# Patient Record
Sex: FEMALE | Race: WHITE | NOT HISPANIC OR LATINO | Employment: OTHER | URBAN - METROPOLITAN AREA
[De-identification: names, ages, dates, MRNs, and addresses within clinical notes are randomized per-mention and may not be internally consistent; named-entity substitution may affect disease eponyms.]

---

## 2017-07-17 ENCOUNTER — APPOINTMENT (OUTPATIENT)
Dept: GENERAL RADIOLOGY | Facility: HOSPITAL | Age: 82
End: 2017-07-17

## 2017-07-17 ENCOUNTER — ANESTHESIA (OUTPATIENT)
Dept: EMERGENCY DEPT | Facility: HOSPITAL | Age: 82
End: 2017-07-17

## 2017-07-17 ENCOUNTER — HOSPITAL ENCOUNTER (INPATIENT)
Facility: HOSPITAL | Age: 82
LOS: 8 days | Discharge: NURSING, REHAB OR LONG-TERM CARE | End: 2017-07-25
Attending: EMERGENCY MEDICINE | Admitting: HOSPITALIST

## 2017-07-17 ENCOUNTER — ANESTHESIA EVENT (OUTPATIENT)
Dept: EMERGENCY DEPT | Facility: HOSPITAL | Age: 82
End: 2017-07-17
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
## 2017-07-17 DIAGNOSIS — Z74.09 IMPAIRED FUNCTIONAL MOBILITY, BALANCE, GAIT, AND ENDURANCE: ICD-10-CM

## 2017-07-17 DIAGNOSIS — M20.021 BOUTONNIERE DEFORMITY OF FINGER OF RIGHT HAND: ICD-10-CM

## 2017-07-17 DIAGNOSIS — Z74.09 IMPAIRED MOBILITY AND ADLS: ICD-10-CM

## 2017-07-17 DIAGNOSIS — D69.3 CHRONIC IDIOPATHIC THROMBOCYTOPENIA (HCC): ICD-10-CM

## 2017-07-17 DIAGNOSIS — Z78.9 IMPAIRED MOBILITY AND ADLS: ICD-10-CM

## 2017-07-17 DIAGNOSIS — M97.01XA PERIPROSTHETIC FRACTURE AROUND INTERNAL PROSTHETIC RIGHT HIP JOINT, INITIAL ENCOUNTER (HCC): ICD-10-CM

## 2017-07-17 PROBLEM — M19.90 ARTHRITIS: Status: ACTIVE | Noted: 2017-07-17

## 2017-07-17 PROBLEM — D69.6 THROMBOCYTOPENIA (HCC): Status: ACTIVE | Noted: 2017-07-17

## 2017-07-17 PROBLEM — S72.91XA CLOSED FRACTURE OF RIGHT FEMUR (HCC): Status: ACTIVE | Noted: 2017-07-17

## 2017-07-17 LAB
ABO GROUP BLD: NORMAL
ALBUMIN SERPL-MCNC: 3.2 G/DL (ref 3.2–4.8)
ALBUMIN/GLOB SERPL: 1.2 G/DL (ref 1.5–2.5)
ALP SERPL-CCNC: 89 U/L (ref 25–100)
ALT SERPL W P-5'-P-CCNC: 15 U/L (ref 7–40)
ANION GAP SERPL CALCULATED.3IONS-SCNC: 5 MMOL/L (ref 3–11)
AST SERPL-CCNC: 20 U/L (ref 0–33)
BASOPHILS # BLD AUTO: 0.01 10*3/MM3 (ref 0–0.2)
BASOPHILS NFR BLD AUTO: 0.2 % (ref 0–1)
BILIRUB SERPL-MCNC: 0.6 MG/DL (ref 0.3–1.2)
BILIRUB UR QL STRIP: NEGATIVE
BLD GP AB SCN SERPL QL: NEGATIVE
BUN BLD-MCNC: 19 MG/DL (ref 9–23)
BUN/CREAT SERPL: 38 (ref 7–25)
CALCIUM SPEC-SCNC: 9 MG/DL (ref 8.7–10.4)
CHLORIDE SERPL-SCNC: 109 MMOL/L (ref 99–109)
CLARITY UR: CLEAR
CO2 SERPL-SCNC: 28 MMOL/L (ref 20–31)
COLOR UR: YELLOW
CREAT BLD-MCNC: 0.5 MG/DL (ref 0.6–1.3)
DEPRECATED RDW RBC AUTO: 52.8 FL (ref 37–54)
EOSINOPHIL # BLD AUTO: 0.01 10*3/MM3 (ref 0–0.3)
EOSINOPHIL NFR BLD AUTO: 0.2 % (ref 0–3)
ERYTHROCYTE [DISTWIDTH] IN BLOOD BY AUTOMATED COUNT: 13.7 % (ref 11.3–14.5)
GFR SERPL CREATININE-BSD FRML MDRD: 117 ML/MIN/1.73
GLOBULIN UR ELPH-MCNC: 2.6 GM/DL
GLUCOSE BLD-MCNC: 103 MG/DL (ref 70–100)
GLUCOSE UR STRIP-MCNC: NEGATIVE MG/DL
HCT VFR BLD AUTO: 34.5 % (ref 34.5–44)
HGB BLD-MCNC: 11.6 G/DL (ref 11.5–15.5)
HGB UR QL STRIP.AUTO: NEGATIVE
IMM GRANULOCYTES # BLD: 0.02 10*3/MM3 (ref 0–0.03)
IMM GRANULOCYTES NFR BLD: 0.3 % (ref 0–0.6)
KETONES UR QL STRIP: ABNORMAL
LEUKOCYTE ESTERASE UR QL STRIP.AUTO: NEGATIVE
LYMPHOCYTES # BLD AUTO: 0.63 10*3/MM3 (ref 0.6–4.8)
LYMPHOCYTES NFR BLD AUTO: 10 % (ref 24–44)
MCH RBC QN AUTO: 35.3 PG (ref 27–31)
MCHC RBC AUTO-ENTMCNC: 33.6 G/DL (ref 32–36)
MCV RBC AUTO: 104.9 FL (ref 80–99)
MONOCYTES # BLD AUTO: 0.46 10*3/MM3 (ref 0–1)
MONOCYTES NFR BLD AUTO: 7.3 % (ref 0–12)
NEUTROPHILS # BLD AUTO: 5.2 10*3/MM3 (ref 1.5–8.3)
NEUTROPHILS NFR BLD AUTO: 82 % (ref 41–71)
NITRITE UR QL STRIP: NEGATIVE
PH UR STRIP.AUTO: 5.5 [PH] (ref 5–8)
PLATELET # BLD AUTO: 62 10*3/MM3 (ref 150–450)
PMV BLD AUTO: 10 FL (ref 6–12)
POTASSIUM BLD-SCNC: 4.5 MMOL/L (ref 3.5–5.5)
PROT SERPL-MCNC: 5.8 G/DL (ref 5.7–8.2)
PROT UR QL STRIP: NEGATIVE
RBC # BLD AUTO: 3.29 10*6/MM3 (ref 3.89–5.14)
RH BLD: NEGATIVE
SODIUM BLD-SCNC: 142 MMOL/L (ref 132–146)
SP GR UR STRIP: 1.02 (ref 1–1.03)
UROBILINOGEN UR QL STRIP: ABNORMAL
WBC NRBC COR # BLD: 6.33 10*3/MM3 (ref 3.5–10.8)

## 2017-07-17 PROCEDURE — 81003 URINALYSIS AUTO W/O SCOPE: CPT | Performed by: EMERGENCY MEDICINE

## 2017-07-17 PROCEDURE — 80053 COMPREHEN METABOLIC PANEL: CPT | Performed by: EMERGENCY MEDICINE

## 2017-07-17 PROCEDURE — 99285 EMERGENCY DEPT VISIT HI MDM: CPT

## 2017-07-17 PROCEDURE — 71010 HC CHEST PA OR AP: CPT

## 2017-07-17 PROCEDURE — 86850 RBC ANTIBODY SCREEN: CPT | Performed by: EMERGENCY MEDICINE

## 2017-07-17 PROCEDURE — 25010000002 HYDROMORPHONE PER 4 MG: Performed by: NURSE PRACTITIONER

## 2017-07-17 PROCEDURE — 25010000002 HYDROMORPHONE PER 4 MG: Performed by: EMERGENCY MEDICINE

## 2017-07-17 PROCEDURE — 73140 X-RAY EXAM OF FINGER(S): CPT

## 2017-07-17 PROCEDURE — 85025 COMPLETE CBC W/AUTO DIFF WBC: CPT | Performed by: EMERGENCY MEDICINE

## 2017-07-17 PROCEDURE — 99223 1ST HOSP IP/OBS HIGH 75: CPT | Performed by: INTERNAL MEDICINE

## 2017-07-17 PROCEDURE — 93005 ELECTROCARDIOGRAM TRACING: CPT | Performed by: EMERGENCY MEDICINE

## 2017-07-17 PROCEDURE — 25010000002 ONDANSETRON PER 1 MG: Performed by: EMERGENCY MEDICINE

## 2017-07-17 PROCEDURE — 86901 BLOOD TYPING SEROLOGIC RH(D): CPT | Performed by: EMERGENCY MEDICINE

## 2017-07-17 PROCEDURE — 73502 X-RAY EXAM HIP UNI 2-3 VIEWS: CPT

## 2017-07-17 PROCEDURE — 86900 BLOOD TYPING SEROLOGIC ABO: CPT | Performed by: EMERGENCY MEDICINE

## 2017-07-17 RX ORDER — HYDROMORPHONE HYDROCHLORIDE 1 MG/ML
0.5 INJECTION, SOLUTION INTRAMUSCULAR; INTRAVENOUS; SUBCUTANEOUS
Status: DISCONTINUED | OUTPATIENT
Start: 2017-07-17 | End: 2017-07-18

## 2017-07-17 RX ORDER — MELOXICAM 15 MG/1
15 TABLET ORAL DAILY
COMMUNITY
End: 2017-07-25 | Stop reason: HOSPADM

## 2017-07-17 RX ORDER — ONDANSETRON 2 MG/ML
4 INJECTION INTRAMUSCULAR; INTRAVENOUS EVERY 6 HOURS PRN
Status: DISCONTINUED | OUTPATIENT
Start: 2017-07-17 | End: 2017-07-25 | Stop reason: HOSPADM

## 2017-07-17 RX ORDER — GABAPENTIN 300 MG/1
300 CAPSULE ORAL 2 TIMES DAILY
COMMUNITY
End: 2017-07-25 | Stop reason: HOSPADM

## 2017-07-17 RX ORDER — ONDANSETRON 2 MG/ML
4 INJECTION INTRAMUSCULAR; INTRAVENOUS ONCE
Status: COMPLETED | OUTPATIENT
Start: 2017-07-17 | End: 2017-07-17

## 2017-07-17 RX ORDER — HYDROMORPHONE HYDROCHLORIDE 1 MG/ML
0.5 INJECTION, SOLUTION INTRAMUSCULAR; INTRAVENOUS; SUBCUTANEOUS
Status: DISPENSED | OUTPATIENT
Start: 2017-07-17 | End: 2017-07-17

## 2017-07-17 RX ORDER — SODIUM CHLORIDE 9 MG/ML
100 INJECTION, SOLUTION INTRAVENOUS CONTINUOUS
Status: DISCONTINUED | OUTPATIENT
Start: 2017-07-17 | End: 2017-07-18

## 2017-07-17 RX ORDER — MELATONIN
1000 DAILY
COMMUNITY

## 2017-07-17 RX ORDER — UBIDECARENONE 75 MG
100 CAPSULE ORAL DAILY
Status: DISCONTINUED | OUTPATIENT
Start: 2017-07-18 | End: 2017-07-20 | Stop reason: DRUGHIGH

## 2017-07-17 RX ORDER — LANOLIN ALCOHOL/MO/W.PET/CERES
50 CREAM (GRAM) TOPICAL DAILY
COMMUNITY
End: 2017-07-17 | Stop reason: DRUGHIGH

## 2017-07-17 RX ORDER — ALBUTEROL SULFATE 90 UG/1
2 AEROSOL, METERED RESPIRATORY (INHALATION) EVERY 4 HOURS PRN
COMMUNITY
End: 2019-06-20

## 2017-07-17 RX ORDER — GABAPENTIN 300 MG/1
300 CAPSULE ORAL 2 TIMES DAILY
Status: DISCONTINUED | OUTPATIENT
Start: 2017-07-17 | End: 2017-07-18

## 2017-07-17 RX ORDER — LANOLIN ALCOHOL/MO/W.PET/CERES
50 CREAM (GRAM) TOPICAL DAILY
Status: DISCONTINUED | OUTPATIENT
Start: 2017-07-18 | End: 2017-07-25 | Stop reason: HOSPADM

## 2017-07-17 RX ORDER — SODIUM CHLORIDE 9 MG/ML
125 INJECTION, SOLUTION INTRAVENOUS CONTINUOUS
Status: DISCONTINUED | OUTPATIENT
Start: 2017-07-17 | End: 2017-07-17

## 2017-07-17 RX ORDER — THIAMINE MONONITRATE (VIT B1) 100 MG
100 TABLET ORAL DAILY
Status: DISCONTINUED | OUTPATIENT
Start: 2017-07-18 | End: 2017-07-25 | Stop reason: HOSPADM

## 2017-07-17 RX ORDER — SODIUM CHLORIDE 0.9 % (FLUSH) 0.9 %
1-10 SYRINGE (ML) INJECTION AS NEEDED
Status: DISCONTINUED | OUTPATIENT
Start: 2017-07-17 | End: 2017-07-25 | Stop reason: HOSPADM

## 2017-07-17 RX ORDER — BUPIVACAINE HYDROCHLORIDE 2.5 MG/ML
INJECTION, SOLUTION EPIDURAL; INFILTRATION; INTRACAUDAL AS NEEDED
Status: SHIPPED | OUTPATIENT
Start: 2017-07-17

## 2017-07-17 RX ORDER — ROPIVACAINE HYDROCHLORIDE 2 MG/ML
10 INJECTION, SOLUTION EPIDURAL; INFILTRATION CONTINUOUS
Status: DISCONTINUED | OUTPATIENT
Start: 2017-07-17 | End: 2017-07-25 | Stop reason: HOSPADM

## 2017-07-17 RX ADMIN — SODIUM CHLORIDE 125 ML/HR: 9 INJECTION, SOLUTION INTRAVENOUS at 14:28

## 2017-07-17 RX ADMIN — HYDROMORPHONE HYDROCHLORIDE 0.5 MG: 1 INJECTION, SOLUTION INTRAMUSCULAR; INTRAVENOUS; SUBCUTANEOUS at 14:26

## 2017-07-17 RX ADMIN — SODIUM CHLORIDE 500 ML: 9 INJECTION, SOLUTION INTRAVENOUS at 14:53

## 2017-07-17 RX ADMIN — BUPIVACAINE HYDROCHLORIDE 40 ML: 2.5 INJECTION, SOLUTION EPIDURAL; INFILTRATION; INTRACAUDAL at 16:25

## 2017-07-17 RX ADMIN — HYDROMORPHONE HYDROCHLORIDE 0.5 MG: 1 INJECTION, SOLUTION INTRAMUSCULAR; INTRAVENOUS; SUBCUTANEOUS at 22:11

## 2017-07-17 RX ADMIN — SODIUM CHLORIDE 125 ML/HR: 9 INJECTION, SOLUTION INTRAVENOUS at 14:53

## 2017-07-17 RX ADMIN — ONDANSETRON 4 MG: 2 INJECTION INTRAMUSCULAR; INTRAVENOUS at 14:26

## 2017-07-17 RX ADMIN — GABAPENTIN 300 MG: 300 CAPSULE ORAL at 23:54

## 2017-07-17 RX ADMIN — SODIUM CHLORIDE 500 ML: 9 INJECTION, SOLUTION INTRAVENOUS at 14:26

## 2017-07-17 NOTE — ED PROVIDER NOTES
Subjective   HPI Comments: Elizabeth Oliveira is a 87 y.o.female who ambulates independently and is typically able to go out to the store without difficulty. She has history of a right sided hip replacement. She presents to the ED today with c/o a fall which occurred just prior to arrival. She reports she attempted sitting down on a stool but missed. This caused her to fall onto her right side. She states she tried catching herself with her right hand but was unsuccessful. She did not hit her head or experience any loss of consciousness. Since, she has developed right sided hip pain radiating into her groin and knee. She states the pain is somewhat intermittent. She also c/o mild right sided neck pain but denies headache, back pain, other extremity pain, abdominal pain, decreased appetite, weight change, or any other complaints at this time.       Patient is a 87 y.o. female presenting with fall.   History provided by:  Patient  Fall   Mechanism of injury: fall    Injury location:  Pelvis  Pelvic injury location:  R hip  Incident location: Store.  Arrived directly from scene: yes    Fall:     Fall occurred:  From a stool    Impact surface:  Hard floor    Point of impact: Right side.    Entrapped after fall: no    Protective equipment: none    Suspicion of alcohol use: no    Suspicion of drug use: no    Prior to arrival data:     Bystander interventions:  None    Patient ambulatory at scene: no      Blood loss:  None    Responsiveness at scene:  Alert    Orientation at scene:  Person, place, situation and time    Loss of consciousness: no      Amnesic to event: no    Associated symptoms: neck pain    Associated symptoms: no abdominal pain, no back pain, no chest pain, no difficulty breathing, no headaches and no loss of consciousness        Review of Systems   Constitutional: Negative for appetite change and unexpected weight change.   Respiratory: Negative for shortness of breath.    Cardiovascular: Negative for  chest pain.   Gastrointestinal: Negative for abdominal pain.   Musculoskeletal: Positive for neck pain. Negative for back pain.        Right hip pain.   Neurological: Negative for loss of consciousness, syncope, weakness and headaches.   All other systems reviewed and are negative.      Past Medical History:   Diagnosis Date   • Depression        Allergies   Allergen Reactions   • Cortisone        Past Surgical History:   Procedure Laterality Date   • HIP SURGERY     • KNEE SURGERY         History reviewed. No pertinent family history.    Social History     Social History   • Marital status: Single     Spouse name: N/A   • Number of children: N/A   • Years of education: N/A     Social History Main Topics   • Smoking status: Former Smoker   • Smokeless tobacco: None   • Alcohol use Yes      Comment: COCKTAIL EVERY NIGHT   • Drug use: No   • Sexual activity: Defer     Other Topics Concern   • None     Social History Narrative   • None         Objective   Physical Exam   Constitutional: She is oriented to person, place, and time. She appears well-developed and well-nourished. No distress.   In some pain.   HENT:   Head: Normocephalic and atraumatic.   Nose: Nose normal.   Mouth/Throat: Oropharynx is clear and moist.   Eyes: Conjunctivae and EOM are normal. Pupils are equal, round, and reactive to light.   Neck: Normal range of motion. Neck supple.   Tender in the right trapezius area.   Cardiovascular: Normal rate, regular rhythm, normal heart sounds and intact distal pulses.    No murmur heard.  Pulmonary/Chest: Effort normal and breath sounds normal. No respiratory distress.   Abdominal: Soft. Bowel sounds are normal. She exhibits no mass. There is no tenderness. There is no rebound and no guarding.   Musculoskeletal: Normal range of motion. She exhibits tenderness. She exhibits no edema.   No TLS tenderness. Pelvis is stable. Both upper extremities have severe arthritic changes in the hands. Right long finger had  boutineer deformity versus swelling of the PIP joint. NV intact. Wrist is non-tender. R hip is tender to palpation with external rotation. Right thigh and knee are non-tender. Foot and ankle non tender. Normal pulses. Sensation intact. Pain with attempt in movement of the right hip area.    Neurological: She is alert and oriented to person, place, and time.   Skin: Skin is warm and dry.   Psychiatric: She has a normal mood and affect. Her behavior is normal.   Nursing note and vitals reviewed.      Procedures         ED Course  ED Course     Recent Results (from the past 24 hour(s))   CBC (No Diff)    Collection Time: 07/18/17  7:32 AM   Result Value Ref Range    WBC 4.70 3.50 - 10.80 10*3/mm3    RBC 2.99 (L) 3.89 - 5.14 10*6/mm3    Hemoglobin 10.7 (L) 11.5 - 15.5 g/dL    Hematocrit 32.9 (L) 34.5 - 44.0 %    .0 (H) 80.0 - 99.0 fL    MCH 35.8 (H) 27.0 - 31.0 pg    MCHC 32.5 32.0 - 36.0 g/dL    RDW 14.0 11.3 - 14.5 %    RDW-SD 56.2 (H) 37.0 - 54.0 fl    MPV 9.9 6.0 - 12.0 fL    Platelets 54 (L) 150 - 450 10*3/mm3   Basic Metabolic Panel    Collection Time: 07/18/17  7:32 AM   Result Value Ref Range    Glucose 94 70 - 100 mg/dL    BUN 12 9 - 23 mg/dL    Creatinine 0.50 (L) 0.60 - 1.30 mg/dL    Sodium 136 132 - 146 mmol/L    Potassium 4.3 3.5 - 5.5 mmol/L    Chloride 106 99 - 109 mmol/L    CO2 26.0 20.0 - 31.0 mmol/L    Calcium 8.8 8.7 - 10.4 mg/dL    eGFR Non African Amer 117 >60 mL/min/1.73    BUN/Creatinine Ratio 24.0 7.0 - 25.0    Anion Gap 4.0 3.0 - 11.0 mmol/L     Note: In addition to lab results from this visit, the labs listed above may include labs taken at another facility or during a different encounter within the last 24 hours. Please correlate lab times with ED admission and discharge times for further clarification of the services performed during this visit.    XR Chest 1 View   Preliminary Result   Underlying chronic changes seen within the lung fields   bilaterally. No evidence of acute  parenchymal disease.       D:  07/17/2017   E:  07/17/2017                  XR Finger 2+ View Right   Final Result   Extensive degenerative changes identified of the proximal   interphalangeal joint with no fracture or dislocation. Gouty arthritis   or inflammatory arthritis cannot be excluded. No cortical destruction.       D:  07/17/2017   E:  07/17/2017       This report was finalized on 7/17/2017 1:44 PM by Dr. Yecenia Coronel MD.          XR Hip With or Without Pelvis 2 - 3 View Right   Final Result   Fracture seen of the proximal right femur with multiple   fracture fragments identified. There is no disruption of the hardware.       D:  07/17/2017   E:  07/17/2017       This report was finalized on 7/17/2017 1:44 PM by Dr. Yecenia Coronel MD.            Vitals:    07/18/17 1500 07/18/17 1530 07/18/17 1547 07/18/17 1845   BP:   99/70 92/55   BP Location:   Left arm Left arm   Patient Position:   Lying Lying   Pulse: 69 69 74    Resp:   16 16   Temp:   99.8 °F (37.7 °C) 98.9 °F (37.2 °C)   TempSrc:   Tympanic Oral   SpO2: 100% 100% 95%    Weight:       Height:         Medications   HYDROmorphone (DILAUDID) injection 0.5 mg (0.5 mg Intravenous Given 7/17/17 1426)   ropivacaine (NAROPIN) 0.2% peripheral nerve cath (moog) 200 mL (10 mL/hr Peripheral Nerve New Bag 7/18/17 1105)   sodium chloride 0.9 % flush 1-10 mL (not administered)   ondansetron (ZOFRAN) injection 4 mg (not administered)   vitamin B-12 (CYANOCOBALAMIN) tablet 100 mcg (100 mcg Oral Given 7/18/17 1042)   vitamin B-6 (PYRIDOXINE) tablet 50 mg (50 mg Oral Given 7/18/17 1041)   sertraline (ZOLOFT) tablet 50 mg (50 mg Oral Given 7/18/17 1042)   thiamine (VITAMIN B-1) tablet 100 mg (100 mg Oral Given 7/18/17 1042)   albuterol (PROVENTIL) nebulizer solution 0.5% 2.5 mg/0.5mL (not administered)   Pharmacy Meds to Bed Consult ( Does not apply Canceled Entry 7/18/17 1212)   oxyCODONE-acetaminophen (PERCOCET) 5-325 MG per tablet 1 tablet (not  administered)   sodium chloride 0.9 % bolus 500 mL (0 mL Intravenous Stopped 7/17/17 1452)   ondansetron (ZOFRAN) injection 4 mg (4 mg Intravenous Given 7/17/17 1426)   sodium chloride 0.9 % bolus 500 mL (500 mL Intravenous New Bag 7/17/17 1453)     ECG/EMG Results (last 24 hours)     ** No results found for the last 24 hours. 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  She has a right periprosthetic hip fracture.  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    In the ER she received IV pain medication.  Patient's request I initially spoke to Dr. Kapadia, who deferred to the on-call orthopedist which was Dr. Bailey.    I relayed the message to Dr. Bailey be his scrub nurse this he was in surgery.    The patient has chronic thrombocytopenia is slightly lower than what she described as her last count and 80,000.    She had no significant head injury with this fall today is not on any long-term anticoagulation.    I spoke with the surgery desk to let them know about the need for a hip block.  When I went back to recheck on the patient again she is already been taken up to the surgical area.    I spoke Dr. Camacho, on-call hospital medicine, will admit the patient.    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  Information recorded by the scribskip was done at my direction and has been verified and validated by me.     Cherry Carrillo  07/17/17 1416       Cherry Carrillo  07/17/17 1420       Michael Barrios MD  07/18/17 0375

## 2017-07-17 NOTE — H&P
[facility] Medicine Services  HISTORY AND PHYSICAL    Primary Care Physician: Eladio Ferrell MD (Inactive)    Subjective     Chief Complaint: Right hip pain    History of Present Illness:     Pt is an 87 y.o.female who fell today on her Right hip after she attempted to sit on a stool and missed. 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 She tried catching herself with her right hand but was unsuccessful and also bruised her Right middle finger. She did not hit her head or experience any loss of consciousness.    Pt c/o of Right sided hip pain radiating into her groin and knee. She has history of a right sided hip replacement with two subsequent Right hip dislocations. At baseline pt ambulates independently and is typically able to go out to the store without difficulty.     ED workup reveals a fracture of the proximal Right femur with multiple fracture fragments; no disruption of hardware. 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    Pt had a peripheral pain block placed on Right prior to inpt admission today    PMHx: chronic idiopathic thrombocytopenia; previous hip replacements on right; left TK replacement; arthritis    Pt does have two glasses of bourbon nightly     RIGHT HIP HISTORY per EMR     (** Progress and Procedure Report from 4/30/15 states pt has a history of adverse anesthesia reaction but pt denies any difficulty with any anaesthesia).     Initial Right total hip replacement performed in 2003 April 2015 pt with 1st dislocation. She underwent a closed reduction of Right hip in ED without issues.    March 2016 pt with 2nd dislocation - right total hip arthroplasty revision done by Dr. Harrington on 03/21/2016.    Pt was transfused with platelets prior to surgery, with her normal platelet count around 50,000. Her platelets remained stable during previous  hospital stay. She was placed on DVT prophylaxis including TEDs and SCDs as well as Lovenox, which were continued postoperatively.     Review of Systems   Constitutional: Positive for activity change. Negative for fever.   Eyes: Negative for visual disturbance.   Respiratory: Negative for cough and shortness of breath.    Cardiovascular: Negative for palpitations and leg swelling.   Gastrointestinal: Negative for constipation (last BM was this morning), rectal pain and vomiting.   Endocrine: Negative for polyuria.   Genitourinary: Negative for dysuria and frequency.   Musculoskeletal: Positive for gait problem.        Right hip, leg pain   Psychiatric/Behavioral: Negative for hallucinations. The patient is not nervous/anxious.       Otherwise complete ROS performed and negative except as mentioned in the HPI.    Past Medical History:   Diagnosis Date   • Depression        Past Surgical History:   Procedure Laterality Date   • HIP SURGERY     • KNEE SURGERY     Bilateral Hips; Left total knee    Family Hx: father COPD. Mother no hx of OA.     Social History     Social History   • Marital status: Single     Spouse name: N/A   • Number of children: N/A   • Years of education: N/A     Occupational History   • Not on file.     Social History Main Topics   • Smoking status: Former Smoker   • Smokeless tobacco: Not on file   • Alcohol use Yes      Comment: COCKTAIL EVERY NIGHT   • Drug use: No   • Sexual activity: Defer     Other Topics Concern   • Not on file     Social History Narrative   • No narrative on file       Medications:  Prescriptions Prior to Admission   Medication Sig Dispense Refill Last Dose   • albuterol (PROAIR HFA) 108 (90 BASE) MCG/ACT inhaler Inhale 2 puffs Every 4 (Four) Hours As Needed for Wheezing.   7/16/2017   • cholecalciferol (VITAMIN D3) 1000 UNITS tablet Take 1,000 Units by mouth Daily.   7/16/2017   • Cyanocobalamin 1000 MCG capsule Take 1 capsule by mouth Daily.   7/16/2017   • gabapentin  "(NEURONTIN) 300 MG capsule Take 300 mg by mouth 2 (Two) Times a Day.   7/17/2017   • meloxicam (MOBIC) 15 MG tablet Take 15 mg by mouth Daily.   7/16/2017   • Multiple Vitamins-Minerals (CENTRUM SILVER PO) Take 1 tablet by mouth Daily.   7/16/2017   • Pyridoxine HCl (VITAMIN B6) 200 MG tablet Take 1 tablet by mouth Daily.   7/16/2017   • sertraline (ZOLOFT) 50 MG tablet Take 50 mg by mouth Daily.   7/16/2017       Allergies:  Allergies   Allergen Reactions   • Cortisone          Objective     Physical Exam:  Vital Signs: /62  Pulse 74  Temp 97.8 °F (36.6 °C)  Resp 16  Ht 62\" (157.5 cm)  Wt 113 lb (51.3 kg)  SpO2 95%  BMI 20.67 kg/m2  Physical Exam   Constitutional: She is oriented to person, place, and time. She is cooperative. No distress.   HENT:   Pt does have dentures   Eyes: EOM are normal. Pupils are equal, round, and reactive to light. Right eye exhibits no discharge. Left eye exhibits no discharge. No scleral icterus.   Pinpoint pupils; symm   Neck: No tracheal deviation present.   Cardiovascular: Regular rhythm.    DHT   Pulmonary/Chest: Effort normal and breath sounds normal. No respiratory distress.   Abdominal: Soft. She exhibits no distension. There is no tenderness.   Musculoskeletal: She exhibits deformity. She exhibits no edema.   Pain Right hip   Right middle finger +bruising +deformity   Neurological: She is alert and oriented to person, place, and time.   Skin: Skin is dry. She is not diaphoretic.   Dry mucous membranes and lips   Psychiatric: She has a normal mood and affect. Her behavior is normal. Thought content normal.       Results Reviewed:    Results from last 7 days  Lab Units 07/17/17  1520   WBC 10*3/mm3 6.33   HEMOGLOBIN g/dL 11.6   PLATELETS 10*3/mm3 62*       Results from last 7 days  Lab Units 07/17/17  1520   SODIUM mmol/L 142   POTASSIUM mmol/L 4.5   CO2 mmol/L 28.0   CREATININE mg/dL 0.50*   GLUCOSE mg/dL 103*   CALCIUM mg/dL 9.0       I have personally reviewed and " "interpreted available lab data, radiology studies and ECG obtained at time of admission.     Assessment / Plan     Problem List:   Hospital Problem List     * (Principal)Closed fracture of right femur    Fall    Arthritis    Thrombocytopenia          Assessment  1. Closed fx of Right prosthetic hip  2. OA  3. Pain 2/2 #1  4. Thrombocytopenia and macrocytosis     Plan:  The patient is admitted to the hospital for further management.  Monitor blood counts   NPO after midnight - anticipating pending ortho surgery   Case mgmt - pt has rehabed at [facility] with previous hip replacements  Pain mgmt/bowel regimen -- anesthesia to be called for inadequate pain control   Incentive spirometer  Antiemetic  Monitor for ETOH withdrawal (has not been an issue on previous hospitalizations)    - has already been typed and screened -   - EKG done -     DVT prophylaxis: amilcar/scd - awaiting procedure    Code Status: FULL CODE - \"If I can be fixed then okay, if not, then so be it.\" Pt wants CPR and Intubation. She does not want a feeding tube. Pt's Power of / Health Care Surrogate is her sister, Arti Poole, who lives in [place]. [phone]    Admission Status: Patient will be admitted to (LEXOBS or LEXINPT)     CAMPBELL Ramirez 07/17/17 8:19 PM     Brief Attending Note       I have seen and examined the patient, performing an independent face-to-face diagnostic evaluation with plan of care reviewed and developed with the advanced practice clinician (APC).       Brief Summary Statement/HPI:   88 yo with h/o chronic thrombocytopenia and bilateral hip replacements presents after fall this afternoon. Ms Oliveira volunteering at [facility] today when she fell, landing on right hip. Plain films show fracture of right proximal femur. Anesthesia has place a nerve catheter for pain control, and the patient will be admitted to the Hospitalist service with Orthopedic consultation.        Attending Physical Exam:  Gen-no acute " distress, non toxic, thin female, mucous membranes slightly dry  CV-RRR, S1 S2 normal, no m/r/g  Resp-CTAB, no wheezes, rhonchi or rales  Abd-soft, Non tender, Non distended, normo active bowel sounds  Ext-No lower extremity cyanosis clubbing or edema bilaterlly  Neuro-some slightly r > left upper  weakness  Psych-overall flat affect   Skin- No rash on exposed UE or LE bilaterally        Brief Assessment/Plan:    Right hip fracture  - discussed case with Orthopedics Dr Bailey, he has kindly agreed to evaluate and make further recommendations  - continue nerve catheter for pain control.    Thrombocytopenia  - secondary to MDS? (data deficient, patient says she follows with Hematology at [facility] - will obtain records)  - Platelets 62 today -- lab review shows platelets of 51 upon similar presentation her in March of 2016    Possible COPD    EtOH use  - thiamine daily    VTE proph: mechanical    For additional information see above per APC which I have reviewed and/or edited.      Edson Camacho MD  07/17/17  10:13 PM

## 2017-07-17 NOTE — ANESTHESIA PROCEDURE NOTES
Peripheral Block    Patient location during procedure: pre-op  Start time: 7/17/2017 4:22 PM  Stop time: 7/17/2017 4:22 PM  Reason for block: procedure for pain and at surgeon's request  Performed by  Anesthesiologist: MARIA ISABEL MARIO  CRNA: NAVEEN NERI  Preanesthetic Checklist  Completed: patient identified, site marked, surgical consent, pre-op evaluation, timeout performed, IV checked, risks and benefits discussed and monitors and equipment checked  Peripheral Block Prep:  Sterile barriers:cap, gloves and mask  Prep: ChloraPrep  Patient monitoring: blood pressure monitoring, continuous pulse oximetry and EKG  Peripheral Procedure  Guidance:ultrasound guided  Images:still images obtained    Laterality:right  Block Type:fascia iliaca catheter  Injection Technique:catheter  Needle Type:echogenic  Needle Gauge:18 G  Catheter Size:20 G (20g)  Medications  Preservative Free Saline:10ml  Local Injected:ropivacaine 0.2% and bupivacaine 0.25% Local Amount Injected:40mL  Post Assessment  Injection Assessment: negative aspiration for heme, no paresthesia on injection and incremental injection  Patient Tolerance:comfortable throughout block  Complications:no  Additional Notes  Procedure:          CATHETER at skin:  15                                Analgesia was achieved with LA      Pt placed in supine position.   The insertion site was prepped in sterile fashion with Chlorapreop and clear plastic drapes.  A B-Matthew 18 g , 4 inch echogenic Touhy needle was advance In-plane under ultrasound guidance. The   Anterior superior Iliac crest was initially visualized and the probe was directed slightly medially and slightly towards the umbilicus.  The course of the needle was tracked over the sartorius muscle through the fascia Iliacus and into the anterior portion of the Iliacus muscle.  Major vessels where identified and avoided as where structures of the peritoneal cavity.  LA injection was made incrementally in 1-5ml  amounts spread was visualized superiorly below fascia iliacus.  Injection was completed with negative aspiration of blood and negative intravascular injection.  Injection pressures where normal or minimal resistance.  A 20 g B-Matthew wire styleted catheter was then advance thru the needle and very easily placed in a superior or cephalad direction.  The catheter was secured at insertion site with skin afix , mastisol, steristreps.  A CHG tegaderm dressing was placed over the insertion site and the nerve catheter labeled and capped.  Thank You.

## 2017-07-18 LAB
ANION GAP SERPL CALCULATED.3IONS-SCNC: 4 MMOL/L (ref 3–11)
BUN BLD-MCNC: 12 MG/DL (ref 9–23)
BUN/CREAT SERPL: 24 (ref 7–25)
CALCIUM SPEC-SCNC: 8.8 MG/DL (ref 8.7–10.4)
CHLORIDE SERPL-SCNC: 106 MMOL/L (ref 99–109)
CO2 SERPL-SCNC: 26 MMOL/L (ref 20–31)
CREAT BLD-MCNC: 0.5 MG/DL (ref 0.6–1.3)
DEPRECATED RDW RBC AUTO: 56.2 FL (ref 37–54)
ERYTHROCYTE [DISTWIDTH] IN BLOOD BY AUTOMATED COUNT: 14 % (ref 11.3–14.5)
GFR SERPL CREATININE-BSD FRML MDRD: 117 ML/MIN/1.73
GLUCOSE BLD-MCNC: 94 MG/DL (ref 70–100)
HCT VFR BLD AUTO: 32.9 % (ref 34.5–44)
HGB BLD-MCNC: 10.7 G/DL (ref 11.5–15.5)
MCH RBC QN AUTO: 35.8 PG (ref 27–31)
MCHC RBC AUTO-ENTMCNC: 32.5 G/DL (ref 32–36)
MCV RBC AUTO: 110 FL (ref 80–99)
PLATELET # BLD AUTO: 54 10*3/MM3 (ref 150–450)
PMV BLD AUTO: 9.9 FL (ref 6–12)
POTASSIUM BLD-SCNC: 4.3 MMOL/L (ref 3.5–5.5)
RBC # BLD AUTO: 2.99 10*6/MM3 (ref 3.89–5.14)
SODIUM BLD-SCNC: 136 MMOL/L (ref 132–146)
WBC NRBC COR # BLD: 4.7 10*3/MM3 (ref 3.5–10.8)

## 2017-07-18 PROCEDURE — 80048 BASIC METABOLIC PNL TOTAL CA: CPT | Performed by: INTERNAL MEDICINE

## 2017-07-18 PROCEDURE — 97163 PT EVAL HIGH COMPLEX 45 MIN: CPT

## 2017-07-18 PROCEDURE — 99231 SBSQ HOSP IP/OBS SF/LOW 25: CPT | Performed by: HOSPITALIST

## 2017-07-18 PROCEDURE — 25010000002 HYDROMORPHONE PER 4 MG: Performed by: NURSE PRACTITIONER

## 2017-07-18 PROCEDURE — 97110 THERAPEUTIC EXERCISES: CPT

## 2017-07-18 PROCEDURE — 25010000002 ROPIVACAINE PER 1 MG: Performed by: ANESTHESIOLOGY

## 2017-07-18 PROCEDURE — 85027 COMPLETE CBC AUTOMATED: CPT | Performed by: INTERNAL MEDICINE

## 2017-07-18 PROCEDURE — 85060 BLOOD SMEAR INTERPRETATION: CPT | Performed by: HOSPITALIST

## 2017-07-18 RX ORDER — OXYCODONE HYDROCHLORIDE AND ACETAMINOPHEN 5; 325 MG/1; MG/1
1 TABLET ORAL EVERY 6 HOURS PRN
Status: DISCONTINUED | OUTPATIENT
Start: 2017-07-18 | End: 2017-07-19

## 2017-07-18 RX ADMIN — PYRIDOXINE HCL TAB 50 MG 50 MG: 50 TAB at 10:41

## 2017-07-18 RX ADMIN — VITAM B12 100 MCG: 100 TAB at 10:42

## 2017-07-18 RX ADMIN — HYDROMORPHONE HYDROCHLORIDE 0.5 MG: 1 INJECTION, SOLUTION INTRAMUSCULAR; INTRAVENOUS; SUBCUTANEOUS at 10:45

## 2017-07-18 RX ADMIN — SERTRALINE HYDROCHLORIDE 50 MG: 50 TABLET ORAL at 10:42

## 2017-07-18 RX ADMIN — SODIUM CHLORIDE 100 ML/HR: 9 INJECTION, SOLUTION INTRAVENOUS at 06:17

## 2017-07-18 RX ADMIN — Medication 100 MG: at 10:42

## 2017-07-18 RX ADMIN — ROPIVACAINE HYDROCHLORIDE 10 ML/HR: 2 INJECTION, SOLUTION EPIDURAL; INFILTRATION at 11:05

## 2017-07-18 RX ADMIN — GABAPENTIN 300 MG: 300 CAPSULE ORAL at 10:42

## 2017-07-18 NOTE — PLAN OF CARE
Problem: Patient Care Overview (Adult)  Goal: Plan of Care Review  Outcome: Ongoing (interventions implemented as appropriate)    07/18/17 4431   Coping/Psychosocial Response Interventions   Plan Of Care Reviewed With patient   Patient Care Overview   Progress progress toward functional goals is gradual   Outcome Evaluation   Outcome Summary/Follow up Plan 88 yo female admitted 7/17/17 w/ right hip fracture s/p fall. Ortho consulted, plan for PT prior to surgical interventions. Pt up w/ therapy to chair today. Continue w/ pain control and encourage ambulation as tolerated. Case mgmt working on placement d/t limited support at home. VSS, tele NSR, tolerating regular diet w/o difficulty.

## 2017-07-18 NOTE — CONSULTS
Requesting Dr. Camacho    Reason right periprosthetic hip fracture    Mrs. Purdy is an extremely pleasant 87-year-old female. 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 The most recent was done by Dr. Rubio slightly over a year ago. She has a press-fit prosthesis and reports to me that she was doing well. She reports to me that she was not using a walker or cane.She was donating her time at [facility] when she fell off of a stool onto her right side. She had pain and difficulty bearing weight. She was brought to the emergency room.She has underlying thrombocytopenia    Past medical history, past surgical history, medications and allergies all reviewed per  chart  14 point review of systems noncontributory other than noted above    Upon physical examination she's an extremely pleasant petite  female in no acute distress. Alert and oriented ×3. Stable vital signs. No evidence of head trauma and good cervical range of motion and although limited.  She has good movement of the upper extremities without evidence of injury  Her pelvis is stable to lateral compression and her abdomen soft and nontender  She has a well-healed incision over the lateral trochanter with some tenderness to touch.  She has discomfort with a straight leg raise  Negative Homans  Distally neurovascularly intact      Plain radiographs demonstrated [place] A minimally displaced intertrochanteric hip fracture surrounding a press-fit revision hip stem. There appears to be no damage to the acetabular component. There is the question of very subtle movement of the tip of the stem within a pedestal.      Assessment;  [place] A minimally displaced periprosthetic intertrochanteric hip fracture surrounding a well-functioning press-fit stem.    Plan;  We can attempt conservative treatment of this fracture in this high risk individual with multiple previous revisions. She would need to be touchdown  weightbearing with a walker. We will mobilize her with physical therapy. She will need serial radiographs to evaluate for any displacement stem. Unfortunately, if we see any migration she would then need a large revision procedure of the femoral stem. I explained this to Elizabeth and answered her questions. She feels comfortable with this plan. We'll continue to follow her through her hospitalization and as an outpatient    Please call with any questions or concerns

## 2017-07-18 NOTE — PLAN OF CARE
Problem: Patient Care Overview (Adult)  Goal: Plan of Care Review  Outcome: Ongoing (interventions implemented as appropriate)    07/18/17 1101   Coping/Psychosocial Response Interventions   Plan Of Care Reviewed With patient   Patient Care Overview   Progress progress toward functional goals is gradual   Outcome Evaluation   Outcome Summary/Follow up Plan Patient able to take 3 steps while maintaining TDWB on R LE and then had to pivot to chair. Reported significant pain with TDWB. Will need SNF upon d/c. Recommend OT consult for ADL and adaptive equipment training. Will continue to progress mobility as able.          Problem: Inpatient Physical Therapy  Goal: Bed Mobility Goal LTG- PT  Outcome: Ongoing (interventions implemented as appropriate)    07/18/17 1101   Bed Mobility PT LTG   Bed Mobility PT LTG, Date Established 07/18/17   Bed Mobility PT LTG, Time to Achieve 5 days   Bed Mobility PT LTG, Activity Type supine to sit/sit to supine   Bed Mobility PT LTG, [place] Level contact guard assist   Bed Mobility PT LTG, Date Goal Reviewed 07/18/17   Bed Mobility PT LTG, Outcome goal ongoing       Goal: Transfer Training Goal 1 LTG- PT  Outcome: Ongoing (interventions implemented as appropriate)    07/18/17 1101   Transfer Training PT LTG   Transfer Training PT LTG, Date Established 07/18/17   Transfer Training PT LTG, Time to Achieve 5 days   Transfer Training PT LTG, Activity Type sit to stand/stand to sit   Transfer Training PT LTG, [place] Level contact guard assist;2 person assist required   Transfer Training PT LTG, Assist Device walker, rolling   Transfer Training PT LTG, Date Goal Reviewed 07/18/17   Transfer Training PT LTG, Outcome goal ongoing       Goal: Gait Training Goal LTG- PT  Outcome: Ongoing (interventions implemented as appropriate)    07/18/17 1101   Gait Training PT LTG   Gait Training Goal PT LTG, Date Established 07/18/17   Gait Training Goal PT LTG, Time to Achieve 5 days   Gait  Training Goal PT LTG, [place] Level minimum assist (75% patient effort);2 person assist required   Gait Training Goal PT LTG, Assist Device walker, rolling   Gait Training Goal PT LTG, Distance to Achieve 50 feet   Gait Training Goal PT LTG, Date Goal Reviewed 07/18/17   Gait Training Goal PT LTG, Outcome goal ongoing

## 2017-07-18 NOTE — PROGRESS NOTES
Discharge Planning Assessment  [facility]     Patient Name: Elizabeth Oliveira  MRN: [id number]  Today's Date: 7/18/2017    Admit Date: 7/17/2017          Discharge Needs Assessment       07/18/17 1614    Living Environment    Lives With alone    Living Arrangements condominium    Home Accessibility bed and bath are not on the first floor;stairs to enter home;stairs within home    Number of Stairs Within Home 14    Stair Railings at Home inside, present at both sides    Type of Financial/Environmental Concern other (see comments)   two level home,lack of accessibility    Transportation Available car;family or friend will provide    Living Environment    Provides Primary Care For no one, unable/limited ability to care for self    Primary Care Provided By other (see comments)   no caregiver, has sister& neice as next of kin    Quality Of Family Relationships unable to assess    Discharge Needs Assessment    Concerns To Be Addressed basic needs concerns;discharge planning concerns    Readmission Within The Last 30 Days no previous admission in last 30 days    Anticipated Changes Related to Illness inability to care for self    Equipment Currently Used at Home cane, straight;raised toilet;bath bench;grab bar;walker, rolling   states she has recently loaned out her equipment to a friend    Equipment Needed After Discharge none    Discharge Facility/Level Of Care Needs nursing facility, skilled    Discharge Disposition skilled nursing facility    Discharge Contact Information if Applicable Arti Poole ( sister & POA) [phone]            Discharge Plan       07/18/17 1621    Case Management/Social Work Plan    Plan to be determined    Patient/Family In Agreement With Plan yes    Additional Comments I met with Ms Oliveira to discuss d/c plan. She is uncertain of her plans. She lives alone  in a two level condo. She has a sister who lives nearby who is also her POA. She states prior to admit she was independent with ADL's,  uses a taxi service for transportation,drives occsionally. She volunteers at [employer]. Her insurance covers Rx meds. We discussed d/c options. Since she has no caregiver,she will most likely need SNF. She is agreeable to a referral to [facility]. Case mgt will follow and assist with final dispostion.        Discharge Placement     No information found        Expected Discharge Date and Time     Expected Discharge Date Expected Discharge Time    Jul 20, 2017               Demographic Summary       07/18/17 1611    Referral Information    Admission Type inpatient    Arrived From home or self-care    Referral Source physician    Reason For Consult discharge planning    Record Reviewed history and physical;medical record    Contact Information    Permission Granted to Share Information With     Primary Care Physician Information    Name Eladio Montoya            Functional Status       07/18/17 1611    Functional Status Prior    Ambulation 0-->independent    Transferring 0-->independent    Toileting 0-->independent    Bathing 0-->independent    Dressing 0-->independent    Eating 0-->independent    Communication 0-->understands/communicates without difficulty    Swallowing 0-->swallows foods/liquids without difficulty    IADL    Medications independent    Meal Preparation independent    Housekeeping independent    Laundry independent    Shopping independent    Oral Care independent    Cognitive/Perceptual/Developmental    Current Mental Status/Cognitive Functioning no deficits noted    Recent Changes in Mental Status/Cognitive Functioning unable to assess    Employment/Financial    Source Of Income social security    Employment/Finance Comments [place] Medicare replacement            Psychosocial     None            Abuse/Neglect     None            Legal     None            Substance Abuse     None            Patient Forms     None          Sonja C Kellerman, HERNÁN

## 2017-07-18 NOTE — PROGRESS NOTES
Acute pain service Inpatient Progress Note    Patient Name: Elizabeth Oliveira  :  1929  MRN:  [id number]        Acute Pain  Service Inpatient Progress Note:    Analgesia:Good  Pain Score:5/10  LOC: alert and awake  Resp Status: room air  Cardiac: VS stable  Side Effects:None  Catheter Site:clean  Cath type: peripheral nerve cath(MOOG pump)  Infusion rate: 10ml/hr  Catheter Plan:Catheter to remain Insitu and Continue catheter infusion rate unchanged  Comments: Patient pleasant but confused

## 2017-07-18 NOTE — PROGRESS NOTES
"      HOSPITALIST DAILY PROGRESS NOTE    Chief Complaint: R Hip pain    Subjective   SUBJECTIVE/OVERNIGHT EVENTS   Pain moderately controlled with peripheral nerve block. No complaints    Review of Systems:  Gen-no fevers, no chills  CV-no chest pain, no palpitations  Resp-no cough, no dyspnea  GI-no N/V/D, no abd pain    Objective   OBJECTIVE   I have reviewed the vital signs.  BP 99/70 (BP Location: Left arm, Patient Position: Lying)  Pulse 74  Temp 99.8 °F (37.7 °C) (Tympanic)   Resp 16  Ht 62\" (157.5 cm)  Wt 113 lb (51.3 kg)  SpO2 95%  BMI 20.67 kg/m2    Physical Exam:  Gen-no acute distress  CV-RRR, S1 S2 normal, no m/r/g  Resp-CTAB, no wheezes  Abd-soft, NT, ND, +BS  Ext-no edema  Neuro-A&Ox3, no focal deficits, bilateral LE sensory and motor intact  Psych-appropriate mood    Results:  I have reviewed the labs, culture data, radiology results, and diagnostic studies.      Results from last 7 days  Lab Units 07/18/17  0732 07/17/17  1520   WBC 10*3/mm3 4.70 6.33   HEMOGLOBIN g/dL 10.7* 11.6   HEMATOCRIT % 32.9* 34.5   PLATELETS 10*3/mm3 54* 62*       Results from last 7 days  Lab Units 07/18/17  0732   SODIUM mmol/L 136   POTASSIUM mmol/L 4.3   CHLORIDE mmol/L 106   CO2 mmol/L 26.0   BUN mg/dL 12   CREATININE mg/dL 0.50*   GLUCOSE mg/dL 94   CALCIUM mg/dL 8.8       I have reviewed the medications.      Assessment/Plan   ASSESSMENT/PLAN    Principal Problem:    Closed fracture of right femur  Active Problems:    Fall    Arthritis    Thrombocytopenia    # Mechanical R hip fracture  - Orthopedics following. Plan for conservative management  - Continue pain control with peripheral nerve block  - IV Dilaudid changed to PO Percocet PRN  - Holding Hip DVT ppx due to thrombocytopenia    # Chronic thrombocytopenia  # Macrocytic anemia  - Peripheral smear pending  - Continue home B6, B12 and thiamine replacements    I expect patient to be discharged in: 3-4 days    Chanelle Tolbert MD  07/18/17  5:04 PM          "

## 2017-07-18 NOTE — THERAPY EVALUATION
Acute Care - Physical Therapy Initial Evaluation  [facility]     Patient Name: Elizabeth Oliveira  : 1929  MRN: [id number]  Today's Date: 2017   Onset of Illness/Injury or Date of Surgery Date: 17  Date of Referral to PT: 17  Referring Physician: MD Lynn      Admit Date: 2017     Visit Dx:    ICD-10-CM ICD-9-CM   1. Fall, initial encounter W19.XXXA E888.9   2. 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 Impaired functional mobility, balance, gait, and endurance Z74.09 V49.89     Patient Active Problem List   Diagnosis   • Fall   • Closed fracture of right femur   • Arthritis   • Thrombocytopenia     Past Medical History:   Diagnosis Date   • Depression      Past Surgical History:   Procedure Laterality Date   • HIP SURGERY     • KNEE SURGERY            PT ASSESSMENT (last 72 hours)      PT Evaluation       17 1614 17 1101    Rehab Evaluation    Document Type  evaluation  -LR    Subjective Information  agree to therapy;complains of;pain  -LR    Patient Effort, Rehab Treatment  good  -LR    Symptoms Noted During/After Treatment  increased pain;significant change in vital signs;other (see comments)  -LR    Symptoms Noted Comment  Pulse ox indicated de-saturation after t/f to chair which persisted after t/f and with donning of supplemental O2. Patient denied SOA and did not seem visibly SOA. Pulse ox did not have good waveform. Notified RN.   -LR    General Information    Patient Profile Review  yes  -LR    Onset of Illness/Injury or Date of Surgery Date  17  -LR    Referring Physician  MD Lynn  -LR    General Observations  Patient supine in bed upon arrival. IV, R fascia iliaca nerve catheter, tele, R LE resting externally rotated.   -LR    Pertinent History Of Current  Problem  Patient presented to ED after she attempted to sit on stool while volunteering at [employer] and missed the stool and fell on her R side. C/o R hip pain and R long finger pain after fall. Patient found to have R hip fx which has been determined to be non-operative per ortho. TDWB.   -LR    Precautions/Limitations  brace on when up;fall precautions;other (see comments)   knee immob when up d/t fascia iliaca cath; TDWB R LE  -LR    Prior Level of Function  independent:;all household mobility;community mobility;gait;transfer;bed mobility;ADL's;home management;cooking;cleaning;driving;shopping;using stairs  -LR    Equipment Currently Used at Home cane, straight;raised toilet;bath bench;grab bar;walker, rolling   states she has recently loaned out her equipment to a friend  -SK cane, straight;raised toilet;bath bench;grab bar;walker, rolling   not using AD; friend currently borrowing raised toilet/RW  -LR    Plans/Goals Discussed With  patient;agreed upon  -LR    Risks Reviewed  patient:;LOB;nausea/vomiting;dizziness;increased discomfort;change in vital signs;lines disloged  -LR    Benefits Reviewed  patient:;improve function;increase independence;increase strength;increase balance;decrease pain;increase knowledge  -LR    Barriers to Rehab  previous functional deficit  -LR    Living Environment    Lives With alone  -SK alone  -LR    Living Arrangements condominium  -SK condominium  -LR    Home Accessibility bed and bath are not on the first floor;stairs to enter home;stairs within home  -SK bed and bath are not on the first floor;house is not wheelchair accessible;stairs to enter home;stairs within home;tub/shower is not walk in;grab bars present (bathtub)  -LR    Number of Stairs to Enter Home  1  -LR    Number of Stairs Within Home 14  -SK 14  -LR    Stair Railings at Home inside, present at both sides  -SK none;inside, present at both sides  -LR    Type of Financial/Environmental Concern other (see comments)    two level home,lack of accessibility  -SK none  -LR    Transportation Available car;family or friend will provide  -SK van, wheelchair accessible  -LR    Living Environment Comment  Patient reports there is no one available to stay and assist upon d/c.   -LR    Clinical Impression    Date of Referral to PT  07/18/17  -LR    PT Diagnosis  R minimally displaced per-prosthetic intertrochanteric hip fx s/p fall  -LR    Prognosis  good  -LR    Patient/Family Goals Statement  decrease pain  -LR    Criteria for Skilled Therapeutic Interventions Met  yes;treatment indicated  -LR    Rehab Potential  good, to achieve stated therapy goals  -LR    Vital Signs    Intra SpO2 (%)  85  -LR    O2 Delivery Intra Treatment  supplemental O2  -LR    Post SpO2 (%)  91  -LR    O2 Delivery Post Treatment  supplemental O2  -LR    Intra Patient Position  Sitting  -LR    Post Patient Position  Sitting  -LR    Pain Assessment    Pain Assessment  0-10  -LR    Pain Score  2  -LR    Post Pain Score  5  -LR    Pain Type  Acute pain  -LR    Pain Location  Hip  -LR    Pain Orientation  Right  -LR    Pain Intervention(s)  Repositioned;Ambulation/increased activity  -LR    Vision Assessment/Intervention    Visual Impairment  WFL  -LR    Cognitive Assessment/Intervention    Current Cognitive/Communication Assessment  functional  -LR    Orientation Status  oriented x 4;required verbal cueing (specifiy in comments)  -LR    Follows Commands/Answers Questions  100% of the time;able to follow single-step instructions;needs cueing;needs repetition  -LR    Personal Safety  WNL/WFL  -LR    ROM (Range of Motion)    General ROM  lower extremity range of motion deficits identified  -LR    General LE Assessment    ROM  LLE ROM was WFL;hip, right: LE ROM deficit  -LR    ROM Detail  R hip AROM imparied 50% d/t pain  -LR    MMT (Manual Muscle Testing)    General MMT Assessment  lower extremity strength deficits identified  -LR    Lower Extremity    Lower Ext Manual  Muscle Testing  left LE strength is WFL;right hip strength deficit  -LR    Lower Ext Manual Muscle Testing Detail  R hip functionally 2/5 d/t pain  -LR    Mobility Assessment/Training    Extremity Weight-Bearing Status  right lower extremity  -LR    Right Lower Extremity Weight-Bearing  touch down weight-bearing  -LR    Bed Mobility, Assessment/Treatment    Bed Mobility, Assistive Device  head of bed elevated;bed rails  -LR    Bed Mob, Supine to Sit, [place]  verbal cues required;minimum assist (75% patient effort);2 person assist required  -LR    Bed Mob, Sit to Supine, [place]  not tested   [facility] at end of eval  -LR    Bed Mobility, Safety Issues  decreased use of arms for pushing/pulling;decreased use of legs for bridging/pushing  -LR    Bed Mobility, Impairments  ROM decreased;strength decreased;pain  -LR    Bed Mobility, Comment  Knee immobilizer donned while supine in bed. Verbal cues to move LEs towards EOB and to push up from bed from behind to raise trunk into sitting. Min assist required to support R LE and at trunk. Denied dizziness upon sitting up. Cues to push from bed to scoot hips out to get feet on floor.   -LR    Transfer Assessment/Treatment    Transfers, Sit-Stand [place]  verbal cues required;moderate assist (50% patient effort);2 person assist required  -LR    Transfers, Stand-Sit [place]  verbal cues required;moderate assist (50% patient effort);2 person assist required  -LR    Transfers, Sit-Stand-Sit, Assist Device  rolling walker  -LR    Transfer, Maintain Weight Bearing Status  able to maintain weight bearing status;cues to maintain weight bearing status  -LR    Transfer, Safety Issues  sequencing ability decreased;step length decreased;weight-shifting ability decreased  -LR    Transfer, Impairments  ROM decreased;strength decreased;pain  -LR    Transfer, Comment  Verbal cues to push up from bed to stand and to reach back for chair to lower into sitting. Verbal cues to  step R LE out before t/f and for TDWB only on R LE.   -LR    Gait Assessment/Treatment    Gait, [place] Level  verbal cues required;moderate assist (50% patient effort);2 person assist required  -LR    Gait, Assistive Device  rolling walker  -LR    Gait, Distance (Feet)  3  -LR    Gait, Gait Deviations  right:;antalgic;forward flexed posture;step length decreased;toe-to-floor clearance decreased;weight-shifting ability decreased  -LR    Gait, Maintain Weight Bearing Status  able to maintain weight bearing status;cues to maintain weight bearing status  -LR    Gait, Safety Issues  sequencing ability decreased;step length decreased;weight-shifting ability decreased  -LR    Gait, Impairments  ROM decreased;strength decreased;pain  -LR    Gait, Comment  Patient took 3 steps maintaining TDWB towards chair and then pivoted to turn in front of chair to sit. Patient reported significant pain with TDWB. Cues for sequencing of steps and weight shifting. Cues for TDWB.   -LR    Therapy Exercises    Right Lower Extremity  AAROM:;10 reps;sitting;ankle pumps/circles;glut sets;heel slides;hip abduction/adduction;quad sets;SLR   cues for technique; mod assist, ROM limited by pain  -LR    Sensory Assessment/Intervention    Sensory Impairment  --   denies numbness/tingling; able to actively DF bilaterally  -LR    Positioning and Restraints    Pre-Treatment Position  in bed  -LR    Post Treatment Position  chair  -LR    In Chair  notified nsg;reclined;sitting;call light within reach;encouraged to call for assist;exit alarm on;on mechanical lift sling;legs elevated  -LR      07/18/17 0800 07/17/17 2391    Muscle Tone Assessment    Muscle Tone Assessment  Bilateral Upper Extremities;LLE;RLE  -AB    Bilateral Upper Extremities Muscle Tone Assessment  associated movements noted  -AB    LLE Muscle Tone Assessment associated movements noted  -JR associated movements noted  -AB    RLE Muscle Tone Assessment severely decreased tone  -JR  severely decreased tone  -AB      07/17/17 1700       General Information    Equipment Currently Used at Home none  -KL     Living Environment    Lives With alone  -KL     Living Arrangements condominium  -     Home Accessibility bed and bath are not on the first floor;bed not on first floor;grab bars present (bathtub);grab bars present (toilet);stairs (2 railings present);stairs within home;tub/shower is not walk in  -     Number of Stairs Within Home 14  -KL     Stair Railings at Home inside, present at both sides  -     Type of Financial/Environmental Concern none  -KL     Transportation Available van, wheelchair accessible  -     Living Environment Comment .  -       User Key  (r) = Recorded By, (t) = Taken By, (c) = Cosigned By    Initials Name Provider Type    LR Tianna Lizarraga, PT Physical Therapist    SK Sonja C Kellerman, RN Case Manager    AB Marlen Hu, RN Registered Nurse    RAFFI Lange RN Registered Nurse    JR Reji Berg, RN Registered Nurse          Physical Therapy Education     Title: PT OT SLP Therapies (Done)     Topic: Physical Therapy (Done)     Point: Mobility training (Done)    Learning Progress Summary    Learner Readiness Method Response Comment Documented by Status   Patient Acceptance KAYLEE BARRON,NR Educated on TDWB status and gait training while maintaining TDWB. Educated on indication for knee immobilizer.  07/18/17 1707 Done               Point: Home exercise program (Done)    Learning Progress Summary    Learner Readiness Method Response Comment Documented by Status   Patient Acceptance KAYLEE BARRON,NR Educated on TDWB status and gait training while maintaining TDWB. Educated on indication for knee immobilizer.  07/18/17 1707 Done               Point: Body mechanics (Done)    Learning Progress Summary    Learner Readiness Method Response Comment Documented by Status   Patient Acceptance KAYLEE BARRON,NR Educated on TDWB status and gait training while maintaining  TDWB. Educated on indication for knee immobilizer.  07/18/17 1707 Done               Point: Precautions (Done)    Learning Progress Summary    Learner Readiness Method Response Comment Documented by Status   Patient Acceptance E,D MARCOS,NR Educated on TDWB status and gait training while maintaining TDWB. Educated on indication for knee immobilizer.  07/18/17 1707 Done                      User Key     Initials Effective Dates Name Provider Type Discipline     06/19/15 -  Tianna Lizarraga, PT Physical Therapist PT                PT Recommendation and Plan  Anticipated Equipment Needs At Discharge: bedside commode  Anticipated Discharge Disposition: skilled nursing facility  Demonstrates Need for Referral to Another Service: occupational therapy  Planned Therapy Interventions: balance training, bed mobility training, gait training, home exercise program, patient/family education, ROM (Range of Motion), strengthening, transfer training  PT Frequency: daily  Plan of Care Review  Plan Of Care Reviewed With: patient  Progress: progress toward functional goals is gradual  Outcome Summary/Follow up Plan: Patient able to take 3 steps while maintaining TDWB on R LE and then had to pivot to chair. Reported significant pain with TDWB. Will need SNF upon d/c. Recommend OT consult for ADL and adaptive equipment training. Will continue to progress mobility as able.           IP PT Goals       07/18/17 1101          Bed Mobility PT LTG    Bed Mobility PT LTG, Date Established 07/18/17  -LR      Bed Mobility PT LTG, Time to Achieve 5 days  -LR      Bed Mobility PT LTG, Activity Type supine to sit/sit to supine  -LR      Bed Mobility PT LTG, [place] Level contact guard assist  -LR      Bed Mobility PT LTG, Date Goal Reviewed 07/18/17  -LR      Bed Mobility PT LTG, Outcome goal ongoing  -LR      Transfer Training PT LTG    Transfer Training PT LTG, Date Established 07/18/17  -LR      Transfer Training PT LTG, Time to  Achieve 5 days  -LR      Transfer Training PT LTG, Activity Type sit to stand/stand to sit  -LR      Transfer Training PT LTG, [place] Level contact guard assist;2 person assist required  -LR      Transfer Training PT LTG, Assist Device walker, rolling  -LR      Transfer Training PT  LTG, Date Goal Reviewed 07/18/17  -LR      Transfer Training PT LTG, Outcome goal ongoing  -LR      Gait Training PT LTG    Gait Training Goal PT LTG, Date Established 07/18/17  -LR      Gait Training Goal PT LTG, Time to Achieve 5 days  -LR      Gait Training Goal PT LTG, [place] Level minimum assist (75% patient effort);2 person assist required  -LR      Gait Training Goal PT LTG, Assist Device walker, rolling  -LR      Gait Training Goal PT LTG, Distance to Achieve 50 feet  -LR      Gait Training Goal PT LTG, Date Goal Reviewed 07/18/17  -LR      Gait Training Goal PT LTG, Outcome goal ongoing  -LR        User Key  (r) = Recorded By, (t) = Taken By, (c) = Cosigned By    Initials Name Provider Type    ANIA Lizarraga, PT Physical Therapist                Outcome Measures       07/18/17 1101          How much help from another person do you currently need...    Turning from your back to your side while in flat bed without using bedrails? 2  -LR      Moving from lying on back to sitting on the side of a flat bed without bedrails? 2  -LR      Moving to and from a bed to a chair (including a wheelchair)? 2  -LR      Standing up from a chair using your arms (e.g., wheelchair, bedside chair)? 2  -LR      Climbing 3-5 steps with a railing? 1  -LR      To walk in hospital room? 2  -LR      AM-PAC 6 Clicks Score 11  -LR      Functional Assessment    Outcome Measure Options AM-PAC 6 Clicks Basic Mobility (PT)  -LR        User Key  (r) = Recorded By, (t) = Taken By, (c) = Cosigned By    Initials Name Provider Type    ANIA Lizarraga PT Physical Therapist           Time Calculation:         PT Charges       07/18/17  1532          Time Calculation    Start Time 1101  -LR      PT Received On 07/18/17  -LR      PT Goal Re-Cert Due Date 07/28/17  -LR      Time Calculation- PT    Total Timed Code Minutes- PT 15 minute(s)  -LR        User Key  (r) = Recorded By, (t) = Taken By, (c) = Cosigned By    Initials Name Provider Type    LR Tianna Lizarraga, PT Physical Therapist          Therapy Charges for Today     Code Description Service Date Service Provider Modifiers Qty    15473043301 HC PT THER PROC EA 15 MIN 7/18/2017 Tianna Lizarraga, PT GP 1    96251947887 HC PT THER SUPP EA 15 MIN 7/18/2017 Tianna Lizarraga, PT GP 3    20378835659 HC PT EVAL HIGH COMPLEXITY 3 7/18/2017 Tianna Lizarraga, PT GP 1          PT G-Codes  Outcome Measure Options: AM-PAC 6 Clicks Basic Mobility (PT)      Tianna Lizarraga, PT  7/18/2017

## 2017-07-19 LAB
ALBUMIN SERPL-MCNC: 3.6 G/DL (ref 3.2–4.8)
ANION GAP SERPL CALCULATED.3IONS-SCNC: 5 MMOL/L (ref 3–11)
BACTERIA UR QL AUTO: ABNORMAL /HPF
BILIRUB UR QL STRIP: NEGATIVE
BUN BLD-MCNC: 13 MG/DL (ref 9–23)
BUN/CREAT SERPL: 21.7 (ref 7–25)
CALCIUM SPEC-SCNC: 9.3 MG/DL (ref 8.7–10.4)
CHLORIDE SERPL-SCNC: 105 MMOL/L (ref 99–109)
CLARITY UR: CLEAR
CO2 SERPL-SCNC: 30 MMOL/L (ref 20–31)
COLOR UR: YELLOW
CREAT BLD-MCNC: 0.6 MG/DL (ref 0.6–1.3)
CYTOLOGIST CVX/VAG CYTO: NORMAL
DEPRECATED RDW RBC AUTO: 54.2 FL (ref 37–54)
ERYTHROCYTE [DISTWIDTH] IN BLOOD BY AUTOMATED COUNT: 13.7 % (ref 11.3–14.5)
GFR SERPL CREATININE-BSD FRML MDRD: 95 ML/MIN/1.73
GLUCOSE BLD-MCNC: 111 MG/DL (ref 70–100)
GLUCOSE BLDC GLUCOMTR-MCNC: 103 MG/DL (ref 70–130)
GLUCOSE UR STRIP-MCNC: NEGATIVE MG/DL
HCT VFR BLD AUTO: 31.7 % (ref 34.5–44)
HGB BLD-MCNC: 10.4 G/DL (ref 11.5–15.5)
HGB UR QL STRIP.AUTO: ABNORMAL
HYALINE CASTS UR QL AUTO: ABNORMAL /LPF
KETONES UR QL STRIP: NEGATIVE
LEUKOCYTE ESTERASE UR QL STRIP.AUTO: NEGATIVE
MCH RBC QN AUTO: 35.6 PG (ref 27–31)
MCHC RBC AUTO-ENTMCNC: 32.8 G/DL (ref 32–36)
MCV RBC AUTO: 108.6 FL (ref 80–99)
NITRITE UR QL STRIP: NEGATIVE
PATH INTERP BLD-IMP: NORMAL
PH UR STRIP.AUTO: 6.5 [PH] (ref 5–8)
PHOSPHATE SERPL-MCNC: 2.4 MG/DL (ref 2.4–5.1)
PLATELET # BLD AUTO: 50 10*3/MM3 (ref 150–450)
PMV BLD AUTO: 10.4 FL (ref 6–12)
POTASSIUM BLD-SCNC: 4.7 MMOL/L (ref 3.5–5.5)
PROT UR QL STRIP: NEGATIVE
RBC # BLD AUTO: 2.92 10*6/MM3 (ref 3.89–5.14)
RBC # UR: ABNORMAL /HPF
REF LAB TEST METHOD: ABNORMAL
SODIUM BLD-SCNC: 140 MMOL/L (ref 132–146)
SP GR UR STRIP: 1.01 (ref 1–1.03)
SQUAMOUS #/AREA URNS HPF: ABNORMAL /HPF
UROBILINOGEN UR QL STRIP: ABNORMAL
WBC NRBC COR # BLD: 5.28 10*3/MM3 (ref 3.5–10.8)
WBC UR QL AUTO: ABNORMAL /HPF

## 2017-07-19 PROCEDURE — 25010000002 THIAMINE PER 100 MG: Performed by: HOSPITALIST

## 2017-07-19 PROCEDURE — 82962 GLUCOSE BLOOD TEST: CPT

## 2017-07-19 PROCEDURE — 97116 GAIT TRAINING THERAPY: CPT

## 2017-07-19 PROCEDURE — 25010000002 LORAZEPAM PER 2 MG: Performed by: HOSPITALIST

## 2017-07-19 PROCEDURE — 25010000002 FONDAPARINUX PER 0.5 MG: Performed by: HOSPITALIST

## 2017-07-19 PROCEDURE — 25010000002 ROPIVACAINE PER 1 MG: Performed by: ANESTHESIOLOGY

## 2017-07-19 PROCEDURE — 97530 THERAPEUTIC ACTIVITIES: CPT

## 2017-07-19 PROCEDURE — 85027 COMPLETE CBC AUTOMATED: CPT | Performed by: HOSPITALIST

## 2017-07-19 PROCEDURE — 97166 OT EVAL MOD COMPLEX 45 MIN: CPT

## 2017-07-19 PROCEDURE — 99233 SBSQ HOSP IP/OBS HIGH 50: CPT | Performed by: HOSPITALIST

## 2017-07-19 PROCEDURE — 81001 URINALYSIS AUTO W/SCOPE: CPT | Performed by: HOSPITALIST

## 2017-07-19 PROCEDURE — 80069 RENAL FUNCTION PANEL: CPT | Performed by: HOSPITALIST

## 2017-07-19 PROCEDURE — 25010000002 MAGNESIUM SULFATE PER 500 MG OF MAGNESIUM: Performed by: HOSPITALIST

## 2017-07-19 RX ORDER — FONDAPARINUX SODIUM 2.5 MG/.5ML
2.5 INJECTION SUBCUTANEOUS EVERY 12 HOURS
Status: DISCONTINUED | OUTPATIENT
Start: 2017-07-19 | End: 2017-07-19

## 2017-07-19 RX ORDER — LORAZEPAM 2 MG/ML
0.5 INJECTION INTRAMUSCULAR
Status: DISCONTINUED | OUTPATIENT
Start: 2017-07-19 | End: 2017-07-20

## 2017-07-19 RX ORDER — LORAZEPAM 2 MG/ML
1 INJECTION INTRAMUSCULAR
Status: DISCONTINUED | OUTPATIENT
Start: 2017-07-19 | End: 2017-07-20

## 2017-07-19 RX ORDER — LORAZEPAM 1 MG/1
1 TABLET ORAL
Status: DISCONTINUED | OUTPATIENT
Start: 2017-07-19 | End: 2017-07-20

## 2017-07-19 RX ORDER — ACETAMINOPHEN 325 MG/1
650 TABLET ORAL EVERY 6 HOURS PRN
Status: DISCONTINUED | OUTPATIENT
Start: 2017-07-19 | End: 2017-07-25 | Stop reason: HOSPADM

## 2017-07-19 RX ORDER — LORAZEPAM 0.5 MG/1
0.5 TABLET ORAL
Status: DISCONTINUED | OUTPATIENT
Start: 2017-07-19 | End: 2017-07-20

## 2017-07-19 RX ORDER — FONDAPARINUX SODIUM 2.5 MG/.5ML
2.5 INJECTION SUBCUTANEOUS
Status: DISCONTINUED | OUTPATIENT
Start: 2017-07-19 | End: 2017-07-25 | Stop reason: HOSPADM

## 2017-07-19 RX ADMIN — FONDAPARINUX SODIUM 2.5 MG: 2.5 INJECTION SUBCUTANEOUS at 20:25

## 2017-07-19 RX ADMIN — LORAZEPAM 1 MG: 2 INJECTION INTRAMUSCULAR; INTRAVENOUS at 16:58

## 2017-07-19 RX ADMIN — LORAZEPAM 1 MG: 2 INJECTION INTRAMUSCULAR; INTRAVENOUS at 17:25

## 2017-07-19 RX ADMIN — SERTRALINE HYDROCHLORIDE 50 MG: 50 TABLET ORAL at 08:58

## 2017-07-19 RX ADMIN — PYRIDOXINE HCL TAB 50 MG 50 MG: 50 TAB at 08:57

## 2017-07-19 RX ADMIN — THIAMINE HYDROCHLORIDE 100 ML/HR: 100 INJECTION, SOLUTION INTRAMUSCULAR; INTRAVENOUS at 18:01

## 2017-07-19 RX ADMIN — VITAM B12 100 MCG: 100 TAB at 08:58

## 2017-07-19 RX ADMIN — Medication 100 MG: at 08:58

## 2017-07-19 RX ADMIN — LORAZEPAM 2 MG: 2 INJECTION INTRAMUSCULAR; INTRAVENOUS at 18:01

## 2017-07-19 RX ADMIN — ROPIVACAINE HYDROCHLORIDE 10 ML/HR: 2 INJECTION, SOLUTION EPIDURAL; INFILTRATION at 05:24

## 2017-07-19 NOTE — PROGRESS NOTES
"      HOSPITALIST DAILY PROGRESS NOTE    Chief Complaint: Hip pain    Subjective   SUBJECTIVE/OVERNIGHT EVENTS   No complaints. Hip pain well-controlled. Did not require additional PO opiate. Ambulated well with PT. Pt thought she heard her sister out in the hallway when seen. Nursing reports pt being oriented but some situational confusion. +Mild dysuria    Review of Systems:  Gen-no fevers, no chills  CV-no chest pain, no palpitations  Resp-no cough, no dyspnea  GI-no N/V/D, no abd pain    Objective   OBJECTIVE   I have reviewed the vital signs.  /68 (BP Location: Left arm, Patient Position: Lying)  Pulse 77  Temp 98.1 °F (36.7 °C) (Oral)   Resp 16  Ht 62\" (157.5 cm)  Wt 113 lb (51.3 kg)  SpO2 96%  BMI 20.67 kg/m2    Physical Exam:  Gen-no acute distress  CV-RRR, S1 S2 normal, no m/r/g  Resp-CTAB, no wheezes  Abd-soft, NT, ND, +BS  Ext-no edema  Neuro-A&Ox3, no focal deficits. Bilateral LE strength 5/5, sensory and motor intact  Psych-appropriate mood    Results:  I have reviewed the labs, culture data, radiology results, and diagnostic studies.      Results from last 7 days  Lab Units 07/19/17  0655 07/18/17  0732 07/17/17  1520   WBC 10*3/mm3 5.28 4.70 6.33   HEMOGLOBIN g/dL 10.4* 10.7* 11.6   HEMATOCRIT % 31.7* 32.9* 34.5   PLATELETS 10*3/mm3 50* 54* 62*       Results from last 7 days  Lab Units 07/19/17  0655   SODIUM mmol/L 140   POTASSIUM mmol/L 4.7   CHLORIDE mmol/L 105   CO2 mmol/L 30.0   BUN mg/dL 13   CREATININE mg/dL 0.60   GLUCOSE mg/dL 111*   CALCIUM mg/dL 9.3       I have reviewed the medications.      Assessment/Plan   ASSESSMENT/PLAN      # Mechanical R hip fracture  - Orthopedics following. Plan for conservative management. FU with Dr. Bailey in 2 weeks for FU XRays. No surgical intervention for now  - Continue pain control with peripheral nerve block. Will remove catheter tomorrow morning to evaluate how pt does with nerve block and PT in anticipation of discharge  - Tylenol PRN for " pain  - Start fondaparinux 2.5mg SQ BID for Hip DVT ppx after discussion with Pharmacy     # Chronic thrombocytopenia  - Peripheral smear shows counts closer to 100,000    # Confusion  # Mild dysuria  - UA neg for infection. No respiratory symptoms to suspect respiratory infection. WBC normal with no fevers  - Medications reviewed   - ?delirium. Consider a low-dose Seroquel if agitated. Will likely improve after DC    # Macrocytic anemia  - Continue home B6, B12 and thiamine replacements    DVT ppx: Fondaparinux  I expect patient to be discharged in: 1 day    Chanelle Tolbert MD  07/19/17  3:31 PM

## 2017-07-19 NOTE — PLAN OF CARE
Problem: Patient Care Overview (Adult)  Goal: Plan of Care Review  Outcome: Ongoing (interventions implemented as appropriate)    07/18/17 1955 07/19/17 0422   Coping/Psychosocial Response Interventions   Plan Of Care Reviewed With patient --    Patient Care Overview   Progress --  improving   Outcome Evaluation   Outcome Summary/Follow up Plan --  Patient rested well this shift. No complaints of pain or discomfort this shift so far. Short periods of what may be confusion throughout the night, but quick to become oriented.

## 2017-07-19 NOTE — PLAN OF CARE
Problem: Patient Care Overview (Adult)  Goal: Plan of Care Review  Outcome: Ongoing (interventions implemented as appropriate)    07/19/17 1338   Coping/Psychosocial Response Interventions   Plan Of Care Reviewed With patient   Patient Care Overview   Progress improving   Outcome Evaluation   Outcome Summary/Follow up Plan All mobility very painful. Still able to participate with PT.         Problem: Inpatient Physical Therapy  Goal: Bed Mobility Goal LTG- PT  Outcome: Ongoing (interventions implemented as appropriate)    07/18/17 1101 07/19/17 1338   Bed Mobility PT LTG   Bed Mobility PT LTG, Date Established 07/18/17 --    Bed Mobility PT LTG, Time to Achieve 5 days --    Bed Mobility PT LTG, Activity Type supine to sit/sit to supine --    Bed Mobility PT LTG, [place] Level contact guard assist --    Bed Mobility PT LTG, Date Goal Reviewed 07/18/17 --    Bed Mobility PT LTG, Outcome --  goal ongoing       Goal: Transfer Training Goal 1 LTG- PT  Outcome: Ongoing (interventions implemented as appropriate)    07/18/17 1101 07/19/17 1338   Transfer Training PT LTG   Transfer Training PT LTG, Date Established 07/18/17 --    Transfer Training PT LTG, Time to Achieve 5 days --    Transfer Training PT LTG, Activity Type sit to stand/stand to sit --    Transfer Training PT LTG, [place] Level contact guard assist;2 person assist required --    Transfer Training PT LTG, Assist Device walker, rolling --    Transfer Training PT LTG, Date Goal Reviewed 07/18/17 --    Transfer Training PT LTG, Outcome --  goal ongoing       Goal: Gait Training Goal LTG- PT  Outcome: Ongoing (interventions implemented as appropriate)    07/18/17 1101 07/19/17 1338   Gait Training PT LTG   Gait Training Goal PT LTG, Date Established 07/18/17 --    Gait Training Goal PT LTG, Time to Achieve 5 days --    Gait Training Goal PT LTG, [place] Level minimum assist (75% patient effort);2 person assist required --    Gait Training Goal PT  LTG, Assist Device walker, rolling --    Gait Training Goal PT LTG, Distance to Achieve 50 feet --    Gait Training Goal PT LTG, Date Goal Reviewed 07/18/17 --    Gait Training Goal PT LTG, Outcome --  goal ongoing

## 2017-07-19 NOTE — THERAPY EVALUATION
Acute Care - Occupational Therapy Initial Evaluation  [facility]     Patient Name: Elizabeth Oliveira  : 1929  MRN: [id number]  Today's Date: 2017  Onset of Illness/Injury or Date of Surgery Date: 17  Date of Referral to OT: 17  Referring Physician: Chanelle Tolbert MD    Admit Date: 2017       ICD-10-CM ICD-9-CM   1. Fall, initial encounter W19.XXXA E888.9   2. 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 Impaired functional mobility, balance, gait, and endurance Z74.09 V49.89   6. Impaired mobility and ADLs Z74.09 799.89     Patient Active Problem List   Diagnosis   • Fall   • Closed fracture of right femur   • Arthritis   • Thrombocytopenia     Past Medical History:   Diagnosis Date   • Depression      Past Surgical History:   Procedure Laterality Date   • HIP SURGERY     • KNEE SURGERY            OT ASSESSMENT FLOWSHEET (last 72 hours)      OT Evaluation       17 1030 17 1027 17 0900 17 1614 17 1611    Rehab Evaluation    Document Type therapy note (daily note)  -SC evaluation  -MM       Subjective Information agree to therapy  -SC agree to therapy;complains of;pain  -MM       Patient Effort, Rehab Treatment good  -SC good  -MM       Symptoms Noted During/After Treatment increased pain  -SC increased pain  -MM       General Information    Patient Profile Review  yes  -MM       Onset of Illness/Injury or Date of Surgery Date  17  -MM       Referring Physician  Chanelle Tolbert MD  -MM       General Observations  Pt supine, R fascia iliaca n. cath intact, agreeable to tx but c/o pain. KI in room and applied to RLE prior to bed mobility.  -MM       Precautions/Limitations brace on when up;other (see comments)   TDWB R LE  -SC brace on when up;fall  precautions;other (see comments)   KI on when up 2/2 R fascia iliaca n. cath, TDWB RLE  -MM       Prior Level of Function  independent:;all household mobility;community mobility;gait;transfer;bed mobility;ADL's;home management;cooking;cleaning;driving;shopping;using stairs   very active lifestyle and [place] [facility] [place]  -MM       Equipment Currently Used at Home  cane, straight;raised toilet;bath bench;grab bar;walker, rolling  -MM  cane, straight;raised toilet;bath bench;grab bar;walker, rolling   states she has recently loaned out her equipment to a friend  -SK     Plans/Goals Discussed With  patient;agreed upon  -MM       Risks Reviewed  patient:;LOB;dizziness;increased discomfort;change in vital signs  -MM       Benefits Reviewed  patient:;improve function;increase independence;increase strength;increase balance;decrease pain;decrease risk of DVT  -MM       Barriers to Rehab  none identified  -MM       Living Environment    Lives With  alone  -MM  alone  -SK     Living Arrangements  condominium  -MM  condominium  -SK     Home Accessibility  bed and bath are not on the first floor;stairs to enter home;stairs within home  -MM  bed and bath are not on the first floor;stairs to enter home;stairs within home  -SK     Number of Stairs to Enter Home  1  -MM       Number of Stairs Within Home  14  -MM  14  -SK     Stair Railings at Home  inside, present at both sides  -MM  inside, present at both sides  -SK     Type of Financial/Environmental Concern    other (see comments)   two level home,lack of accessibility  -SK     Transportation Available    car;family or friend will provide  -SK     Clinical Impression    Date of Referral to OT  07/19/17  -MM       OT Diagnosis  Decreased independence with ADL and fnxl mobility.  -MM       Patient/Family Goals Statement  Return to PLOF, decrease pain with activity.  -MM       Criteria for Skilled Therapeutic Interventions Met  yes;treatment indicated  -MM        Rehab Potential  good, to achieve stated therapy goals  -MM       Therapy Frequency  daily   per priority policy  -MM       Anticipated Equipment Needs At Discharge  --   Issued reacher, LH sponge, sockaid, shoe horn, leg   -MM       Anticipated Discharge Disposition  skilled nursing facility   SNF for short term rehab  -MM       Functional Level Prior    Ambulation     0-->independent  -SK    Transferring     0-->independent  -SK    Toileting     0-->independent  -SK    Bathing     0-->independent  -SK    Dressing     0-->independent  -SK    Eating     0-->independent  -SK    Communication     0-->understands/communicates without difficulty  -SK    Swallowing     0-->swallows foods/liquids without difficulty  -SK    Pain Assessment    Pain Assessment Mark-Fox FACES  -SC Mark-Baker FACES  -MM       Mark-Fox FACES Pain Rating 2  -SC 2  -MM       Post Pain Score 7  -SC 7  -MM       Pain Type Acute pain  -SC Acute pain  -MM       Pain Location Hip  -SC Hip  -MM       Pain Orientation Right  -SC Right  -MM       Pain Intervention(s) Repositioned  -SC Repositioned;Ambulation/increased activity   Notified RN, MD present at end of session and aware  -MM       Response to Interventions  tolerated  -MM       Vision Assessment/Intervention    Visual Impairment  WFL  -MM       Cognitive Assessment/Intervention    Current Cognitive/Communication Assessment functional  -SC functional  -MM       Orientation Status oriented x 4;required verbal cueing (specifiy in comments)  -SC oriented x 4  -MM       Follows Commands/Answers Questions 75% of the time  -SC 75% of the time;able to follow single-step instructions;needs increased time  -MM       Personal Safety WNL/WFL  -SC WNL/WFL  -MM       ROM (Range of Motion)    General ROM  no range of motion deficits identified   BUE  -MM       MMT (Manual Muscle Testing)    General MMT Assessment  upper extremity strength deficits identified  -MM       General MMT Assessment Detail   BUE grossly 4/5 except R  4-5 2/2 swolen 3rd digit.  -MM       Muscle Tone Assessment    Muscle Tone Assessment   Right-Side Extremities  -TP      RLE Muscle Tone Assessment   unable/inappropriate to test  -TP      Bed Mobility, Assessment/Treatment    Bed Mobility, Assistive Device head of bed elevated;bed rails  -SC bed rails;head of bed elevated  -MM       Bed Mob, Supine to Sit, [place] moderate assist (50% patient effort);2 person assist required  -SC moderate assist (50% patient effort);2 person assist required;verbal cues required  -MM       Bed Mob, Sit to Supine, [place]  not tested   UIC  -MM       Bed Mobility, Safety Issues decreased use of arms for pushing/pulling  -SC decreased use of arms for pushing/pulling;decreased use of legs for bridging/pushing  -MM       Bed Mobility, Impairments pain;impaired balance;strength decreased  -SC pain;strength decreased;impaired balance  -       Bed Mobility, Comment up to edge of bed with brace on.   -SC        Transfer Assessment/Treatment    Transfers, Sit-Stand [place] moderate assist (50% patient effort);2 person assist required;verbal cues required  -SC moderate assist (50% patient effort);2 person assist required  -MM       Transfers, Stand-Sit [place] 2 person assist required;moderate assist (50% patient effort)  -SC moderate assist (50% patient effort);2 person assist required  -MM       Transfers, Sit-Stand-Sit, Assist Device rolling walker  -SC rolling walker  -MM       Transfer, Impairments pain;motor control impaired;strength decreased  -SC        Transfer, Comment cues for hand placement  -SC VC for hand placement  -MM       Functional Mobility    Functional Mobility- Ind. Level  moderate assist (50% patient effort);2 person assist required;verbal cues required  -       Functional Mobility- Device  rolling walker  -MM       Functional Mobility-Maintain WBing  able to maintain weight bearing status  -MM       Functional  Mobility- Comment  VC for weight shift between LLE and BUE on RW, sequencing walker and maintaining TDWB status; PT gave assist to pt's RLE for advancing forward.  -MM       Lower Body Bathing Assessment/Training    LB Bathing Assess/Train Assistive Device  long-handled sponge  -MM       LB Bathing Assess/Train, Position  sitting  -MM       LB Bathing Assess/Train, [place] Level  verbal cues required  -MM       LB Bathing Assess/Train, Impairments  strength decreased;impaired balance;pain  -MM       LB Bathing Assess/Train, Comment  Pt able to complete LBB simulation with vc only after demonstration was provided.  -MM       Lower Body Dressing Assessment/Training    LB Dressing Assess/Train, Clothing Type  doffing:;socks;donning:;pants  -MM       LB Dressing Assess/Train, Assist Device  reacher  -MM       LB Dressing Assess/Train, Position  sitting  -MM       LB Dressing Assess/Train, [place]  minimum assist (75% patient effort);verbal cues required  -MM       LB Dressing Assess/Train, Impairments  strength decreased;coordination impaired;pain;other (see comments)   decreased sequencing/problem solving skills  -MM       LB Dressing Assess/Train, Comment  Dependent to don socks without AE at beginning of session. Demonstration of reacher provided. Afterward, pt was able to doff L sock min A and simulate donning pants with moderate vc.  -MM       Therapy Exercises    Right Lower Extremity AAROM:;10 reps;supine;ankle pumps/circles;quad sets;heel slides  -SC        Bilateral Upper Extremity  AROM:;10 reps;elbow flexion/extension;hand pumps;shoulder extension/flexion;shoulder horizontal abd/add   Rest breaks for shldr ext/flex and shldr horizontal abd/add  -MM       Sensory Assessment/Intervention    Light Touch  LUE;RUE  -MM       LUE Light Touch  WNL  -MM       RUE Light Touch  WNL  -MM       General Therapy Interventions    Planned Therapy Interventions  adaptive equipment training;ADL retraining;balance  training;bed mobility training;energy conservation;home exercise program;strengthening;transfer training  -       Positioning and Restraints    Pre-Treatment Position in bed  -SC in bed  -       Post Treatment Position chair  -SC chair  -       In Chair sitting;notified nsg;reclined;exit alarm on  -SC notified nsg;reclined;call light within reach;encouraged to call for assist;exit alarm on;with other staff;on mechanical lift sling;R waffle boot;R knee immobilizer   MD present, R KI in place but loosened for comfort  -         07/18/17 1101 07/18/17 0800 07/17/17 2211 07/17/17 1700       Rehab Evaluation    Document Type evaluation  -LR        Subjective Information agree to therapy;complains of;pain  -LR        Patient Effort, Rehab Treatment good  -LR        Symptoms Noted During/After Treatment increased pain;significant change in vital signs;other (see comments)  -LR        Symptoms Noted Comment Pulse ox indicated de-saturation after t/f to chair which persisted after t/f and with donning of supplemental O2. Patient denied SOA and did not seem visibly SOA. Pulse ox did not have good waveform. Notified RN.   -LR        General Information    Patient Profile Review yes  -LR        Onset of Illness/Injury or Date of Surgery Date 07/17/17  -LR        Referring Physician MD Lynn  -LR        General Observations Patient supine in bed upon arrival. IV, R fascia iliaca nerve catheter, tele, R LE resting externally rotated.   -LR        Pertinent History Of Current Problem Patient presented to ED after she attempted to sit on stool while volunteering at [employer] and missed the stool and fell on her R side. C/o R hip pain and R long finger pain after fall. Patient found to have R hip fx which has been determined to be non-operative per ortho. TDWB.   -LR        Precautions/Limitations brace on when up;fall precautions;other (see comments)   knee immob when up d/t fascia iliaca cath; TDWB R LE  -LR        Prior  Level of Function independent:;all household mobility;community mobility;gait;transfer;bed mobility;ADL's;home management;cooking;cleaning;driving;shopping;using stairs  -LR        Equipment Currently Used at Home cane, straight;raised toilet;bath bench;grab bar;walker, rolling   not using AD; friend currently borrowing raised toilet/RW  -LR   none  -KL     Plans/Goals Discussed With patient;agreed upon  -LR        Risks Reviewed patient:;LOB;nausea/vomiting;dizziness;increased discomfort;change in vital signs;lines disloged  -LR        Benefits Reviewed patient:;improve function;increase independence;increase strength;increase balance;decrease pain;increase knowledge  -LR        Barriers to Rehab previous functional deficit  -LR        Living Environment    Lives With alone  -LR   alone  -KL     Living Arrangements condominium  -LR   condominium  -KL     Home Accessibility bed and bath are not on the first floor;house is not wheelchair accessible;stairs to enter home;stairs within home;tub/shower is not walk in;grab bars present (bathtub)  -LR   bed and bath are not on the first floor;bed not on first floor;grab bars present (bathtub);grab bars present (toilet);stairs (2 railings present);stairs within home;tub/shower is not walk in  -KL     Number of Stairs to Enter Home 1  -LR        Number of Stairs Within Home 14  -LR   14  -KL     Stair Railings at Home none;inside, present at both sides  -LR   inside, present at both sides  -KL     Type of Financial/Environmental Concern none  -LR   none  -KL     Transportation Available van, wheelchair accessible  -LR   van, wheelchair accessible  -KL     Living Environment Comment Patient reports there is no one available to stay and assist upon d/c.   -LR   .  -KL     Functional Level Prior    Ambulation    0-->independent  -KL     Transferring    0-->independent  -KL     Toileting    0-->independent  -KL     Bathing    0-->independent  -KL     Dressing    0-->independent   -KL     Eating    0-->independent  -KL     Communication    0-->understands/communicates without difficulty  -KL     Swallowing    0-->swallows foods/liquids without difficulty  -KL     Prior Functional Level Comment    .  -KL     Vital Signs    Intra SpO2 (%) 85  -LR        O2 Delivery Intra Treatment supplemental O2  -LR        Post SpO2 (%) 91  -LR        O2 Delivery Post Treatment supplemental O2  -LR        Intra Patient Position Sitting  -LR        Post Patient Position Sitting  -LR        Pain Assessment    Pain Assessment 0-10  -LR        Pain Score 2  -LR        Post Pain Score 5  -LR        Pain Type Acute pain  -LR        Pain Location Hip  -LR        Pain Orientation Right  -LR        Pain Intervention(s) Repositioned;Ambulation/increased activity  -LR        Vision Assessment/Intervention    Visual Impairment WFL  -LR        Cognitive Assessment/Intervention    Current Cognitive/Communication Assessment functional  -LR        Orientation Status oriented x 4;required verbal cueing (specifiy in comments)  -LR        Follows Commands/Answers Questions 100% of the time;able to follow single-step instructions;needs cueing;needs repetition  -LR        Personal Safety WNL/WFL  -LR        ROM (Range of Motion)    General ROM lower extremity range of motion deficits identified  -LR        MMT (Manual Muscle Testing)    General MMT Assessment lower extremity strength deficits identified  -LR        Muscle Tone Assessment    Muscle Tone Assessment   Bilateral Upper Extremities;LLE;RLE  -AB      Bilateral Upper Extremities Muscle Tone Assessment   associated movements noted  -AB      LLE Muscle Tone Assessment  associated movements noted  -JR associated movements noted  -AB      RLE Muscle Tone Assessment  severely decreased tone  -JR severely decreased tone  -AB      Mobility Assessment/Training    Extremity Weight-Bearing Status right lower extremity  -LR        Right Lower Extremity Weight-Bearing touch down  weight-bearing  -LR        Bed Mobility, Assessment/Treatment    Bed Mobility, Assistive Device head of bed elevated;bed rails  -LR        Bed Mob, Supine to Sit, [place] verbal cues required;minimum assist (75% patient effort);2 person assist required  -LR        Bed Mob, Sit to Supine, [place] not tested   UIC at end of eval  -LR        Bed Mobility, Safety Issues decreased use of arms for pushing/pulling;decreased use of legs for bridging/pushing  -LR        Bed Mobility, Impairments ROM decreased;strength decreased;pain  -LR        Bed Mobility, Comment Knee immobilizer donned while supine in bed. Verbal cues to move LEs towards EOB and to push up from bed from behind to raise trunk into sitting. Min assist required to support R LE and at trunk. Denied dizziness upon sitting up. Cues to push from bed to scoot hips out to get feet on floor.   -LR        Transfer Assessment/Treatment    Transfers, Sit-Stand [place] verbal cues required;moderate assist (50% patient effort);2 person assist required  -LR        Transfers, Stand-Sit [place] verbal cues required;moderate assist (50% patient effort);2 person assist required  -LR        Transfers, Sit-Stand-Sit, Assist Device rolling walker  -LR        Transfer, Maintain Weight Bearing Status able to maintain weight bearing status;cues to maintain weight bearing status  -LR        Transfer, Safety Issues sequencing ability decreased;step length decreased;weight-shifting ability decreased  -LR        Transfer, Impairments ROM decreased;strength decreased;pain  -LR        Transfer, Comment Verbal cues to push up from bed to stand and to reach back for chair to lower into sitting. Verbal cues to step R LE out before t/f and for TDWB only on R LE.   -LR        Therapy Exercises    Right Lower Extremity AAROM:;10 reps;sitting;ankle pumps/circles;glut sets;heel slides;hip abduction/adduction;quad sets;SLR   cues for technique; mod assist, ROM limited by  pain  -LR        Sensory Assessment/Intervention    Sensory Impairment --   denies numbness/tingling; able to actively DF bilaterally  -LR        Positioning and Restraints    Pre-Treatment Position in bed  -LR        Post Treatment Position chair  -LR        In Chair notified nsg;reclined;sitting;call light within reach;encouraged to call for assist;exit alarm on;on mechanical lift sling;legs elevated  -LR        General LE Assessment    ROM LLE ROM was WFL;hip, right: LE ROM deficit  -LR        ROM Detail R hip AROM imparied 50% d/t pain  -LR        Lower Extremity    Lower Ext Manual Muscle Testing left LE strength is WFL;right hip strength deficit  -LR        Lower Ext Manual Muscle Testing Detail R hip functionally 2/5 d/t pain  -LR          User Key  (r) = Recorded By, (t) = Taken By, (c) = Cosigned By    Initials Name Effective Dates    MARIA GUADALUPE Sanchez, PT 06/19/15 -     MM Tori Harrison, OT 06/22/15 -     LR Tianna Lizarraga, PT 06/19/15 -     SK Sonja C Kellerman, RN 03/14/16 -     AB Marlen Hu, RN 06/16/16 -     KL Siena Lange, HERNÁN 06/16/16 -     JR Reji Berg, RN 02/06/17 -     TP Agatha Mccullough, HERNÁN 07/10/17 -            Occupational Therapy Education     Title: PT OT SLP Therapies (Done)     Topic: Occupational Therapy (Done)     Point: ADL training (Done)    Description: Instruct learner(s) on proper safety adaptation and remediation techniques during self care or transfers.   Instruct in proper use of assistive devices.    Learning Progress Summary    Learner Readiness Method Response Comment Documented by Status   Patient Acceptance E,TB,D VU,NR Educated pt on role of OT, process of IE, benefits of activity. Guided through HEP, AE use for ADL, and educated on precautions and improved mechanics for bed mobility, ADL and tsf. MM 07/19/17 1415 Done               Point: Home exercise program (Done)    Description: Instruct learner(s) on appropriate technique for monitoring,  assisting and/or progressing therapeutic exercises/activities.    Learning Progress Summary    Learner Readiness Method Response Comment Documented by Status   Patient Acceptance E,TB,KAYLEE VU,NR Educated pt on role of OT, process of IE, benefits of activity. Guided through HEP, AE use for ADL, and educated on precautions and improved mechanics for bed mobility, ADL and tsf.  07/19/17 1415 Done               Point: Precautions (Done)    Description: Instruct learner(s) on prescribed precautions during self-care and functional transfers.    Learning Progress Summary    Learner Readiness Method Response Comment Documented by Status   Patient Acceptance E,TB,KAYLEE RODRÍGUEZ,NR Educated pt on role of OT, process of IE, benefits of activity. Guided through HEP, AE use for ADL, and educated on precautions and improved mechanics for bed mobility, ADL and tsf.  07/19/17 1415 Done               Point: Body mechanics (Done)    Description: Instruct learner(s) on proper positioning and spine alignment during self-care, functional mobility activities and/or exercises.    Learning Progress Summary    Learner Readiness Method Response Comment Documented by Status   Patient Acceptance E,TB,KAYLEE RODRÍGUEZ,NR Educated pt on role of OT, process of IE, benefits of activity. Guided through HEP, AE use for ADL, and educated on precautions and improved mechanics for bed mobility, ADL and tsf.  07/19/17 1415 Done                      User Key     Initials Effective Dates Name Provider Type Discipline     06/22/15 -  Tori Harrison OT Occupational Therapist OT                  OT Recommendation and Plan  Anticipated Equipment Needs At Discharge:  (Issued reacher, LH sponge, sockaid, shoe horn, leg )  Anticipated Discharge Disposition: skilled nursing facility (SNF for short term rehab)  Planned Therapy Interventions: adaptive equipment training, ADL retraining, balance training, bed mobility training, energy conservation, home exercise  program, strengthening, transfer training  Therapy Frequency: daily (per priority policy)  Plan of Care Review  Plan Of Care Reviewed With: patient  Progress: improving  Outcome Summary/Follow up Plan: Pt limited by pain. Mod A x 2 for supine to sit, mod A x2 for STS tsf with RW, mod A X 2 with RW for fnxl mobility. OT to address strength, balance, ADL, mobility and tsf deficits. Recommend d/c to SNF for short term rehab.          OT Goals       07/19/17 1422          Bed Mobility OT LTG    Bed Mobility OT LTG, Date Established 07/19/17  -MM      Bed Mobility OT LTG, Time to Achieve 2 wks  -MM      Bed Mobility OT LTG, Activity Type all bed mobility  -MM      Bed Mobility OT LTG, [place] Level minimum assist (75% patient effort)  -MM      Transfer Training OT LTG    Transfer Training OT LTG, Date Established 07/19/17  -MM      Transfer Training OT LTG, Time to Achieve 2 wks  -MM      Transfer Training OT LTG, Activity Type sit to stand/stand to sit;bed to chair /chair to bed;toilet  -MM      Transfer Training OT LTG, [place] Level minimum assist (75% patient effort)  -MM      Transfer Training OT LTG, Assist Device walker, rolling  -MM      Patient Education OT LTG    Patient Education OT LTG, Date Established 07/19/17  -MM      Patient Education OT LTG, Time to Achieve 2 wks  -MM      Patient Education OT LTG, Education Type HEP;precautions per surgeon;positioning;home safety;adaptive equipment mgmt  -MM      Patient Education OT LTG, Education Understanding demonstrates adequately  -MM      LB Dressing OT LTG    LB Dressing Goal OT LTG, Date Established 07/19/17  -MM      LB Dressing Goal OT LTG, Time to Achieve 2 wks  -MM      LB Dressing Goal OT LTG, [place] Level minimum assist (75% patient effort)  -MM      LB Dressing Goal OT LTG, Adaptive Equipment --   AE PRN  -MM        User Key  (r) = Recorded By, (t) = Taken By, (c) = Cosigned By    Initials Name Provider Type    LAKEISHA Harrison,  OT Occupational Therapist                Outcome Measures       07/19/17 1030 07/19/17 1027 07/18/17 1101    How much help from another person do you currently need...    Turning from your back to your side while in flat bed without using bedrails? 2  -SC  2  -LR    Moving from lying on back to sitting on the side of a flat bed without bedrails? 2  -SC  2  -LR    Moving to and from a bed to a chair (including a wheelchair)? 2  -SC  2  -LR    Standing up from a chair using your arms (e.g., wheelchair, bedside chair)? 2  -SC  2  -LR    Climbing 3-5 steps with a railing? 1  -SC  1  -LR    To walk in hospital room? 2  -SC  2  -LR    AM-PAC 6 Clicks Score 11  -SC  11  -LR    How much help from another is currently needed...    Putting on and taking off regular lower body clothing?  1  -MM     Bathing (including washing, rinsing, and drying)  2  -MM     Toileting (which includes using toilet bed pan or urinal)  2  -MM     Putting on and taking off regular upper body clothing  3  -MM     Taking care of personal grooming (such as brushing teeth)  3  -MM     Eating meals  3  -MM     Score  14  -MM     Functional Assessment    Outcome Measure Options AM-PAC 6 Clicks Basic Mobility (PT)  -SC AM-PAC 6 Clicks Daily Activity (OT)  -MM AM-PAC 6 Clicks Basic Mobility (PT)  -LR      User Key  (r) = Recorded By, (t) = Taken By, (c) = Cosigned By    Initials Name Provider Type    MARIA GUADALUPE Sanchez, PT Physical Therapist    MM Tori Harrison, OT Occupational Therapist    LR Tianna Lizarraga, PT Physical Therapist          Time Calculation:   OT Start Time: 1027    Therapy Charges for Today     Code Description Service Date Service Provider Modifiers Qty    81675524137  OT EVAL MOD COMPLEXITY 2 7/19/2017 Tori Harrison OT GO 1    [id number]  OT THERAPEUTIC ACT EA 15 MIN 7/19/2017 Tori Harrison OT GO 2               Tori Harrison OT  7/19/2017

## 2017-07-19 NOTE — PROGRESS NOTES
Ortho Consult F/U    Pt mobilized with PT yesterday.  Notes moderate pain.  No other complications noted    Exam stable    Revisited importance early mobilization  DVT prophylaxis  Appreciate medical optimization  Revisited need for close f/u XRays to ensure no displacement    TW

## 2017-07-19 NOTE — THERAPY TREATMENT NOTE
Acute Care - Physical Therapy Treatment Note  [facility]     Patient Name: Elizabeth Oliveira  : 1929  MRN: [id number]  Today's Date: 2017  Onset of Illness/Injury or Date of Surgery Date: 17  Date of Referral to PT: 17  Referring Physician: Chanelle Tolbert MD    Admit Date: 2017    Visit Dx:    ICD-10-CM ICD-9-CM   1. Fall, initial encounter W19.XXXA E888.9   2. 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 Impaired functional mobility, balance, gait, and endurance Z74.09 V49.89   6. Impaired mobility and ADLs Z74.09 799.89     Patient Active Problem List   Diagnosis   • Fall   • Closed fracture of right femur   • Arthritis   • Thrombocytopenia               Adult Rehabilitation Note       17 1030          Rehab Assessment/Intervention    Discipline physical therapist  -SC      Document Type therapy note (daily note)  -SC      Subjective Information agree to therapy  -SC      Patient Effort, Rehab Treatment good  -SC      Symptoms Noted During/After Treatment increased pain  -SC      Precautions/Limitations brace on when up;other (see comments)   TDWB R LE  -SC      Recorded by [SC] Medardo Sanchez, PT      Pain Assessment    Pain Assessment Mark-Fox FACES  -SC      Mark-Fox FACES Pain Rating 2  -SC      Post Pain Score 7  -SC      Pain Type Acute pain  -SC      Pain Location Hip  -SC      Pain Orientation Right  -SC      Pain Intervention(s) Repositioned  -SC      Recorded by [SC] Medardo Sanchez, PT      Cognitive Assessment/Intervention    Current Cognitive/Communication Assessment functional  -SC      Orientation Status oriented x 4;required verbal cueing (specifiy in comments)  -SC      Follows Commands/Answers Questions 75% of the time  -SC      Personal Safety WNL/WFL  -SC       Recorded by [SC] Medardo Sanchez, PT      Bed Mobility, Assessment/Treatment    Bed Mobility, Assistive Device head of bed elevated;bed rails  -SC      Bed Mob, Supine to Sit, [place] moderate assist (50% patient effort);2 person assist required  -SC      Bed Mobility, Safety Issues decreased use of arms for pushing/pulling  -SC      Bed Mobility, Impairments pain;impaired balance;strength decreased  -SC      Bed Mobility, Comment up to edge of bed with brace on.   -SC      Recorded by [SC] Medardo Sanchez, PT      Transfer Assessment/Treatment    Transfers, Sit-Stand [place] moderate assist (50% patient effort);2 person assist required;verbal cues required  -SC      Transfers, Stand-Sit [place] 2 person assist required;moderate assist (50% patient effort)  -SC      Transfers, Sit-Stand-Sit, Assist Device rolling walker  -SC      Transfer, Impairments pain;motor control impaired;strength decreased  -SC      Transfer, Comment cues for hand placement  -SC      Recorded by [SC] Medardo Sanchez, PT      Gait Assessment/Treatment    Gait, [place] Level moderate assist (50% patient effort)  -SC      Gait, Assistive Device rolling walker  -SC      Gait, Distance (Feet) 5  -SC      Gait, Gait Deviations right:;antalgic;weight-shifting ability decreased  -SC      Gait, Maintain Weight Bearing Status able to maintain weight bearing status  -SC      Gait, Safety Issues sequencing ability decreased  -SC      Gait, Impairments impaired balance;coordination impaired;motor control impaired;strength decreased  -SC      Gait, Comment Cues for sequencing walker  -SC      Recorded by [SC] Medardo Sanchez, PT      Therapy Exercises    Right Lower Extremity AAROM:;10 reps;supine;ankle pumps/circles;quad sets;heel slides  -SC      Recorded by [SC] Medardo Sanchez, PT      Positioning and Restraints    Pre-Treatment Position in bed  -SC      Post Treatment Position chair  -SC      In Chair sitting;notified nsg;reclined;exit  alarm on  -SC      Recorded by [SC] Medardo Sanchez, PT        User Key  (r) = Recorded By, (t) = Taken By, (c) = Cosigned By    Initials Name Effective Dates    SC Medardo Sanchez, PT 06/19/15 -                 IP PT Goals       07/19/17 1338 07/18/17 1101       Bed Mobility PT LTG    Bed Mobility PT LTG, Date Established  07/18/17  -LR     Bed Mobility PT LTG, Time to Achieve  5 days  -LR     Bed Mobility PT LTG, Activity Type  supine to sit/sit to supine  -LR     Bed Mobility PT LTG, [place] Level  contact guard assist  -LR     Bed Mobility PT LTG, Date Goal Reviewed  07/18/17  -LR     Bed Mobility PT LTG, Outcome goal ongoing  -SC goal ongoing  -LR     Transfer Training PT LTG    Transfer Training PT LTG, Date Established  07/18/17  -LR     Transfer Training PT LTG, Time to Achieve  5 days  -LR     Transfer Training PT LTG, Activity Type  sit to stand/stand to sit  -LR     Transfer Training PT LTG, [place] Level  contact guard assist;2 person assist required  -LR     Transfer Training PT LTG, Assist Device  walker, rolling  -LR     Transfer Training PT  LTG, Date Goal Reviewed  07/18/17  -LR     Transfer Training PT LTG, Outcome goal ongoing  -SC goal ongoing  -LR     Gait Training PT LTG    Gait Training Goal PT LTG, Date Established  07/18/17  -LR     Gait Training Goal PT LTG, Time to Achieve  5 days  -LR     Gait Training Goal PT LTG, [place] Level  minimum assist (75% patient effort);2 person assist required  -LR     Gait Training Goal PT LTG, Assist Device  walker, rolling  -LR     Gait Training Goal PT LTG, Distance to Achieve  50 feet  -LR     Gait Training Goal PT LTG, Date Goal Reviewed  07/18/17  -LR     Gait Training Goal PT LTG, Outcome goal ongoing  -SC goal ongoing  -LR       User Key  (r) = Recorded By, (t) = Taken By, (c) = Cosigned By    Initials Name Provider Type    SC Medardo Sanchez, PT Physical Therapist    LR Tianna Lizarraga, PT Physical Therapist          Physical  Therapy Education     Title: PT OT SLP Therapies (Done)     Topic: Physical Therapy (Done)     Point: Mobility training (Done)    Learning Progress Summary    Learner Readiness Method Response Comment Documented by Status   Patient Eager E CATRACHITO RODRÍGUEZ,AWILDA reviewed safety with mobility SC 07/19/17 1337 Done    Acceptance E VU   07/18/17 1753 Done    Acceptance E,D MARCOS,NR Educated on TDWB status and gait training while maintaining TDWB. Educated on indication for knee immobilizer. LR 07/18/17 1707 Done               Point: Home exercise program (Done)    Learning Progress Summary    Learner Readiness Method Response Comment Documented by Status   Patient Eager E CATRACHITO RODRÍGUEZ NR reviewed safety with mobility SC 07/19/17 1337 Done    Acceptance E VU   07/18/17 1753 Done    Acceptance E,D MARCOSNR Educated on TDWB status and gait training while maintaining TDWB. Educated on indication for knee immobilizer. LR 07/18/17 1707 Done               Point: Body mechanics (Done)    Learning Progress Summary    Learner Readiness Method Response Comment Documented by Status   Patient Eager E CATRACHITO RODRÍGUEZ NR reviewed safety with mobility SC 07/19/17 1337 Done    Acceptance E VU   07/18/17 1753 Done    Acceptance E,D MARCOSNR Educated on TDWB status and gait training while maintaining TDWB. Educated on indication for knee immobilizer.  07/18/17 1707 Done               Point: Precautions (Done)    Learning Progress Summary    Learner Readiness Method Response Comment Documented by Status   Patient Eager E CATRACHITO RODRÍGUEZ NR reviewed safety with mobility SC 07/19/17 1337 Done    Acceptance E VU   07/18/17 1753 Done    Acceptance E,D MARCOSNR Educated on TDWB status and gait training while maintaining TDWB. Educated on indication for knee immobilizer.  07/18/17 1707 Done                      User Key     Initials Effective Dates Name Provider Type Discipline    SC 06/19/15 -  Medardo Sanchez, PT Physical Therapist PT     06/19/15 -  Tianna Lizarraga, PT  Physical Therapist PT    JR 02/06/17 -  Reji Berg, RN Registered Nurse Nurse                    PT Recommendation and Plan  Anticipated Equipment Needs At Discharge: bedside commode  Anticipated Discharge Disposition: skilled nursing facility  Demonstrates Need for Referral to Another Service: occupational therapy  Planned Therapy Interventions: balance training, bed mobility training, gait training, home exercise program, patient/family education, ROM (Range of Motion), strengthening, transfer training  PT Frequency: daily  Plan of Care Review  Plan Of Care Reviewed With: patient  Progress: improving  Outcome Summary/Follow up Plan: All mobility very painful. Still able to participate with PT.          Outcome Measures       07/19/17 1030 07/18/17 1101       How much help from another person do you currently need...    Turning from your back to your side while in flat bed without using bedrails? 2  -SC 2  -LR     Moving from lying on back to sitting on the side of a flat bed without bedrails? 2  -SC 2  -LR     Moving to and from a bed to a chair (including a wheelchair)? 2  -SC 2  -LR     Standing up from a chair using your arms (e.g., wheelchair, bedside chair)? 2  -SC 2  -LR     Climbing 3-5 steps with a railing? 1  -SC 1  -LR     To walk in hospital room? 2  -SC 2  -LR     AM-PAC 6 Clicks Score 11  -SC 11  -LR     Functional Assessment    Outcome Measure Options AM-PAC 6 Clicks Basic Mobility (PT)  -SC AM-PAC 6 Clicks Basic Mobility (PT)  -LR       User Key  (r) = Recorded By, (t) = Taken By, (c) = Cosigned By    Initials Name Provider Type    SC Medardo Sanchez, PT Physical Therapist    LR Tianna Lizarraga, PT Physical Therapist           Time Calculation:         PT Charges       07/19/17 1344          Time Calculation    Start Time 1030  -SC      PT Received On 07/19/17  -SC      PT Goal Re-Cert Due Date 07/28/17  -SC      Time Calculation- PT    Total Timed Code Minutes- PT 15 minute(s)  -SC         User Key  (r) = Recorded By, (t) = Taken By, (c) = Cosigned By    Initials Name Provider Type    SC Medardo Sanchez, PT Physical Therapist          Therapy Charges for Today     Code Description Service Date Service Provider Modifiers Qty    [id number] HC GAIT TRAINING EA 15 MIN 7/19/2017 Medardo Sanchez, PT GP 1          PT G-Codes  Outcome Measure Options: AM-PAC 6 Clicks Basic Mobility (PT)    Medardo Sanchez PT  7/19/2017

## 2017-07-19 NOTE — PLAN OF CARE
Problem: Patient Care Overview (Adult)  Goal: Plan of Care Review  Outcome: Ongoing (interventions implemented as appropriate)    07/19/17 0611   Coping/Psychosocial Response Interventions   Plan Of Care Reviewed With patient   Patient Care Overview   Progress no change   Outcome Evaluation   Outcome Summary/Follow up Plan 88 yo female admitted for hip fracture after fall. Per H&P pt drinks two glasses of bourbon/night. Noted increased confusion today w/ hallucincations. notified KYRA GONZALEZ protocol initiated.

## 2017-07-19 NOTE — PROGRESS NOTES
Acute pain service Inpatient Progress Note    Patient Name: Elizabeth Oliveira  :  1929  MRN:  [id number]        Acute Pain  Service Inpatient Progress Note:    Analgesia:Good  Pain Score:2/10  LOC: alert and awake  Resp Status: supplemental oxygen  Cardiac: VS stable  Side Effects:None  Catheter Site:clean  Cath type: peripheral nerve cath(MOOG pump)  Infusion rate: 10ml/hr  Catheter Plan:Catheter to remain Insitu and Continue catheter infusion rate unchanged  Comments: Pt. Resting in bed, no pain unless movement, better than before block per pt and RN.  To cont. With conservative treatment, non surgical.  Thank you.

## 2017-07-19 NOTE — PROGRESS NOTES
Continued Stay Note  [facility]     Patient Name: Elizabeth Oliveira  MRN: [id number]  Today's Date: 7/19/2017    Admit Date: 7/17/2017          Discharge Plan       07/19/17 1621    Case Management/Social Work Plan    Plan SNF at [facility]    Patient/Family In Agreement With Plan yes    Additional Comments Case mgt con't to follow. [facility] has a skilled bed available and can accept Ms Oliveira PENDING insurance authorization, hopefully this will be complete by Friday 7/21,but could possibly take longer to obtain the authorization from this insurance. Case mgt will follow and assist with final arrangments.              Discharge Codes     None        Expected Discharge Date and Time     Expected Discharge Date Expected Discharge Time    Jul 21, 2017             Sonja C Kellerman, RN

## 2017-07-19 NOTE — SIGNIFICANT NOTE
Pt w/ worsening confusion and now hallucinations (auditory and visual).  Notified Dr. Tolbert.  Orders for CIWA protocol entered.  Pt has had 2 doses of ativan (total 2mg), score down from 23 to 16.  Dr. Barnes paged to make aware of possible need to transfer to higher level of care.  Awaiting call back.    Total of 4 mg Ativan given after talking w/ Dr. Barnes. Pt is resting, CIWA 14.  Will continue to monitor.

## 2017-07-19 NOTE — PLAN OF CARE
Problem: Patient Care Overview (Adult)  Goal: Plan of Care Review  Outcome: Ongoing (interventions implemented as appropriate)    07/19/17 1422   Coping/Psychosocial Response Interventions   Plan Of Care Reviewed With patient   Patient Care Overview   Progress improving   Outcome Evaluation   Outcome Summary/Follow up Plan Pt limited by pain. Mod A x 2 for supine to sit, mod A x2 for STS tsf with RW, mod A X 2 with RW for fnxl mobility. OT to address strength, balance, ADL, mobility and tsf deficits. Recommend d/c to SNF for short term rehab.         Problem: Inpatient Occupational Therapy  Goal: Bed Mobility Goal LTG- OT  Outcome: Ongoing (interventions implemented as appropriate)    07/19/17 1422   Bed Mobility OT LTG   Bed Mobility OT LTG, Date Established 07/19/17   Bed Mobility OT LTG, Time to Achieve 2 wks   Bed Mobility OT LTG, Activity Type all bed mobility   Bed Mobility OT LTG, [place] Level minimum assist (75% patient effort)       Goal: Transfer Training Goal 1 LTG- OT  Outcome: Ongoing (interventions implemented as appropriate)    07/19/17 1422   Transfer Training OT LTG   Transfer Training OT LTG, Date Established 07/19/17   Transfer Training OT LTG, Time to Achieve 2 wks   Transfer Training OT LTG, Activity Type sit to stand/stand to sit;bed to chair /chair to bed;toilet   Transfer Training OT LTG, [place] Level minimum assist (75% patient effort)   Transfer Training OT LTG, Assist Device walker, rolling       Goal: Patient Education Goal LTG- OT  Outcome: Ongoing (interventions implemented as appropriate)    07/19/17 1422   Patient Education OT LTG   Patient Education OT LTG, Date Established 07/19/17   Patient Education OT LTG, Time to Achieve 2 wks   Patient Education OT LTG, Education Type HEP;precautions per surgeon;positioning;home safety;adaptive equipment mgmt   Patient Education OT LTG, Education Understanding demonstrates adequately       Goal: LB Dressing Goal LTG- OT  Outcome:  Ongoing (interventions implemented as appropriate)    07/19/17 1422   LB Dressing OT LTG   LB Dressing Goal OT LTG, Date Established 07/19/17   LB Dressing Goal OT LTG, Time to Achieve 2 wks   LB Dressing Goal OT LTG, [place] Level minimum assist (75% patient effort)   LB Dressing Goal OT LTG, Adaptive Equipment (AE PRN)

## 2017-07-20 LAB
BASOPHILS # BLD AUTO: 0.01 10*3/MM3 (ref 0–0.2)
BASOPHILS NFR BLD AUTO: 0.2 % (ref 0–1)
DEPRECATED RDW RBC AUTO: 51.4 FL (ref 37–54)
EOSINOPHIL # BLD AUTO: 0.08 10*3/MM3 (ref 0–0.3)
EOSINOPHIL NFR BLD AUTO: 1.7 % (ref 0–3)
ERYTHROCYTE [DISTWIDTH] IN BLOOD BY AUTOMATED COUNT: 13.4 % (ref 11.3–14.5)
FOLATE SERPL-MCNC: >24 NG/ML (ref 3.2–20)
HAV IGM SERPL QL IA: NORMAL
HBV CORE IGM SERPL QL IA: NORMAL
HBV SURFACE AG SERPL QL IA: NORMAL
HCT VFR BLD AUTO: 30.8 % (ref 34.5–44)
HCV AB SER DONR QL: NORMAL
HGB BLD-MCNC: 10.5 G/DL (ref 11.5–15.5)
HIV1+2 AB SER QL: NORMAL
IMM GRANULOCYTES # BLD: 0.02 10*3/MM3 (ref 0–0.03)
IMM GRANULOCYTES NFR BLD: 0.4 % (ref 0–0.6)
IRON 24H UR-MRATE: 35 MCG/DL (ref 50–175)
IRON SATN MFR SERPL: 13 % (ref 15–50)
LYMPHOCYTES # BLD AUTO: 0.8 10*3/MM3 (ref 0.6–4.8)
LYMPHOCYTES NFR BLD AUTO: 16.9 % (ref 24–44)
MCH RBC QN AUTO: 36 PG (ref 27–31)
MCHC RBC AUTO-ENTMCNC: 34.1 G/DL (ref 32–36)
MCV RBC AUTO: 105.5 FL (ref 80–99)
MONOCYTES # BLD AUTO: 0.69 10*3/MM3 (ref 0–1)
MONOCYTES NFR BLD AUTO: 14.6 % (ref 0–12)
NEUTROPHILS # BLD AUTO: 3.13 10*3/MM3 (ref 1.5–8.3)
NEUTROPHILS NFR BLD AUTO: 66.2 % (ref 41–71)
PLATELET # BLD AUTO: 44 10*3/MM3 (ref 150–450)
PMV BLD AUTO: 9.8 FL (ref 6–12)
RBC # BLD AUTO: 2.92 10*6/MM3 (ref 3.89–5.14)
RETICS/RBC NFR AUTO: 3.11 % (ref 0.5–1.5)
TIBC SERPL-MCNC: 278 MCG/DL (ref 250–450)
TSH SERPL DL<=0.05 MIU/L-ACNC: 2 MIU/ML (ref 0.35–5.35)
VIT B12 BLD-MCNC: 1408 PG/ML (ref 211–911)
WBC NRBC COR # BLD: 4.73 10*3/MM3 (ref 3.5–10.8)

## 2017-07-20 PROCEDURE — 97110 THERAPEUTIC EXERCISES: CPT

## 2017-07-20 PROCEDURE — 25010000002 FONDAPARINUX PER 0.5 MG: Performed by: HOSPITALIST

## 2017-07-20 PROCEDURE — 99233 SBSQ HOSP IP/OBS HIGH 50: CPT | Performed by: HOSPITALIST

## 2017-07-20 PROCEDURE — 82607 VITAMIN B-12: CPT | Performed by: HOSPITALIST

## 2017-07-20 PROCEDURE — 25010000002 ROPIVACAINE PER 1 MG: Performed by: ANESTHESIOLOGY

## 2017-07-20 PROCEDURE — 85025 COMPLETE CBC W/AUTO DIFF WBC: CPT | Performed by: HOSPITALIST

## 2017-07-20 PROCEDURE — 80074 ACUTE HEPATITIS PANEL: CPT | Performed by: HOSPITALIST

## 2017-07-20 PROCEDURE — 83550 IRON BINDING TEST: CPT | Performed by: HOSPITALIST

## 2017-07-20 PROCEDURE — 82746 ASSAY OF FOLIC ACID SERUM: CPT | Performed by: HOSPITALIST

## 2017-07-20 PROCEDURE — 97116 GAIT TRAINING THERAPY: CPT

## 2017-07-20 PROCEDURE — G0432 EIA HIV-1/HIV-2 SCREEN: HCPCS | Performed by: HOSPITALIST

## 2017-07-20 PROCEDURE — 84443 ASSAY THYROID STIM HORMONE: CPT | Performed by: HOSPITALIST

## 2017-07-20 PROCEDURE — 83540 ASSAY OF IRON: CPT | Performed by: HOSPITALIST

## 2017-07-20 PROCEDURE — 85045 AUTOMATED RETICULOCYTE COUNT: CPT | Performed by: HOSPITALIST

## 2017-07-20 PROCEDURE — 25010000002 LORAZEPAM PER 2 MG: Performed by: HOSPITALIST

## 2017-07-20 RX ORDER — LORAZEPAM 1 MG/1
1 TABLET ORAL
Status: DISCONTINUED | OUTPATIENT
Start: 2017-07-20 | End: 2017-07-21

## 2017-07-20 RX ORDER — LORAZEPAM 2 MG/ML
1 INJECTION INTRAMUSCULAR
Status: DISCONTINUED | OUTPATIENT
Start: 2017-07-20 | End: 2017-07-21

## 2017-07-20 RX ORDER — TRAMADOL HYDROCHLORIDE 50 MG/1
25 TABLET ORAL EVERY 8 HOURS PRN
Status: DISCONTINUED | OUTPATIENT
Start: 2017-07-20 | End: 2017-07-25

## 2017-07-20 RX ORDER — LORAZEPAM 0.5 MG/1
0.5 TABLET ORAL
Status: DISCONTINUED | OUTPATIENT
Start: 2017-07-20 | End: 2017-07-21

## 2017-07-20 RX ORDER — LANOLIN ALCOHOL/MO/W.PET/CERES
1000 CREAM (GRAM) TOPICAL DAILY
Status: DISCONTINUED | OUTPATIENT
Start: 2017-07-20 | End: 2017-07-25 | Stop reason: HOSPADM

## 2017-07-20 RX ORDER — LORAZEPAM 2 MG/ML
0.5 INJECTION INTRAMUSCULAR
Status: DISCONTINUED | OUTPATIENT
Start: 2017-07-20 | End: 2017-07-21

## 2017-07-20 RX ADMIN — FONDAPARINUX SODIUM 2.5 MG: 2.5 INJECTION SUBCUTANEOUS at 17:11

## 2017-07-20 RX ADMIN — ROPIVACAINE HYDROCHLORIDE 10 ML/HR: 2 INJECTION, SOLUTION EPIDURAL; INFILTRATION at 00:00

## 2017-07-20 RX ADMIN — ACETAMINOPHEN 650 MG: 325 TABLET, FILM COATED ORAL at 15:08

## 2017-07-20 RX ADMIN — CYANOCOBALAMIN TAB 1000 MCG 1000 MCG: 1000 TAB at 09:45

## 2017-07-20 RX ADMIN — LORAZEPAM 0.5 MG: 2 INJECTION INTRAMUSCULAR; INTRAVENOUS at 02:52

## 2017-07-20 RX ADMIN — PYRIDOXINE HCL TAB 50 MG 50 MG: 50 TAB at 09:34

## 2017-07-20 RX ADMIN — Medication 100 MG: at 09:33

## 2017-07-20 RX ADMIN — SERTRALINE HYDROCHLORIDE 50 MG: 50 TABLET ORAL at 09:33

## 2017-07-20 NOTE — PLAN OF CARE
Problem: Patient Care Overview (Adult)  Goal: Plan of Care Review  Outcome: Ongoing (interventions implemented as appropriate)    07/20/17 1634   Coping/Psychosocial Response Interventions   Plan Of Care Reviewed With patient   Patient Care Overview   Progress progress towards functional goals is fair   Outcome Evaluation   Outcome Summary/Follow up Plan Assistance for all mobility. Nerve cath out and brace off. Able to take small steps. Limited by pain         Problem: Inpatient Physical Therapy  Goal: Bed Mobility Goal LTG- PT  Outcome: Ongoing (interventions implemented as appropriate)    07/18/17 1101 07/20/17 1634   Bed Mobility PT LTG   Bed Mobility PT LTG, Date Established 07/18/17 --    Bed Mobility PT LTG, Time to Achieve 5 days --    Bed Mobility PT LTG, Activity Type supine to sit/sit to supine --    Bed Mobility PT LTG, [place] Level contact guard assist --    Bed Mobility PT LTG, Date Goal Reviewed 07/18/17 --    Bed Mobility PT LTG, Outcome --  goal ongoing       Goal: Transfer Training Goal 1 LTG- PT  Outcome: Ongoing (interventions implemented as appropriate)    07/18/17 1101 07/20/17 1634   Transfer Training PT LTG   Transfer Training PT LTG, Date Established 07/18/17 --    Transfer Training PT LTG, Time to Achieve 5 days --    Transfer Training PT LTG, Activity Type sit to stand/stand to sit --    Transfer Training PT LTG, [place] Level contact guard assist;2 person assist required --    Transfer Training PT LTG, Assist Device walker, rolling --    Transfer Training PT LTG, Date Goal Reviewed 07/18/17 --    Transfer Training PT LTG, Outcome --  goal ongoing       Goal: Gait Training Goal LTG- PT  Outcome: Ongoing (interventions implemented as appropriate)    07/18/17 1101 07/19/17 1338   Gait Training PT LTG   Gait Training Goal PT LTG, Date Established 07/18/17 --    Gait Training Goal PT LTG, Time to Achieve 5 days --    Gait Training Goal PT LTG, [place] Level minimum assist (75%  patient effort);2 person assist required --    Gait Training Goal PT LTG, Assist Device walker, rolling --    Gait Training Goal PT LTG, Distance to Achieve 50 feet --    Gait Training Goal PT LTG, Date Goal Reviewed 07/18/17 --    Gait Training Goal PT LTG, Outcome --  goal ongoing

## 2017-07-20 NOTE — PROGRESS NOTES
"      HOSPITALIST DAILY PROGRESS NOTE    Chief Complaint: Hip pain    Subjective   SUBJECTIVE/OVERNIGHT EVENTS   Yesterday afternoon, patient became agitated and started hallucinating. Improved on CIWA protocol. Patient was agitated overnight and unintentionally pulled out her peripheral nerve block catheter this morning and also her peripheral IV.  She reports increased hip pain with transferred to on chair from bed.     Review of Systems:  Gen-no fevers, no chills  CV-no chest pain, no palpitations  Resp-no cough, no dyspnea  GI-no N/V/D, no abd pain    Objective   OBJECTIVE   I have reviewed the vital signs.  /60 (BP Location: Left leg, Patient Position: Lying)  Pulse 67  Temp 98.4 °F (36.9 °C) (Oral)   Resp 24  Ht 62\" (157.5 cm)  Wt 113 lb (51.3 kg)  SpO2 97%  BMI 20.67 kg/m2    Physical Exam:  Gen-no acute distress  CV-RRR, S1 S2 normal, no m/r/g  Resp-CTAB, no wheezes  Abd-soft, NT, ND, +BS  Ext-no edema  Neuro-A&Ox3, no focal deficits in LE   Psych-appropriate mood    Results:  I have reviewed the labs, culture data, radiology results, and diagnostic studies.      Results from last 7 days  Lab Units 07/20/17  0525 07/19/17  0655 07/18/17  0732   WBC 10*3/mm3 4.73 5.28 4.70   HEMOGLOBIN g/dL 10.5* 10.4* 10.7*   HEMATOCRIT % 30.8* 31.7* 32.9*   PLATELETS 10*3/mm3 44* 50* 54*       Results from last 7 days  Lab Units 07/19/17  0655   SODIUM mmol/L 140   POTASSIUM mmol/L 4.7   CHLORIDE mmol/L 105   CO2 mmol/L 30.0   BUN mg/dL 13   CREATININE mg/dL 0.60   GLUCOSE mg/dL 111*   CALCIUM mg/dL 9.3         I have reviewed the medications.    Current Facility-Administered Medications:   •  acetaminophen (TYLENOL) tablet 650 mg, 650 mg, Oral, Q6H PRN, Chanelle Tolbert MD  •  albuterol (PROVENTIL) nebulizer solution 0.5% 2.5 mg/0.5mL, 2.5 mg, Nebulization, Q6H PRN, Michael Harvey RPH  •  fondaparinux (ARIXTRA) injection 2.5 mg, 2.5 mg, Subcutaneous, Q24H, Chanelle Tolbert MD, 2.5 mg at 07/19/17 2025  •  " LORazepam (ATIVAN) tablet 0.5 mg, 0.5 mg, Oral, Q2H PRN **OR** LORazepam (ATIVAN) injection 0.5 mg, 0.5 mg, Intravenous, Q2H PRN, 0.5 mg at 07/20/17 0252 **OR** LORazepam (ATIVAN) tablet 1 mg, 1 mg, Oral, Q1H PRN **OR** LORazepam (ATIVAN) injection 1 mg, 1 mg, Intravenous, Q1H PRN **OR** LORazepam (ATIVAN) injection 1 mg, 1 mg, Intravenous, Q15 Min PRN, 2 mg at 07/19/17 1801 **OR** LORazepam (ATIVAN) injection 1 mg, 1 mg, Intramuscular, Q15 Min PRN, Chanelle Tolbert MD  •  multiple vitamin (M.V.I. Adult) 10 mL, thiamine (B-1) 100 mg, folic acid 1 mg, magnesium sulfate 2 g in sodium chloride 0.9 % 1,000 mL infusion, 100 mL/hr, Intravenous, Daily, Chanelle Tolbert MD, Last Rate: 100 mL/hr at 07/19/17 1801, 100 mL/hr at 07/19/17 1801  •  ondansetron (ZOFRAN) injection 4 mg, 4 mg, Intravenous, Q6H PRN, CAMPBELL Ramirez  •  Pharmacy Meds to Bed Consult, , Does not apply, Daily, Cecy Rooney, [facility]  •  ropivacaine (NAROPIN) 0.2% peripheral nerve cath (moog) 200 mL, 10 mL/hr, Peripheral Nerve, Continuous, Amelia Arora MD, Last Rate: 10 mL/hr at 07/20/17 0000, 10 mL/hr at 07/20/17 0000  •  sertraline (ZOLOFT) tablet 50 mg, 50 mg, Oral, Daily, Edson Camacho MD, 50 mg at 07/19/17 0858  •  sodium chloride 0.9 % flush 1-10 mL, 1-10 mL, Intravenous, PRN, Edson Camacho MD  •  thiamine (VITAMIN B-1) tablet 100 mg, 100 mg, Oral, Daily, Edson Camacho MD, 100 mg at 07/19/17 0858  •  vitamin B-12 (CYANOCOBALAMIN) tablet 100 mcg, 100 mcg, Oral, Daily, Edson Camacho MD, 100 mcg at 07/19/17 0858  •  vitamin B-6 (PYRIDOXINE) tablet 50 mg, 50 mg, Oral, Daily, Edson Camacho MD, 50 mg at 07/19/17 0857    Facility-Administered Medications Ordered in Other Encounters:   •  bupivacaine PF (MARCAINE) 0.25 % injection, , , PRN, Monico Herbert CRNA, 40 mL at 07/17/17 1625    Assessment/Plan   ASSESSMENT/PLAN      # Mechanical R hip fracture  - Orthopedics following. Plan for conservative management. FU with Dr. Bailey in 2 weeks  for FU XRays. No surgical intervention for now  - Peripheral nerve block unintentionally removed today  - Tylenol PRN for pain.  Avoid opiates.  Tramadol when necessary  - Start fondaparinux 2.5mg SQ BID for Hip DVT ppx after discussion with Pharmacy      # Chronic thrombocytopenia  - Peripheral smear shows counts closer to 100,000 with no schistocytes when labs showed plts count of 50,000. No clinical suspicion or lab abnormalities for MAHA   - Unlikely cirrhosis given normal INR. Will hold off on RUQ US for now  - Could be from chronic alcohol use (2 shots of bourbon for a long time)  - Will obtain HIV, acute hep panel     # Alcohol withdrawal  # Hallucinations  - UA neg for infection. No respiratory symptoms to suspect respiratory infection. WBC normal with no fevers  - Medications reviewed   - [facility] protocol in place  - Thiamine repletement     # Macrocytic anemia  - Baseling Hgb ~10  - Iron studies, B12 level, folate level, TSH  - Continue home B6, B12 and thiamine replacements    # Mood disorder  - Continue Sertraline     DVT ppx: Fondaparinux  I expect patient to be discharged in: 1-2 days    Chanelle Tolbert MD  07/20/17  7:30 AM

## 2017-07-20 NOTE — PLAN OF CARE
Problem: Patient Care Overview (Adult)  Goal: Plan of Care Review  Outcome: Ongoing (interventions implemented as appropriate)    07/19/17 2026 07/20/17 0334   Coping/Psychosocial Response Interventions   Plan Of Care Reviewed With patient --    Patient Care Overview   Progress --  no change   Outcome Evaluation   Outcome Summary/Follow up Plan --  CIWA assessments completed PRN, see flowsheets and MAR for medication administration and assessment data. Patient rested most of the night, awoke around 0240 with increased agitation and confusion.          07/19/17 2026 07/20/17 0334   Coping/Psychosocial Response Interventions   Plan Of Care Reviewed With patient --    Patient Care Overview   Progress --  no change   Outcome Evaluation   Outcome Summary/Follow up Plan --  CIWA assessments completed PRN, see flowsheets and MAR for medication administration and assessment data. Patient rested most of the night, awoke around 0240 with increased agitation and confusion.

## 2017-07-20 NOTE — THERAPY TREATMENT NOTE
Acute Care - Physical Therapy Treatment Note  [facility]     Patient Name: Elizabeth Oliveira  : 1929  MRN: [id number]  Today's Date: 2017  Onset of Illness/Injury or Date of Surgery Date: 17  Date of Referral to PT: 17  Referring Physician: Chanelle Tolbert MD    Admit Date: 2017    Visit Dx:    ICD-10-CM ICD-9-CM   1. Fall, initial encounter W19.XXXA E888.9   2. 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 Impaired functional mobility, balance, gait, and endurance Z74.09 V49.89   6. Impaired mobility and ADLs Z74.09 799.89     Patient Active Problem List   Diagnosis   • Fall   • Closed fracture of right femur   • Arthritis   • Thrombocytopenia               Adult Rehabilitation Note       17 1510 17 1030       Rehab Assessment/Intervention    Discipline physical therapist  -SC physical therapist  -SC     Document Type therapy note (daily note)  -SC therapy note (daily note)  -SC     Subjective Information agree to therapy  -SC agree to therapy  -SC     Patient Effort, Rehab Treatment good  -SC good  -SC     Symptoms Noted During/After Treatment increased pain  -SC increased pain  -SC     Precautions/Limitations fall precautions;other (see comments)   nerve cath out   -SC brace on when up;other (see comments)   TDWB R LE  -SC     Recorded by [SC] Medardo Sanchez, PT [SC] Medardo Sanchez, PT     Pain Assessment    Pain Assessment Mark-Fox FACES  -SC Mark-Baker FACES  -SC     Mark-Fox FACES Pain Rating 6  -SC 2  -SC     Post Pain Score 8  -SC 7  -SC     Pain Type Acute pain  -SC Acute pain  -SC     Pain Location Hip  -SC Hip  -SC     Pain Orientation Right  -SC Right  -SC     Pain Intervention(s) Repositioned  -SC Repositioned  -SC     Response to Interventions medicated after  treatment  -SC      Recorded by [SC] Medardo Sanchez, PT [SC] Medardo Sanchez, PT     Cognitive Assessment/Intervention    Current Cognitive/Communication Assessment functional  -SC functional  -SC     Orientation Status oriented to;place;person;required verbal cueing (specifiy in comments)  -SC oriented x 4;required verbal cueing (specifiy in comments)  -SC     Follows Commands/Answers Questions 75% of the time  -SC 75% of the time  -SC     Personal Safety WNL/WFL  -SC WNL/WFL  -SC     Personal Safety Interventions gait belt;fall prevention program maintained  -SC      Recorded by [SC] Medardo Sanchez, PT [SC] Medardo Sanchez, PT     Bed Mobility, Assessment/Treatment    Bed Mobility, Assistive Device head of bed elevated;bed rails  -SC head of bed elevated;bed rails  -SC     Bed Mob, Supine to Sit, Jones moderate assist (50% patient effort);2 person assist required  -SC moderate assist (50% patient effort);2 person assist required  -SC     Bed Mobility, Safety Issues decreased use of arms for pushing/pulling;decreased use of legs for bridging/pushing  -SC decreased use of arms for pushing/pulling  -SC     Bed Mobility, Impairments impaired balance;strength decreased;postural control impaired;pain  -SC pain;impaired balance;strength decreased  -SC     Bed Mobility, Comment vc to get to edge of bed-assist given to r leg and trunk  -SC up to edge of bed with brace on.   -SC     Recorded by [SC] Medardo Sanchez, PT [SC] Medardo Sanchez, PT     Transfer Assessment/Treatment    Transfers, Sit-Stand Jones moderate assist (50% patient effort);2 person assist required  -SC moderate assist (50% patient effort);2 person assist required;verbal cues required  -SC     Transfers, Stand-Sit Jones moderate assist (50% patient effort);2 person assist required  -SC 2 person assist required;moderate assist (50% patient effort)  -SC     Transfers, Sit-Stand-Sit, Assist Device rolling walker  -SC rolling walker  -SC      Transfer, Impairments pain;impaired balance;motor control impaired;postural control impaired  -SC pain;motor control impaired;strength decreased  -SC     Transfer, Comment cues for hand placement  -SC cues for hand placement  -SC     Recorded by [SC] Medardo Sanchez PT [SC] Medardo Sanchez PT     Gait Assessment/Treatment    Gait, [place] Level moderate assist (50% patient effort);2 person assist required  -SC moderate assist (50% patient effort)  -SC     Gait, Assistive Device rolling walker  -SC rolling walker  -SC     Gait, Distance (Feet) 3  -SC 5  -SC     Gait, Gait Deviations right:;antalgic;sarah decreased;step length decreased;weight-shifting ability decreased  -SC right:;antalgic;weight-shifting ability decreased  -SC     Gait, Maintain Weight Bearing Status able to maintain weight bearing status  -SC able to maintain weight bearing status  -SC     Gait, Safety Issues step length decreased;loses balance backward  -SC sequencing ability decreased  -SC     Gait, Impairments impaired balance;motor control impaired;pain  -SC impaired balance;coordination impaired;motor control impaired;strength decreased  -SC     Gait, Comment No brace today. Cues for sequencing and assist to advance walker  -SC Cues for sequencing walker  -SC     Recorded by [SC] Medardo Sanchez PT [SC] Medardo Sanchez PT     Therapy Exercises    Right Lower Extremity AROM:;10 reps;sitting;ankle pumps/circles;LAQ;quad sets  -SC AAROM:;10 reps;supine;ankle pumps/circles;quad sets;heel slides  -SC     Recorded by [SC] Medardo Sanchez PT [SC] Medardo Sanchez PT     Positioning and Restraints    Pre-Treatment Position in bed  -SC in bed  -SC     Post Treatment Position chair  -SC chair  -SC     In Chair sitting;reclined;notified nsg;call light within reach;encouraged to call for assist;exit alarm on  -SC sitting;notified nsg;reclined;exit alarm on  -SC     Recorded by [SC] Medardo Sanchez PT [SC] Medardo Sanchez PT       User Gruber  (r) =  Recorded By, (t) = Taken By, (c) = Cosigned By    Initials Name Effective Dates    SC Medardo Sanchez, PT 06/19/15 -                 IP PT Goals       07/20/17 1634 07/19/17 1338 07/18/17 1101    Bed Mobility PT LTG    Bed Mobility PT LTG, Date Established   07/18/17  -LR    Bed Mobility PT LTG, Time to Achieve   5 days  -LR    Bed Mobility PT LTG, Activity Type   supine to sit/sit to supine  -LR    Bed Mobility PT LTG, [place] Level   contact guard assist  -LR    Bed Mobility PT LTG, Date Goal Reviewed   07/18/17  -LR    Bed Mobility PT LTG, Outcome goal ongoing  -SC goal ongoing  -SC goal ongoing  -LR    Transfer Training PT LTG    Transfer Training PT LTG, Date Established   07/18/17  -LR    Transfer Training PT LTG, Time to Achieve   5 days  -LR    Transfer Training PT LTG, Activity Type   sit to stand/stand to sit  -LR    Transfer Training PT LTG, [place] Level   contact guard assist;2 person assist required  -LR    Transfer Training PT LTG, Assist Device   walker, rolling  -LR    Transfer Training PT  LTG, Date Goal Reviewed   07/18/17  -LR    Transfer Training PT LTG, Outcome goal ongoing  -SC goal ongoing  -SC goal ongoing  -LR    Gait Training PT LTG    Gait Training Goal PT LTG, Date Established   07/18/17  -LR    Gait Training Goal PT LTG, Time to Achieve   5 days  -LR    Gait Training Goal PT LTG, [place] Level   minimum assist (75% patient effort);2 person assist required  -LR    Gait Training Goal PT LTG, Assist Device   walker, rolling  -LR    Gait Training Goal PT LTG, Distance to Achieve   50 feet  -LR    Gait Training Goal PT LTG, Date Goal Reviewed   07/18/17  -LR    Gait Training Goal PT LTG, Outcome  goal ongoing  -SC goal ongoing  -LR      User Key  (r) = Recorded By, (t) = Taken By, (c) = Cosigned By    Initials Name Provider Type    SC Medardo Sanchez, PT Physical Therapist    LR Tianna Lizarraga, PT Physical Therapist          Physical Therapy Education     Title: PT OT SLP  Therapies (Active)     Topic: Physical Therapy (Active)     Point: Mobility training (Active)    Learning Progress Summary    Learner Readiness Method Response Comment Documented by Status   Patient Eager E NR reviewed safety with mobility SC 07/20/17 1634 Active    Nonacceptance E NR   07/19/17 1845 Active    Eager E CATRACHITO RODRÍGUEZ NR reviewed safety with mobility SC 07/19/17 1337 Done    Acceptance E VU   07/18/17 1753 Done    Acceptance E,D MARCOSNR Educated on TDWB status and gait training while maintaining TDWB. Educated on indication for knee immobilizer.  07/18/17 1707 Done               Point: Home exercise program (Active)    Learning Progress Summary    Learner Readiness Method Response Comment Documented by Status   Patient Eager E NR reviewed safety with mobility SC 07/20/17 1634 Active    Nonacceptance E NR   07/19/17 1845 Active    Eager E CATRACHITO RODRÍGUEZ NR reviewed safety with mobility SC 07/19/17 1337 Done    Acceptance E VU   07/18/17 1753 Done    Acceptance E,D AWILDA RODRÍGUEZ Educated on TDWB status and gait training while maintaining TDWB. Educated on indication for knee immobilizer.  07/18/17 1707 Done               Point: Body mechanics (Active)    Learning Progress Summary    Learner Readiness Method Response Comment Documented by Status   Patient Eager E NR reviewed safety with mobility SC 07/20/17 1634 Active    Nonacceptance E NR   07/19/17 1845 Active    Eager E CATRACHITO RODRÍGUEZ NR reviewed safety with mobility SC 07/19/17 1337 Done    Acceptance E VU   07/18/17 1753 Done    Acceptance E,D AWILDA RODRÍGUEZ Educated on TDWB status and gait training while maintaining TDWB. Educated on indication for knee immobilizer.  07/18/17 1707 Done               Point: Precautions (Active)    Learning Progress Summary    Learner Readiness Method Response Comment Documented by Status   Patient Eager E NR reviewed safety with mobility SC 07/20/17 1634 Active    Nonacceptance E NR   07/19/17 1845 Active    Eager E CATRACHITO RODRÍGUEZ NR reviewed safety  with mobility SC 07/19/17 1337 Done    Acceptance E VU  JR 07/18/17 1753 Done    Acceptance E,D VU,NR Educated on TDWB status and gait training while maintaining TDWB. Educated on indication for knee immobilizer.  07/18/17 1707 Done                      User Key     Initials Effective Dates Name Provider Type Discipline    SC 06/19/15 -  Medardo Sanchez, PT Physical Therapist PT     06/19/15 -  Tianna Lizarraga, PT Physical Therapist PT     02/06/17 -  Reji Berg, RN Registered Nurse Nurse                    PT Recommendation and Plan  Anticipated Equipment Needs At Discharge: bedside commode  Anticipated Discharge Disposition: skilled nursing facility  Demonstrates Need for Referral to Another Service: occupational therapy  Planned Therapy Interventions: balance training, bed mobility training, gait training, home exercise program, patient/family education, ROM (Range of Motion), strengthening, transfer training  PT Frequency: daily  Plan of Care Review  Plan Of Care Reviewed With: patient  Progress: progress towards functional goals is fair  Outcome Summary/Follow up Plan: Assistance for all mobility. Nerve cath out and brace off. Able to take small steps. Limited by pain          Outcome Measures       07/20/17 1510 07/19/17 1030 07/19/17 1027    How much help from another person do you currently need...    Turning from your back to your side while in flat bed without using bedrails? 2  -SC 2  -SC     Moving from lying on back to sitting on the side of a flat bed without bedrails? 2  -SC 2  -SC     Moving to and from a bed to a chair (including a wheelchair)? 2  -SC 2  -SC     Standing up from a chair using your arms (e.g., wheelchair, bedside chair)? 2  -SC 2  -SC     Climbing 3-5 steps with a railing? 1  -SC 1  -SC     To walk in hospital room? 2  -SC 2  -SC     AM-PAC 6 Clicks Score 11  -SC 11  -SC     How much help from another is currently needed...    Putting on and taking off regular lower  body clothing?   1  -MM    Bathing (including washing, rinsing, and drying)   2  -MM    Toileting (which includes using toilet bed pan or urinal)   2  -MM    Putting on and taking off regular upper body clothing   3  -MM    Taking care of personal grooming (such as brushing teeth)   3  -MM    Eating meals   3  -MM    Score   14  -MM    Functional Assessment    Outcome Measure Options AM-[facility] 6 Clicks Basic Mobility (PT)  -SC AM-PAC 6 Clicks Basic Mobility (PT)  -[facility] 6 Clicks Daily Activity (OT)  -MM      07/18/17 1101          How much help from another person do you currently need...    Turning from your back to your side while in flat bed without using bedrails? 2  -LR      Moving from lying on back to sitting on the side of a flat bed without bedrails? 2  -LR      Moving to and from a bed to a chair (including a wheelchair)? 2  -LR      Standing up from a chair using your arms (e.g., wheelchair, bedside chair)? 2  -LR      Climbing 3-5 steps with a railing? 1  -LR      To walk in hospital room? 2  -LR      AM-PAC 6 Clicks Score 11  -LR      Functional Assessment    Outcome Measure Options AM-[facility] 6 Clicks Basic Mobility (PT)  -LR        User Key  (r) = Recorded By, (t) = Taken By, (c) = Cosigned By    Initials Name Provider Type    MARIA GUADALUPE Sanchez, PT Physical Therapist    MM Tori Harrison, OT Occupational Therapist    LR Tianna Lizarraga, PT Physical Therapist           Time Calculation:         PT Charges       07/20/17 1638          Time Calculation    Start Time 1510  -SC      PT Received On 07/20/17  -SC      PT Goal Re-Cert Due Date 07/28/17  -SC      Time Calculation- PT    Total Timed Code Minutes- PT 25 minute(s)  -SC        User Key  (r) = Recorded By, (t) = Taken By, (c) = Cosigned By    Initials Name Provider Type    MARIA GUADALUPE Sanchez, PT Physical Therapist          Therapy Charges for Today     Code Description Service Date Service Provider Modifiers Qty    42430835871 HC GAIT  TRAINING EA 15 MIN 7/19/2017 Medardo Sanchez, PT GP 1    20933599522 HC GAIT TRAINING EA 15 MIN 7/20/2017 Medardo Sanchez, PT GP 1    65646488509 HC PT THER PROC EA 15 MIN 7/20/2017 Medardo Sanchez, PT GP 1    89928343301 HC PT THER SUPP EA 15 MIN 7/20/2017 Medardo Sanchez, PT GP 1          PT G-Codes  Outcome Measure Options: AM-PAC 6 Clicks Basic Mobility (PT)    Medardo Sanchez, PT  7/20/2017

## 2017-07-20 NOTE — PLAN OF CARE
Problem: Fractured Hip (Adult)  Goal: Signs and Symptoms of Listed Potential Problems Will be Absent or Manageable (Fractured Hip)  Outcome: Ongoing (interventions implemented as appropriate)

## 2017-07-21 LAB
C DIFF TOX GENS STL QL NAA+PROBE: NOT DETECTED
DEPRECATED RDW RBC AUTO: 51.6 FL (ref 37–54)
ERYTHROCYTE [DISTWIDTH] IN BLOOD BY AUTOMATED COUNT: 13.3 % (ref 11.3–14.5)
FERRITIN SERPL-MCNC: 112 NG/ML (ref 10–291)
HCT VFR BLD AUTO: 31.7 % (ref 34.5–44)
HGB BLD-MCNC: 10.7 G/DL (ref 11.5–15.5)
MCH RBC QN AUTO: 36 PG (ref 27–31)
MCHC RBC AUTO-ENTMCNC: 33.8 G/DL (ref 32–36)
MCV RBC AUTO: 106.7 FL (ref 80–99)
PLATELET # BLD AUTO: 49 10*3/MM3 (ref 150–450)
PMV BLD AUTO: 9.8 FL (ref 6–12)
RBC # BLD AUTO: 2.97 10*6/MM3 (ref 3.89–5.14)
WBC NRBC COR # BLD: 5.62 10*3/MM3 (ref 3.5–10.8)

## 2017-07-21 PROCEDURE — 87046 STOOL CULTR AEROBIC BACT EA: CPT | Performed by: HOSPITALIST

## 2017-07-21 PROCEDURE — 99232 SBSQ HOSP IP/OBS MODERATE 35: CPT | Performed by: HOSPITALIST

## 2017-07-21 PROCEDURE — 25010000002 FONDAPARINUX PER 0.5 MG: Performed by: HOSPITALIST

## 2017-07-21 PROCEDURE — 85027 COMPLETE CBC AUTOMATED: CPT | Performed by: HOSPITALIST

## 2017-07-21 PROCEDURE — 82728 ASSAY OF FERRITIN: CPT | Performed by: HOSPITALIST

## 2017-07-21 PROCEDURE — 87177 OVA AND PARASITES SMEARS: CPT | Performed by: HOSPITALIST

## 2017-07-21 PROCEDURE — 87209 SMEAR COMPLEX STAIN: CPT | Performed by: HOSPITALIST

## 2017-07-21 PROCEDURE — 87493 C DIFF AMPLIFIED PROBE: CPT | Performed by: HOSPITALIST

## 2017-07-21 PROCEDURE — 87045 FECES CULTURE AEROBIC BACT: CPT | Performed by: HOSPITALIST

## 2017-07-21 RX ORDER — FERROUS SULFATE 325(65) MG
325 TABLET ORAL
Status: DISCONTINUED | OUTPATIENT
Start: 2017-07-21 | End: 2017-07-25 | Stop reason: HOSPADM

## 2017-07-21 RX ORDER — DIPHENOXYLATE HYDROCHLORIDE AND ATROPINE SULFATE 2.5; .025 MG/1; MG/1
1 TABLET ORAL 4 TIMES DAILY PRN
Status: DISCONTINUED | OUTPATIENT
Start: 2017-07-21 | End: 2017-07-25 | Stop reason: HOSPADM

## 2017-07-21 RX ADMIN — Medication 325 MG: at 16:26

## 2017-07-21 RX ADMIN — TRAMADOL HYDROCHLORIDE 25 MG: 50 TABLET, COATED ORAL at 08:40

## 2017-07-21 RX ADMIN — FONDAPARINUX SODIUM 2.5 MG: 2.5 INJECTION SUBCUTANEOUS at 16:26

## 2017-07-21 RX ADMIN — Medication 100 MG: at 08:32

## 2017-07-21 RX ADMIN — DIPHENOXYLATE HYDROCHLORIDE AND ATROPINE SULFATE 1 TABLET: 2.5; .025 TABLET ORAL at 16:26

## 2017-07-21 RX ADMIN — SERTRALINE HYDROCHLORIDE 50 MG: 50 TABLET ORAL at 08:32

## 2017-07-21 RX ADMIN — CYANOCOBALAMIN TAB 1000 MCG 1000 MCG: 1000 TAB at 08:32

## 2017-07-21 RX ADMIN — PYRIDOXINE HCL TAB 50 MG 50 MG: 50 TAB at 08:32

## 2017-07-21 NOTE — PLAN OF CARE
Problem: Patient Care Overview (Adult)  Goal: Plan of Care Review  Outcome: Ongoing (interventions implemented as appropriate)    07/21/17 0209   Coping/Psychosocial Response Interventions   Plan Of Care Reviewed With patient   Patient Care Overview   Progress progress towards functional goals is fair         Problem: Fractured Hip (Adult)  Goal: Signs and Symptoms of Listed Potential Problems Will be Absent or Manageable (Fractured Hip)  Outcome: Ongoing (interventions implemented as appropriate)    07/21/17 0209   Fractured Hip   Problems Assessed (Fractured Hip) all   Problems Present (Fractured Hip) functional deficit/self-care deficit         Problem: Fall Risk (Adult)  Goal: Absence of Falls  Outcome: Ongoing (interventions implemented as appropriate)    07/21/17 0209   Fall Risk (Adult)   Absence of Falls making progress toward outcome

## 2017-07-21 NOTE — PROGRESS NOTES
Continued Stay Note  [facility]     Patient Name: Elizabeth Oliveira  MRN: [id number]  Today's Date: 7/21/2017    Admit Date: 7/17/2017          Discharge Plan       07/21/17 1604    Case Management/Social Work Plan    Plan SNF at [facility]    Patient/Family In Agreement With Plan yes    Additional Comments Case mgt con't to follow. Unfortunately still awaiting insurance approval, hopefully we can proceed with transfer Monday 7/24. Case mgt will f/u Monday am              Discharge Codes     None        Expected Discharge Date and Time     Expected Discharge Date Expected Discharge Time    Jul 21, 2017             Sonja C Kellerman, RN

## 2017-07-21 NOTE — PROGRESS NOTES
"      HOSPITALIST DAILY PROGRESS NOTE    Chief Complaint: Hip pain    Subjective   SUBJECTIVE/OVERNIGHT EVENTS   Having diarrhea. Per Tech, had 5-6 mucus-y liquid BMs. No abdominal pain. No blood in stool. Not scoring on CIWA protocol    Review of Systems:  Gen-no fevers, no chills  CV-no chest pain, no palpitations  Resp-no cough, no dyspnea  GI-no N/V, no abd pain, +Diarrhea    Objective   OBJECTIVE   I have reviewed the vital signs.  /62  Pulse 100  Temp 97.3 °F (36.3 °C) (Tympanic)   Resp 20  Ht 62\" (157.5 cm)  Wt 113 lb (51.3 kg)  SpO2 90%  BMI 20.67 kg/m2    Physical Exam:  Gen-no acute distress  CV-RRR, S1 S2 normal, no m/r/g  Resp-CTAB, no wheezes  Abd-soft, NT, ND, +BS  Ext-no edema  Neuro-A&Ox3, no focal deficits  Psych-appropriate mood    Results:  I have reviewed the labs, culture data, radiology results, and diagnostic studies.      Results from last 7 days  Lab Units 07/21/17  0639 07/20/17  0525 07/19/17  0655   WBC 10*3/mm3 5.62 4.73 5.28   HEMOGLOBIN g/dL 10.7* 10.5* 10.4*   HEMATOCRIT % 31.7* 30.8* 31.7*   PLATELETS 10*3/mm3 49* 44* 50*       Results from last 7 days  Lab Units 07/19/17  0655   SODIUM mmol/L 140   POTASSIUM mmol/L 4.7   CHLORIDE mmol/L 105   CO2 mmol/L 30.0   BUN mg/dL 13   CREATININE mg/dL 0.60   GLUCOSE mg/dL 111*   CALCIUM mg/dL 9.3       I have reviewed the medications.    ferrous sulfate 325 mg Oral Daily With Breakfast   fondaparinux 2.5 mg Subcutaneous Q24H   Pharmacy Meds to Bed Consult  Does not apply Daily   sertraline 50 mg Oral Daily   thiamine 100 mg Oral Daily   cyancobalamin 1,000 mcg Oral Daily   vitamin B-6 50 mg Oral Daily     Assessment/Plan   ASSESSMENT/PLAN      # Diarrhea  - C. Diff neg  - Will give Lomotil PRN for symptomatic control    # Mechanical R hip fracture  - Orthopedics following. Plan for conservative management. FU with Dr. Bailey in 2 weeks for FU XRays. No surgical intervention for now  - Peripheral nerve block unintentionally " removed today  - Tylenol PRN for pain.  Avoid opiates.  Tramadol when necessary  - Start fondaparinux 2.5mg SQ BID for Hip DVT ppx after discussion with Pharmacy      # Chronic thrombocytopenia  - Peripheral smear shows counts closer to 100,000 with no schistocytes when labs showed plts count of 50,000. No clinical suspicion or lab abnormalities for MAHA   - Unlikely cirrhosis given normal INR. Will hold off on RUQ US for now  - Could be from chronic alcohol use (reportedly 2 shots of bourbon nightly for a long time)  - HIV NR, acute hep panel neg      # Alcohol withdrawal: resolved  # Hallucinations  - UA neg for infection. No respiratory symptoms to suspect respiratory infection. WBC normal with no fevers  - Medications reviewed   - KYRA DCed  - Thiamine repletement     # Macrocytic anemia  - Baseling Hgb ~10  - Iron studies, B12 level, folate level, TSH  - Continue home B6, B12 and thiamine replacements  - Started oral iron     # Mood disorder  - Continue Sertraline      DVT ppx: Fondaparinux  I expect patient to be discharged in: 1-2 days    Chanelle Tolbert MD  07/21/17  6:48 PM

## 2017-07-21 NOTE — PLAN OF CARE
Problem: Pressure Ulcer Risk (Donal Scale) (Adult,Obstetrics,Pediatric)  Goal: Identify Related Risk Factors and Signs and Symptoms  Outcome: Ongoing (interventions implemented as appropriate)

## 2017-07-22 LAB
ALBUMIN SERPL-MCNC: 3.7 G/DL (ref 3.2–4.8)
ANION GAP SERPL CALCULATED.3IONS-SCNC: 8 MMOL/L (ref 3–11)
BASOPHILS # BLD AUTO: 0.02 10*3/MM3 (ref 0–0.2)
BASOPHILS NFR BLD AUTO: 0.3 % (ref 0–1)
BUN BLD-MCNC: 17 MG/DL (ref 9–23)
BUN/CREAT SERPL: 42.5 (ref 7–25)
CALCIUM SPEC-SCNC: 9.2 MG/DL (ref 8.7–10.4)
CHLORIDE SERPL-SCNC: 102 MMOL/L (ref 99–109)
CO2 SERPL-SCNC: 29 MMOL/L (ref 20–31)
CREAT BLD-MCNC: 0.4 MG/DL (ref 0.6–1.3)
DEPRECATED RDW RBC AUTO: 52.7 FL (ref 37–54)
EOSINOPHIL # BLD AUTO: 0.06 10*3/MM3 (ref 0–0.3)
EOSINOPHIL NFR BLD AUTO: 0.9 % (ref 0–3)
ERYTHROCYTE [DISTWIDTH] IN BLOOD BY AUTOMATED COUNT: 13.6 % (ref 11.3–14.5)
GFR SERPL CREATININE-BSD FRML MDRD: >150 ML/MIN/1.73
GLUCOSE BLD-MCNC: 106 MG/DL (ref 70–100)
HCT VFR BLD AUTO: 31.1 % (ref 34.5–44)
HGB BLD-MCNC: 10.4 G/DL (ref 11.5–15.5)
IMM GRANULOCYTES # BLD: 0.04 10*3/MM3 (ref 0–0.03)
IMM GRANULOCYTES NFR BLD: 0.6 % (ref 0–0.6)
LYMPHOCYTES # BLD AUTO: 0.74 10*3/MM3 (ref 0.6–4.8)
LYMPHOCYTES NFR BLD AUTO: 11.7 % (ref 24–44)
MAGNESIUM SERPL-MCNC: 1.8 MG/DL (ref 1.3–2.7)
MCH RBC QN AUTO: 35.9 PG (ref 27–31)
MCHC RBC AUTO-ENTMCNC: 33.4 G/DL (ref 32–36)
MCV RBC AUTO: 107.2 FL (ref 80–99)
MONOCYTES # BLD AUTO: 0.8 10*3/MM3 (ref 0–1)
MONOCYTES NFR BLD AUTO: 12.6 % (ref 0–12)
NEUTROPHILS # BLD AUTO: 4.69 10*3/MM3 (ref 1.5–8.3)
NEUTROPHILS NFR BLD AUTO: 73.9 % (ref 41–71)
PHOSPHATE SERPL-MCNC: 2.5 MG/DL (ref 2.4–5.1)
PLAT MORPH BLD: NORMAL
PLATELET # BLD AUTO: 57 10*3/MM3 (ref 150–450)
PMV BLD AUTO: 10.6 FL (ref 6–12)
POTASSIUM BLD-SCNC: 3.9 MMOL/L (ref 3.5–5.5)
RBC # BLD AUTO: 2.9 10*6/MM3 (ref 3.89–5.14)
RBC MORPH BLD: NORMAL
SODIUM BLD-SCNC: 139 MMOL/L (ref 132–146)
WBC MORPH BLD: NORMAL
WBC NRBC COR # BLD: 6.35 10*3/MM3 (ref 3.5–10.8)

## 2017-07-22 PROCEDURE — 85025 COMPLETE CBC W/AUTO DIFF WBC: CPT | Performed by: HOSPITALIST

## 2017-07-22 PROCEDURE — 99232 SBSQ HOSP IP/OBS MODERATE 35: CPT | Performed by: HOSPITALIST

## 2017-07-22 PROCEDURE — 85007 BL SMEAR W/DIFF WBC COUNT: CPT | Performed by: HOSPITALIST

## 2017-07-22 PROCEDURE — 25010000002 FONDAPARINUX PER 0.5 MG: Performed by: HOSPITALIST

## 2017-07-22 PROCEDURE — 80069 RENAL FUNCTION PANEL: CPT | Performed by: HOSPITALIST

## 2017-07-22 PROCEDURE — 83735 ASSAY OF MAGNESIUM: CPT | Performed by: HOSPITALIST

## 2017-07-22 RX ADMIN — FONDAPARINUX SODIUM 2.5 MG: 2.5 INJECTION SUBCUTANEOUS at 16:31

## 2017-07-22 RX ADMIN — Medication 325 MG: at 09:18

## 2017-07-22 RX ADMIN — TRAMADOL HYDROCHLORIDE 25 MG: 50 TABLET, COATED ORAL at 22:06

## 2017-07-22 RX ADMIN — SERTRALINE HYDROCHLORIDE 50 MG: 50 TABLET ORAL at 09:18

## 2017-07-22 RX ADMIN — TRAMADOL HYDROCHLORIDE 25 MG: 50 TABLET, COATED ORAL at 09:18

## 2017-07-22 RX ADMIN — PYRIDOXINE HCL TAB 50 MG 50 MG: 50 TAB at 09:20

## 2017-07-22 RX ADMIN — CYANOCOBALAMIN TAB 1000 MCG 1000 MCG: 1000 TAB at 09:19

## 2017-07-22 RX ADMIN — ACETAMINOPHEN 650 MG: 325 TABLET, FILM COATED ORAL at 16:31

## 2017-07-22 RX ADMIN — Medication 100 MG: at 09:18

## 2017-07-22 RX ADMIN — TRAMADOL HYDROCHLORIDE 25 MG: 50 TABLET, COATED ORAL at 17:54

## 2017-07-22 NOTE — PROGRESS NOTES
Orthopedic follow-up    Elizabeth continues to have some difficulty with pain and mobilization in therapy. No complications however    Vital signs reviewed  Skin over the hip remains to be in good condition.  Review of surgical incision shows no complications.  Compartments are soft. Negative Homans    Plan continues to be a discharge with safe mobilization  Fall prevention and DVT prophylaxis  She'll need follow-up in 2 weeks with x-rays in the office to evaluate for any evidence of fracture displacement or subsidence of the femoral component.    Please call with any questions or concerns

## 2017-07-22 NOTE — PLAN OF CARE
Problem: Fall Risk (Adult)  Intervention: Reduce Risk/Promote Restraint Free Environment    07/22/17 0116   Restraint Interventions   Safety Promotion/Fall Prevention safety round/check completed;fall prevention program maintained;nonskid shoes/slippers when out of bed;supervised activity;toileting scheduled   Safety Interventions   Safety/Security Measures bed alarm set   Environmental Safety Modification assistive device/personal items within reach;clutter free environment maintained;lighting adjusted;mobility aid in reach;room near unit station;room organization consistent

## 2017-07-22 NOTE — PROGRESS NOTES
"      HOSPITALIST DAILY PROGRESS NOTE    Chief Complaint: Hip pain    Subjective   SUBJECTIVE/OVERNIGHT EVENTS   Having severe hip pain with movement.  Diarrhea improved    Review of Systems:  Gen-no fevers, no chills  CV-no chest pain, no palpitations  Resp-no cough, no dyspnea  GI-no N/V, no abd pain, +Diarrhea    Objective   OBJECTIVE   I have reviewed the vital signs.  /67 (BP Location: Left arm, Patient Position: Lying)  Pulse 87  Temp 98 °F (36.7 °C) (Oral)   Resp 18  Ht 62\" (157.5 cm)  Wt 113 lb (51.3 kg)  SpO2 97%  BMI 20.67 kg/m2    Physical Exam:  Gen-no acute distress  CV-RRR, S1 S2 normal, no m/r/g  Resp-CTAB, no wheezes  Abd-soft, NT, ND, +BS  Ext-no edema  Neuro-A&Ox3, no focal deficits, sensory and motor intact on right lower extremity  Psych-appropriate mood    Results:  I have reviewed the labs, culture data, radiology results, and diagnostic studies.      Results from last 7 days  Lab Units 07/22/17  0436 07/21/17  0639 07/20/17  0525   WBC 10*3/mm3 6.35 5.62 4.73   HEMOGLOBIN g/dL 10.4* 10.7* 10.5*   HEMATOCRIT % 31.1* 31.7* 30.8*   PLATELETS 10*3/mm3 57* 49* 44*       Results from last 7 days  Lab Units 07/22/17  0436   SODIUM mmol/L 139   POTASSIUM mmol/L 3.9   CHLORIDE mmol/L 102   CO2 mmol/L 29.0   BUN mg/dL 17   CREATININE mg/dL 0.40*   GLUCOSE mg/dL 106*   CALCIUM mg/dL 9.2       I have reviewed the medications.    ferrous sulfate 325 mg Oral Daily With Breakfast   fondaparinux 2.5 mg Subcutaneous Q24H   Pharmacy Meds to Bed Consult  Does not apply Daily   sertraline 50 mg Oral Daily   thiamine 100 mg Oral Daily   cyancobalamin 1,000 mcg Oral Daily   vitamin B-6 50 mg Oral Daily     Assessment/Plan   ASSESSMENT/PLAN      # Diarrhea  - C. Diff neg  - Will give Lomotil PRN for symptomatic control    # Mechanical R hip fracture  - Orthopedics following. Plan for conservative management. FU with Dr. Bailey in 2 weeks for FU XRays. No surgical intervention for now  - Peripheral nerve " block unintentionally removed today  - Tylenol PRN for pain.  Avoid opiates.  Tramadol when necessary  - Start fondaparinux 2.5mg SQ BID for Hip DVT ppx after discussion with Pharmacy      # Chronic thrombocytopenia  - Peripheral smear shows counts closer to 100,000 with no schistocytes when labs showed plts count of 50,000. No clinical suspicion or lab abnormalities for MAHA   - Unlikely cirrhosis given normal INR. Will hold off on RUQ US for now  - Could be from chronic alcohol use (reportedly 2 shots of bourbon nightly for a long time)  - HIV NR, acute hep panel neg      # Alcohol withdrawal: resolved  # Hallucinations  - UA neg for infection. No respiratory symptoms to suspect respiratory infection. WBC normal with no fevers  - Medications reviewed   - KYRA DCed  - Thiamine repletement     # Macrocytic anemia  # Anemia of chronic disease  - Baseling Hgb ~10  - B12 level, folate level, TSH all normal  - Continue home B6, B12 and thiamine replacements  - Started oral iron     # Mood disorder  - Continue Sertraline      DVT ppx: Fondaparinux  I expect patient to be discharged in: 1-2 days pending insurance approval    Chanelle Tolbert MD  07/22/17  7:06 AM

## 2017-07-22 NOTE — PLAN OF CARE
Problem: Pressure Ulcer Risk (Donal Scale) (Adult,Obstetrics,Pediatric)  Goal: Skin Integrity  Outcome: Ongoing (interventions implemented as appropriate)

## 2017-07-23 LAB — BACTERIA SPEC AEROBE CULT: NORMAL

## 2017-07-23 PROCEDURE — 97110 THERAPEUTIC EXERCISES: CPT

## 2017-07-23 PROCEDURE — 25010000002 FONDAPARINUX PER 0.5 MG: Performed by: HOSPITALIST

## 2017-07-23 PROCEDURE — 97530 THERAPEUTIC ACTIVITIES: CPT

## 2017-07-23 PROCEDURE — 99232 SBSQ HOSP IP/OBS MODERATE 35: CPT | Performed by: HOSPITALIST

## 2017-07-23 RX ADMIN — PYRIDOXINE HCL TAB 50 MG 50 MG: 50 TAB at 08:51

## 2017-07-23 RX ADMIN — TRAMADOL HYDROCHLORIDE 25 MG: 50 TABLET, COATED ORAL at 04:49

## 2017-07-23 RX ADMIN — Medication 100 MG: at 08:51

## 2017-07-23 RX ADMIN — SERTRALINE HYDROCHLORIDE 50 MG: 50 TABLET ORAL at 08:51

## 2017-07-23 RX ADMIN — TRAMADOL HYDROCHLORIDE 25 MG: 50 TABLET, COATED ORAL at 22:53

## 2017-07-23 RX ADMIN — FONDAPARINUX SODIUM 2.5 MG: 2.5 INJECTION SUBCUTANEOUS at 17:30

## 2017-07-23 RX ADMIN — CYANOCOBALAMIN TAB 1000 MCG 1000 MCG: 1000 TAB at 08:51

## 2017-07-23 RX ADMIN — TRAMADOL HYDROCHLORIDE 25 MG: 50 TABLET, COATED ORAL at 12:35

## 2017-07-23 RX ADMIN — Medication 325 MG: at 08:51

## 2017-07-23 NOTE — THERAPY TREATMENT NOTE
Acute Care - Physical Therapy Treatment Note  [facility]     Patient Name: Elizabeth Oliveira  : 1929  MRN: [id number]  Today's Date: 2017  Onset of Illness/Injury or Date of Surgery Date: 17  Date of Referral to PT: 17  Referring Physician: Chanelle Tolbert MD    Admit Date: 2017    Visit Dx:    ICD-10-CM ICD-9-CM   1. Fall, initial encounter W19.XXXA E888.9   2. 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 Impaired functional mobility, balance, gait, and endurance Z74.09 V49.89   6. Impaired mobility and ADLs Z74.09 799.89     Patient Active Problem List   Diagnosis   • Fall   • Closed fracture of right femur   • Arthritis   • Thrombocytopenia               Adult Rehabilitation Note       17 0955 17 1510       Rehab Assessment/Intervention    Discipline physical therapy assistant  -UD physical therapist  -SC     Document Type therapy note (daily note)  -UD therapy note (daily note)  -SC     Subjective Information agree to therapy;complains of;weakness;pain  -UD agree to therapy  -SC     Patient Effort, Rehab Treatment adequate  -UD good  -SC     Symptoms Noted During/After Treatment increased pain  -UD increased pain  -SC     Precautions/Limitations  fall precautions;other (see comments)   nerve cath out   -SC     Specific Treatment Considerations knee immobilizer rt leg  -UD      Recorded by [UD] Latia Conklin PTA [SC] Medardo Osoriop, PT     Vital Signs    O2 Delivery Post Treatment supplemental O2  -UD      Pre Patient Position Supine  -UD      Intra Patient Position Standing  -UD      Post Patient Position Sitting  -UD      Recorded by [UD] Latia Conklin PTA      Pain Assessment    Pain Assessment Mark-Fox FACES  -UD Mark-Baker FACES  -SC     Mark-Fox FACES Pain Rating 4  -UD  6  -SC     Pain Score 4  -UD      Post Pain Score 4  -UD 8  -SC     Pain Type Acute pain  -UD Acute pain  -SC     Pain Location Leg  -UD Hip  -SC     Pain Orientation Right  -UD Right  -SC     Pain Intervention(s) Repositioned  -UD Repositioned  -SC     Response to Interventions  medicated after treatment  -SC     Recorded by [UD] Latia Conklin PTA [SC] Medardo Sanchez, PT     Cognitive Assessment/Intervention    Current Cognitive/Communication Assessment  functional  -SC     Orientation Status oriented x 4  -UD oriented to;place;person;required verbal cueing (specifiy in comments)  -SC     Follows Commands/Answers Questions 75% of the time  -UD 75% of the time  -SC     Personal Safety  WNL/WFL  -SC     Personal Safety Interventions  gait belt;fall prevention program maintained  -SC     Recorded by [UD] Latia Conklin PTA [SC] Medardo Sanchez, PT     Mobility Assessment/Training    Right Lower Extremity Weight-Bearing touch down weight-bearing  -UD      Recorded by [UD] Latia Conklin PTA      Bed Mobility, Assessment/Treatment    Bed Mobility, Assistive Device  head of bed elevated;bed rails  -SC     Bed Mob, Supine to Sit, [place] moderate assist (50% patient effort);2 person assist required  - moderate assist (50% patient effort);2 person assist required  -SC     Bed Mobility, Safety Issues  decreased use of arms for pushing/pulling;decreased use of legs for bridging/pushing  -SC     Bed Mobility, Impairments  impaired balance;strength decreased;postural control impaired;pain  -SC     Bed Mobility, Comment  vc to get to edge of bed-assist given to r leg and trunk  -SC     Recorded by [UD] Latia Conklin PTA [SC] Medardo Sanchez, PT     Transfer Assessment/Treatment    Transfers, Sit-Stand [place] moderate assist (50% patient effort);2 person assist required  - moderate assist (50% patient effort);2 person assist required  -SC     Transfers, Stand-Sit [place] moderate assist (50% patient effort);2  person assist required  -UD moderate assist (50% patient effort);2 person assist required  -SC     Transfers, Sit-Stand-Sit, Assist Device  rolling walker  -SC     Transfer, Impairments  pain;impaired balance;motor control impaired;postural control impaired  -SC     Transfer, Comment  cues for hand placement  -SC     Recorded by [UD] Latia Conklin PTA [SC] Medardo Sanchez, PT     Gait Assessment/Treatment    Gait, [place] Level moderate assist (50% patient effort);2 person assist required  -UD moderate assist (50% patient effort);2 person assist required  -SC     Gait, Assistive Device  rolling walker  -SC     Gait, Distance (Feet) 5  -UD 3  -SC     Gait, Gait Deviations  right:;antalgic;sarah decreased;step length decreased;weight-shifting ability decreased  -SC     Gait, Maintain Weight Bearing Status  able to maintain weight bearing status  -SC     Gait, Safety Issues step length decreased  - step length decreased;loses balance backward  -SC     Gait, Impairments strength decreased  - impaired balance;motor control impaired;pain  -SC     Gait, Comment --   knee immobilzer rt leg  - No brace today. Cues for sequencing and assist to advance walker  -SC     Recorded by [UD] Latia Conklin PTA [SC] Medardo Sanchez, PT     Therapy Exercises    Right Lower Extremity  AROM:;10 reps;sitting;ankle pumps/circles;LAQ;quad sets  -SC     Bilateral Lower Extremities AROM:;10 reps;sitting  -      Recorded by [UD] Latia Conklin PTA [SC] Medardo Sanchez, PT     Positioning and Restraints    Pre-Treatment Position in bed  -UD in bed  -SC     Post Treatment Position chair  - chair  -SC     In Chair notified nsg;reclined;sitting;call light within reach;exit alarm on;legs elevated  - sitting;reclined;notified nsg;call light within reach;encouraged to call for assist;exit alarm on  -SC     Recorded by [UD] Latia Conklin PTA [SC] Medardo Sanchez, PT       User Key  (r) = Recorded By, (t) = Taken By, (c) = Cosigned By     Initials Name Effective Dates    SC Medardo Osoriop, PT 06/19/15 -     UD Latia Conklin, PTA 06/22/15 -                 IP PT Goals       07/23/17 1113 07/20/17 1634 07/19/17 1338    Bed Mobility PT LTG    Bed Mobility PT LTG, Outcome goal ongoing  -UD goal ongoing  -SC goal ongoing  -SC    Transfer Training PT LTG    Transfer Training PT LTG, Outcome goal ongoing  -UD goal ongoing  -SC goal ongoing  -SC    Gait Training PT LTG    Gait Training Goal PT LTG, Outcome goal ongoing  -UD  goal ongoing  -SC      07/18/17 1101          Bed Mobility PT LTG    Bed Mobility PT LTG, Date Established 07/18/17  -LR      Bed Mobility PT LTG, Time to Achieve 5 days  -LR      Bed Mobility PT LTG, Activity Type supine to sit/sit to supine  -LR      Bed Mobility PT LTG, [place] Level contact guard assist  -LR      Bed Mobility PT LTG, Date Goal Reviewed 07/18/17  -LR      Bed Mobility PT LTG, Outcome goal ongoing  -LR      Transfer Training PT LTG    Transfer Training PT LTG, Date Established 07/18/17  -LR      Transfer Training PT LTG, Time to Achieve 5 days  -LR      Transfer Training PT LTG, Activity Type sit to stand/stand to sit  -LR      Transfer Training PT LTG, [place] Level contact guard assist;2 person assist required  -LR      Transfer Training PT LTG, Assist Device walker, rolling  -LR      Transfer Training PT  LTG, Date Goal Reviewed 07/18/17  -LR      Transfer Training PT LTG, Outcome goal ongoing  -LR      Gait Training PT LTG    Gait Training Goal PT LTG, Date Established 07/18/17  -LR      Gait Training Goal PT LTG, Time to Achieve 5 days  -LR      Gait Training Goal PT LTG, [place] Level minimum assist (75% patient effort);2 person assist required  -LR      Gait Training Goal PT LTG, Assist Device walker, rolling  -LR      Gait Training Goal PT LTG, Distance to Achieve 50 feet  -LR      Gait Training Goal PT LTG, Date Goal Reviewed 07/18/17  -LR      Gait Training Goal PT LTG, Outcome goal ongoing  -LR         User Key  (r) = Recorded By, (t) = Taken By, (c) = Cosigned By    Initials Name Provider Type    MARIA GUADALUPE Sanchez, PT Physical Therapist    CHRISTOFER Conklin, PTA Physical Therapy Assistant    LR Tianna Lizarraga, PT Physical Therapist          Physical Therapy Education     Title: PT OT SLP Therapies (Active)     Topic: Physical Therapy (Done)     Point: Mobility training (Done)    Learning Progress Summary    Learner Readiness Method Response Comment Documented by Status   Patient Acceptance E,D NR,CATRACHITO   07/23/17 1113 Done    Eager E NR reviewed safety with mobility SC 07/20/17 1634 Active    Nonacceptance E NR   07/19/17 1845 Active    Eager E CATRACHITO RODRÍGUEZNR reviewed safety with mobility SC 07/19/17 1337 Done    Acceptance E VU   07/18/17 1753 Done    Acceptance E,D VUNR Educated on TDWB status and gait training while maintaining TDWB. Educated on indication for knee immobilizer.  07/18/17 1707 Done               Point: Home exercise program (Done)    Learning Progress Summary    Learner Readiness Method Response Comment Documented by Status   Patient Acceptance E,D NR,CATRACHITO   07/23/17 1113 Done    Eager E NR reviewed safety with mobility SC 07/20/17 1634 Active    Nonacceptance E NR   07/19/17 1845 Active    Eager E CATRACHITO RODRÍGUEZNR reviewed safety with mobility SC 07/19/17 1337 Done    Acceptance E VU   07/18/17 1753 Done    Acceptance E,D VUNR Educated on TDWB status and gait training while maintaining TDWB. Educated on indication for knee immobilizer.  07/18/17 1707 Done               Point: Body mechanics (Done)    Learning Progress Summary    Learner Readiness Method Response Comment Documented by Status   Patient Acceptance E,D NR,DU   07/23/17 1113 Done    Eager E NR reviewed safety with mobility SC 07/20/17 1634 Active    Nonacceptance E NR   07/19/17 1845 Active    Eager E VUCATRACHITONR reviewed safety with mobility SC 07/19/17 1337 Done    Acceptance E VU   07/18/17 1753 Done    Acceptance  KAYLEE BARRON NR Educated on TDWB status and gait training while maintaining TDWB. Educated on indication for knee immobilizer.  07/18/17 1707 Done               Point: Precautions (Done)    Learning Progress Summary    Learner Readiness Method Response Comment Documented by Status   Patient Acceptance E,D CATRACHITO [facility]   07/23/17 1113 Done    Eager BEATRICE [facility] reviewed safety with mobility SC 07/20/17 1634 Active    Nonacceptance E NR   07/19/17 1845 Active    Eager E CATRACHITO RODRÍGUEZ NR reviewed safety with mobility SC 07/19/17 1337 Done    Acceptance E VU   07/18/17 1753 Done    Acceptance KAYLEE BARRON NR Educated on TDWB status and gait training while maintaining TDWB. Educated on indication for knee immobilizer.  07/18/17 1707 Done                      User Key     Initials Effective Dates Name Provider Type Discipline    SC 06/19/15 -  Medardo Sanchez, PT Physical Therapist PT     06/22/15 -  Latia Conklin, PTA Physical Therapy Assistant PT     06/19/15 -  Tianna Lizarraga, PT Physical Therapist PT     02/06/17 -  Reji Berg RN Registered Nurse Nurse                    PT Recommendation and Plan  Anticipated Equipment Needs At Discharge: bedside commode  Anticipated Discharge Disposition: skilled nursing facility  Demonstrates Need for Referral to Another Service: occupational therapy  Planned Therapy Interventions: balance training, bed mobility training, gait training, home exercise program, patient/family education, ROM (Range of Motion), strengthening, transfer training  PT Frequency: daily  Plan of Care Review  Plan Of Care Reviewed With: patient  Progress: no change  Outcome Summary/Follow up Plan: pt has to have knee immobilzer on per Rn.still needs mod. assist for transfers.limited by pain for gait          Outcome Measures       07/23/17 0955 07/20/17 1510       How much help from another person do you currently need...    Turning from your back to your side while in flat bed without using bedrails? 2  -UD 2  -SC      Moving from lying on back to sitting on the side of a flat bed without bedrails? 2  -UD 2  -SC     Moving to and from a bed to a chair (including a wheelchair)? 2  -UD 2  -SC     Standing up from a chair using your arms (e.g., wheelchair, bedside chair)? 2  -UD 2  -SC     Climbing 3-5 steps with a railing? 1  -UD 1  -SC     To walk in hospital room? 2  -UD 2  -SC     AM-PAC 6 Clicks Score 11  -UD 11  -SC     Functional Assessment    Outcome Measure Options  AM-PAC 6 Clicks Basic Mobility (PT)  -SC       User Key  (r) = Recorded By, (t) = Taken By, (c) = Cosigned By    Initials Name Provider Type    SC Medardo Sanchez, PT Physical Therapist    CHRISTOFER Conklin PTA Physical Therapy Assistant           Time Calculation:         PT Charges       07/23/17 1115          Time Calculation    PT Received On 07/23/17  -      PT Goal Re-Cert Due Date 07/28/17  -      Time Calculation- PT    Total Timed Code Minutes- PT 20 minute(s)  -UD        User Key  (r) = Recorded By, (t) = Taken By, (c) = Cosigned By    Initials Name Provider Type    CHRISTOFER Conklin PTA Physical Therapy Assistant          Therapy Charges for Today     Code Description Service Date Service Provider Modifiers Qty    13440338195 HC PT THER PROC EA 15 MIN 7/23/2017 Latia Conklin PTA GP 1    63463282992 HC PT THER SUPP EA 15 MIN 7/23/2017 Latia Conklin PTA GP 1          PT G-Codes  Outcome Measure Options: AM-PAC 6 Clicks Basic Mobility (PT)    Latia Conklin PTA  7/23/2017

## 2017-07-23 NOTE — PLAN OF CARE
Problem: Patient Care Overview (Adult)  Goal: Plan of Care Review  Outcome: Ongoing (interventions implemented as appropriate)    07/23/17 0602   Coping/Psychosocial Response Interventions   Plan Of Care Reviewed With patient   Patient Care Overview   Progress improving         Problem: Fractured Hip (Adult)  Goal: Signs and Symptoms of Listed Potential Problems Will be Absent or Manageable (Fractured Hip)  Outcome: Ongoing (interventions implemented as appropriate)    07/23/17 0602   Fractured Hip   Problems Assessed (Fractured Hip) all;constipation   Problems Present (Fractured Hip) pain         07/23/17 0602   Fractured Hip   Problems Assessed (Fractured Hip) all;constipation   Problems Present (Fractured Hip) pain         Problem: Fall Risk (Adult)  Goal: Identify Related Risk Factors and Signs and Symptoms  Outcome: Ongoing (interventions implemented as appropriate)    07/23/17 0602   Fall Risk   Fall Risk: Related Risk Factors age-related changes;confusion/agitation;history of falls;gait/mobility problems;fatigue/slow reaction;impaired vision;inadequate lighting   Fall Risk: Signs and Symptoms presence of risk factors         Problem: Pressure Ulcer Risk (Donal Scale) (Adult,Obstetrics,Pediatric)  Goal: Identify Related Risk Factors and Signs and Symptoms    07/23/17 0602   Pressure Ulcer Risk (Donal Scale)   Related Risk Factors (Pressure Ulcer Risk (Donal Scale)) age extremes;cognitive impairment;hospitalization prolonged;medication;mobility impaired

## 2017-07-23 NOTE — THERAPY TREATMENT NOTE
Acute Care - Occupational Therapy Treatment Note  [facility]     Patient Name: Elizabeth Oliveira  : 1929  MRN: [id number]  Today's Date: 2017  Onset of Illness/Injury or Date of Surgery Date: 17  Date of Referral to OT: 17  Referring Physician: Chanelle Tolbert MD      Admit Date: 2017    Visit Dx:     ICD-10-CM ICD-9-CM   1. Fall, initial encounter W19.XXXA E888.9   2. 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 Impaired functional mobility, balance, gait, and endurance Z74.09 V49.89   6. Impaired mobility and ADLs Z74.09 799.89     Patient Active Problem List   Diagnosis   • Fall   • Closed fracture of right femur   • Arthritis   • Thrombocytopenia             Adult Rehabilitation Note       17 1050 17 0955 17 1510    Rehab Assessment/Intervention    Discipline occupational therapist  -TA physical therapy assistant  -UD physical therapist  -SC    Document Type therapy note (daily note)  -TA therapy note (daily note)  -UD therapy note (daily note)  -SC    Subjective Information agree to therapy;complains of;pain  -TA agree to therapy;complains of;weakness;pain  -UD agree to therapy  -SC    Patient Effort, Rehab Treatment adequate  -TA adequate  -UD good  -SC    Symptoms Noted During/After Treatment increased pain  -TA increased pain  -UD increased pain  -SC    Precautions/Limitations fall precautions;brace on when up;other (see comments)   KI when up; TDWB RLE  -TA  fall precautions;other (see comments)   nerve cath out   -SC    Specific Treatment Considerations  knee immobilizer rt leg  -UD     Recorded by [TA] Onofre Suárez OT [UD] Latia Conklin PTA [SC] Medardo Sanchez, PT    Vital Signs    Pre Systolic BP Rehab --   Nurse cleared pt for tx, VSS  -TA      O2 Delivery  Post Treatment  supplemental O2  -UD     Pre Patient Position Sitting  -TA Supine  -UD     Intra Patient Position Side Lying  -TA Standing  -UD     Post Patient Position Sitting  -TA Sitting  -UD     Recorded by [TA] Onofre Suárez, OT [UD] Latia Conklin PTA     Pain Assessment    Pain Assessment Mark-Fox FACES  -TA Mark-Fox FACES  -UD Mark-Baker FACES  -SC    Mark-Fox FACES Pain Rating 4  -TA 4  -UD 6  -SC    Pain Score 8  -TA 4  -UD     Post Pain Score 8  -TA 4  -UD 8  -SC    Pain Type Acute pain  -TA Acute pain  -UD Acute pain  -SC    Pain Location Leg  -TA Leg  -UD Hip  -SC    Pain Orientation Right  -TA Right  -UD Right  -SC    Pain Intervention(s) Repositioned;Ambulation/increased activity  -TA Repositioned  -UD Repositioned  -SC    Response to Interventions Pt premedicated; did not tolerate  -TA  medicated after treatment  -SC    Recorded by [TA] Onofre Suárez, OT [UD] Latia Conklin, PTA [SC] Medardo Sanchez, PT    Cognitive Assessment/Intervention    Current Cognitive/Communication Assessment functional  -TA  functional  -SC    Orientation Status oriented x 4  -TA oriented x 4  -UD oriented to;place;person;required verbal cueing (specifiy in comments)  -SC    Follows Commands/Answers Questions 100% of the time;able to follow single-step instructions;needs cueing  -TA 75% of the time  -UD 75% of the time  -SC    Personal Safety WNL/WFL  -TA  WNL/WFL  -SC    Personal Safety Interventions fall prevention program maintained;gait belt;nonskid shoes/slippers when out of bed;muscle strengthening facilitated;supervised activity  -TA  gait belt;fall prevention program maintained  -SC    Recorded by [TA] Onofre Suárez, OT [UD] Latia Conklin, PTA [SC] Medardo Sanchez, PT    Mobility Assessment/Training    Extremity Weight-Bearing Status right lower extremity  -TA      Right Lower Extremity Weight-Bearing touch down weight-bearing  -TA touch down weight-bearing  -UD     Recorded by [TA] Onofre LUNA  NANCY Suárze [UD] Latia Conklin PTA     Bed Mobility, Assessment/Treatment    Bed Mobility, Assistive Device   head of bed elevated;bed rails  -SC    Bed Mob, Supine to Sit, [place]  moderate assist (50% patient effort);2 person assist required  -UD moderate assist (50% patient effort);2 person assist required  -SC    Bed Mobility, Safety Issues   decreased use of arms for pushing/pulling;decreased use of legs for bridging/pushing  -SC    Bed Mobility, Impairments strength decreased  -TA  impaired balance;strength decreased;postural control impaired;pain  -SC    Bed Mobility, Comment Pt UIC; pt needed to void bowel; unable to tolerate BSC; chair relined to supine position and pt rolled to L side with Max A for placement of bedpan, post toilet hygiene  -TA  vc to get to edge of bed-assist given to r leg and trunk  -SC    Recorded by [TA] Onofre Suárez OT [UD] Latia Conklin PTA [SC] Medardo LI Daniel, PT    Transfer Assessment/Treatment    Transfers, Sit-Stand [place]  moderate assist (50% patient effort);2 person assist required  -UD moderate assist (50% patient effort);2 person assist required  -SC    Transfers, Stand-Sit [place]  moderate assist (50% patient effort);2 person assist required  -UD moderate assist (50% patient effort);2 person assist required  -SC    Transfers, Sit-Stand-Sit, Assist Device   rolling walker  -SC    Transfer, Impairments   pain;impaired balance;motor control impaired;postural control impaired  -SC    Transfer, Comment   cues for hand placement  -SC    Recorded by  [UD] Latia Conklin PTA [SC] Medardo A Daniel, PT    Gait Assessment/Treatment    Gait, [place] Level  moderate assist (50% patient effort);2 person assist required  -UD moderate assist (50% patient effort);2 person assist required  -SC    Gait, Assistive Device   rolling walker  -SC    Gait, Distance (Feet)  5  -UD 3  -SC    Gait, Gait Deviations   right:;antalgic;sarah decreased;step length  decreased;weight-shifting ability decreased  -SC    Gait, Maintain Weight Bearing Status   able to maintain weight bearing status  -SC    Gait, Safety Issues  step length decreased  - step length decreased;loses balance backward  -SC    Gait, Impairments  strength decreased  -UD impaired balance;motor control impaired;pain  -SC    Gait, Comment  --   knee immobilzer rt leg  -UD No brace today. Cues for sequencing and assist to advance walker  -SC    Recorded by  [UD] Latia Conklin PTA [SC] Medardo Osoriop, PT    Toileting Assessment/Training    Toileting Assess/Train, Assistive Device other (see comments)   bedpan  -TA      Toileting Assess/Train, Position supine  -TA      Toileting Assess/Train, Indepen Level dependent (less than 25% patient effort)  -TA      Toileting Assess/Train, Impairments strength decreased;pain  -TA      Toileting Assess/Train, Comment Increased pain with L rolling; pt dependent for post toilet hygiene  -TA      Recorded by [TA] Onofre Suárez OT      Therapy Exercises    Right Lower Extremity   AROM:;10 reps;sitting;ankle pumps/circles;LAQ;quad sets  -SC    Bilateral Lower Extremities  AROM:;10 reps;sitting  -UD     Bilateral Upper Extremity AROM:;10 reps;sitting;elbow flexion/extension;hand pumps;shoulder circles;shoulder extension/flexion;shoulder rolls/shrugs  -TA      Recorded by [TA] Onofre Suárez OT [UD] Latia Conklin PTA [SC] Medardo LI Daniel, PT    Positioning and Restraints    Pre-Treatment Position sitting in chair/recliner  -TA in bed  -UD in bed  -SC    Post Treatment Position chair  -TA chair  -UD chair  -SC    In Chair notified nsg;reclined;call light within reach;encouraged to call for assist;exit alarm on;with nsg;legs elevated;RLE elevated;R heel elevated  -TA notified nsg;reclined;sitting;call light within reach;exit alarm on;legs elevated  -UD sitting;reclined;notified nsg;call light within reach;encouraged to call for assist;exit alarm on  -SC    Recorded by  [TA] Onofre Suárez, OT [UD] Latia Conklin, PTA [SC] Medardo LI Daniel, PT      User Key  (r) = Recorded By, (t) = Taken By, (c) = Cosigned By    Initials Name Effective Dates    SC Medardo A Daniel, PT 06/19/15 -     UD Latia Conklin, PTA 06/22/15 -     TA Onofre S Kamini, OT 03/14/16 -                 OT Goals       07/23/17 1130 07/19/17 1422       Bed Mobility OT LTG    Bed Mobility OT LTG, Date Established  07/19/17  -MM     Bed Mobility OT LTG, Time to Achieve  2 wks  -MM     Bed Mobility OT LTG, Activity Type  all bed mobility  -MM     Bed Mobility OT LTG, [place] Level  minimum assist (75% patient effort)  -MM     Bed Mobility OT LTG, Outcome goal ongoing   Max A to roll L with recliner in supine position  -TA      Transfer Training OT LTG    Transfer Training OT LTG, Date Established  07/19/17  -MM     Transfer Training OT LTG, Time to Achieve  2 wks  -MM     Transfer Training OT LTG, Activity Type  sit to stand/stand to sit;bed to chair /chair to bed;toilet  -MM     Transfer Training OT LTG, [place] Level  minimum assist (75% patient effort)  -MM     Transfer Training OT LTG, Assist Device  walker, rolling  -MM     Transfer Training OT LTG, Outcome goal ongoing  -TA      Patient Education OT LTG    Patient Education OT LTG, Date Established  07/19/17  -MM     Patient Education OT LTG, Time to Achieve  2 wks  -MM     Patient Education OT LTG, Education Type  HEP;precautions per surgeon;positioning;home safety;adaptive equipment mgmt  -MM     Patient Education OT LTG, Education Understanding  demonstrates adequately  -MM     Patient Education OT LTG Outcome goal ongoing   Progressing with HEP, surgical precautions/TDWB.  -TA      LB Dressing OT LTG    LB Dressing Goal OT LTG, Date Established  07/19/17  -MM     LB Dressing Goal OT LTG, Time to Achieve  2 wks  -MM     LB Dressing Goal OT LTG, [place] Level  minimum assist (75% patient effort)  -MM     LB Dressing Goal OT LTG, Adaptive Equipment   --   AE PRN  -MM     LB Dressing Goal OT LTG, Outcome goal ongoing  -TA        User Key  (r) = Recorded By, (t) = Taken By, (c) = Cosigned By    Initials Name Provider Type    LAKEISHA Harrison, OT Occupational Therapist    DEEP Suárez, OT Occupational Therapist          Occupational Therapy Education     Title: PT OT SLP Therapies (Active)     Topic: Occupational Therapy (Active)     Point: ADL training (Active)    Description: Instruct learner(s) on proper safety adaptation and remediation techniques during self care or transfers.   Instruct in proper use of assistive devices.    Learning Progress Summary    Learner Readiness Method Response Comment Documented by Status   Patient Nonacceptance E NR   07/19/17 1845 Active    Acceptance E,TB,D VU,NR Educated pt on role of OT, process of IE, benefits of activity. Guided through HEP, AE use for ADL, and educated on precautions and improved mechanics for bed mobility, ADL and tsf.  07/19/17 1415 Done               Point: Home exercise program (Done)    Description: Instruct learner(s) on appropriate technique for monitoring, assisting and/or progressing therapeutic exercises/activities.    Learning Progress Summary    Learner Readiness Method Response Comment Documented by Status   Patient Acceptance E VU,NR Body mechanics with rolling; BUE HEP TA 07/23/17 1129 Done    Nonacceptance E NR   07/19/17 1845 Active    Acceptance E,TB,D VU,NR Educated pt on role of OT, process of IE, benefits of activity. Guided through HEP, AE use for ADL, and educated on precautions and improved mechanics for bed mobility, ADL and tsf.  07/19/17 1415 Done               Point: Precautions (Active)    Description: Instruct learner(s) on prescribed precautions during self-care and functional transfers.    Learning Progress Summary    Learner Readiness Method Response Comment Documented by Status   Patient Nonacceptance E NR   07/19/17 1845 Active    Acceptance  E,MOHAN,KAYLEE RODRÍGUEZ,NR Educated pt on role of OT, process of IE, benefits of activity. Guided through HEP, AE use for ADL, and educated on precautions and improved mechanics for bed mobility, ADL and tsf.  07/19/17 1415 Done               Point: Body mechanics (Done)    Description: Instruct learner(s) on proper positioning and spine alignment during self-care, functional mobility activities and/or exercises.    Learning Progress Summary    Learner Readiness Method Response Comment Documented by Status   Patient Acceptance E MARCOS,AWILDA Body mechanics with rolling; BUE HEP TA 07/23/17 1129 Done    Nonacceptance E NR  JR 07/19/17 1845 Active    Acceptance EMOHAN,KAYLEE RODRÍGUEZ,NR Educated pt on role of OT, process of IE, benefits of activity. Guided through HEP, AE use for ADL, and educated on precautions and improved mechanics for bed mobility, ADL and tsf.  07/19/17 1415 Done                      User Key     Initials Effective Dates Name Provider Type Discipline     06/22/15 -  Tori Harrison, OT Occupational Therapist OT    TA 03/14/16 -  Onofre Suárez, OT Occupational Therapist OT     02/06/17 -  Reji Berg RN Registered Nurse Nurse                  OT Recommendation and Plan  Anticipated Equipment Needs At Discharge:  (Issued reacher, LH sponge, sockaid, shoe horn, leg )  Anticipated Discharge Disposition: skilled nursing facility (SNF for short term rehab)  Planned Therapy Interventions: adaptive equipment training, ADL retraining, balance training, bed mobility training, energy conservation, home exercise program, strengthening, transfer training  Therapy Frequency: daily (per priority policy)  Plan of Care Review  Plan Of Care Reviewed With: patient  Progress: progress toward functional goals is gradual  Outcome Summary/Follow up Plan: Pt limited by pain with movement of LB this date; dependent for post toilet hygiene; progressing with BUE HEP, surgical precautions.        Outcome Measures        07/23/17 1050 07/23/17 0955 07/20/17 1510    How much help from another person do you currently need...    Turning from your back to your side while in flat bed without using bedrails?  2  -UD 2  -SC    Moving from lying on back to sitting on the side of a flat bed without bedrails?  2  -UD 2  -SC    Moving to and from a bed to a chair (including a wheelchair)?  2  -UD 2  -SC    Standing up from a chair using your arms (e.g., wheelchair, bedside chair)?  2  -UD 2  -SC    Climbing 3-5 steps with a railing?  1  -UD 1  -SC    To walk in hospital room?  2  -UD 2  -SC    AM-PAC 6 Clicks Score  11  -UD 11  -SC    How much help from another is currently needed...    Putting on and taking off regular lower body clothing? 1  -TA      Bathing (including washing, rinsing, and drying) 2  -TA      Toileting (which includes using toilet bed pan or urinal) 2  -TA      Putting on and taking off regular upper body clothing 3  -TA      Taking care of personal grooming (such as brushing teeth) 3  -TA      Eating meals 3  -TA      Score 14  -TA      Functional Assessment    Outcome Measure Options AM-PAC 6 Clicks Daily Activity (OT)  -TA  AM-PAC 6 Clicks Basic Mobility (PT)  -SC      User Key  (r) = Recorded By, (t) = Taken By, (c) = Cosigned By    Initials Name Provider Type    SC Medardo Sanchez, PT Physical Therapist    UD Latia Conklin, PTA Physical Therapy Assistant    DEEP Suárez OT Occupational Therapist           Time Calculation:         Time Calculation- OT       07/23/17 1133          Time Calculation- OT    OT Start Time 1050   ttc 24 minutes  -TA      Total Timed Code Minutes- OT 24 minute(s)  -TA      OT Received On 07/23/17  -TA      OT Goal Re-Cert Due Date 07/29/17  -TA        User Key  (r) = Recorded By, (t) = Taken By, (c) = Cosigned By    Initials Name Provider Type    DEEP Suárez OT Occupational Therapist           Therapy Charges for Today     Code Description Service Date Service Provider  Modifiers Qty    [id number]  OT THERAPEUTIC ACT EA 15 MIN 7/23/2017 Onofre Suárez OT GO 2               Onofre Suárez OT  7/23/2017

## 2017-07-23 NOTE — PLAN OF CARE
Problem: Patient Care Overview (Adult)  Goal: Plan of Care Review  Outcome: Ongoing (interventions implemented as appropriate)    07/23/17 1130   Coping/Psychosocial Response Interventions   Plan Of Care Reviewed With patient   Patient Care Overview   Progress progress toward functional goals is gradual   Outcome Evaluation   Outcome Summary/Follow up Plan Pt limited by pain with movement of LB this date; dependent for post toilet hygiene; progressing with BUE HEP, surgical precautions.         Problem: Inpatient Occupational Therapy  Goal: Bed Mobility Goal LTG- OT  Outcome: Ongoing (interventions implemented as appropriate)    07/19/17 1422 07/23/17 1130   Bed Mobility OT LTG   Bed Mobility OT LTG, Date Established 07/19/17 --    Bed Mobility OT LTG, Time to Achieve 2 wks --    Bed Mobility OT LTG, Activity Type all bed mobility --    Bed Mobility OT LTG, [place] Level minimum assist (75% patient effort) --    Bed Mobility OT LTG, Outcome --  goal ongoing  (Max A to roll L with recliner in supine position)       Goal: Transfer Training Goal 1 LTG- OT  Outcome: Ongoing (interventions implemented as appropriate)    07/19/17 1422 07/23/17 1130   Transfer Training OT LTG   Transfer Training OT LTG, Date Established 07/19/17 --    Transfer Training OT LTG, Time to Achieve 2 wks --    Transfer Training OT LTG, Activity Type sit to stand/stand to sit;bed to chair /chair to bed;toilet --    Transfer Training OT LTG, [place] Level minimum assist (75% patient effort) --    Transfer Training OT LTG, Assist Device walker, rolling --    Transfer Training OT LTG, Outcome --  goal ongoing       Goal: Patient Education Goal LTG- OT  Outcome: Ongoing (interventions implemented as appropriate)    07/19/17 1422 07/23/17 1130   Patient Education OT LTG   Patient Education OT LTG, Date Established 07/19/17 --    Patient Education OT LTG, Time to Achieve 2 wks --    Patient Education OT LTG, Education Type HEP;precautions per  surgeon;positioning;home safety;adaptive equipment mgmt --    Patient Education OT LTG, Education Understanding demonstrates adequately --    Patient Education OT LTG Outcome --  goal ongoing  (Progressing with HEP, surgical precautions/TDWB.)       Goal: LB Dressing Goal LTG- OT  Outcome: Ongoing (interventions implemented as appropriate)    07/19/17 1422 07/23/17 1130   LB Dressing OT LTG   LB Dressing Goal OT LTG, Date Established 07/19/17 --    LB Dressing Goal OT LTG, Time to Achieve 2 wks --    LB Dressing Goal OT LTG, [place] Level minimum assist (75% patient effort) --    LB Dressing Goal OT LTG, Adaptive Equipment (AE PRN) --    LB Dressing Goal OT LTG, Outcome --  goal ongoing

## 2017-07-23 NOTE — PROGRESS NOTES
"      HOSPITALIST DAILY PROGRESS NOTE    Chief Complaint: Hip pain    Subjective   SUBJECTIVE/OVERNIGHT EVENTS   Hip pain is improved. Diarrhea improved    Review of Systems:  Gen-no fevers, no chills  CV-no chest pain, no palpitations  Resp-no cough, no dyspnea  GI-no N/V, no abd pain, +Diarrhea    Objective   OBJECTIVE   I have reviewed the vital signs.  /68 (BP Location: Left arm, Patient Position: Lying)  Pulse 80  Temp 98.2 °F (36.8 °C) (Temporal Artery )   Resp 16  Ht 62\" (157.5 cm)  Wt 113 lb (51.3 kg)  SpO2 95%  BMI 20.67 kg/m2    Physical Exam:  Gen-no acute distress  CV-RRR, S1 S2 normal, no m/r/g  Resp-CTAB, no wheezes  Abd-soft, NT, ND, +BS  Ext-no edema  Neuro-A&Ox3, sensory and motor intact on right lower extremity  Psych-appropriate mood    Results:  I have reviewed the labs, culture data, radiology results, and diagnostic studies.      Results from last 7 days  Lab Units 07/22/17  0436 07/21/17  0639 07/20/17  0525   WBC 10*3/mm3 6.35 5.62 4.73   HEMOGLOBIN g/dL 10.4* 10.7* 10.5*   HEMATOCRIT % 31.1* 31.7* 30.8*   PLATELETS 10*3/mm3 57* 49* 44*       Results from last 7 days  Lab Units 07/22/17  0436   SODIUM mmol/L 139   POTASSIUM mmol/L 3.9   CHLORIDE mmol/L 102   CO2 mmol/L 29.0   BUN mg/dL 17   CREATININE mg/dL 0.40*   GLUCOSE mg/dL 106*   CALCIUM mg/dL 9.2       I have reviewed the medications.    ferrous sulfate 325 mg Oral Daily With Breakfast   fondaparinux 2.5 mg Subcutaneous Q24H   Pharmacy Meds to Bed Consult  Does not apply Daily   sertraline 50 mg Oral Daily   thiamine 100 mg Oral Daily   cyancobalamin 1,000 mcg Oral Daily   vitamin B-6 50 mg Oral Daily     Assessment/Plan   ASSESSMENT/PLAN      # Diarrhea  - C. Diff neg  - Will give Lomotil PRN for symptomatic control    # Mechanical R hip fracture  - Orthopedics following. Plan for conservative management. FU with Dr. Bailey in 2 weeks for FU XRays. No surgical intervention for now  - Tylenol PRN for pain.  Avoid opiates.  " Tramadol when necessary  - Start fondaparinux 2.5mg SQ BID for Hip DVT ppx after discussion with Pharmacy      # Chronic thrombocytopenia  - Peripheral smear shows counts closer to 100,000 with no schistocytes when labs showed plts count of 50,000. No clinical suspicion or lab abnormalities for MAHA   - Unlikely cirrhosis given normal INR. Will hold off on RUQ US for now  - Could be from chronic alcohol use (reportedly 2 shots of bourbon nightly for a long time)  - HIV NR, acute hep panel neg      # Alcohol withdrawal: resolved  # Hallucinations  - UA neg for infection. No respiratory symptoms to suspect respiratory infection. WBC normal with no fevers  - Medications reviewed   - KYRA DCed  - Thiamine repletement     # Macrocytic anemia  # Anemia of chronic disease  - Baseling Hgb ~10  - B12 level, folate level, TSH all normal  - Continue home B6, B12 and thiamine replacements  - Started oral iron     # Mood disorder  - Continue Sertraline      DVT ppx: Fondaparinux  I expect patient to be discharged in: 1-2 days pending insurance approval    Chanelle Tolbert MD  07/23/17  7:40 AM

## 2017-07-23 NOTE — PLAN OF CARE
Problem: Patient Care Overview (Adult)  Goal: Plan of Care Review  Outcome: Ongoing (interventions implemented as appropriate)    07/23/17 1113   Coping/Psychosocial Response Interventions   Plan Of Care Reviewed With patient   Patient Care Overview   Progress no change   Outcome Evaluation   Outcome Summary/Follow up Plan pt has to have knee immobilzer on per Rn.still needs mod. assist for transfers.limited by pain for gait         Problem: Inpatient Physical Therapy  Goal: Bed Mobility Goal LTG- PT  Outcome: Ongoing (interventions implemented as appropriate)    07/18/17 1101 07/23/17 1113   Bed Mobility PT LTG   Bed Mobility PT LTG, Date Established 07/18/17 --    Bed Mobility PT LTG, Time to Achieve 5 days --    Bed Mobility PT LTG, Activity Type supine to sit/sit to supine --    Bed Mobility PT LTG, [place] Level contact guard assist --    Bed Mobility PT LTG, Date Goal Reviewed 07/18/17 --    Bed Mobility PT LTG, Outcome --  goal ongoing       Goal: Transfer Training Goal 1 LTG- PT  Outcome: Ongoing (interventions implemented as appropriate)    07/18/17 1101 07/23/17 1113   Transfer Training PT LTG   Transfer Training PT LTG, Date Established 07/18/17 --    Transfer Training PT LTG, Time to Achieve 5 days --    Transfer Training PT LTG, Activity Type sit to stand/stand to sit --    Transfer Training PT LTG, [place] Level contact guard assist;2 person assist required --    Transfer Training PT LTG, Assist Device walker, rolling --    Transfer Training PT LTG, Date Goal Reviewed 07/18/17 --    Transfer Training PT LTG, Outcome --  goal ongoing       Goal: Gait Training Goal LTG- PT  Outcome: Ongoing (interventions implemented as appropriate)    07/18/17 1101 07/23/17 1113   Gait Training PT LTG   Gait Training Goal PT LTG, Date Established 07/18/17 --    Gait Training Goal PT LTG, Time to Achieve 5 days --    Gait Training Goal PT LTG, [place] Level minimum assist (75% patient effort);2 person  assist required --    Gait Training Goal PT LTG, Assist Device walker, rolling --    Gait Training Goal PT LTG, Distance to Achieve 50 feet --    Gait Training Goal PT LTG, Date Goal Reviewed 07/18/17 --    Gait Training Goal PT LTG, Outcome --  goal ongoing

## 2017-07-24 LAB
O+P SPEC MICRO: NORMAL
O+P STL TRI STN: NORMAL

## 2017-07-24 PROCEDURE — 97110 THERAPEUTIC EXERCISES: CPT

## 2017-07-24 PROCEDURE — 25010000002 FONDAPARINUX PER 0.5 MG: Performed by: HOSPITALIST

## 2017-07-24 PROCEDURE — 94799 UNLISTED PULMONARY SVC/PX: CPT

## 2017-07-24 PROCEDURE — 99232 SBSQ HOSP IP/OBS MODERATE 35: CPT | Performed by: NURSE PRACTITIONER

## 2017-07-24 PROCEDURE — 97530 THERAPEUTIC ACTIVITIES: CPT | Performed by: OCCUPATIONAL THERAPIST

## 2017-07-24 RX ORDER — ASCORBIC ACID 500 MG
500 TABLET ORAL DAILY
Status: DISCONTINUED | OUTPATIENT
Start: 2017-07-24 | End: 2017-07-25 | Stop reason: HOSPADM

## 2017-07-24 RX ADMIN — SERTRALINE HYDROCHLORIDE 50 MG: 50 TABLET ORAL at 08:38

## 2017-07-24 RX ADMIN — ACETAMINOPHEN 650 MG: 325 TABLET, FILM COATED ORAL at 21:13

## 2017-07-24 RX ADMIN — PYRIDOXINE HCL TAB 50 MG 50 MG: 50 TAB at 08:38

## 2017-07-24 RX ADMIN — ACETAMINOPHEN 650 MG: 325 TABLET, FILM COATED ORAL at 14:33

## 2017-07-24 RX ADMIN — FONDAPARINUX SODIUM 2.5 MG: 2.5 INJECTION SUBCUTANEOUS at 17:19

## 2017-07-24 RX ADMIN — Medication 100 MG: at 08:38

## 2017-07-24 RX ADMIN — CYANOCOBALAMIN TAB 1000 MCG 1000 MCG: 1000 TAB at 08:38

## 2017-07-24 RX ADMIN — Medication 325 MG: at 08:38

## 2017-07-24 RX ADMIN — TRAMADOL HYDROCHLORIDE 25 MG: 50 TABLET, COATED ORAL at 08:38

## 2017-07-24 RX ADMIN — OXYCODONE HYDROCHLORIDE AND ACETAMINOPHEN 500 MG: 500 TABLET ORAL at 14:34

## 2017-07-24 RX ADMIN — TRAMADOL HYDROCHLORIDE 25 MG: 50 TABLET, COATED ORAL at 17:19

## 2017-07-24 NOTE — PROGRESS NOTES
Continued Stay Note  [facility]     Patient Name: Elizabeth Oliveira  MRN: [id number]  Today's Date: 7/24/2017    Admit Date: 7/17/2017          Discharge Plan       07/24/17 1701    Case Management/Social Work Plan    Plan SNF at [facility]    Patient/Family In Agreement With Plan yes    Additional Comments Unfortuntaely, still waiting on insurance authorization for skilled care at [facility] , this is a particulary difficult payor to obtain authorization from. [facility] still has a skilled bed for her.Discussed with Ms Oliveira and sister  (Arti Poole  [phone])              Discharge Codes     None        Expected Discharge Date and Time     Expected Discharge Date Expected Discharge Time    Jul 21, 2017             Sonja C Kellerman, RN

## 2017-07-24 NOTE — PLAN OF CARE
Problem: Patient Care Overview (Adult)  Goal: Plan of Care Review  Outcome: Ongoing (interventions implemented as appropriate)    07/24/17 1636   Coping/Psychosocial Response Interventions   Plan Of Care Reviewed With patient   Patient Care Overview   Progress progress toward functional goals as expected   Outcome Evaluation   Outcome Summary/Follow up Plan Pt performed stand-pivot transfer to chair, limited by c/o R and L hip pain. Able to maintain WB status on RLE with repetitive cues. Will progress with mobility as able.          Problem: Inpatient Physical Therapy  Goal: Bed Mobility Goal LTG- PT  Outcome: Ongoing (interventions implemented as appropriate)    07/18/17 1101 07/24/17 1636   Bed Mobility PT LTG   Bed Mobility PT LTG, Date Established 07/18/17 --    Bed Mobility PT LTG, Time to Achieve 5 days --    Bed Mobility PT LTG, Activity Type supine to sit/sit to supine --    Bed Mobility PT LTG, [place] Level contact guard assist --    Bed Mobility PT LTG, Date Goal Reviewed --  07/24/17   Bed Mobility PT LTG, Outcome --  goal ongoing       Goal: Transfer Training Goal 1 LTG- PT  Outcome: Ongoing (interventions implemented as appropriate)    07/18/17 1101 07/24/17 1636   Transfer Training PT LTG   Transfer Training PT LTG, Date Established 07/18/17 --    Transfer Training PT LTG, Time to Achieve 5 days --    Transfer Training PT LTG, Activity Type sit to stand/stand to sit --    Transfer Training PT LTG, [place] Level contact guard assist;2 person assist required --    Transfer Training PT LTG, Assist Device walker, rolling --    Transfer Training PT LTG, Date Goal Reviewed --  07/24/17   Transfer Training PT LTG, Outcome --  goal ongoing       Goal: Gait Training Goal LTG- PT  Outcome: Ongoing (interventions implemented as appropriate)    07/18/17 1101 07/24/17 1636   Gait Training PT LTG   Gait Training Goal PT LTG, Date Established 07/18/17 --    Gait Training Goal PT LTG, Time to Achieve 5 days  --    Gait Training Goal PT LTG, [place] Level minimum assist (75% patient effort);2 person assist required --    Gait Training Goal PT LTG, Assist Device walker, rolling --    Gait Training Goal PT LTG, Distance to Achieve 50 feet --    Gait Training Goal PT LTG, Date Goal Reviewed --  07/24/17   Gait Training Goal PT LTG, Outcome --  goal ongoing

## 2017-07-24 NOTE — THERAPY TREATMENT NOTE
Acute Care - Physical Therapy Treatment Note  [facility]     Patient Name: Elizabeth Oliveira  : 1929  MRN: [id number]  Today's Date: 2017  Onset of Illness/Injury or Date of Surgery Date: 17  Date of Referral to PT: 17  Referring Physician: Chanelle Tolbert MD    Admit Date: 2017    Visit Dx:    ICD-10-CM ICD-9-CM   1. Fall, initial encounter W19.XXXA E888.9   2. 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 Impaired functional mobility, balance, gait, and endurance Z74.09 V49.89   6. Impaired mobility and ADLs Z74.09 799.89     Patient Active Problem List   Diagnosis   • Fall   • Closed fracture of right femur   • Arthritis   • Thrombocytopenia               Adult Rehabilitation Note       17 1530 17 1515 17 1050    Rehab Assessment/Intervention    Discipline occupational therapist  -AR physical therapist   co-tx with OT Simultaneous filing. User may be unaware of other data.  -LM occupational therapist  -TA    Document Type  therapy note (daily note)  -LM therapy note (daily note)  -TA    Subjective Information agree to therapy;complains of;pain  -AR agree to therapy;complains of;pain  -LM agree to therapy;complains of;pain  -TA    Patient Effort, Rehab Treatment good  -AR good  -LM adequate  -TA    Symptoms Noted During/After Treatment increased pain;fatigue  -AR increased pain;fatigue  -LM increased pain  -TA    Precautions/Limitations fall precautions;brace on when up;non-weight bearing status   KI on when up, TDWD RLE  -AR fall precautions;brace on when up;other (see comments)   RLE KI, TDWB RLE  -LM fall precautions;brace on when up;other (see comments)   KI when up; TDWB RLE  -TA    Recorded by [AR] Nicole Estrada, OT [LM] Rima Mckeon PT [TA] Onofre Suárez,  OT    Vital Signs    Pre Systolic BP Rehab   --   Nurse cleared pt for tx, VSS  -TA    Pre SpO2 (%) 90  -AR 90  -LM     O2 Delivery Pre Treatment supplemental O2   2.5 L NC  -AR supplemental O2   2.5 L NC  -LM     Intra SpO2 (%) 84  -AR 84  -LM     O2 Delivery Intra Treatment supplemental O2  -AR supplemental O2  -LM     Post SpO2 (%) 91  -AR 91  -LM     O2 Delivery Post Treatment supplemental O2  -AR supplemental O2  -LM     Pre Patient Position Supine  -AR Supine  -LM Sitting  -TA    Intra Patient Position   Side Lying  -TA    Post Patient Position Sitting  -AR Sitting  -LM Sitting  -TA    Recorded by [AR] Nicole Estrada, OT [LM] Riam Mckeon, PT [TA] Onofre Suárez OT    Pain Assessment    Pain Assessment 0-10  -AR 0-10  -LM Mark-Fox FACES  -TA    Mark-Fox FACES Pain Rating 4  -AR 4  -LM 4  -TA    Pain Score 3  -AR 3   pain in R hip only  -LM 8  -TA    Post Pain Score  --   did not give numerical value; pain in both L&R hip  -LM 8  -TA    Pain Type Acute pain  -AR Acute pain  -LM Acute pain  -TA    Pain Location Leg  -AR Hip  -LM Leg  -TA    Pain Orientation Right  -AR Right  -LM Right  -TA    Pain Intervention(s) Repositioned;Ambulation/increased activity  -AR Repositioned;Ambulation/increased activity  -LM Repositioned;Ambulation/increased activity  -TA    Response to Interventions tolerated  -AR  Pt premedicated; did not tolerate  -TA    Multiple Pain Sites Yes  -AR Yes  -LM     Recorded by [AR] Nicole Estrada, OT [LM] Rima Mckeon, PT [TA] Onofre Suárez OT    Pain 2    Pre Tx Pain Score 2 4  -AR 4  -LM     Post Tx Pain Score 2 3  -AR 3  -LM     Pain Type 2 Acute pain  -AR Acute pain  -LM     Pain Location 2 Hip  -AR Hip  -LM     Pain Orientation 2 Right  -AR Right  -LM     Pain Intervention(s) 2 Repositioned;Ambulation/increased activity  -AR Repositioned;Ambulation/increased activity  -LM     Response to Interventions 2 tolerated  -AR      Recorded by [AR] Nicole Estrada, OT  [LM] Rima Mckeon, PT     Cognitive Assessment/Intervention    Current Cognitive/Communication Assessment functional  -AR functional  -LM functional  -TA    Orientation Status oriented x 4  -AR oriented x 4  -LM oriented x 4  -TA    Follows Commands/Answers Questions 75% of the time;able to follow single-step instructions  -AR 75% of the time;able to follow single-step instructions;needs cueing;needs increased time;needs repetition  -% of the time;able to follow single-step instructions;needs cueing  -TA    Personal Safety WNL/WFL  -AR WNL/WFL  -LM WNL/WFL  -TA    Personal Safety Interventions fall prevention program maintained  -AR  fall prevention program maintained;gait belt;nonskid shoes/slippers when out of bed;muscle strengthening facilitated;supervised activity  -TA    Recorded by [AR] Nicole Estrada, OT [LM] Rima Mckeon, PT [TA] Onofre Suárez, NANCY    Mobility Assessment/Training    Extremity Weight-Bearing Status right lower extremity  -AR right lower extremity  -LM right lower extremity  -TA    Right Lower Extremity Weight-Bearing touch down weight-bearing  -AR touch down weight-bearing  -LM touch down weight-bearing  -TA    Recorded by [AR] Nicole Estrada, OT [LM] Rima Mckeon, PT [TA] Onofre Suárez, OT    Bed Mobility, Assessment/Treatment    Bed Mobility, Assistive Device bed rails;head of bed elevated;draw sheet  -AR bed rails;head of bed elevated  -LM     Bed Mobility, Scoot/Bridge, [place] maximum assist (25% patient effort);2 person assist required;verbal cues required  -AR maximum assist (25% patient effort);2 person assist required;verbal cues required  -LM     Bed Mob, Supine to Sit, [place] maximum assist (25% patient effort);2 person assist required;verbal cues required  -AR maximum assist (25% patient effort);2 person assist required;verbal cues required  -LM     Bed Mob, Sit to Supine, [place]  not tested   Pt left UIC  -LM     Bed Mobility,  Impairments strength decreased;impaired balance  -AR strength decreased;pain  -LM strength decreased  -TA    Bed Mobility, Comment cues to sequence and advance BLE, cues maintain WB status  -AR Donned and adjusted RLE KI prior to bed mobility. Pt able to scoot BLEs close to EOB. Verbal cues for hand placement and proper technique for transfer; assist required at trunk to move into upright position and at BLEs to move off bed.   -LM Pt UIC; pt needed to void bowel; unable to tolerate BSC; chair relined to supine position and pt rolled to L side with Max A for placement of bedpan, post toilet hygiene  -TA    Recorded by [AR] Nicole Estrada, OT [LM] Rima Mckeon, PT [TA] Onofre Suárez OT    Transfer Assessment/Treatment    Transfers, Bed-Chair [place]  maximum assist (25% patient effort);2 person assist required;verbal cues required  -LM     Transfers, Chair-Bed [place]  not tested   Pt left UIC  -LM     Transfers, Bed-Chair-Bed, Assist Device  other (see comments)   pt holding onto therapist arm; stand-pivot transfer  -LM     Transfers, Sit-Stand [place] maximum assist (25% patient effort);2 person assist required;verbal cues required  -AR maximum assist (25% patient effort);2 person assist required;verbal cues required  -LM     Transfers, Stand-Sit [place] maximum assist (25% patient effort);2 person assist required;verbal cues required  -AR moderate assist (50% patient effort);2 person assist required;verbal cues required  -LM     Transfers, Sit-Stand-Sit, Assist Device rolling walker  -AR      Transfer, Maintain Weight Bearing Status cues to maintain weight bearing status  -AR      Transfer, Safety Issues balance decreased during turns;sequencing ability decreased  -AR sequencing ability decreased;step length decreased;weight-shifting ability decreased  -LM     Transfer, Impairments pain;strength decreased;impaired balance  -AR strength decreased;pain  -LM     Transfer, Comment cues  for hand placement, sequencing and assist to advance RLE durings tand-to-sit transition   -AR Verbal cues to push from bed when standing, WB status, reach for chair armrests when sitting, step out RLE prior to transfer.   -LM     Recorded by [AR] Nicole Estrada OT [LM] Rima Mckeon, PT     Gait Assessment/Treatment    Gait, [place] Level  unable to perform  -LM     Recorded by  [LM] Rima Mckeon, PT     Lower Body Dressing Assessment/Training    LB Dressing Assess/Train, Comment deferred AE training as pt on  phone with rehab facility  -AR      Recorded by [AR] Nicole Estrada OT      Toileting Assessment/Training    Toileting Assess/Train, Assistive Device   other (see comments)   bedpan  -TA    Toileting Assess/Train, Position   supine  -TA    Toileting Assess/Train, Indepen Level   dependent (less than 25% patient effort)  -TA    Toileting Assess/Train, Impairments   strength decreased;pain  -TA    Toileting Assess/Train, Comment   Increased pain with L rolling; pt dependent for post toilet hygiene  -TA    Recorded by   [TA] Onofre Suárez OT    Therapy Exercises    Right Lower Extremity  AROM:;10 reps;supine;ankle pumps/circles;glut sets;quad sets;AAROM:;SLR;hip abduction/adduction  -LM     Bilateral Upper Extremity   AROM:;10 reps;sitting;elbow flexion/extension;hand pumps;shoulder circles;shoulder extension/flexion;shoulder rolls/shrugs  -TA    Recorded by  [LM] Rima Mckeon, PT [TA] Onofre Suárez OT    Positioning and Restraints    Pre-Treatment Position in bed  -AR in bed  -LM sitting in chair/recliner  -TA    Post Treatment Position chair  -AR chair  -LM chair  -TA    In Chair notified nsg;reclined;call light within reach;encouraged to call for assist;exit alarm on;compression device  -AR notified nsg;reclined;sitting;call light within reach;encouraged to call for assist;exit alarm on;with OT   removed KI  -LM notified nsg;reclined;call light within reach;encouraged to call  for assist;exit alarm on;with nsg;legs elevated;RLE elevated;R heel elevated  -TA    Recorded by [AR] Nicole Estrada, OT [LM] Rima Mckeon, PT [TA] Onofre Suárez, OT      07/23/17 0955          Rehab Assessment/Intervention    Discipline physical therapy assistant  -UD      Document Type therapy note (daily note)  -UD      Subjective Information agree to therapy;complains of;weakness;pain  -UD      Patient Effort, Rehab Treatment adequate  -UD      Symptoms Noted During/After Treatment increased pain  -UD      Specific Treatment Considerations knee immobilizer rt leg  -UD      Recorded by [UD] Latia Conklin PTA      Vital Signs    O2 Delivery Post Treatment supplemental O2  -UD      Pre Patient Position Supine  -UD      Intra Patient Position Standing  -UD      Post Patient Position Sitting  -UD      Recorded by [UD] Latia Conklin PTA      Pain Assessment    Pain Assessment Mark-Fox FACES  -UD      Mark-Fox FACES Pain Rating 4  -UD      Pain Score 4  -UD      Post Pain Score 4  -UD      Pain Type Acute pain  -UD      Pain Location Leg  -UD      Pain Orientation Right  -UD      Pain Intervention(s) Repositioned  -UD      Recorded by [UD] Latia Conklin PTA      Cognitive Assessment/Intervention    Orientation Status oriented x 4  -UD      Follows Commands/Answers Questions 75% of the time  -UD      Recorded by [UD] Latia Conklin PTA      Mobility Assessment/Training    Right Lower Extremity Weight-Bearing touch down weight-bearing  -UD      Recorded by [UD] Latia Conklin PTA      Bed Mobility, Assessment/Treatment    Bed Mob, Supine to Sit, [place]gie moderate assist (50% patient effort);2 person assist required  -UD      Recorded by [UD] Latia Conklin PTA      Transfer Assessment/Treatment    Transfers, Sit-Stand [place]gie moderate assist (50% patient effort);2 person assist required  -UD      Transfers, Stand-Sit [place]gie moderate assist (50% patient effort);2 person assist required  -UD       Recorded by [UD] Latia Conklin PTA      Gait Assessment/Treatment    Gait, [place] Level moderate assist (50% patient effort);2 person assist required  -UD      Gait, Distance (Feet) 5  -UD      Gait, Safety Issues step length decreased  -UD      Gait, Impairments strength decreased  -UD      Gait, Comment --   knee immobilzer rt leg  -UD      Recorded by [UD] Latia Conklin PTA      Therapy Exercises    Bilateral Lower Extremities AROM:;10 reps;sitting  -UD      Recorded by [UD] Latia Conklin PTA      Positioning and Restraints    Pre-Treatment Position in bed  -UD      Post Treatment Position chair  -UD      In Chair notified nsg;reclined;sitting;call light within reach;exit alarm on;legs elevated  -UD      Recorded by [UD] Latia Conklin PTA        User Key  (r) = Recorded By, (t) = Taken By, (c) = Cosigned By    Initials Name Effective Dates    UD Latia Conklin, PRIYA 06/22/15 -     AR Nicole Estrada, OT 06/22/15 -     TA Onofre Suárez, OT 03/14/16 -     LM Rima Mckeon, PT 06/09/17 -                 IP PT Goals       07/24/17 1636 07/23/17 1113 07/20/17 1634    Bed Mobility PT LTG    Bed Mobility PT LTG, Date Goal Reviewed 07/24/17  -LM      Bed Mobility PT LTG, Outcome goal ongoing  -LM goal ongoing  -UD goal ongoing  -SC    Transfer Training PT LTG    Transfer Training PT  LTG, Date Goal Reviewed 07/24/17  -LM      Transfer Training PT LTG, Outcome goal ongoing  -LM goal ongoing  -UD goal ongoing  -SC    Gait Training PT LTG    Gait Training Goal PT LTG, Date Goal Reviewed 07/24/17  -LM      Gait Training Goal PT LTG, Outcome goal ongoing  -LM goal ongoing  -UD       07/19/17 1338 07/18/17 1101       Bed Mobility PT LTG    Bed Mobility PT LTG, Date Established  07/18/17  -LR     Bed Mobility PT LTG, Time to Achieve  5 days  -LR     Bed Mobility PT LTG, Activity Type  supine to sit/sit to supine  -LR     Bed Mobility PT LTG, [place] Level  contact guard assist  -LR     Bed Mobility PT LTG,  Date Goal Reviewed  07/18/17  -LR     Bed Mobility PT LTG, Outcome goal ongoing  -SC goal ongoing  -LR     Transfer Training PT LTG    Transfer Training PT LTG, Date Established  07/18/17  -LR     Transfer Training PT LTG, Time to Achieve  5 days  -LR     Transfer Training PT LTG, Activity Type  sit to stand/stand to sit  -LR     Transfer Training PT LTG, [place] Level  contact guard assist;2 person assist required  -LR     Transfer Training PT LTG, Assist Device  walker, rolling  -LR     Transfer Training PT  LTG, Date Goal Reviewed  07/18/17  -LR     Transfer Training PT LTG, Outcome goal ongoing  -SC goal ongoing  -LR     Gait Training PT LTG    Gait Training Goal PT LTG, Date Established  07/18/17  -LR     Gait Training Goal PT LTG, Time to Achieve  5 days  -LR     Gait Training Goal PT LTG, [place] Level  minimum assist (75% patient effort);2 person assist required  -LR     Gait Training Goal PT LTG, Assist Device  walker, rolling  -LR     Gait Training Goal PT LTG, Distance to Achieve  50 feet  -LR     Gait Training Goal PT LTG, Date Goal Reviewed  07/18/17  -LR     Gait Training Goal PT LTG, Outcome goal ongoing  -SC goal ongoing  -LR       User Key  (r) = Recorded By, (t) = Taken By, (c) = Cosigned By    Initials Name Provider Type    SC Medardo Sanchez, PT Physical Therapist    UD Latia Conklin, PTA Physical Therapy Assistant    LR Tianna Lizarraga, PT Physical Therapist    LM Rima Mckeon, PT Physical Therapist          Physical Therapy Education     Title: PT OT SLP Therapies (Done)     Topic: Physical Therapy (Done)     Point: Mobility training (Done)    Learning Progress Summary    Learner Readiness Method Response Comment Documented by Status   Patient Acceptance E,D VU,NR Reviewed benefits of activity, WB status, transfer technique, HEP. LM 07/24/17 1635 Done    Acceptance E,D NR,CATRACHITO BEAULIEU 07/23/17 1113 Done    Eager E NR reviewed safety with mobility SC 07/20/17 1634 Active     Nonacceptance E NR   07/19/17 1845 Active    Eager E CATRACHITO RODRÍGUEZNR reviewed safety with mobility SC 07/19/17 1337 Done    Acceptance E VU   07/18/17 1753 Done    Acceptance E,D MARCOS,NR Educated on TDWB status and gait training while maintaining TDWB. Educated on indication for knee immobilizer.  07/18/17 1707 Done               Point: Home exercise program (Done)    Learning Progress Summary    Learner Readiness Method Response Comment Documented by Status   Patient Acceptance E,D VU,NR Reviewed benefits of activity, WB status, transfer technique, HEP.  07/24/17 1635 Done    Acceptance E,D NRCATRACHITO   07/23/17 1113 Done    Eager E NR reviewed safety with mobility SC 07/20/17 1634 Active    Nonacceptance E NR   07/19/17 1845 Active    Eager E CATRACHITO RODRÍGUEZ,NR reviewed safety with mobility SC 07/19/17 1337 Done    Acceptance E VU   07/18/17 1753 Done    Acceptance E,D MARCOSNR Educated on TDWB status and gait training while maintaining TDWB. Educated on indication for knee immobilizer.  07/18/17 1707 Done               Point: Body mechanics (Done)    Learning Progress Summary    Learner Readiness Method Response Comment Documented by Status   Patient Acceptance E,D VU,NR Reviewed benefits of activity, WB status, transfer technique, HEP.  07/24/17 1635 Done    Acceptance E,D NRCATRACHITO   07/23/17 1113 Done    Eager E NR reviewed safety with mobility SC 07/20/17 1634 Active    Nonacceptance E NR   07/19/17 1845 Active    Eager E CATRACHITO RODRÍGUEZNR reviewed safety with mobility SC 07/19/17 1337 Done    Acceptance E VU   07/18/17 1753 Done    Acceptance E,D MARCOS,NR Educated on TDWB status and gait training while maintaining TDWB. Educated on indication for knee immobilizer.  07/18/17 1707 Done               Point: Precautions (Done)    Learning Progress Summary    Learner Readiness Method Response Comment Documented by Status   Patient Acceptance E,D VU,NR Reviewed benefits of activity, WB status, transfer technique, HEP.  07/24/17  1635 Done    Acceptance E,D NR,CATRACHITO   07/23/17 1113 Done    Eager E NR reviewed safety with mobility SC 07/20/17 1634 Active    Nonacceptance E NR   07/19/17 1845 Active    Eager E CATRACHITO RODRÍGUEZ,NR reviewed safety with mobility SC 07/19/17 1337 Done    Acceptance E VU   07/18/17 1753 Done    Acceptance E,D MARCOS,NR Educated on TDWB status and gait training while maintaining TDWB. Educated on indication for knee immobilizer.  07/18/17 1707 Done                      User Key     Initials Effective Dates Name Provider Type Discipline    SC 06/19/15 -  Medardo Sanchez, PT Physical Therapist PT     06/22/15 -  Latia Conklin, PRIYA Physical Therapy Assistant PT     06/19/15 -  Tianna Lizarraga, PT Physical Therapist PT     02/06/17 -  Reji Berg, RN Registered Nurse Nurse     06/09/17 -  Rima Mckeon, PT Physical Therapist PT                    PT Recommendation and Plan  Anticipated Equipment Needs At Discharge: bedside commode  Anticipated Discharge Disposition: skilled nursing facility  Demonstrates Need for Referral to Another Service: occupational therapy  Planned Therapy Interventions: balance training, bed mobility training, gait training, home exercise program, patient/family education, ROM (Range of Motion), strengthening, transfer training  PT Frequency: daily  Plan of Care Review  Plan Of Care Reviewed With: patient  Progress: progress toward functional goals as expected  Outcome Summary/Follow up Plan: Pt performed stand-pivot transfer to chair, limited by c/o R and L hip pain. Able to maintain WB status on RLE with repetitive cues. Will progress with mobility as able.           Outcome Measures       07/24/17 1530 07/24/17 1515 07/23/17 1050    How much help from another person do you currently need...    Turning from your back to your side while in flat bed without using bedrails?  2  -LM     Moving from lying on back to sitting on the side of a flat bed without bedrails?  2  -LM     Moving to  and from a bed to a chair (including a wheelchair)?  2  -LM     Standing up from a chair using your arms (e.g., wheelchair, bedside chair)?  2  -LM     Climbing 3-5 steps with a railing?  1  -LM     To walk in hospital room?  2  -LM     AM-PAC 6 Clicks Score  11  -LM     How much help from another is currently needed...    Putting on and taking off regular lower body clothing? 1  -AR  1  -TA    Bathing (including washing, rinsing, and drying) 2  -AR  2  -TA    Toileting (which includes using toilet bed pan or urinal) 2  -AR  2  -TA    Putting on and taking off regular upper body clothing 3  -AR  3  -TA    Taking care of personal grooming (such as brushing teeth) 3  -AR  3  -TA    Eating meals 3  -AR  3  -TA    Score 14  -AR  14  -TA    Functional Assessment    Outcome Measure Options AM-PAC 6 Clicks Daily Activity (OT)  -AR AM-PAC 6 Clicks Basic Mobility (PT)  -LM AM-PAC 6 Clicks Daily Activity (OT)  -TA      07/23/17 0955          How much help from another person do you currently need...    Turning from your back to your side while in flat bed without using bedrails? 2  -UD      Moving from lying on back to sitting on the side of a flat bed without bedrails? 2  -UD      Moving to and from a bed to a chair (including a wheelchair)? 2  -UD      Standing up from a chair using your arms (e.g., wheelchair, bedside chair)? 2  -UD      Climbing 3-5 steps with a railing? 1  -UD      To walk in hospital room? 2  -UD      AM-PAC 6 Clicks Score 11  -UD        User Key  (r) = Recorded By, (t) = Taken By, (c) = Cosigned By    Initials Name Provider Type    UD Latia Conklin, PTA Physical Therapy Assistant    TOSIN Estrada, OT Occupational Therapist    DEEP Suárez, OT Occupational Therapist    LISA Mckeon, PT Physical Therapist           Time Calculation:         PT Charges       07/24/17 1638          Time Calculation    Start Time 1515  -LM      PT Received On 07/24/17  -LM      PT Goal Re-Cert Due Date  07/28/17  -LM      Time Calculation- PT    Total Timed Code Minutes- PT 25 minute(s)  -LM        User Key  (r) = Recorded By, (t) = Taken By, (c) = Cosigned By    Initials Name Provider Type    LM Rima Mckeno PT Physical Therapist          Therapy Charges for Today     Code Description Service Date Service Provider Modifiers Qty    [id number] HC PT THER PROC EA 15 MIN 7/24/2017 Rima Mckeon, PT GP 2          PT G-Codes  Outcome Measure Options: AM-PAC 6 Clicks Daily Activity (OT)    Rima Mckeon PT  7/24/2017

## 2017-07-24 NOTE — PROGRESS NOTES
"Adult Nutrition  Assessment/PES    Patient Name:  Elizabeth Oliveira  YOB: 1929  MRN: [id number]  Admit Date:  7/17/2017    Assessment Date:  7/24/2017        Reason for Assessment       07/24/17 1149    Reason for Assessment    Reason For Assessment/Visit length of stay    Time Spent (min) 20    Diagnosis Diagnosis    Ortho Fracture   Closed fx of right femur; Fall; Arthritis    Other diagnosis Thrombocytopenia              Nutrition/Diet History       07/24/17 1150    Nutrition/Diet History    Reported/Observed By Patient    Other Pt reported appetite to be fair, willing to try supplements, reported the  has been taking excellent care of her, and would like to have gatorade sent on every tray, pt reported it has helped her with cramps.            Anthropometrics       07/24/17 1151    Anthropometrics    Height 157.5 cm (62\")    Weight 51.3 kg (113 lb)    Ideal Body Weight (IBW)    Ideal Body Weight (IBW), Female 50.83    % Ideal Body Weight 101.05    Body Mass Index (BMI)    BMI (kg/m2) 20.71            Labs/Tests/Procedures/Meds       07/24/17 1152    Labs/Tests/Procedures/Meds    Labs/Tests Review Reviewed                Nutrition Prescription Ordered       07/24/17 1152    Nutrition Prescription PO    Current PO Diet Regular            Evaluation of Received Nutrient/Fluid Intake       07/24/17 1152    PO Evaluation    Number of Meals 4    % PO Intake 56        Problem/Interventions:        Problem 1       07/24/17 1152    Nutrition Diagnoses Problem 1    Problem 1 Inadequate Intake/Infusion    Etiology (related to) Medical Diagnosis   Clinical condition    Signs/Symptoms (evidenced by) PO Intake    Percent (%) intake recorded 56 %    Over number of meals 4              Intervention Goal       07/24/17 1153    Intervention Goal    General Nutrition support treatment    PO Increase intake            Nutrition Intervention       07/24/17 1153    Nutrition Intervention    RD/Tech " Action Advise available snack;Menu provided;Encourage intake;Recommend/ordered;Follow Tx progress;Care plan reviewd    Recommended/Ordered Supplement            Nutrition Prescription       07/24/17 1153    Nutrition Prescription PO    PO Prescription Begin/change supplement    Fluid Consistency Thin    Supplement Boost Plus   Chocolate    Supplement Frequency 3 times a day    New PO Prescription Ordered? Yes            Education/Evaluation       07/24/17 1154    Monitor/Evaluation    Monitor Per protocol;PO intake;Supplement intake        Electronically signed by:  Carol Stone  07/24/17 11:54 AM

## 2017-07-24 NOTE — THERAPY TREATMENT NOTE
Acute Care - Occupational Therapy Treatment Note  [facility]     Patient Name: Elizabeth Oliveira  : 1929  MRN: [id number]  Today's Date: 2017  Onset of Illness/Injury or Date of Surgery Date: 17  Date of Referral to OT: 17  Referring Physician: Chanelle Tolbert MD      Admit Date: 2017    Visit Dx:     ICD-10-CM ICD-9-CM   1. Fall, initial encounter W19.XXXA E888.9   2. 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 Impaired functional mobility, balance, gait, and endurance Z74.09 V49.89   6. Impaired mobility and ADLs Z74.09 799.89     Patient Active Problem List   Diagnosis   • Fall   • Closed fracture of right femur   • Arthritis   • Thrombocytopenia             Adult Rehabilitation Note       17 1530 17 1515 17 1050    Rehab Assessment/Intervention    Discipline occupational therapist  -AR physical therapist   co-tx with OT Simultaneous filing. User may be unaware of other data.  -LM occupational therapist  -TA    Document Type  therapy note (daily note)  -LM therapy note (daily note)  -TA    Subjective Information agree to therapy;complains of;pain  -AR agree to therapy;complains of;pain  -LM agree to therapy;complains of;pain  -TA    Patient Effort, Rehab Treatment good  -AR good  -LM adequate  -TA    Symptoms Noted During/After Treatment increased pain;fatigue  -AR increased pain;fatigue  -LM increased pain  -TA    Precautions/Limitations fall precautions;brace on when up;non-weight bearing status   KI on when up, TDWD RLE  -AR fall precautions;brace on when up;other (see comments)   RLE KI, TDWB RLE  -LM fall precautions;brace on when up;other (see comments)   KI when up; TDWB RLE  -TA    Recorded by [AR] Nicole Estrada OT [LM] Rima Mckeon PT [TA] Onofre LUNA  NANCY Suárez    Vital Signs    Pre Systolic BP Rehab   --   Nurse cleared pt for tx, VSS  -TA    Pre SpO2 (%) 90  -AR 95  -LM     O2 Delivery Pre Treatment supplemental O2   2.5 L NC  -AR      Intra SpO2 (%) 84  -AR      O2 Delivery Intra Treatment supplemental O2  -AR      Post SpO2 (%) 91  -AR 90  -LM     O2 Delivery Post Treatment supplemental O2  -AR      Pre Patient Position Supine  -AR Supine  -LM Sitting  -TA    Intra Patient Position   Side Lying  -TA    Post Patient Position Sitting  -AR Sitting  -LM Sitting  -TA    Recorded by [AR] Nicole Estrada OT [LM] Rima Mckeon, PT [TA] Onofre Suárez OT    Pain Assessment    Pain Assessment 0-10  -AR 0-10  -LM Mark-Fox FACES  -TA    Mark-Fox FACES Pain Rating 4  -AR  4  -TA    Pain Score 3  -AR 5   pain in R hip only  -LM 8  -TA    Post Pain Score  --   did not give numerical value; pain in both L&R hip  -LM 8  -TA    Pain Type Acute pain  -AR Acute pain  -LM Acute pain  -TA    Pain Location Leg  -AR Hip  -LM Leg  -TA    Pain Orientation Right  -AR Right;Left  -LM Right  -TA    Pain Intervention(s) Repositioned;Ambulation/increased activity  -AR Repositioned  -LM Repositioned;Ambulation/increased activity  -TA    Response to Interventions tolerated  -AR  Pt premedicated; did not tolerate  -TA    Multiple Pain Sites Yes  -AR      Recorded by [AR] Nicole Estrada OT [LM] Rima Mckeon, PT [TA] Onofre Suárez OT    Pain 2    Pre Tx Pain Score 2 4  -AR      Post Tx Pain Score 2 3  -AR      Pain Type 2 Acute pain  -AR      Pain Location 2 Hip  -AR      Pain Orientation 2 Right  -AR      Pain Intervention(s) 2 Repositioned;Ambulation/increased activity  -AR      Response to Interventions 2 tolerated  -AR      Recorded by [AR] Nicole Estrada OT      Cognitive Assessment/Intervention    Current Cognitive/Communication Assessment functional  -AR functional  -LM functional  -TA    Orientation Status oriented x 4  -AR oriented x 4  -LM  oriented x 4  -TA    Follows Commands/Answers Questions 75% of the time;able to follow single-step instructions  -% of the time;able to follow single-step instructions;needs cueing;needs increased time;needs repetition  -% of the time;able to follow single-step instructions;needs cueing  -TA    Personal Safety WNL/WFL  -AR WNL/WFL  -LM WNL/WFL  -TA    Personal Safety Interventions fall prevention program maintained  -AR  fall prevention program maintained;gait belt;nonskid shoes/slippers when out of bed;muscle strengthening facilitated;supervised activity  -TA    Recorded by [AR] Nicole Estrada, OT [LM] Rima Mckeon, PT [TA] Onofre Suárez OT    Mobility Assessment/Training    Extremity Weight-Bearing Status right lower extremity  -AR right lower extremity  -LM right lower extremity  -TA    Right Lower Extremity Weight-Bearing touch down weight-bearing  -AR touch down weight-bearing  -LM touch down weight-bearing  -TA    Recorded by [AR] Nicole Estrada OT [LM] Rima Mckeon, PT [TA] Onofre Suárez OT    Bed Mobility, Assessment/Treatment    Bed Mobility, Assistive Device bed rails;head of bed elevated;draw sheet  -AR bed rails;head of bed elevated  -LM     Bed Mobility, Scoot/Bridge, [place] maximum assist (25% patient effort);2 person assist required;verbal cues required  -AR      Bed Mob, Supine to Sit, [place] maximum assist (25% patient effort);2 person assist required;verbal cues required  -AR maximum assist (25% patient effort);2 person assist required;verbal cues required  -LM     Bed Mob, Sit to Supine, [place]  not tested   Pt left UIC  -LM     Bed Mobility, Impairments strength decreased;impaired balance  -AR strength decreased;pain  -LM strength decreased  -TA    Bed Mobility, Comment cues to sequence and advance BLE, cues maintain WB status  -AR Donned and adjusted RLE KI prior to bed mobility. Pt able to scoot BLEs close to EOB. Verbal cues for hand  placement and proper technique for transfer; assist required at trunk to move into upright position and at BLEs to move off bed.   -LM Pt UIC; pt needed to void bowel; unable to tolerate BSC; chair relined to supine position and pt rolled to L side with Max A for placement of bedpan, post toilet hygiene  -TA    Recorded by [AR] Nicole Estrada, OT [LM] Rima Mckeon, PT [TA] Onofre Suárez OT    Transfer Assessment/Treatment    Transfers, Bed-Chair [place]  maximum assist (25% patient effort);2 person assist required;verbal cues required  -LM     Transfers, Chair-Bed [place]  not tested   Pt left UIC  -LM     Transfers, Bed-Chair-Bed, Assist Device  other (see comments)   pt holding onto therapist arm; stand-pivot transfer  -LM     Transfers, Sit-Stand [place] maximum assist (25% patient effort);2 person assist required;verbal cues required  -AR maximum assist (25% patient effort);2 person assist required;verbal cues required  -LM     Transfers, Stand-Sit [place] maximum assist (25% patient effort);2 person assist required;verbal cues required  -AR moderate assist (50% patient effort);2 person assist required;verbal cues required  -LM     Transfers, Sit-Stand-Sit, Assist Device rolling walker  -AR      Transfer, Maintain Weight Bearing Status cues to maintain weight bearing status  -AR      Transfer, Safety Issues balance decreased during turns;sequencing ability decreased  -AR sequencing ability decreased;step length decreased;weight-shifting ability decreased  -LM     Transfer, Impairments pain;strength decreased;impaired balance  -AR strength decreased;pain  -LM     Transfer, Comment cues for hand placement, sequencing and assist to advance RLE durings tand-to-sit transition   -AR Verbal cues to push from bed when standing, WB status, reach for chair armrests when sitting, step out RLE prior to transfer.   -LM     Recorded by [AR] Nicole Estrada, OT [LM] Rima Mckeon, PT      Gait Assessment/Treatment    Gait, [place] Level  unable to perform  -LM     Recorded by  [LM] Rima Mckeon, PT     Lower Body Dressing Assessment/Training    LB Dressing Assess/Train, Comment deferred AE training as pt on  phone with rehab facility  -AR      Recorded by [AR] Nicole Estrada OT      Toileting Assessment/Training    Toileting Assess/Train, Assistive Device   other (see comments)   bedpan  -TA    Toileting Assess/Train, Position   supine  -TA    Toileting Assess/Train, Indepen Level   dependent (less than 25% patient effort)  -TA    Toileting Assess/Train, Impairments   strength decreased;pain  -TA    Toileting Assess/Train, Comment   Increased pain with L rolling; pt dependent for post toilet hygiene  -TA    Recorded by   [TA] Onofre Suárez OT    Therapy Exercises    Right Lower Extremity  AROM:;10 reps;supine;ankle pumps/circles;glut sets;quad sets;AAROM:;SLR;hip abduction/adduction  -LM     Bilateral Upper Extremity   AROM:;10 reps;sitting;elbow flexion/extension;hand pumps;shoulder circles;shoulder extension/flexion;shoulder rolls/shrugs  -TA    Recorded by  [LM] Rima Mckeon, PT [TA] Onofre Suárez OT    Positioning and Restraints    Pre-Treatment Position in bed  -AR in bed  -LM sitting in chair/recliner  -TA    Post Treatment Position chair  -AR chair  -LM chair  -TA    In Chair notified nsg;reclined;call light within reach;encouraged to call for assist;exit alarm on;compression device  -AR notified nsg;reclined;sitting;call light within reach;encouraged to call for assist;exit alarm on;with OT   removed KI  -LM notified nsg;reclined;call light within reach;encouraged to call for assist;exit alarm on;with nsg;legs elevated;RLE elevated;R heel elevated  -TA    Recorded by [AR] Nicole Estrada OT [LM] Rima Mckeon, PT [TA] Onofre Suárez OT      07/23/17 0955          Rehab Assessment/Intervention    Discipline physical therapy assistant  -UD      Document  Type therapy note (daily note)  -UD      Subjective Information agree to therapy;complains of;weakness;pain  -UD      Patient Effort, Rehab Treatment adequate  -UD      Symptoms Noted During/After Treatment increased pain  -UD      Specific Treatment Considerations knee immobilizer rt leg  -UD      Recorded by [UD] Latia Conklin PTA      Vital Signs    O2 Delivery Post Treatment supplemental O2  -UD      Pre Patient Position Supine  -UD      Intra Patient Position Standing  -UD      Post Patient Position Sitting  -UD      Recorded by [UD] Latia Conklin PTA      Pain Assessment    Pain Assessment Mark-Fox FACES  -UD      Mark-Fox FACES Pain Rating 4  -UD      Pain Score 4  -UD      Post Pain Score 4  -UD      Pain Type Acute pain  -UD      Pain Location Leg  -UD      Pain Orientation Right  -UD      Pain Intervention(s) Repositioned  -UD      Recorded by [UD] Latia Conklin PTA      Cognitive Assessment/Intervention    Orientation Status oriented x 4  -UD      Follows Commands/Answers Questions 75% of the time  -UD      Recorded by [UD] Latia Conklin PTA      Mobility Assessment/Training    Right Lower Extremity Weight-Bearing touch down weight-bearing  -UD      Recorded by [UD] Latia Conklin PTA      Bed Mobility, Assessment/Treatment    Bed Mob, Supine to Sit, [place] moderate assist (50% patient effort);2 person assist required  -UD      Recorded by [UD] Latia Conklin PTA      Transfer Assessment/Treatment    Transfers, Sit-Stand [place] moderate assist (50% patient effort);2 person assist required  -UD      Transfers, Stand-Sit [place] moderate assist (50% patient effort);2 person assist required  -UD      Recorded by [UD] Latia Conklin PTA      Gait Assessment/Treatment    Gait, [place] Level moderate assist (50% patient effort);2 person assist required  -UD      Gait, Distance (Feet) 5  -UD      Gait, Safety Issues step length decreased  -UD      Gait, Impairments strength decreased  -UD       Gait, Comment --   knee immobilzer rt leg  -UD      Recorded by [UD] Latia Conklin PTA      Therapy Exercises    Bilateral Lower Extremities AROM:;10 reps;sitting  -UD      Recorded by [UD] Latia Conklin PTA      Positioning and Restraints    Pre-Treatment Position in bed  -UD      Post Treatment Position chair  -UD      In Chair notified nsg;reclined;sitting;call light within reach;exit alarm on;legs elevated  -UD      Recorded by [UD] Latia Conklin PTA        User Key  (r) = Recorded By, (t) = Taken By, (c) = Cosigned By    Initials Name Effective Dates    UD Latia Conklin, PTA 06/22/15 -     AR Nicole Estrada, OT 06/22/15 -     TA Onofre Suárez, OT 03/14/16 -     LM Rima HERNAN Mckeon, PT 06/09/17 -                 OT Goals       07/24/17 1624 07/23/17 1130 07/19/17 1422    Bed Mobility OT LTG    Bed Mobility OT LTG, Date Established   07/19/17  -MM    Bed Mobility OT LTG, Time to Achieve   2 wks  -MM    Bed Mobility OT LTG, Activity Type   all bed mobility  -MM    Bed Mobility OT LTG, [place] Level   minimum assist (75% patient effort)  -MM    Bed Mobility OT LTG, Outcome goal ongoing  -AR goal ongoing   Max A to roll L with recliner in supine position  -TA     Transfer Training OT LTG    Transfer Training OT LTG, Date Established   07/19/17  -MM    Transfer Training OT LTG, Time to Achieve   2 wks  -MM    Transfer Training OT LTG, Activity Type   sit to stand/stand to sit;bed to chair /chair to bed;toilet  -MM    Transfer Training OT LTG, [place] Level   minimum assist (75% patient effort)  -MM    Transfer Training OT LTG, Assist Device   walker, rolling  -MM    Transfer Training OT LTG, Outcome goal ongoing  -AR goal ongoing  -TA     Patient Education OT LTG    Patient Education OT LTG, Date Established   07/19/17  -MM    Patient Education OT LTG, Time to Achieve   2 wks  -MM    Patient Education OT LTG, Education Type   HEP;precautions per surgeon;positioning;home safety;adaptive equipment  mgmt  -MM    Patient Education OT LTG, Education Understanding   demonstrates adequately  -MM    Patient Education OT LTG Outcome goal ongoing  -AR goal ongoing   Progressing with HEP, surgical precautions/TDWB.  -TA     LB Dressing OT LTG    LB Dressing Goal OT LTG, Date Established   07/19/17  -MM    LB Dressing Goal OT LTG, Time to Achieve   2 wks  -MM    LB Dressing Goal OT LTG, [place] Level   minimum assist (75% patient effort)  -MM    LB Dressing Goal OT LTG, Adaptive Equipment   --   AE PRN  -MM    LB Dressing Goal OT LTG, Outcome goal ongoing  -AR goal ongoing  -TA       User Key  (r) = Recorded By, (t) = Taken By, (c) = Cosigned By    Initials Name Provider Type    MM Tori Harrison, OT Occupational Therapist    TOSIN Estrada, OT Occupational Therapist    DEEP Suárez, OT Occupational Therapist          Occupational Therapy Education     Title: PT OT SLP Therapies (Done)     Topic: Occupational Therapy (Done)     Point: ADL training (Done)    Description: Instruct learner(s) on proper safety adaptation and remediation techniques during self care or transfers.   Instruct in proper use of assistive devices.    Learning Progress Summary    Learner Readiness Method Response Comment Documented by Status   Patient Eager MOHAN BARRON D VU,NR Reviewed WB precautions, bed mobility, transfr training AR 07/24/17 1621 Done    Nonacceptance E AWILDA THOMASON 07/19/17 1845 Active    Acceptance MOHAN BARRON D VU,NR Educated pt on role of OT, process of IE, benefits of activity. Guided through HEP, AE use for ADL, and educated on precautions and improved mechanics for bed mobility, ADL and tsf.  07/19/17 1415 Done               Point: Home exercise program (Done)    Description: Instruct learner(s) on appropriate technique for monitoring, assisting and/or progressing therapeutic exercises/activities.    Learning Progress Summary    Learner Readiness Method Response Comment Documented by Status   Patient Eager  MOHAN BARRON D VU,AWILDA Reviewed WB precautions, bed mobility, transfr training AR 07/24/17 1621 Done    Acceptance E MARCOS,AWILDA Body mechanics with rolling; BUE HEP  07/23/17 1129 Done    Nonacceptance E NR   07/19/17 1845 Active    Acceptance MOHAN BARRON D VU,AWILDA Educated pt on role of OT, process of IE, benefits of activity. Guided through HEP, AE use for ADL, and educated on precautions and improved mechanics for bed mobility, ADL and tsf.  07/19/17 1415 Done               Point: Precautions (Done)    Description: Instruct learner(s) on prescribed precautions during self-care and functional transfers.    Learning Progress Summary    Learner Readiness Method Response Comment Documented by Status   Patient Eager MOHAN BARRON D VU, NR Reviewed WB precautions, bed mobility, transfr training AR 07/24/17 1621 Done    Nonacceptance E NR   07/19/17 1845 Active    Acceptance MOHAN BARRON D VU, NR Educated pt on role of OT, process of IE, benefits of activity. Guided through HEP, AE use for ADL, and educated on precautions and improved mechanics for bed mobility, ADL and tsf.  07/19/17 1415 Done               Point: Body mechanics (Done)    Description: Instruct learner(s) on proper positioning and spine alignment during self-care, functional mobility activities and/or exercises.    Learning Progress Summary    Learner Readiness Method Response Comment Documented by Status   Patient Eager MOHAN BARRON D VU, NR Reviewed WB precautions, bed mobility, transfr training AR 07/24/17 1621 Done    Acceptance E AWILDA RODRÍGUEZ Body mechanics with rolling; BUE HEP  07/23/17 1129 Done    Nonacceptance E NR   07/19/17 1845 Active    Acceptance MOHAN BARRON D VU, NR Educated pt on role of OT, process of IE, benefits of activity. Guided through HEP, AE use for ADL, and educated on precautions and improved mechanics for bed mobility, ADL and tsf.  07/19/17 1415 Done                      User Key     Initials Effective Dates Name Provider Type Discipline     06/22/15 -  Tori Bullard  Christy, OT Occupational Therapist OT    AR 06/22/15 -  Nicole Estrada, OT Occupational Therapist OT    TA 03/14/16 -  Onofre Suárez, OT Occupational Therapist OT    JR 02/06/17 -  Reji Berg RN Registered Nurse Nurse                  OT Recommendation and Plan  Anticipated Equipment Needs At Discharge:  (Issued reacher, LH sponge, sockaid, shoe horn, leg )  Anticipated Discharge Disposition: skilled nursing facility (SNF for short term rehab)  Planned Therapy Interventions: adaptive equipment training, ADL retraining, balance training, bed mobility training, energy conservation, home exercise program, strengthening, transfer training  Therapy Frequency: daily (per priority policy)  Plan of Care Review  Plan Of Care Reviewed With: patient  Progress: progress toward functional goals is gradual  Outcome Summary/Follow up Plan: Pt limited with c/o L hip pain as well as R hip pain. Able to maintain WB status RLE wih cues. Recommend SNF-level rehab.         Outcome Measures       07/24/17 1530 07/23/17 1050 07/23/17 0955    How much help from another person do you currently need...    Turning from your back to your side while in flat bed without using bedrails?   2  -UD    Moving from lying on back to sitting on the side of a flat bed without bedrails?   2  -UD    Moving to and from a bed to a chair (including a wheelchair)?   2  -UD    Standing up from a chair using your arms (e.g., wheelchair, bedside chair)?   2  -UD    Climbing 3-5 steps with a railing?   1  -UD    To walk in hospital room?   2  -UD    AM-PAC 6 Clicks Score   11  -UD    How much help from another is currently needed...    Putting on and taking off regular lower body clothing? 1  -AR 1  -TA     Bathing (including washing, rinsing, and drying) 2  -AR 2  -TA     Toileting (which includes using toilet bed pan or urinal) 2  -AR 2  -TA     Putting on and taking off regular upper body clothing 3  -AR 3  -TA     Taking care of personal  grooming (such as brushing teeth) 3  -AR 3  -TA     Eating meals 3  -AR 3  -TA     Score 14  -AR 14  -TA     Functional Assessment    Outcome Measure Options AM-PAC 6 Clicks Daily Activity (OT)  -AR AM-PAC 6 Clicks Daily Activity (OT)  -TA       User Key  (r) = Recorded By, (t) = Taken By, (c) = Cosigned By    Initials Name Provider Type    CHRISTOFER Conklin, PRIYA Physical Therapy Assistant    AR Nicole Estrada, OT Occupational Therapist    DEEP Suárez OT Occupational Therapist           Time Calculation:         Time Calculation- OT       07/24/17 1622          Time Calculation- OT    OT Start Time 1530  -AR      Total Timed Code Minutes- OT 15 minute(s)  -AR      OT Received On 07/24/17  -AR      OT Goal Re-Cert Due Date 07/29/17  -AR        User Key  (r) = Recorded By, (t) = Taken By, (c) = Cosigned By    Initials Name Provider Type    TOSIN Estrada OT Occupational Therapist           Therapy Charges for Today     Code Description Service Date Service Provider Modifiers Qty    [id number]  OT THERAPEUTIC ACT EA 15 MIN 7/24/2017 Nicole Estrada OT GO 1               Nicole Estrada OT  7/24/2017

## 2017-07-24 NOTE — PLAN OF CARE
Problem: Fall Risk (Adult)  Goal: Absence of Falls  Outcome: Ongoing (interventions implemented as appropriate)    07/24/17 0126   Fall Risk (Adult)   Absence of Falls making progress toward outcome

## 2017-07-24 NOTE — PLAN OF CARE
Problem: Patient Care Overview (Adult)  Goal: Plan of Care Review  Outcome: Ongoing (interventions implemented as appropriate)    07/24/17 1624   Coping/Psychosocial Response Interventions   Plan Of Care Reviewed With patient   Patient Care Overview   Progress progress toward functional goals is gradual   Outcome Evaluation   Outcome Summary/Follow up Plan Pt limited with c/o L hip pain as well as R hip pain. Able to maintain WB status RLE wih cues. Recommend SNF-level rehab.          Problem: Fractured Hip (Adult)  Goal: Signs and Symptoms of Listed Potential Problems Will be Absent or Manageable (Fractured Hip)  Outcome: Ongoing (interventions implemented as appropriate)    07/24/17 1624   Fractured Hip   Problems Assessed (Fractured Hip) functional deficit/self-care deficit   Problems Present (Fractured Hip) functional deficit/self-care deficit         Problem: Inpatient Occupational Therapy  Goal: Bed Mobility Goal LTG- OT  Outcome: Ongoing (interventions implemented as appropriate)    07/19/17 1422 07/24/17 1624   Bed Mobility OT LTG   Bed Mobility OT LTG, Date Established 07/19/17 --    Bed Mobility OT LTG, Time to Achieve 2 wks --    Bed Mobility OT LTG, Activity Type all bed mobility --    Bed Mobility OT LTG, [place] Level minimum assist (75% patient effort) --    Bed Mobility OT LTG, Outcome --  goal ongoing       Goal: Transfer Training Goal 1 LTG- OT  Outcome: Ongoing (interventions implemented as appropriate)    07/19/17 1422 07/24/17 1624   Transfer Training OT LTG   Transfer Training OT LTG, Date Established 07/19/17 --    Transfer Training OT LTG, Time to Achieve 2 wks --    Transfer Training OT LTG, Activity Type sit to stand/stand to sit;bed to chair /chair to bed;toilet --    Transfer Training OT LTG, [place] Level minimum assist (75% patient effort) --    Transfer Training OT LTG, Assist Device walker, rolling --    Transfer Training OT LTG, Outcome --  goal ongoing       Goal: Patient  Education Goal LTG- OT  Outcome: Ongoing (interventions implemented as appropriate)    07/19/17 1422 07/24/17 1624   Patient Education OT LTG   Patient Education OT LTG, Date Established 07/19/17 --    Patient Education OT LTG, Time to Achieve 2 wks --    Patient Education OT LTG, Education Type HEP;precautions per surgeon;positioning;home safety;adaptive equipment mgmt --    Patient Education OT LTG, Education Understanding demonstrates adequately --    Patient Education OT LTG Outcome --  goal ongoing       Goal: LB Dressing Goal LTG- OT  Outcome: Ongoing (interventions implemented as appropriate)    07/19/17 1422 07/24/17 1624   LB Dressing OT LTG   LB Dressing Goal OT LTG, Date Established 07/19/17 --    LB Dressing Goal OT LTG, Time to Achieve 2 wks --    LB Dressing Goal OT LTG, [place] Level minimum assist (75% patient effort) --    LB Dressing Goal OT LTG, Adaptive Equipment (AE PRN) --    LB Dressing Goal OT LTG, Outcome --  goal ongoing

## 2017-07-24 NOTE — PROGRESS NOTES
"Hospitalist  NOTE     Date of Admission: 7/17/2017   LOS: 7 days   PCP: Eladio Ferrell MD (Inactive)  Ms. Elizabeth Oliveira, 87 y.o. female is followed for:  Chief Complaint: hip pain            SUBJECTIVE   HPI:late note entry saw patient at 1400. Patient was resting in bed in NAD. She states she has not had much of an appetite. She denies any soa, cp, fever, chills or n/v/d.    Subjective:  Symptoms:  No shortness of breath, chest pain or diarrhea.    Diet:  No nausea or vomiting.        The patient's relevant past medical, surgical and social history were reviewed and updated in Epic as appropriate.    Review of Systems:  Review of Systems   Constitutional: Positive for activity change and appetite change. Negative for chills and fever.   Respiratory: Negative for shortness of breath and wheezing.    Cardiovascular: Negative for chest pain and palpitations.   Gastrointestinal: Negative for abdominal distention, abdominal pain, diarrhea, nausea and vomiting.   Genitourinary: Negative for dysuria.   Musculoskeletal: Positive for gait problem.     Otherwise complete ROS negative except as mentioned in the HPI        OBJECTIVE     Vital Sign Min/Max for last 24 hours  Temp  Min: 97.8 °F (36.6 °C)  Max: 98.4 °F (36.9 °C)   BP  Min: 107/63  Max: 110/67   Pulse  Min: 78  Max: 83   Resp  Min: 16  Max: 16   SpO2  Min: 91 %  Max: 97 %   Flow (L/min)  Min: 2  Max: 2   Weight  Min: 113 lb (51.3 kg)  Max: 113 lb (51.3 kg)      Intake/Output Summary (Last 24 hours) at 07/24/17 1519  Last data filed at 07/23/17 1740   Gross per 24 hour   Intake                0 ml   Output             1000 ml   Net            -1000 ml      Telemetry sr Last 3 weights    07/17/17  1117 07/24/17  1151   Weight: 113 lb (51.3 kg) 113 lb (51.3 kg)          Objective:  Vital signs: (most recent): Blood pressure 107/63, pulse 78, temperature 98.3 °F (36.8 °C), resp. rate 16, height 62\" (157.5 cm), weight 113 lb (51.3 kg), SpO2 91 %.  No " fever.              Physical Exam:   Constitutional: Oriented to person, place, and time. Well-developed and well-nourished. No distress.   Head: Normocephalic and atraumatic.   Mouth/Throat: No oropharyngeal exudate.   Eyes: Conjunctivae and EOM are normal. No scleral icterus.   Neck: Normal range of motion. No mass or lymphadenopathy  Cardiovascular: Normal rate, regular rhythm, normal heart sounds and intact distal pulses.  Exam reveals no gallop and no friction rub.  No murmur appreciated.  Pulmonary/Chest: Effort normal and breath sounds normal. No respiratory distress.  No rales rhonchi or wheezing   Abdominal: Soft.  Normal bowel sounds, no distention, no mass, no tenderness  Musculoskeletal: Normal range of motion. He exhibits no edema.   Neurological: No focal deficits   Skin: Skin is warm and dry. No rash noted. Nails show no clubbing.   Mood: Appropriate and pleasant  Results:    Results from last 7 days  Lab Units 07/22/17  0436 07/21/17  0639 07/20/17  0525   WBC 10*3/mm3 6.35 5.62 4.73   HEMOGLOBIN g/dL 10.4* 10.7* 10.5*   HEMATOCRIT % 31.1* 31.7* 30.8*   PLATELETS 10*3/mm3 57* 49* 44*       Results from last 7 days  Lab Units 07/22/17  0436   SODIUM mmol/L 139   POTASSIUM mmol/L 3.9   CHLORIDE mmol/L 102   CO2 mmol/L 29.0   BUN mg/dL 17   CREATININE mg/dL 0.40*   GLUCOSE mg/dL 106*   CALCIUM mg/dL 9.2       Results from last 7 days  Lab Units 07/17/17  1520   ALK PHOS U/L 89   BILIRUBIN mg/dL 0.6   ALT (SGPT) U/L 15   AST (SGOT) U/L 20         Imaging Results (last 24 hours)     ** No results found for the last 24 hours. **        Lab Results   Component Value Date    GLUCOSE 106 (H) 07/22/2017    GLUCOSE 111 (H) 07/19/2017    GLUCOSE 94 07/18/2017    GLUCOSE 103 (H) 07/17/2017    GLUCOSE 99 03/22/2016     Current Medications:  ferrous sulfate 325 mg Oral Daily With Breakfast   fondaparinux 2.5 mg Subcutaneous Q24H   Pharmacy Meds to Bed Consult  Does not apply Daily   sertraline 50 mg Oral Daily    thiamine 100 mg Oral Daily   cyancobalamin 1,000 mcg Oral Daily   vitamin B-6 50 mg Oral Daily   vitamin C 500 mg Oral Daily       ropivacaine (NAROPIN) 0.2% peripheral nerve cath (moog) 200 mL 10 mL/hr Last Rate: 10 mL/hr (07/20/17 0000)       I reviewed the patient's results and images. Images:    Medications reviewed.      Assessment/Plan   ASSESSMENT / PLAN    Principal Problem:    Closed fracture of right femur  Active Problems:    Fall    Arthritis    Thrombocytopenia    # Diarrhea  - C. Diff neg  - Will give Lomotil PRN for symptomatic control     # Mechanical R hip fracture  - Orthopedics following. Plan for conservative management. FU with Dr. Bailey in 2 weeks for FU XRays. No surgical intervention for now  - Tylenol PRN for pain.  Avoid opiates.  Tramadol when necessary  - Start fondaparinux 2.5mg SQ BID for Hip DVT ppx after discussion with Pharmacy      # Chronic thrombocytopenia  - Peripheral smear shows counts closer to 100,000 with no schistocytes when labs showed plts count of 50,000. No clinical suspicion or lab abnormalities for MAHA   - Unlikely cirrhosis given normal INR. Will hold off on RUQ US for now  - Could be from chronic alcohol use (reportedly 2 shots of bourbon nightly for a long time)  - HIV NR, acute hep panel neg      # Alcohol withdrawal: resolved  # Hallucinations  - UA neg for infection. No respiratory symptoms to suspect respiratory infection. WBC normal with no fevers  - Medications reviewed   - CIWA DCed  - Thiamine repletement      # Macrocytic anemia  # Anemia of chronic disease  - Baseling Hgb ~10  - B12 level, folate level, TSH all normal  - Continue home B6, B12 and thiamine replacements  - Started oral iron, added vitamin C to help absorption       # Mood disorder  - Continue Sertraline    Patient is on 3 L of oxygen per nursing tried to wean and patient sat went to 88%. Encouraged used of IS and CDB. Talked with nursing about weaning as tolerated.       DVT ppx:  Fondaparinux  I expect patient to be discharged in: 1-2 days pending insurance approval             Assessment & Plan      Plan of care and goals reviewed with patient and staff.      I discussed the patient's findings and my recommendations with patient.     Lynnette Harvey, CAMPBELL 07/24/17 3:19 PM

## 2017-07-25 VITALS
TEMPERATURE: 97.2 F | HEART RATE: 74 BPM | DIASTOLIC BLOOD PRESSURE: 52 MMHG | SYSTOLIC BLOOD PRESSURE: 100 MMHG | RESPIRATION RATE: 16 BRPM | HEIGHT: 62 IN | OXYGEN SATURATION: 96 % | BODY MASS INDEX: 20.8 KG/M2 | WEIGHT: 113 LBS

## 2017-07-25 PROCEDURE — 94799 UNLISTED PULMONARY SVC/PX: CPT

## 2017-07-25 PROCEDURE — 99239 HOSP IP/OBS DSCHRG MGMT >30: CPT | Performed by: NURSE PRACTITIONER

## 2017-07-25 RX ORDER — TRAMADOL HYDROCHLORIDE 50 MG/1
50 TABLET ORAL EVERY 8 HOURS PRN
Status: DISCONTINUED | OUTPATIENT
Start: 2017-07-25 | End: 2017-07-25 | Stop reason: HOSPADM

## 2017-07-25 RX ORDER — TRAMADOL HYDROCHLORIDE 50 MG/1
50 TABLET ORAL EVERY 4 HOURS PRN
Refills: 0
Start: 2017-07-25 | End: 2018-04-23

## 2017-07-25 RX ORDER — ACETAMINOPHEN 325 MG/1
650 TABLET ORAL EVERY 6 HOURS PRN
Refills: 0
Start: 2017-07-25 | End: 2019-06-20

## 2017-07-25 RX ORDER — LANOLIN ALCOHOL/MO/W.PET/CERES
100 CREAM (GRAM) TOPICAL DAILY
Start: 2017-07-25 | End: 2019-06-20

## 2017-07-25 RX ORDER — FONDAPARINUX SODIUM 2.5 MG/.5ML
2.5 INJECTION SUBCUTANEOUS
Qty: 3.5 ML
Start: 2017-07-25 | End: 2017-08-08

## 2017-07-25 RX ORDER — ASCORBIC ACID 500 MG
500 TABLET ORAL DAILY
Start: 2017-07-25 | End: 2019-06-20

## 2017-07-25 RX ORDER — FERROUS SULFATE 325(65) MG
325 TABLET ORAL
Start: 2017-07-25 | End: 2019-06-20

## 2017-07-25 RX ADMIN — OXYCODONE HYDROCHLORIDE AND ACETAMINOPHEN 500 MG: 500 TABLET ORAL at 08:24

## 2017-07-25 RX ADMIN — Medication: at 10:43

## 2017-07-25 RX ADMIN — TRAMADOL HYDROCHLORIDE 25 MG: 50 TABLET, COATED ORAL at 08:25

## 2017-07-25 RX ADMIN — PYRIDOXINE HCL TAB 50 MG 50 MG: 50 TAB at 08:24

## 2017-07-25 RX ADMIN — TRAMADOL HYDROCHLORIDE 25 MG: 50 TABLET, COATED ORAL at 01:07

## 2017-07-25 RX ADMIN — Medication 100 MG: at 08:24

## 2017-07-25 RX ADMIN — CYANOCOBALAMIN TAB 1000 MCG 1000 MCG: 1000 TAB at 08:24

## 2017-07-25 RX ADMIN — SERTRALINE HYDROCHLORIDE 50 MG: 50 TABLET ORAL at 08:24

## 2017-07-25 RX ADMIN — ACETAMINOPHEN 650 MG: 325 TABLET, FILM COATED ORAL at 10:25

## 2017-07-25 RX ADMIN — Medication 325 MG: at 08:24

## 2017-07-25 RX ADMIN — TRAMADOL HYDROCHLORIDE 50 MG: 50 TABLET, COATED ORAL at 11:05

## 2017-07-25 NOTE — PROGRESS NOTES
"Hospitalist  NOTE     Date of Admission: 7/17/2017   LOS: 8 days   PCP: Eladio Ferrell MD (Inactive)  Ms. Elizabeth Oliveira, 87 y.o. female is followed for:  Chief Complaint: hip pain            SUBJECTIVE   HPI:late note entry saw patient at 1230. Patient was resting in bed in NAD. She states she has not had much of an appetite. She denies any soa, cp, fever, chills or n/v/d. She states her pain has improved from this morning.     Subjective:  Symptoms:  No shortness of breath, chest pain or diarrhea.    Diet:  No nausea or vomiting.        The patient's relevant past medical, surgical and social history were reviewed and updated in Epic as appropriate.    Review of Systems:  Review of Systems   Constitutional: Positive for activity change and appetite change. Negative for chills and fever.   Respiratory: Negative for shortness of breath and wheezing.    Cardiovascular: Negative for chest pain and palpitations.   Gastrointestinal: Negative for abdominal distention, abdominal pain, diarrhea, nausea and vomiting.   Genitourinary: Negative for dysuria.   Musculoskeletal: Positive for gait problem.     Otherwise complete ROS negative except as mentioned in the HPI        OBJECTIVE     Vital Sign Min/Max for last 24 hours  Temp  Min: 96.4 °F (35.8 °C)  Max: 97.2 °F (36.2 °C)   BP  Min: 100/52  Max: 122/64   Pulse  Min: 70  Max: 77   Resp  Min: 16  Max: 16   SpO2  Min: 92 %  Max: 96 %   Flow (L/min)  Min: 1  Max: 2   No Data Recorded      Intake/Output Summary (Last 24 hours) at 07/25/17 1314  Last data filed at 07/24/17 1949   Gross per 24 hour   Intake                0 ml   Output              350 ml   Net             -350 ml      Telemetry sr Last 3 weights    07/17/17  1117 07/24/17  1151   Weight: 113 lb (51.3 kg) 113 lb (51.3 kg)          Objective:  Vital signs: (most recent): Blood pressure 100/52, pulse 74, temperature 97.2 °F (36.2 °C), resp. rate 16, height 62\" (157.5 cm), weight 113 lb (51.3 kg), " SpO2 96 %.  No fever.              Physical Exam:   Constitutional: Oriented to person, place, and time. Well-developed and well-nourished. No distress.   Head: Normocephalic and atraumatic.    Eyes: Conjunctivae and EOM are normal. No scleral icterus.   Neck: Normal range of motion. No mass or lymphadenopathy  Cardiovascular: Normal rate, regular rhythm, normal heart sounds and intact distal pulses.  Exam reveals no gallop and no friction rub.  No murmur appreciated.  Pulmonary/Chest: Effort normal and breath sounds normal. No respiratory distress.  No rales rhonchi or wheezing   Abdominal: Soft.  Normal bowel sounds, no distention, no mass, no tenderness  Musculoskeletal: Normal range of motion. He exhibits no edema.   Neurological: No focal deficits   Skin: Skin is warm and dry. No rash noted. Nails show no clubbing. bruising noted on right inside thigh. Bruising on miguel arms and right hand.   Mood: Appropriate and pleasant  Results:    Results from last 7 days  Lab Units 07/22/17  0436 07/21/17  0639 07/20/17  0525   WBC 10*3/mm3 6.35 5.62 4.73   HEMOGLOBIN g/dL 10.4* 10.7* 10.5*   HEMATOCRIT % 31.1* 31.7* 30.8*   PLATELETS 10*3/mm3 57* 49* 44*       Results from last 7 days  Lab Units 07/22/17  0436   SODIUM mmol/L 139   POTASSIUM mmol/L 3.9   CHLORIDE mmol/L 102   CO2 mmol/L 29.0   BUN mg/dL 17   CREATININE mg/dL 0.40*   GLUCOSE mg/dL 106*   CALCIUM mg/dL 9.2             Imaging Results (last 24 hours)     ** No results found for the last 24 hours. **        Lab Results   Component Value Date    GLUCOSE 106 (H) 07/22/2017    GLUCOSE 111 (H) 07/19/2017    GLUCOSE 94 07/18/2017    GLUCOSE 103 (H) 07/17/2017    GLUCOSE 99 03/22/2016     Current Medications:    ferrous sulfate 325 mg Oral Daily With Breakfast   fondaparinux 2.5 mg Subcutaneous Q24H   Pharmacy Meds to Bed Consult  Does not apply Daily   sertraline 50 mg Oral Daily   thiamine 100 mg Oral Daily   cyancobalamin 1,000 mcg Oral Daily   vitamin B-6 50 mg  Oral Daily   vitamin C 500 mg Oral Daily       ropivacaine (NAROPIN) 0.2% peripheral nerve cath (moog) 200 mL 10 mL/hr Last Rate: 10 mL/hr (07/20/17 0000)       I reviewed the patient's results and images. Images:    Medications reviewed.      Assessment/Plan   ASSESSMENT / PLAN    Principal Problem:    Closed fracture of right femur  Active Problems:    Fall    Arthritis    Thrombocytopenia    # Diarrhea  - C. Diff neg  - Will give Lomotil PRN for symptomatic control     # Mechanical R hip fracture  - Orthopedics following. Plan for conservative management. FU with Dr. Bailey in 2 weeks for FU XRays. No surgical intervention for now  - Tylenol PRN for pain.  Avoid opiates.  Tramadol dose increased due to uncontrolled pain this am  - Start fondaparinux 2.5mg SQ BID for Hip DVT ppx after discussion with Pharmacy      # Chronic thrombocytopenia  - Peripheral smear shows counts closer to 100,000 with no schistocytes when labs showed plts count of 50,000. No clinical suspicion or lab abnormalities for MAHA   - Unlikely cirrhosis given normal INR. Will hold off on RUQ US for now  - Could be from chronic alcohol use (reportedly 2 shots of bourbon nightly for a long time)  - HIV NR, acute hep panel neg      # Alcohol withdrawal: resolved  # Hallucinations  - UA neg for infection. No respiratory symptoms to suspect respiratory infection. WBC normal with no fevers  - Medications reviewed   - CIWA DCed  - Thiamine repletement      # Macrocytic anemia  # Anemia of chronic disease  - Baseling Hgb ~10  - B12 level, folate level, TSH all normal  - Continue home B6, B12 and thiamine replacements  - Started oral iron, added vitamin C to help absorption       # Mood disorder  - Continue Sertraline    Patient appetite is poor will add boost supplements tid    DVT ppx: Fondaparinux  I expect patient to be discharged in: 1 days pending insurance approval             Assessment & Plan      Plan of care and goals reviewed with patient  and staff.      I discussed the patient's findings and my recommendations with patient.     Lynnette Harvey, APRN 07/25/17 1:14 PM

## 2017-07-25 NOTE — PROGRESS NOTES
Continued Stay Note  [facility]     Patient Name: Elizabeth Oliveira  MRN: [id number]  Today's Date: 7/25/2017    Admit Date: 7/17/2017          Discharge Plan       07/25/17 4471    Case Management/Social Work Plan    Plan [facility]    Patient/Family In Agreement With Plan yes    Additional Comments Per Marine at [facility], they have received insurance approval and are able to accept today if medically ready. EMS transport scheduled for 1500. Nurse to call report to [phone], dc summary to be faxed to [phone]. APRN and patient notified. CM will follow.               Discharge Codes     None        Expected Discharge Date and Time     Expected Discharge Date Expected Discharge Time    Jul 28, 2017             Margie Calderon RN

## 2017-07-25 NOTE — DISCHARGE SUMMARY
[facility] Medicine Services  DISCHARGE SUMMARY       Date of Admission: 7/17/2017  Date of Discharge:  7/25/2017  Primary Care Physician: Eladio Ferrell MD (Inactive)  Consulting Physician(s)     Provider Relationship    Jamaal Bailey MD Consulting Physician          Discharge Diagnoses:  Active Hospital Problems (** Indicates Principal Problem)    Diagnosis Date Noted   • **Closed fracture of right femur [S72.91XA] 07/17/2017   • Fall [W19.XXXA] 07/17/2017   • Arthritis [M19.90] 07/17/2017   • Thrombocytopenia [D69.6] 07/17/2017      Resolved Hospital Problems    Diagnosis Date Noted Date Resolved   No resolved problems to display.       Presenting Problem/History of Present Illness  Fall, initial encounter [W19.XXXA]     History of Present Illness on Day of Discharge: Patient is resting in bed in NAD. She states she is feeling better with the increase in pain medication. She is tolerating diet without any n/v/d.  She is ready for rehab today.     Hospital Course  Patient is a 87 y.o. female with a PMH of depression , hip surgery and knee surgery. Patient presented to [facility] ED with complaints of right hip pain after she attempted to sit on a stool and missed. She tried catching herself with her right hand but was unsuccessful and also bruised her Right middle finger. She did not hit her head or experience any loss of consciousness.    In ED xray reveals a fracture of the proximal Right femur with multiple fracture fragments; no disruption of hardware. 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     Patient was admitted to hospital medicine for further care and orthopedics were consulted. Patient had a peripheral pain blocked placed for pain and it has been removed and patient is tolerating po pain medication.    Orthopedics did not feel that surgery was recommended at this time. They will use conservative treatment. They  "felt it was a Aguirre A minimally displaced periprosthetic intertrochanteric hip fracture surrounding a well-functioning press-fit stem. They recommended  Mobilization  with physical therapy.    Patient had some anemia and was placed on oral iron and vitamin C. Hemoglobin has remained stable with no overt signs of bleeding. Platelets were low on admission and have increased and remained stable while in hospital.     Patient will be discharged to rehab today. She will need to see a facility provider within 1-2 days. She will need to follow up with PCP within one week of d/c from rehab. She will follow up with Orthopedics in 2 weeks with xrays. She will need a CBC in two days to monitor platelet count. We will continue DVT prophylaxis until follow up with orthopedics and will defer to them if patient needs to continue medication. Will defer to PCP about continuation of gabapentin medication has been stopped while in hospital.     Procedures Performed         Consults:   Consults     Date and Time Order Name Status Description    7/17/2017 2210 Inpatient Consult to Orthopedic Surgery Completed           Pertinent Test Results:  Lab Results (last 24 hours)     Procedure Component Value Units Date/Time    Ova & Parasite Examination [[id number]] Collected:  07/21/17 1235    Specimen:  Stool from Per Rectum Updated:  07/24/17 1418     Ova + Parasite Exam No ova or parasites seen on concentrated smear     Trichrome Stain No ova or parasites seen on trichrome stain        Imaging Results (last 24 hours)     ** No results found for the last 24 hours. **          Condition on Discharge:  Stable     Physical Exam on Discharge:/52  Pulse 74  Temp 97.2 °F (36.2 °C)  Resp 16  Ht 62\" (157.5 cm)  Wt 113 lb (51.3 kg)  SpO2 96%  BMI 20.67 kg/m2  Physical Exam   Constitutional: She is oriented to person, place, and time. She appears well-developed and well-nourished. No distress.   HENT:   Head: Normocephalic.   Eyes: " Pupils are equal, round, and reactive to light.   Neck: Normal range of motion.   Cardiovascular: Normal rate, regular rhythm, normal heart sounds and intact distal pulses.  Exam reveals no friction rub.    No murmur heard.  Pulmonary/Chest: Effort normal and breath sounds normal. No stridor. No respiratory distress. She has no wheezes. She has no rales.   Abdominal: Soft. Bowel sounds are normal. She exhibits no distension. There is no tenderness.   Musculoskeletal: Normal range of motion. She exhibits no edema.   Neurological: She is alert and oriented to person, place, and time.   Skin: Skin is warm and dry. She is not diaphoretic. There is pallor.   Psychiatric: She has a normal mood and affect. Her behavior is normal.   Vitals reviewed.        Discharge Disposition  Rehab Facility or Unit (DC - External)    Discharge Medications   Elizabeth Oliveira   Home Medication Instructions LAURENCE:[id number]    Printed on:07/25/17 1410   Medication Information                      acetaminophen (TYLENOL) 325 MG tablet  Take 2 tablets by mouth Every 6 (Six) Hours As Needed for Mild Pain (1-3).             albuterol (PROAIR HFA) 108 (90 BASE) MCG/ACT inhaler  Inhale 2 puffs Every 4 (Four) Hours As Needed for Wheezing.             cholecalciferol (VITAMIN D3) 1000 UNITS tablet  Take 1,000 Units by mouth Daily.             Cyanocobalamin 1000 MCG capsule  Take 1 capsule by mouth Daily.             ferrous sulfate 325 (65 FE) MG tablet  Take 1 tablet by mouth Daily With Breakfast.             fondaparinux (ARIXTRA) 2.5 MG/0.5ML solution injection  Inject 0.5 mL under the skin Daily for 14 days.             Multiple Vitamins-Minerals (CENTRUM SILVER PO)  Take 1 tablet by mouth Daily.             Pyridoxine HCl (VITAMIN B6) 200 MG tablet  Take 1 tablet by mouth Daily.             sertraline (ZOLOFT) 50 MG tablet  Take 50 mg by mouth Daily.             thiamine (VITAMIN B1) 100 MG tablet  Take 1 tablet by mouth Daily.              traMADol (ULTRAM) 50 MG tablet  Take 1 tablet by mouth Every 4 (Four) Hours As Needed for Severe Pain  (7-10).             vitamin C (VITAMIN C) 500 MG tablet  Take 1 tablet by mouth Daily.                 Discharge Diet:   Diet Instructions     Diet: Regular; Thin Liquids, No Restrictions       Discharge Diet:  Regular   Fluid Consistency:  Thin Liquids, No Restrictions   Boost supplements tid                 Discharge Care Plan / Instructions:    Activity at Discharge:   Activity Instructions     Activity as Tolerated                     Follow-up Appointments  No future appointments.  Additional Instructions for the Follow-ups that You Need to Schedule     Discharge Follow-Up With Specified Provider    As directed    To:  Dr. Bailey orthopedics   Follow Up:  2 Weeks       Discharge Follow-up with PCP    As directed    Follow Up Details:  PCP within one week of d/c from rehab                 Test Results Pending at Discharge       CAMPBELL Macdonald 07/25/17 2:10 PM    Time: Discharge 45 min    Please note that portions of this note may have been completed with a voice recognition program. Efforts were made to edit the dictations, but occasionally words are mistranscribed.

## 2017-07-25 NOTE — PLAN OF CARE
Problem: Patient Care Overview (Adult)  Goal: Plan of Care Review  Outcome: Ongoing (interventions implemented as appropriate)    07/25/17 0358   Coping/Psychosocial Response Interventions   Plan Of Care Reviewed With patient   Patient Care Overview   Progress progress toward functional goals as expected   Outcome Evaluation   Outcome Summary/Follow up Plan Pt c/o R leg/hip pain during the 7p shift - medicated as ordered/appropriate. Does well with using bedpain. Pt calm, cooperative. VSS         Problem: Fractured Hip (Adult)  Goal: Signs and Symptoms of Listed Potential Problems Will be Absent or Manageable (Fractured Hip)  Outcome: Ongoing (interventions implemented as appropriate)    Problem: Fall Risk (Adult)  Goal: Identify Related Risk Factors and Signs and Symptoms  Outcome: Ongoing (interventions implemented as appropriate)  Goal: Absence of Falls  Outcome: Ongoing (interventions implemented as appropriate)    Problem: Pressure Ulcer Risk (Donal Scale) (Adult,Obstetrics,Pediatric)  Goal: Identify Related Risk Factors and Signs and Symptoms  Outcome: Ongoing (interventions implemented as appropriate)  Goal: Skin Integrity  Outcome: Ongoing (interventions implemented as appropriate)

## 2017-07-26 NOTE — THERAPY DISCHARGE NOTE
Acute Care - Occupational Therapy Discharge Summary  [facility]     Patient Name: Elizabeth Oliveira  : 1929  MRN: [id number]    Today's Date: 2017  Onset of Illness/Injury or Date of Surgery Date: 17    Date of Referral to OT: 17  Referring Physician: Chanelle Tolbert MD      Admit Date: 2017        OT Recommendation and Plan    Visit Dx:    ICD-10-CM ICD-9-CM   1. Fall, initial encounter W19.XXXA E888.9   2. 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 Impaired functional mobility, balance, gait, and endurance Z74.09 V49.89   6. Impaired mobility and ADLs Z74.09 799.89                     OT Goals       17 1624 17 1130 17 1422    Bed Mobility OT LTG    Bed Mobility OT LTG, Date Established   17  -MM    Bed Mobility OT LTG, Time to Achieve   2 wks  -MM    Bed Mobility OT LTG, Activity Type   all bed mobility  -MM    Bed Mobility OT LTG, [place] Level   minimum assist (75% patient effort)  -MM    Bed Mobility OT LTG, Outcome goal ongoing  -AR goal ongoing   Max A to roll L with recliner in supine position  -TA     Transfer Training OT LTG    Transfer Training OT LTG, Date Established   17  -MM    Transfer Training OT LTG, Time to Achieve   2 wks  -MM    Transfer Training OT LTG, Activity Type   sit to stand/stand to sit;bed to chair /chair to bed;toilet  -MM    Transfer Training OT LTG, [place] Level   minimum assist (75% patient effort)  -MM    Transfer Training OT LTG, Assist Device   walker, rolling  -MM    Transfer Training OT LTG, Outcome goal ongoing  -AR goal ongoing  -TA     Patient Education OT LTG    Patient Education OT LTG, Date Established   17  -MM    Patient Education OT LTG, Time to Achieve   2 wks  -MM    Patient Education OT LTG,  Education Type   HEP;precautions per surgeon;positioning;home safety;adaptive equipment mgmt  -MM    Patient Education OT LTG, Education Understanding   demonstrates adequately  -MM    Patient Education OT LTG Outcome goal ongoing  -AR goal ongoing   Progressing with HEP, surgical precautions/TDWB.  -TA     LB Dressing OT LTG    LB Dressing Goal OT LTG, Date Established   07/19/17  -MM    LB Dressing Goal OT LTG, Time to Achieve   2 wks  -MM    LB Dressing Goal OT LTG, Ideal Level   minimum assist (75% patient effort)  -MM    LB Dressing Goal OT LTG, Adaptive Equipment   --   AE PRN  -MM    LB Dressing Goal OT LTG, Outcome goal ongoing  -AR goal ongoing  -TA       User Key  (r) = Recorded By, (t) = Taken By, (c) = Cosigned By    Initials Name Provider Type    MM Tori Harrison, OT Occupational Therapist    TOSIN Estrada, OT Occupational Therapist    DEEP Suárez, OT Occupational Therapist                Outcome Measures       07/24/17 1530 07/24/17 1515       How much help from another person do you currently need...    Turning from your back to your side while in flat bed without using bedrails?  2  -LM     Moving from lying on back to sitting on the side of a flat bed without bedrails?  2  -LM     Moving to and from a bed to a chair (including a wheelchair)?  2  -LM     Standing up from a chair using your arms (e.g., wheelchair, bedside chair)?  2  -LM     Climbing 3-5 steps with a railing?  1  -LM     To walk in hospital room?  2  -LM     AM-PAC 6 Clicks Score  11  -LM     How much help from another is currently needed...    Putting on and taking off regular lower body clothing? 1  -AR      Bathing (including washing, rinsing, and drying) 2  -AR      Toileting (which includes using toilet bed pan or urinal) 2  -AR      Putting on and taking off regular upper body clothing 3  -AR      Taking care of personal grooming (such as brushing teeth) 3  -AR      Eating meals 3  -AR       Score 14  -AR      Functional Assessment    Outcome Measure Options AM-PAC 6 Clicks Daily Activity (OT)  -AR AM-PAC 6 Clicks Basic Mobility (PT)  -LM       User Key  (r) = Recorded By, (t) = Taken By, (c) = Cosigned By    Initials Name Provider Type    TOSIN Estrada, OT Occupational Therapist    LISA Mckeon, PT Physical Therapist              OT Discharge Summary  Reason for Discharge: Discharge from facility  Outcomes Achieved: Refer to plan of care for updates on goals achieved  Discharge Destination: Inpatient rehabilitation facility      Katie Griggs OT  7/26/2017

## 2017-07-26 NOTE — PROGRESS NOTES
Continued Stay Note  [facility]     Patient Name: Elizabeth Oliveira  MRN: [id number]  Today's Date: 7/26/2017    Admit Date: 7/17/2017          Discharge Plan     Consent obtained for the participation in the [facility] Transitions Program. Love Solares RN                Discharge Codes     None        Expected Discharge Date and Time     Expected Discharge Date Expected Discharge Time    Jul 25, 2017             Love Solares RN

## 2017-10-17 ENCOUNTER — LAB REQUISITION (OUTPATIENT)
Dept: LAB | Facility: HOSPITAL | Age: 82
End: 2017-10-17

## 2017-10-17 DIAGNOSIS — Z00.00 ROUTINE GENERAL MEDICAL EXAMINATION AT A HEALTH CARE FACILITY: ICD-10-CM

## 2017-10-17 LAB
ANION GAP SERPL CALCULATED.3IONS-SCNC: 6 MMOL/L (ref 3–11)
BASOPHILS # BLD AUTO: 0.01 10*3/MM3 (ref 0–0.2)
BASOPHILS NFR BLD AUTO: 0.1 % (ref 0–1)
BUN BLD-MCNC: 18 MG/DL (ref 9–23)
BUN/CREAT SERPL: 30 (ref 7–25)
CALCIUM SPEC-SCNC: 8.6 MG/DL (ref 8.7–10.4)
CHLORIDE SERPL-SCNC: 95 MMOL/L (ref 99–109)
CO2 SERPL-SCNC: 34 MMOL/L (ref 20–31)
CREAT BLD-MCNC: 0.6 MG/DL (ref 0.6–1.3)
DEPRECATED RDW RBC AUTO: 58.6 FL (ref 37–54)
EOSINOPHIL # BLD AUTO: 0 10*3/MM3 (ref 0–0.3)
EOSINOPHIL NFR BLD AUTO: 0 % (ref 0–3)
ERYTHROCYTE [DISTWIDTH] IN BLOOD BY AUTOMATED COUNT: 16 % (ref 11.3–14.5)
GFR SERPL CREATININE-BSD FRML MDRD: 95 ML/MIN/1.73
GLUCOSE BLD-MCNC: 174 MG/DL (ref 70–100)
HCT VFR BLD AUTO: 33.7 % (ref 34.5–44)
HGB BLD-MCNC: 10.5 G/DL (ref 11.5–15.5)
IMM GRANULOCYTES # BLD: 0.02 10*3/MM3 (ref 0–0.03)
IMM GRANULOCYTES NFR BLD: 0.2 % (ref 0–0.6)
LYMPHOCYTES # BLD AUTO: 0.77 10*3/MM3 (ref 0.6–4.8)
LYMPHOCYTES NFR BLD AUTO: 9 % (ref 24–44)
MCH RBC QN AUTO: 31.3 PG (ref 27–31)
MCHC RBC AUTO-ENTMCNC: 31.2 G/DL (ref 32–36)
MCV RBC AUTO: 100.3 FL (ref 80–99)
MONOCYTES # BLD AUTO: 0.86 10*3/MM3 (ref 0–1)
MONOCYTES NFR BLD AUTO: 10.1 % (ref 0–12)
NEUTROPHILS # BLD AUTO: 6.87 10*3/MM3 (ref 1.5–8.3)
NEUTROPHILS NFR BLD AUTO: 80.6 % (ref 41–71)
PLATELET # BLD AUTO: 86 10*3/MM3 (ref 150–450)
PMV BLD AUTO: 10.9 FL (ref 6–12)
POTASSIUM BLD-SCNC: 4.4 MMOL/L (ref 3.5–5.5)
RBC # BLD AUTO: 3.36 10*6/MM3 (ref 3.89–5.14)
SODIUM BLD-SCNC: 135 MMOL/L (ref 132–146)
WBC NRBC COR # BLD: 8.53 10*3/MM3 (ref 3.5–10.8)

## 2017-10-17 PROCEDURE — 80048 BASIC METABOLIC PNL TOTAL CA: CPT

## 2017-10-17 PROCEDURE — 85025 COMPLETE CBC W/AUTO DIFF WBC: CPT

## 2017-11-28 ENCOUNTER — TRANSCRIBE ORDERS (OUTPATIENT)
Dept: NUTRITION | Facility: HOSPITAL | Age: 82
End: 2017-11-28

## 2017-11-28 DIAGNOSIS — E63.9 POOR NUTRITION: Primary | ICD-10-CM

## 2017-12-05 ENCOUNTER — TRANSCRIBE ORDERS (OUTPATIENT)
Dept: PHYSICAL THERAPY | Facility: CLINIC | Age: 82
End: 2017-12-05

## 2017-12-05 DIAGNOSIS — R29.898 MUSCULAR DECONDITIONING: Primary | ICD-10-CM

## 2017-12-12 ENCOUNTER — TRANSCRIBE ORDERS (OUTPATIENT)
Dept: PHYSICAL THERAPY | Facility: HOSPITAL | Age: 82
End: 2017-12-12

## 2017-12-12 ENCOUNTER — HOSPITAL ENCOUNTER (OUTPATIENT)
Dept: PHYSICAL THERAPY | Facility: HOSPITAL | Age: 82
Setting detail: THERAPIES SERIES
Discharge: HOME | End: 2017-12-12
Attending: FAMILY MEDICINE

## 2017-12-12 DIAGNOSIS — R29.898 MUSCULAR DECONDITIONING: ICD-10-CM

## 2017-12-12 DIAGNOSIS — Z74.09 IMPAIRED FUNCTIONAL MOBILITY, BALANCE, GAIT, AND ENDURANCE: Primary | ICD-10-CM

## 2017-12-12 PROCEDURE — 97161 PT EVAL LOW COMPLEX 20 MIN: CPT

## 2017-12-12 PROCEDURE — G8978 MOBILITY CURRENT STATUS: HCPCS

## 2017-12-12 PROCEDURE — G8979 MOBILITY GOAL STATUS: HCPCS

## 2017-12-12 NOTE — THERAPY EVALUATION
Outpatient Physical Therapy Ortho Initial Evaluation  [facility]     Patient Name: Elizabeth Oliveira  : 1929  MRN: [id number]  Today's Date: 2017      Visit Date: 2017    Patient Active Problem List   Diagnosis   • Fall   • Closed fracture of right femur   • Arthritis   • Thrombocytopenia        Past Medical History:   Diagnosis Date   • Depression         Past Surgical History:   Procedure Laterality Date   • HIP SURGERY     • KNEE SURGERY         Visit Dx:     ICD-10-CM ICD-9-CM   1. Muscular deconditioning R29.898 781.99             Patient History       17 1300          History    Chief Complaint Difficulty Walking;Difficulty with daily activities;Falls/history of falls;Muscle weakness  -      Date Current Problem(s) Began 17  -      Brief Description of Current Complaint Ms. Oliveira presents s/p JESSIKA revision .  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  She landed on her right hip.  Found to have right periprosthetic proximal femur fracture ().  Intially treated conservatively, but then required surgery.  She was discharged to subacute rehab following surgery (d/c'd ), and then had 3 weeks of home health P.T. . Some of history obtained from chart review and documentation patient brought with her today.  She has h/o previous revisions due to dislocation.     Lives alone in a condo, with 1 step in.  Had lift chair installed.  (7 steps down to to kitchen and living area, and 7 up to bedrooms and full bath).   Can walk in kitchen without A.D.  Lacks confidence to fully go without A.D.  Was not using A.D. prior to injury.  She was playing golf.  Has a lift for right shoe.    -      Previous treatment for THIS PROBLEM Rehabilitation   [place] [facility] [place] home with H.H. for 3 weeks  -      Occupation/sports/leisure activities retired ; has volunteered for [employer]  for 20 years; plays golf  -LF      Pain     Pain Location Hip  -LF      Pain at Present 0  -LF      Pain at Best 0  -LF      Pain at Worst 0  -LF      Pain Comments says she occasionally has R medial knee pain  -LF      Difficulties with ADL's? has someone coming 3 days a week to help with laundry, general housecleaning, cooking, has not returned to driving  -LF      Fall Risk Assessment    Any falls in the past year: Yes  -LF      Number of falls reported in the last 12 months 1  -LF      Factors that contributed to the fall: Lost balance  -LF      Daily Activities    Primary Language English  -LF      Are you able to read Yes  -LF      Are you able to write Yes  -LF      How does patient learn best? Listening  -LF      Barriers to learning --   wears glasses  -LF      Pt Participated in POC and Goals Yes  -LF        User Key  (r) = Recorded By, (t) = Taken By, (c) = Cosigned By    Initials Name Provider Type    LF Lauren Gastelum, PT Physical Therapist                PT Ortho       12/12/17 1300    Precautions and Contraindications    Precautions/Limitations hip precautions- right  -LF    Posture/Observations    Posture/Observations Comments ambulates with RWx, mild forward flexed posture  -LF    MMT (Manual Muscle Testing)    General MMT Assessment lower extremity strength deficits identified  -LF    Left Hip    Hip Flexion Gross Movement (4+/5) good plus  -LF    Hip ABduction Gross Movement (4+/5) good plus  -LF    Hip ADduction Gross Movement (4+/5) good plus  -LF    Right Hip    Hip Flexion Gross Movement (4-/5) good minus  -LF    Hip ABduction Gross Movement (4-/5) good minus  -LF    Hip ADduction Gross Movement (5/5) normal  -LF    Lower Extremity    Lower Ext Manual Muscle Testing left hip strength deficit;right hip strength deficit;left knee strength deficit;right knee strength deficit;left ankle strength deficit;right ankle strength deficit  -LF    Left Knee    Knee Extension Gross Movement (5/5) normal   -LF    Knee Flexion Gross Movement (4+/5) good plus  -LF    Right Knee    Knee Extension Gross Movement (4+/5) good plus  -LF    Knee Flexion Gross Movement (4+/5) good plus  -LF    Left Ankle/Foot    Ankle PF Gross Movement (5/5) normal  -LF    Ankle Dorsiflexion Gross Movement (4+/5) good plus  -LF    Right Ankle/Foot    Ankle PF Gross Movement (5/5) normal  -LF    Ankle Dorsiflexion Gross Movement (4+/5) good plus  -LF      User Key  (r) = Recorded By, (t) = Taken By, (c) = Cosigned By    Initials Name Provider Type    LF Lauren Gastelum, PT Physical Therapist        Therapy Education  Education Details: Patient does not want to return for follow-up until after new Year.  Instructed to resume HEP provided by home health as she says she had not been consistent with this recently           PT OP Goals       12/12/17 1300       PT Short Term Goals    STG Date to Achieve 01/09/18  -LF     STG 1 MÉNDEZ score improved by 10 points  -LF     STG 2 Patient independent and complaint with HEP for improved strength and mobility  -LF     STG 3 Patient able to complete 10 reps sit<>stand 30 seconds test to demonstrate improved strength.  -LF     Long Term Goals    LTG Date to Achieve 02/06/18  -LF     LTG 1 MÉNDEZ improved >/= 45 for improved functional mobility and decreased fall risk  -LF     LTG 2 TUG score improved to </= 10 seconds without A.D. for improved functional mobility and decreased fall risk  -LF     LTG 3 LEFS score improved by 10 points  -LF     Time Calculation    PT Goal Re-Cert Due Date 03/12/18  -LF       User Key  (r) = Recorded By, (t) = Taken By, (c) = Cosigned By    Initials Name Provider Type    LF Lauren Gastelum, PT Physical Therapist                PT Assessment/Plan       12/12/17 1300       PT Assessment    Functional Limitations Impaired gait;Limitation in home management;Limitations in community activities;Performance in leisure activities;Performance in self-care ADL;Performance in sport  activities  -LF     Impairments Balance;Endurance;Gait;Impaired flexibility;Pain;Muscle strength  -LF     Assessment Comments Ms. Oliveira presents s/p JESSIKA revision with decrease strength, balance, gait, activity tolerance.  She required several rests during exam today.  She will benefit from continued treatment to address deficits for improved functional mobility and decreased fall risk  -     Please refer to paper survey for additional self-reported information Yes  -LF     Rehab Potential Good  -LF     Patient/caregiver participated in establishment of treatment plan and goals Yes  -LF     Patient would benefit from skilled therapy intervention Yes  -LF     PT Plan    PT Frequency 2x/week  -LF     Predicted Duration of Therapy Intervention (days/wks) 8 weeks  -LF     Planned CPT's? PT EVAL LOW COMPLEXITY: 18000;PT THER PROC EA 15 MIN: 03131;PT GAIT TRAINING EA 15 MIN: 04812;PT NEUROMUSC RE-EDUCATION EA 15 MIN: 51723;PT HOT OR COLD PACK TREAT MCARE  -LF     PT Plan Comments will cont. 2x/week with focus on ther ex to improve LE strength, balance, and gait.  She does not want to start until after the holidays and is scheduled for follow-up 1st week of January  -       User Key  (r) = Recorded By, (t) = Taken By, (c) = Cosigned By    Initials Name Provider Type     Lauren Gastelum, PT Physical Therapist               Outcome Measure Options: Lower Extremity Functional Scale (LEFS), Timed Up and Go (TUG), 2 Minute Walk Test, 30 Second Chair Stand Test, Williamson Balance  30 Second Chair Stand Test  30 Second Chair Stand Test: 8 (using UE's)  Williamson Balance Scale  Sitting to Standing: able to stand independently using hands  Standing Unsupported: able to stand safely for 2 minutes  Sitting with Back Unsupported but Feet Supported on Floor or on Stool: able to sit safely and securely for 2 minutes  Standing to Sitting: controls descent by using hands  Transfers: able to transfer safely definite need of hands  Standing  Unsupported with Eyes Closed: able to stand 10 seconds with supervision  Standing Unsupported with Feet Together: able to place feet together independently and stand 1 minute safely  Reaching Forward with Outstretched Arm While Standing: can reach forward 5 cm (2 inches)   Object From the Floor From a Standing Position: unable to try/needs assist to keep from losing balance or falling  Turning to Look Behind Over Left and Right Shoulders While Standing: looks behind one side only other side shows less weight shift  Turn 360 Degrees: needs close supervision or verbal cuing  Place Alternate Foot on Step or Stool While Standing Unsupported: able to complete > 2 steps needs minimal assist  Standing Unsupported with One Foot in Front: loses balance while stepping or standing  Standing on One Leg: tries to lift leg unable to hold 3 seconds but remains standing independently  Williamson Total Score: 32  Lower Extremity Functional Index  Any of your usual work, housework or school activities: Moderate difficulty  Your usual hobbies, recreational or sporting activities: Extreme difficulty or unable to perform activity  Getting into or out of the bath: Moderate difficulty  Walking between rooms: A little bit of difficulty  Putting on your shoes or socks: Moderate difficulty  Squatting: Extreme difficulty or unable to perform activity  Lifting an object, like a bag of groceries from the floor: Moderate difficulty  Performing light activities around your home: Moderate difficulty  Performing heavy activities around your home: Quite a bit of difficulty  Getting into or out of a car: Moderate difficulty  Walking 2 blocks: Extreme difficulty or unable to perform activity  Walking a mile: Extreme difficulty or unable to perform activity  Going up or down 10 stairs (about 1 flight of stairs): Extreme difficulty or unable to perform activity  Standing for 1 hour: Extreme difficulty or unable to perform activity  Sitting for 1 hour:  No difficulty  Running on even ground: Extreme difficulty or unable to perform activity  Running on uneven ground: Extreme difficulty or unable to perform activity  Making sharp turns while running fast: Extreme difficulty or unable to perform activity  Hopping: Extreme difficulty or unable to perform activity  Rolling over in bed: Moderate difficulty  Total: 22  Timed Up and Go (TUG)  TUG Test 1: 15 seconds (using RWx)  TUG Test 2: 13 seconds (w/o A.D., w/ CGA of P.T.)      Time Calculation:   Start Time: 1250     Therapy Charges for Today     Code Description Service Date Service Provider Modifiers Qty    23362315215 HC PT MOBILITY CURRENT 12/12/2017 Lauren Gastelum, PT GP, CK 1    93334992107 HC PT MOBILITY PROJECTED 12/12/2017 Lauren Gastelum, PT GP, CJ 1    43793301323 HC PT EVAL LOW COMPLEXITY 4 12/12/2017 Lauren Gastelum, PT GP 1          PT G-Rosemary  PT Professional Judgement Used?: Yes  Outcome Measure Options: Lower Extremity Functional Scale (LEFS), Timed Up and Go (TUG), 2 Minute Walk Test, 30 Second Chair Stand Test, Williamson Balance  Functional Limitation: Mobility: Walking and moving around  Mobility: Walking and Moving Around Current Status (): At least 40 percent but less than 60 percent impaired, limited or restricted  Mobility: Walking and Moving Around Goal Status (): At least 20 percent but less than 40 percent impaired, limited or restricted         Lauren Gastelum, PT  12/12/2017

## 2018-01-02 ENCOUNTER — DOCUMENTATION (OUTPATIENT)
Dept: PHYSICAL THERAPY | Facility: HOSPITAL | Age: 83
End: 2018-01-02

## 2018-01-02 DIAGNOSIS — R29.898 MUSCULAR DECONDITIONING: Primary | ICD-10-CM

## 2018-01-02 DIAGNOSIS — Z74.09 IMPAIRED FUNCTIONAL MOBILITY, BALANCE, GAIT, AND ENDURANCE: ICD-10-CM

## 2018-01-02 NOTE — THERAPY DISCHARGE NOTE
Outpatient Physical Therapy Discharge Summary         Patient Name: Elizabeth Oliveira  : 1929  MRN: [id number]    Today's Date: 2018    Visit Dx:    ICD-10-CM ICD-9-CM   1. Muscular deconditioning R29.898 781.99   2. Impaired functional mobility, balance, gait, and endurance Z74.09 V49.89             PT OP Goals       18 0800       PT Short Term Goals    STG Date to Achieve 18  -MM     STG 1 MÉNDEZ score improved by 10 points  -MM     STG 1 Progress Not Met  -MM     STG 2 Patient independent and complaint with HEP for improved strength and mobility  -MM     STG 2 Progress Not Met  -MM     STG 3 Patient able to complete 10 reps sit<>stand 30 seconds test to demonstrate improved strength.  -MM     STG 3 Progress Not Met  -MM     Long Term Goals    LTG Date to Achieve 18  -MM     LTG 1 MÉNDEZ improved >/= 45 for improved functional mobility and decreased fall risk  -MM     LTG 1 Progress Not Met  -MM     LTG 2 TUG score improved to </= 10 seconds without A.D. for improved functional mobility and decreased fall risk  -MM     LTG 2 Progress Not Met  -MM     LTG 3 LEFS score improved by 10 points  -MM     LTG 3 Progress Not Met  -MM       User Key  (r) = Recorded By, (t) = Taken By, (c) = Cosigned By    Initials Name Provider Type    MM Ronaldo Tapia, PT Physical Therapist          OP PT Discharge Summary  Date of Discharge: 18  Reason for Discharge: other (comment) (client called to cancel appointments because she has started going to see another PT. )  Outcomes Achieved: Refer to plan of care for updates on goals achieved  Discharge Destination: Home without follow-up              Ronaldo Tapia, PT  2018

## 2018-03-07 ENCOUNTER — HOSPITAL ENCOUNTER (OUTPATIENT)
Dept: GENERAL RADIOLOGY | Facility: HOSPITAL | Age: 83
Discharge: HOME | End: 2018-03-07
Attending: FAMILY MEDICINE | Admitting: FAMILY MEDICINE

## 2018-03-07 ENCOUNTER — TRANSCRIBE ORDERS (OUTPATIENT)
Dept: ADMINISTRATIVE | Facility: HOSPITAL | Age: 83
End: 2018-03-07

## 2018-03-07 DIAGNOSIS — J44.9 CHRONIC OBSTRUCTIVE PULMONARY DISEASE, UNSPECIFIED COPD TYPE (HCC): Primary | ICD-10-CM

## 2018-03-07 PROCEDURE — 71046 X-RAY EXAM CHEST 2 VIEWS: CPT

## 2018-03-09 ENCOUNTER — TRANSCRIBE ORDERS (OUTPATIENT)
Dept: ADMINISTRATIVE | Facility: HOSPITAL | Age: 83
End: 2018-03-09

## 2018-03-09 ENCOUNTER — HOSPITAL ENCOUNTER (OUTPATIENT)
Dept: GENERAL RADIOLOGY | Facility: HOSPITAL | Age: 83
Discharge: HOME | End: 2018-03-09
Attending: FAMILY MEDICINE | Admitting: FAMILY MEDICINE

## 2018-03-09 DIAGNOSIS — R93.89 ABNORMAL CHEST X-RAY: Primary | ICD-10-CM

## 2018-03-09 DIAGNOSIS — R93.89 ABNORMAL CHEST X-RAY: ICD-10-CM

## 2018-03-09 PROCEDURE — 71046 X-RAY EXAM CHEST 2 VIEWS: CPT

## 2018-04-23 ENCOUNTER — OFFICE VISIT (OUTPATIENT)
Dept: PULMONOLOGY | Facility: CLINIC | Age: 83
End: 2018-04-23

## 2018-04-23 VITALS
SYSTOLIC BLOOD PRESSURE: 120 MMHG | TEMPERATURE: 98.4 F | WEIGHT: 112 LBS | HEIGHT: 61 IN | OXYGEN SATURATION: 96 % | BODY MASS INDEX: 21.14 KG/M2 | DIASTOLIC BLOOD PRESSURE: 72 MMHG | HEART RATE: 72 BPM

## 2018-04-23 DIAGNOSIS — R06.02 SOB (SHORTNESS OF BREATH): Primary | ICD-10-CM

## 2018-04-23 DIAGNOSIS — R09.02 OXYGEN DESATURATION: ICD-10-CM

## 2018-04-23 DIAGNOSIS — J43.2 CENTRILOBULAR EMPHYSEMA (HCC): ICD-10-CM

## 2018-04-23 DIAGNOSIS — R06.09 DOE (DYSPNEA ON EXERTION): ICD-10-CM

## 2018-04-23 PROCEDURE — 94375 RESPIRATORY FLOW VOLUME LOOP: CPT | Performed by: INTERNAL MEDICINE

## 2018-04-23 PROCEDURE — 94618 PULMONARY STRESS TESTING: CPT | Performed by: INTERNAL MEDICINE

## 2018-04-23 PROCEDURE — 99204 OFFICE O/P NEW MOD 45 MIN: CPT | Performed by: INTERNAL MEDICINE

## 2018-04-23 PROCEDURE — 94726 PLETHYSMOGRAPHY LUNG VOLUMES: CPT | Performed by: INTERNAL MEDICINE

## 2018-04-23 PROCEDURE — 94729 DIFFUSING CAPACITY: CPT | Performed by: INTERNAL MEDICINE

## 2018-04-23 RX ORDER — MELOXICAM 15 MG/1
15 TABLET ORAL DAILY
COMMUNITY
Start: 2018-02-20

## 2018-04-23 RX ORDER — GABAPENTIN 300 MG/1
300 CAPSULE ORAL 2 TIMES DAILY
COMMUNITY
Start: 2018-02-20

## 2018-04-23 NOTE — PROGRESS NOTES
Elizabeth Oliveira is a 88 y.o. female here for evaluation of   Chief Complaint   Patient presents with   • COPD       Problem list:  1. Moderate to severe emphysema  2. Exercise-induced desaturation  3. Dyspnea on exertion  4. Myelodysplastic syndrome, thrombocytopenia, chronic anemia  5. Lumbosacral spondylosis  6. Dyslipidemia  7. Irritable bowel syndrome  8. Osteoarthritis  9. Macular degeneration  10. Depression  11. Total hip replacement, right 2003, left 2005  12. Right femur fracture with a reconstruction July 17, 2017  13. Left total knee replacement 2006, calcium removal 1 month later  14. Right hip dislocation April 2015  15. Bilateral cataract surgery  16. Former tobacco use, quit 2010; [id number]    History of Present Illness    88-year-old woman who fell last summer, dislocating and fracturing her right femur. She had a previous right hip replacement. She required a reconstructive surgery and was in rehabilitation at [facility] for almost 4 months. It left her with her right leg shorter than her left requiring an elevated she will. She was started on supplemental oxygen during this rehabilitation.. Currently she is wearing it at night. She is able to work out with her rehabilitation twice a week and denies wearing oxygen. She does feel short of air on exertion. She quit smoking cigarettes approximately 10 years ago after smoking for 60 years. She uses an inhaler on an as-needed basis and really seldom uses it. She denies a chronic cough or wheezing she just feels short of air and tired when she walks. She denies sharp chest pain with inspiration or hemoptysis. She has never had a blood clot. She is still driving. She uses an electric cart in the grocery store. She has a cleaning lady. She states that she can bathe and dress herself without dyspnea. She would very much like to come off of the supplemental oxygen at night. Her home medical equipment company is [employer]. He has not  been hospitalized for breathing difficulty. She has no history of coronary artery disease. She does have a chronically low platelets and a chronic anemia. Her last hemoglobin was 10.5. Her TSH is okay.    Review of Systems   Constitutional: Positive for activity change. Negative for appetite change and fever.   HENT: Positive for postnasal drip. Negative for congestion, sore throat and trouble swallowing.    Eyes: Positive for visual disturbance.   Respiratory: Positive for shortness of breath. Negative for cough and wheezing.    Cardiovascular: Negative for chest pain, palpitations and leg swelling.   Gastrointestinal: Negative for constipation and diarrhea.   Endocrine: Negative.    Genitourinary: Negative for dysuria.   Musculoskeletal: Positive for arthralgias and back pain.   Skin: Negative.    Allergic/Immunologic: Negative for environmental allergies.   Neurological: Negative for dizziness, tremors, weakness and numbness.        Poor balance   Hematological: Bruises/bleeds easily.   Psychiatric/Behavioral: Negative.          Current Outpatient Prescriptions:   •  acetaminophen (TYLENOL) 325 MG tablet, Take 2 tablets by mouth Every 6 (Six) Hours As Needed for Mild Pain (1-3)., Disp: , Rfl: 0  •  albuterol (PROAIR HFA) 108 (90 BASE) MCG/ACT inhaler, Inhale 2 puffs Every 4 (Four) Hours As Needed for Wheezing., Disp: , Rfl:   •  cholecalciferol (VITAMIN D3) 1000 UNITS tablet, Take 1,000 Units by mouth Daily., Disp: , Rfl:   •  Cyanocobalamin 1000 MCG capsule, Take 1 capsule by mouth Daily., Disp: , Rfl:   •  ferrous sulfate 325 (65 FE) MG tablet, Take 1 tablet by mouth Daily With Breakfast., Disp: , Rfl:   •  gabapentin (NEURONTIN) 300 MG capsule, Take 300 mg by mouth 2 (Two) Times a Day., Disp: , Rfl:   •  meloxicam (MOBIC) 15 MG tablet, , Disp: , Rfl:   •  Multiple Vitamins-Minerals (CENTRUM SILVER PO), Take 1 tablet by mouth Daily., Disp: , Rfl:   •  Pyridoxine HCl (VITAMIN B6) 200 MG tablet, Take 1 tablet by  "mouth Daily., Disp: , Rfl:   •  sertraline (ZOLOFT) 50 MG tablet, Take 50 mg by mouth Daily., Disp: , Rfl:   •  thiamine (VITAMIN B1) 100 MG tablet, Take 1 tablet by mouth Daily., Disp: , Rfl:   •  vitamin C (VITAMIN C) 500 MG tablet, Take 1 tablet by mouth Daily., Disp: , Rfl:   No current facility-administered medications for this visit.     Facility-Administered Medications Ordered in Other Visits:   •  bupivacaine PF (MARCAINE) 0.25 % injection, , , PRN, Monico Herbert, CRNA, 40 mL at 07/17/17 1625    Past Medical History:   Diagnosis Date   • COPD (chronic obstructive pulmonary disease)    • Depression    • Dyslipidemia    • Dyslipidemia    • IBS (irritable bowel syndrome) 2016   • Macular degeneration    • Myelodysplastic syndrome     anemia, thrombocytopemia   • Osteoarthritis    • Other spondylosis with radiculopathy, lumbar region      Past Surgical History:   Procedure Laterality Date   • CATARACT EXTRACTION, BILATERAL     • HIP SURGERY     • KNEE SURGERY     • REVISION OF TOTAL HIP FEMORAL Right 07/2017   • TOTAL HIP ARTHROPLASTY Right    • TOTAL HIP ARTHROPLASTY Left    • TOTAL KNEE ARTHROPLASTY Left     2006     Social History     Social History   • Marital status: Single     Occupational History   •       Social History Main Topics   • Smoking status: Former Smoker     Packs/day: 1.50     Years: 40.00     Types: Cigarettes     Quit date: 2010   • Smokeless tobacco: Never Used   • Alcohol use 4.2 oz/week     7 Shots of liquor per week      Comment: COCKTAIL EVERY NIGHT   • Drug use: No   • Sexual activity: Defer     Other Topics Concern   • Not on file     Social History Narrative    Lives in [facility], still driving. Sister nearby     Family History   Problem Relation Age of Onset   • Heart failure Mother    • Leukemia Father      Blood pressure 120/72, pulse 72, temperature 98.4 °F (36.9 °C), height 154.9 cm (61\"), weight 50.8 kg (112 lb), SpO2 96 %.    Physical Exam   Constitutional: She " appears well-developed.   HENT:   Head: Normocephalic and atraumatic.   Clear PND. No nasal polyps, no exudate   Eyes: Pupils are equal, round, and reactive to light. No scleral icterus.   Neck: No JVD present. No tracheal deviation present. No thyromegaly present.   Cardiovascular: Normal rate, regular rhythm, normal heart sounds and intact distal pulses.    No murmur heard.  Pulmonary/Chest: Effort normal and breath sounds normal. No respiratory distress. She has no wheezes.   Abdominal: Soft. Bowel sounds are normal. She exhibits no distension. There is no tenderness.   Musculoskeletal: She exhibits deformity. She exhibits no edema.   Right leg shorter, Heberden nodules   Lymphadenopathy:     She has no cervical adenopathy.   Neurological: She is alert.   Skin: Skin is warm and dry.   Psychiatric: She has a normal mood and affect.         PFTs:  FVC 1.86 L, 98%, FEV1 0.68 L, 49% ratio 36% consistent with severe obstruction. No response to bronchodilators. Total lung capacity 4.74 L, 103%, residual volume 2.88 L, 120%, mild hyperinflation. Diffusion capacity 7 or 40%, severely reduced    6 minute walk test, resting saturation 98%, pulse 82, blood pressure 120/72. After walking 3 minutes or approximately 300 feet her saturation decreased to 87% and 2 L was placed. Saturation improved to 93%. She walks an additional 300 feet and 3 minutes and saturation remained 89-90. She did not complain of dyspnea. She walked a total of 600 feet  Radiology:  FINDINGS: PA and lateral view of the chest with nipple markers in place  reveal no radiopaque density seen overlying the left lower lung field.  Cardiac and mediastinal silhouettes are within normal limits. The lung  fields are clear. No focal parenchymal opacification is present.  No  pleural effusion or pneumothorax. Degenerative change is seen within the  spine. Vascular calcification is seen within the thoracic aorta.         IMPRESSION:  Chronic changes seen within the  lung fields with no evidence  of acute parenchymal disease.     D:  03/09/2018     IMPRESSION:  1. Marked COPD and emphysema.  2. Dextroscoliosis and kyphosis of the dorsal spine with marked  hypertrophic dorsal osteoarthropathy.  3. No definite evidence of edema, free fluid or active disease.  4. A small nodular density is superimposed over the lower third of the  left chest which I suspect is a nipple shadow. Repeat with nipple  markers would be helpful. No other suspect or acute abnormalities are  seen superimposed upon chronic disease.     D:  03/07/2018  Lab:    Elizabeth was seen today for copd.    Diagnoses and all orders for this visit:    SOB (shortness of breath)  -     Pulmonary Function Test    Centrilobular emphysema    Oxygen desaturation, exercise    BAUMAN (dyspnea on exertion)        Discussion:    88-year-old woman, former smoker with exertional dyspnea. Pulmonary function tests reveal severe airways obstruction. Resting pulse oximetry is 98%. However she desaturates to 87% after walking for 3 minutes or 300 feet. Chest x-ray does reveal emphysematous changes with some hyperinflation and decreased lung markings. She has no purulent sputum or active wheezing on examination. I do suspect her obstructive lung disease is the primary reason for her exertional dyspnea. She was not interested in additional medications. I considered starting Anoro 1 puff daily or Stiolto 2 puffs daily.  However, she is not interested in additional medications. Therefore have encouraged her to use her albuterol inhaler every morning when she gets up and before any significant exertion. If she remains symptomatic then I would consider the other 2 options. According to her primary care records she received a Prevnar 13 and 2016, pneumococcal 23 vaccine in 2013.  She does require oxygen with walking any distance over 300 feet. She does not have signs or symptoms of pulmonary thromboembolic disease. Some of her exertional dyspnea  may be secondary to her anemia.     Oxygen 2-3 L nasal cannula with exertion over 300 feet  Albuterol inhaler 2 puffs before any significant exercise and each morning  Anoro 1 puff daily or Stiolto 2 fpuffs daily if BAUMAN does not improve  Follow-up in 6 months, sooner if difficulties arise    Minnie Garcia MD

## 2018-05-21 ENCOUNTER — TRANSCRIBE ORDERS (OUTPATIENT)
Dept: ADMINISTRATIVE | Facility: HOSPITAL | Age: 83
End: 2018-05-21

## 2018-05-21 ENCOUNTER — HOSPITAL ENCOUNTER (OUTPATIENT)
Dept: GENERAL RADIOLOGY | Facility: HOSPITAL | Age: 83
Discharge: HOME | End: 2018-05-21
Attending: FAMILY MEDICINE | Admitting: FAMILY MEDICINE

## 2018-05-21 DIAGNOSIS — M25.512 LEFT SHOULDER PAIN, UNSPECIFIED CHRONICITY: ICD-10-CM

## 2018-05-21 DIAGNOSIS — M25.512 LEFT SHOULDER PAIN, UNSPECIFIED CHRONICITY: Primary | ICD-10-CM

## 2018-05-21 PROCEDURE — 73030 X-RAY EXAM OF SHOULDER: CPT

## 2019-06-19 ENCOUNTER — OFFICE VISIT (OUTPATIENT)
Dept: PULMONOLOGY | Facility: CLINIC | Age: 84
End: 2019-06-19

## 2019-06-19 VITALS
BODY MASS INDEX: 19.45 KG/M2 | DIASTOLIC BLOOD PRESSURE: 70 MMHG | WEIGHT: 103 LBS | HEIGHT: 61 IN | HEART RATE: 75 BPM | SYSTOLIC BLOOD PRESSURE: 112 MMHG | TEMPERATURE: 97.6 F | OXYGEN SATURATION: 96 %

## 2019-06-19 DIAGNOSIS — J43.2 CENTRILOBULAR EMPHYSEMA (HCC): Primary | ICD-10-CM

## 2019-06-19 DIAGNOSIS — R06.09 DOE (DYSPNEA ON EXERTION): ICD-10-CM

## 2019-06-19 DIAGNOSIS — R09.02 OXYGEN DESATURATION: ICD-10-CM

## 2019-06-19 PROCEDURE — 94726 PLETHYSMOGRAPHY LUNG VOLUMES: CPT | Performed by: NURSE PRACTITIONER

## 2019-06-19 PROCEDURE — 94060 EVALUATION OF WHEEZING: CPT | Performed by: NURSE PRACTITIONER

## 2019-06-19 PROCEDURE — 99213 OFFICE O/P EST LOW 20 MIN: CPT | Performed by: NURSE PRACTITIONER

## 2019-06-19 PROCEDURE — 94729 DIFFUSING CAPACITY: CPT | Performed by: NURSE PRACTITIONER

## 2019-06-19 RX ORDER — SULFAMETHOXAZOLE AND TRIMETHOPRIM 800; 160 MG/1; MG/1
1 TABLET ORAL 2 TIMES DAILY
Refills: 0 | COMMUNITY
Start: 2019-06-10 | End: 2019-06-22 | Stop reason: HOSPADM

## 2019-06-19 RX ORDER — CLINDAMYCIN PHOSPHATE 10 MG/G
GEL TOPICAL
Refills: 0 | COMMUNITY
Start: 2019-06-10 | End: 2019-06-20

## 2019-06-19 RX ORDER — ALBUTEROL SULFATE 90 UG/1
4 AEROSOL, METERED RESPIRATORY (INHALATION) ONCE
Status: COMPLETED | OUTPATIENT
Start: 2019-06-19 | End: 2019-06-19

## 2019-06-19 RX ADMIN — ALBUTEROL SULFATE 4 PUFF: 90 AEROSOL, METERED RESPIRATORY (INHALATION) at 12:27

## 2019-06-19 NOTE — PROGRESS NOTES
[facility] Follow up    CHIEF COMPLAINT    Severe COPD    HISTORY OF PRESENT ILLNESS    Elizabeth Oliveira is a 89 y.o.female here today for follow-up of her COPD.  She was last seen in the office in April 2018 by Dr. Garcia.  She denies any respiratory illnesses or exacerbations since her last appointment.  At her last appointment she was supplied with some samples of Anoro aid Stiolto to try to see if it helped with her dyspnea on exertion.  She states she tried them but she does not remember if they helped or not.      She states she has some dyspnea on exertion but it is not major.  She takes frequent breaks and goes slowly and is able to do daily tasks.  She has a rescue inhaler that she uses on occasion for dyspnea on exertion.  She will use this at least one time per week.  She does not notice any worsening breathing issues since her last appointment.    She continues to wear 2 L nasal cannula at nighttime for nocturnal hypoxia.  She denies any sleeping difficulties.  She is curious if she can discontinue her oxygen.    She denies fever, chills, sputum production, hemoptysis, night sweats, weight loss, chest pain or palpitations.  She has some mild lower extremity edema.  She states that it is not worsened over the last year.  She denies any allergy or sinus difficulties.  She denies reflux symptoms.    She is up-to-date on her current vaccinations.    Patient Active Problem List   Diagnosis   • Fall   • Closed fracture of right femur (CMS/HCC)   • Arthritis   • Thrombocytopenia (CMS/HCC)   • Centrilobular emphysema (CMS/HCC)   • BAUMAN (dyspnea on exertion)   • Oxygen desaturation, exercise       Allergies   Allergen Reactions   • Cortisone        Current Outpatient Medications:   •  acetaminophen (TYLENOL) 325 MG tablet, Take 2 tablets by mouth Every 6 (Six) Hours As Needed for Mild Pain (1-3)., Disp: , Rfl: 0  •  albuterol (PROAIR HFA) 108 (90 BASE) MCG/ACT inhaler, Inhale 2 puffs Every 4 (Four)  Hours As Needed for Wheezing., Disp: , Rfl:   •  cholecalciferol (VITAMIN D3) 1000 UNITS tablet, Take 1,000 Units by mouth Daily., Disp: , Rfl:   •  clindamycin (CLINDAGEL) 1 % gel, , Disp: , Rfl: 0  •  Cyanocobalamin 1000 MCG capsule, Take 1 capsule by mouth Daily., Disp: , Rfl:   •  ferrous sulfate 325 (65 FE) MG tablet, Take 1 tablet by mouth Daily With Breakfast., Disp: , Rfl:   •  gabapentin (NEURONTIN) 300 MG capsule, Take 300 mg by mouth 2 (Two) Times a Day., Disp: , Rfl:   •  meloxicam (MOBIC) 15 MG tablet, , Disp: , Rfl:   •  Multiple Vitamins-Minerals (CENTRUM SILVER PO), Take 1 tablet by mouth Daily., Disp: , Rfl:   •  Pyridoxine HCl (VITAMIN B6) 200 MG tablet, Take 1 tablet by mouth Daily., Disp: , Rfl:   •  sertraline (ZOLOFT) 50 MG tablet, Take 50 mg by mouth Daily., Disp: , Rfl:   •  sulfamethoxazole-trimethoprim (BACTRIM DS,SEPTRA DS) 800-160 MG per tablet, , Disp: , Rfl: 0  •  thiamine (VITAMIN B1) 100 MG tablet, Take 1 tablet by mouth Daily., Disp: , Rfl:   •  vitamin C (VITAMIN C) 500 MG tablet, Take 1 tablet by mouth Daily., Disp: , Rfl:   No current facility-administered medications for this visit.     Facility-Administered Medications Ordered in Other Visits:   •  bupivacaine PF (MARCAINE) 0.25 % injection, , , PRN, Lanius, Monico, CRNA, 40 mL at 17 1625  MEDICATION LIST AND ALLERGIES REVIEWED.    Social History     Tobacco Use   • Smoking status: Former Smoker     Packs/day: 1.50     Years: 40.00     Pack years: 60.00     Types: Cigarettes     Last attempt to quit: 2010     Years since quittin.4   • Smokeless tobacco: Never Used   Substance Use Topics   • Alcohol use: Yes     Alcohol/week: 4.2 oz     Types: 7 Shots of liquor per week     Comment: COCKTAIL EVERY NIGHT   • Drug use: No       FAMILY AND SOCIAL HISTORY REVIEWED.    Review of Systems   Constitutional: Negative for activity change, appetite change, fatigue, fever and unexpected weight change.   HENT: Negative for  "congestion, postnasal drip, rhinorrhea, sinus pressure, sore throat and voice change.    Eyes: Negative for visual disturbance.   Respiratory: Positive for shortness of breath. Negative for cough, chest tightness and wheezing.    Cardiovascular: Negative for chest pain, palpitations and leg swelling.   Gastrointestinal: Negative for abdominal distention, abdominal pain, nausea and vomiting.   Endocrine: Negative for cold intolerance and heat intolerance.   Genitourinary: Negative for difficulty urinating and urgency.   Musculoskeletal: Negative for arthralgias, back pain and neck pain.   Skin: Negative for color change and pallor.   Allergic/Immunologic: Negative for environmental allergies and food allergies.   Neurological: Negative for dizziness, syncope, weakness and light-headedness.   Hematological: Negative for adenopathy. Does not bruise/bleed easily.   Psychiatric/Behavioral: Negative for agitation and behavioral problems.   .    /70   Pulse 75   Temp 97.6 °F (36.4 °C)   Ht 154.9 cm (61\")   Wt 46.7 kg (103 lb)   LMP  (LMP Unknown)   SpO2 96% Comment: resting, room air  Breastfeeding? No   BMI 19.46 kg/m²     Immunization History   Administered Date(s) Administered   • Pneumococcal Polysaccharide (PPSV23) 10/01/2014       Physical Exam   Constitutional: She is oriented to person, place, and time. She appears well-developed and well-nourished.   HENT:   Head: Normocephalic and atraumatic.   Eyes: Pupils are equal, round, and reactive to light.   Neck: Normal range of motion. Neck supple. No thyromegaly present.   Cardiovascular: Normal rate, regular rhythm, normal heart sounds and intact distal pulses. Exam reveals no gallop and no friction rub.   No murmur heard.  Pulmonary/Chest: Effort normal and breath sounds normal. No respiratory distress. She has no wheezes. She has no rales. She exhibits no tenderness.   Abdominal: Soft. Bowel sounds are normal. There is no tenderness.   Musculoskeletal: " Normal range of motion.   Lymphadenopathy:     She has no cervical adenopathy.   Neurological: She is alert and oriented to person, place, and time.   Skin: Skin is warm and dry. Capillary refill takes less than 2 seconds. She is not diaphoretic.   Psychiatric: She has a normal mood and affect. Her behavior is normal.   Nursing note and vitals reviewed.        RESULTS    PFTS in the office today, read by me.    Spirometry Interpretation 06/19/19:    1. Severe airway obstruction. (FEV1 35-49% pred.).  2. The maximum voluntary ventilation (MVV) is reduced.   3. No post bronchodilator response  4. TLC 4.13 102% predicted  5. Reduced DLCO at 53% predicted.    Chest PA/lateral: Reviewed by me in the office today, appears to have no acute pulmonary process, chronic lung changes similar when compared to previous exams, official report pending.    PROBLEM LIST    Problem List Items Addressed This Visit        Respiratory    Centrilobular emphysema (CMS/HCC) - Primary    Relevant Medications    albuterol sulfate HFA (PROVENTIL HFA;VENTOLIN HFA;PROAIR HFA) inhaler 4 puff (Completed)    Other Relevant Orders    XR Chest PA & Lateral    Pulmonary Function Test (Completed)    BAUMAN (dyspnea on exertion)       Other    Oxygen desaturation, exercise            DISCUSSION    Ms. Purdy was here for follow-up of her severe COPD.  We reviewed her PFTs in detail today and she continues to have severe obstruction.  They are slightly improved when compared to previous testing.  She did not want to start a maintenance therapy for her COPD.  She would like to continue her rescue inhaler as needed for dyspnea on exertion.      She will continue 2 L at nighttime.  I did advise her that this was helping her during her sleep and that she should continue it.  She is agreeable to use it every night.    She did not have any current complaints in the office today.  She seems to be doing well from a pulmonary standpoint.    She will follow-up in 1  year or sooner if her symptoms worsen.  I did advise her to call if she had any other questions or concerns.  I spent 15 minutes with the patient. I spent > 50% percent of this time counseling and discussing diagnosis, prognosis, diagnostic testing, evaluation, current status, treatment options and management.    Alisha SANDRA Mitchell, APRN  06/19/20191:13 PM  Electronically signed     Please note that portions of this note were completed with a voice recognition program. Efforts were made to edit the dictations, but occasionally words are mistranscribed.      CC: Azeem Merchant MD

## 2019-06-20 ENCOUNTER — HOSPITAL ENCOUNTER (INPATIENT)
Facility: HOSPITAL | Age: 84
LOS: 2 days | Discharge: HOME HEALTH | End: 2019-06-22
Attending: EMERGENCY MEDICINE | Admitting: INTERNAL MEDICINE

## 2019-06-20 ENCOUNTER — APPOINTMENT (OUTPATIENT)
Dept: GENERAL RADIOLOGY | Facility: HOSPITAL | Age: 84
End: 2019-06-20

## 2019-06-20 DIAGNOSIS — R06.09 DOE (DYSPNEA ON EXERTION): ICD-10-CM

## 2019-06-20 DIAGNOSIS — J44.9 COPD, SEVERE (HCC): ICD-10-CM

## 2019-06-20 DIAGNOSIS — J18.9 PNEUMONITIS: Primary | ICD-10-CM

## 2019-06-20 DIAGNOSIS — J96.21 ACUTE ON CHRONIC RESPIRATORY FAILURE WITH HYPOXIA (HCC): ICD-10-CM

## 2019-06-20 PROBLEM — D46.9 MYELODYSPLASTIC SYNDROME (HCC): Status: ACTIVE | Noted: 2019-06-20

## 2019-06-20 PROBLEM — J69.0 ASPIRATION PNEUMONITIS (HCC): Status: ACTIVE | Noted: 2019-06-20

## 2019-06-20 LAB
ALBUMIN SERPL-MCNC: 4.1 G/DL (ref 3.5–5.2)
ALBUMIN/GLOB SERPL: 1.8 G/DL
ALP SERPL-CCNC: 93 U/L (ref 39–117)
ALT SERPL W P-5'-P-CCNC: 19 U/L (ref 1–33)
ANION GAP SERPL CALCULATED.3IONS-SCNC: 12 MMOL/L
AST SERPL-CCNC: 27 U/L (ref 1–32)
BASOPHILS # BLD AUTO: 0.04 10*3/MM3 (ref 0–0.2)
BASOPHILS NFR BLD AUTO: 0.3 % (ref 0–1.5)
BILIRUB SERPL-MCNC: 0.4 MG/DL (ref 0.2–1.2)
BUN BLD-MCNC: 15 MG/DL (ref 8–23)
BUN/CREAT SERPL: 20.3 (ref 7–25)
CALCIUM SPEC-SCNC: 9.3 MG/DL (ref 8.6–10.5)
CHLORIDE SERPL-SCNC: 100 MMOL/L (ref 98–107)
CO2 SERPL-SCNC: 26 MMOL/L (ref 22–29)
CREAT BLD-MCNC: 0.74 MG/DL (ref 0.57–1)
DEPRECATED RDW RBC AUTO: 54.6 FL (ref 37–54)
EOSINOPHIL # BLD AUTO: 0.03 10*3/MM3 (ref 0–0.4)
EOSINOPHIL NFR BLD AUTO: 0.2 % (ref 0.3–6.2)
ERYTHROCYTE [DISTWIDTH] IN BLOOD BY AUTOMATED COUNT: 14.2 % (ref 12.3–15.4)
GFR SERPL CREATININE-BSD FRML MDRD: 74 ML/MIN/1.73
GLOBULIN UR ELPH-MCNC: 2.3 GM/DL
GLUCOSE BLD-MCNC: 138 MG/DL (ref 65–99)
HCT VFR BLD AUTO: 40.6 % (ref 34–46.6)
HGB BLD-MCNC: 12.9 G/DL (ref 12–15.9)
HOLD SPECIMEN: NORMAL
IMM GRANULOCYTES # BLD AUTO: 0.19 10*3/MM3 (ref 0–0.05)
IMM GRANULOCYTES NFR BLD AUTO: 1.3 % (ref 0–0.5)
LIPASE SERPL-CCNC: 21 U/L (ref 13–60)
LYMPHOCYTES # BLD AUTO: 0.5 10*3/MM3 (ref 0.7–3.1)
LYMPHOCYTES NFR BLD AUTO: 3.5 % (ref 19.6–45.3)
MCH RBC QN AUTO: 33.7 PG (ref 26.6–33)
MCHC RBC AUTO-ENTMCNC: 31.8 G/DL (ref 31.5–35.7)
MCV RBC AUTO: 106 FL (ref 79–97)
MONOCYTES # BLD AUTO: 0.81 10*3/MM3 (ref 0.1–0.9)
MONOCYTES NFR BLD AUTO: 5.7 % (ref 5–12)
NEUTROPHILS # BLD AUTO: 12.56 10*3/MM3 (ref 1.7–7)
NEUTROPHILS NFR BLD AUTO: 89 % (ref 42.7–76)
NRBC BLD AUTO-RTO: 0 /100 WBC (ref 0–0.2)
NT-PROBNP SERPL-MCNC: 1290 PG/ML (ref 5–1800)
PLAT MORPH BLD: NORMAL
PLATELET # BLD AUTO: 62 10*3/MM3 (ref 140–450)
PMV BLD AUTO: 9.9 FL (ref 6–12)
POTASSIUM BLD-SCNC: 5 MMOL/L (ref 3.5–5.2)
PROCALCITONIN SERPL-MCNC: 0.14 NG/ML (ref 0.1–0.25)
PROT SERPL-MCNC: 6.4 G/DL (ref 6–8.5)
RBC # BLD AUTO: 3.83 10*6/MM3 (ref 3.77–5.28)
RBC MORPH BLD: NORMAL
SODIUM BLD-SCNC: 138 MMOL/L (ref 136–145)
TROPONIN T SERPL-MCNC: <0.01 NG/ML (ref 0–0.03)
WBC MORPH BLD: NORMAL
WBC NRBC COR # BLD: 14.13 10*3/MM3 (ref 3.4–10.8)
WHOLE BLOOD HOLD SPECIMEN: NORMAL

## 2019-06-20 PROCEDURE — 80053 COMPREHEN METABOLIC PANEL: CPT | Performed by: EMERGENCY MEDICINE

## 2019-06-20 PROCEDURE — 83690 ASSAY OF LIPASE: CPT | Performed by: EMERGENCY MEDICINE

## 2019-06-20 PROCEDURE — 94640 AIRWAY INHALATION TREATMENT: CPT

## 2019-06-20 PROCEDURE — 85007 BL SMEAR W/DIFF WBC COUNT: CPT | Performed by: EMERGENCY MEDICINE

## 2019-06-20 PROCEDURE — 99285 EMERGENCY DEPT VISIT HI MDM: CPT

## 2019-06-20 PROCEDURE — 85025 COMPLETE CBC W/AUTO DIFF WBC: CPT | Performed by: EMERGENCY MEDICINE

## 2019-06-20 PROCEDURE — 84145 PROCALCITONIN (PCT): CPT | Performed by: INTERNAL MEDICINE

## 2019-06-20 PROCEDURE — 83880 ASSAY OF NATRIURETIC PEPTIDE: CPT | Performed by: EMERGENCY MEDICINE

## 2019-06-20 PROCEDURE — 94799 UNLISTED PULMONARY SVC/PX: CPT

## 2019-06-20 PROCEDURE — 71045 X-RAY EXAM CHEST 1 VIEW: CPT

## 2019-06-20 PROCEDURE — 93005 ELECTROCARDIOGRAM TRACING: CPT | Performed by: EMERGENCY MEDICINE

## 2019-06-20 PROCEDURE — 25010000002 PIPERACILLIN SOD-TAZOBACTAM PER 1 G: Performed by: EMERGENCY MEDICINE

## 2019-06-20 PROCEDURE — 84484 ASSAY OF TROPONIN QUANT: CPT | Performed by: EMERGENCY MEDICINE

## 2019-06-20 PROCEDURE — 99223 1ST HOSP IP/OBS HIGH 75: CPT | Performed by: INTERNAL MEDICINE

## 2019-06-20 RX ORDER — ALBUTEROL SULFATE 90 UG/1
2 AEROSOL, METERED RESPIRATORY (INHALATION) EVERY 6 HOURS PRN
COMMUNITY

## 2019-06-20 RX ORDER — GABAPENTIN 300 MG/1
300 CAPSULE ORAL 2 TIMES DAILY
Status: DISCONTINUED | OUTPATIENT
Start: 2019-06-20 | End: 2019-06-22 | Stop reason: HOSPADM

## 2019-06-20 RX ORDER — SODIUM CHLORIDE 0.9 % (FLUSH) 0.9 %
3-10 SYRINGE (ML) INJECTION AS NEEDED
Status: DISCONTINUED | OUTPATIENT
Start: 2019-06-20 | End: 2019-06-22 | Stop reason: HOSPADM

## 2019-06-20 RX ORDER — IPRATROPIUM BROMIDE AND ALBUTEROL SULFATE 2.5; .5 MG/3ML; MG/3ML
3 SOLUTION RESPIRATORY (INHALATION) ONCE
Status: COMPLETED | OUTPATIENT
Start: 2019-06-20 | End: 2019-06-20

## 2019-06-20 RX ORDER — ONDANSETRON 2 MG/ML
4 INJECTION INTRAMUSCULAR; INTRAVENOUS EVERY 6 HOURS PRN
Status: DISCONTINUED | OUTPATIENT
Start: 2019-06-20 | End: 2019-06-22 | Stop reason: HOSPADM

## 2019-06-20 RX ORDER — MELOXICAM 7.5 MG/1
15 TABLET ORAL DAILY
Status: DISCONTINUED | OUTPATIENT
Start: 2019-06-20 | End: 2019-06-22 | Stop reason: HOSPADM

## 2019-06-20 RX ORDER — IPRATROPIUM BROMIDE AND ALBUTEROL SULFATE 2.5; .5 MG/3ML; MG/3ML
3 SOLUTION RESPIRATORY (INHALATION)
Status: DISCONTINUED | OUTPATIENT
Start: 2019-06-20 | End: 2019-06-22 | Stop reason: HOSPADM

## 2019-06-20 RX ORDER — ASPIRIN 81 MG/1
324 TABLET, CHEWABLE ORAL ONCE
Status: COMPLETED | OUTPATIENT
Start: 2019-06-20 | End: 2019-06-20

## 2019-06-20 RX ORDER — MELATONIN
1000 DAILY
Status: DISCONTINUED | OUTPATIENT
Start: 2019-06-20 | End: 2019-06-22 | Stop reason: HOSPADM

## 2019-06-20 RX ORDER — IPRATROPIUM BROMIDE AND ALBUTEROL SULFATE 2.5; .5 MG/3ML; MG/3ML
3 SOLUTION RESPIRATORY (INHALATION) EVERY 4 HOURS PRN
Status: DISCONTINUED | OUTPATIENT
Start: 2019-06-20 | End: 2019-06-22 | Stop reason: HOSPADM

## 2019-06-20 RX ORDER — ACETAMINOPHEN 325 MG/1
650 TABLET ORAL EVERY 4 HOURS PRN
Status: DISCONTINUED | OUTPATIENT
Start: 2019-06-20 | End: 2019-06-22 | Stop reason: HOSPADM

## 2019-06-20 RX ORDER — SODIUM CHLORIDE 0.9 % (FLUSH) 0.9 %
3 SYRINGE (ML) INJECTION EVERY 12 HOURS SCHEDULED
Status: DISCONTINUED | OUTPATIENT
Start: 2019-06-20 | End: 2019-06-22 | Stop reason: HOSPADM

## 2019-06-20 RX ORDER — CLINDAMYCIN PHOSPHATE 10 MG/G
1 GEL TOPICAL 2 TIMES DAILY
COMMUNITY
Start: 2019-06-10 | End: 2019-06-22 | Stop reason: HOSPADM

## 2019-06-20 RX ORDER — SODIUM CHLORIDE 0.9 % (FLUSH) 0.9 %
10 SYRINGE (ML) INJECTION AS NEEDED
Status: DISCONTINUED | OUTPATIENT
Start: 2019-06-20 | End: 2019-06-22 | Stop reason: HOSPADM

## 2019-06-20 RX ADMIN — TAZOBACTAM SODIUM AND PIPERACILLIN SODIUM 3.38 G: 375; 3 INJECTION, SOLUTION INTRAVENOUS at 17:29

## 2019-06-20 RX ADMIN — VITAMIN D, TAB 1000IU (100/BT) 1000 UNITS: 25 TAB at 21:42

## 2019-06-20 RX ADMIN — GABAPENTIN 300 MG: 300 CAPSULE ORAL at 21:42

## 2019-06-20 RX ADMIN — ASPIRIN 81 MG CHEWABLE TABLET 324 MG: 81 TABLET CHEWABLE at 15:04

## 2019-06-20 RX ADMIN — MELOXICAM 15 MG: 7.5 TABLET ORAL at 21:42

## 2019-06-20 RX ADMIN — IPRATROPIUM BROMIDE AND ALBUTEROL SULFATE 3 ML: 2.5; .5 SOLUTION RESPIRATORY (INHALATION) at 15:14

## 2019-06-20 RX ADMIN — SODIUM CHLORIDE, PRESERVATIVE FREE 3 ML: 5 INJECTION INTRAVENOUS at 21:43

## 2019-06-20 RX ADMIN — TAZOBACTAM SODIUM AND PIPERACILLIN SODIUM 3.38 G: 375; 3 INJECTION, SOLUTION INTRAVENOUS at 23:54

## 2019-06-21 LAB
ANION GAP SERPL CALCULATED.3IONS-SCNC: 9 MMOL/L
BASOPHILS # BLD AUTO: 0.03 10*3/MM3 (ref 0–0.2)
BASOPHILS NFR BLD AUTO: 0.3 % (ref 0–1.5)
BUN BLD-MCNC: 14 MG/DL (ref 8–23)
BUN/CREAT SERPL: 18.4 (ref 7–25)
CALCIUM SPEC-SCNC: 8.9 MG/DL (ref 8.6–10.5)
CHLORIDE SERPL-SCNC: 103 MMOL/L (ref 98–107)
CO2 SERPL-SCNC: 28 MMOL/L (ref 22–29)
CREAT BLD-MCNC: 0.76 MG/DL (ref 0.57–1)
DEPRECATED RDW RBC AUTO: 55.9 FL (ref 37–54)
EOSINOPHIL # BLD AUTO: 0.03 10*3/MM3 (ref 0–0.4)
EOSINOPHIL NFR BLD AUTO: 0.3 % (ref 0.3–6.2)
ERYTHROCYTE [DISTWIDTH] IN BLOOD BY AUTOMATED COUNT: 14.3 % (ref 12.3–15.4)
GFR SERPL CREATININE-BSD FRML MDRD: 72 ML/MIN/1.73
GLUCOSE BLD-MCNC: 97 MG/DL (ref 65–99)
HCT VFR BLD AUTO: 35.3 % (ref 34–46.6)
HGB BLD-MCNC: 11.3 G/DL (ref 12–15.9)
IMM GRANULOCYTES # BLD AUTO: 0.04 10*3/MM3 (ref 0–0.05)
IMM GRANULOCYTES NFR BLD AUTO: 0.5 % (ref 0–0.5)
LYMPHOCYTES # BLD AUTO: 0.79 10*3/MM3 (ref 0.7–3.1)
LYMPHOCYTES NFR BLD AUTO: 9.1 % (ref 19.6–45.3)
MCH RBC QN AUTO: 34.2 PG (ref 26.6–33)
MCHC RBC AUTO-ENTMCNC: 32 G/DL (ref 31.5–35.7)
MCV RBC AUTO: 107 FL (ref 79–97)
MONOCYTES # BLD AUTO: 0.6 10*3/MM3 (ref 0.1–0.9)
MONOCYTES NFR BLD AUTO: 6.9 % (ref 5–12)
NEUTROPHILS # BLD AUTO: 7.17 10*3/MM3 (ref 1.7–7)
NEUTROPHILS NFR BLD AUTO: 82.9 % (ref 42.7–76)
NRBC BLD AUTO-RTO: 0 /100 WBC (ref 0–0.2)
PLATELET # BLD AUTO: 55 10*3/MM3 (ref 140–450)
PMV BLD AUTO: 10.4 FL (ref 6–12)
POTASSIUM BLD-SCNC: 4.5 MMOL/L (ref 3.5–5.2)
RBC # BLD AUTO: 3.3 10*6/MM3 (ref 3.77–5.28)
SODIUM BLD-SCNC: 140 MMOL/L (ref 136–145)
WBC NRBC COR # BLD: 8.66 10*3/MM3 (ref 3.4–10.8)

## 2019-06-21 PROCEDURE — 94640 AIRWAY INHALATION TREATMENT: CPT

## 2019-06-21 PROCEDURE — 85025 COMPLETE CBC W/AUTO DIFF WBC: CPT | Performed by: INTERNAL MEDICINE

## 2019-06-21 PROCEDURE — 25010000002 PIPERACILLIN SOD-TAZOBACTAM PER 1 G: Performed by: INTERNAL MEDICINE

## 2019-06-21 PROCEDURE — 80048 BASIC METABOLIC PNL TOTAL CA: CPT | Performed by: INTERNAL MEDICINE

## 2019-06-21 PROCEDURE — 97161 PT EVAL LOW COMPLEX 20 MIN: CPT

## 2019-06-21 PROCEDURE — 99232 SBSQ HOSP IP/OBS MODERATE 35: CPT | Performed by: INTERNAL MEDICINE

## 2019-06-21 PROCEDURE — 94799 UNLISTED PULMONARY SVC/PX: CPT

## 2019-06-21 PROCEDURE — 92610 EVALUATE SWALLOWING FUNCTION: CPT

## 2019-06-21 RX ADMIN — IPRATROPIUM BROMIDE AND ALBUTEROL SULFATE 3 ML: 2.5; .5 SOLUTION RESPIRATORY (INHALATION) at 11:58

## 2019-06-21 RX ADMIN — SODIUM CHLORIDE, PRESERVATIVE FREE 3 ML: 5 INJECTION INTRAVENOUS at 08:38

## 2019-06-21 RX ADMIN — MELOXICAM 15 MG: 7.5 TABLET ORAL at 08:37

## 2019-06-21 RX ADMIN — SODIUM CHLORIDE, PRESERVATIVE FREE 3 ML: 5 INJECTION INTRAVENOUS at 19:58

## 2019-06-21 RX ADMIN — GABAPENTIN 300 MG: 300 CAPSULE ORAL at 19:56

## 2019-06-21 RX ADMIN — IPRATROPIUM BROMIDE AND ALBUTEROL SULFATE 3 ML: 2.5; .5 SOLUTION RESPIRATORY (INHALATION) at 16:23

## 2019-06-21 RX ADMIN — GABAPENTIN 300 MG: 300 CAPSULE ORAL at 08:37

## 2019-06-21 RX ADMIN — VITAMIN D, TAB 1000IU (100/BT) 1000 UNITS: 25 TAB at 08:37

## 2019-06-21 RX ADMIN — IPRATROPIUM BROMIDE AND ALBUTEROL SULFATE 3 ML: 2.5; .5 SOLUTION RESPIRATORY (INHALATION) at 08:44

## 2019-06-21 RX ADMIN — TAZOBACTAM SODIUM AND PIPERACILLIN SODIUM 3.38 G: 375; 3 INJECTION, SOLUTION INTRAVENOUS at 16:03

## 2019-06-21 RX ADMIN — TAZOBACTAM SODIUM AND PIPERACILLIN SODIUM 3.38 G: 375; 3 INJECTION, SOLUTION INTRAVENOUS at 08:38

## 2019-06-22 VITALS
DIASTOLIC BLOOD PRESSURE: 54 MMHG | HEIGHT: 61 IN | HEART RATE: 77 BPM | RESPIRATION RATE: 17 BRPM | WEIGHT: 103 LBS | SYSTOLIC BLOOD PRESSURE: 101 MMHG | BODY MASS INDEX: 19.45 KG/M2 | OXYGEN SATURATION: 91 % | TEMPERATURE: 98.3 F

## 2019-06-22 PROBLEM — J42 CHRONIC BRONCHITIS (HCC): Status: ACTIVE | Noted: 2019-06-22

## 2019-06-22 PROBLEM — J40 BRONCHITIS: Status: ACTIVE | Noted: 2019-06-22

## 2019-06-22 PROCEDURE — 94799 UNLISTED PULMONARY SVC/PX: CPT

## 2019-06-22 PROCEDURE — 25010000002 PIPERACILLIN SOD-TAZOBACTAM PER 1 G: Performed by: INTERNAL MEDICINE

## 2019-06-22 PROCEDURE — 99239 HOSP IP/OBS DSCHRG MGMT >30: CPT | Performed by: INTERNAL MEDICINE

## 2019-06-22 RX ORDER — DOXYCYCLINE 100 MG/1
100 CAPSULE ORAL EVERY 12 HOURS SCHEDULED
Qty: 10 CAPSULE | Refills: 0 | Status: SHIPPED | OUTPATIENT
Start: 2019-06-22 | End: 2019-06-27

## 2019-06-22 RX ORDER — AMOXICILLIN AND CLAVULANATE POTASSIUM 562.5; 437.5; 62.5 MG/1; MG/1; MG/1
2 TABLET, FILM COATED, EXTENDED RELEASE ORAL EVERY 12 HOURS SCHEDULED
Qty: 20 TABLET | Refills: 0 | Status: SHIPPED | OUTPATIENT
Start: 2019-06-22 | End: 2019-06-22

## 2019-06-22 RX ORDER — AMOXICILLIN AND CLAVULANATE POTASSIUM 562.5; 437.5; 62.5 MG/1; MG/1; MG/1
2 TABLET, FILM COATED, EXTENDED RELEASE ORAL EVERY 12 HOURS SCHEDULED
Qty: 20 TABLET | Refills: 0 | Status: SHIPPED | OUTPATIENT
Start: 2019-06-22 | End: 2019-06-27

## 2019-06-22 RX ORDER — DOXYCYCLINE 100 MG/1
100 CAPSULE ORAL EVERY 12 HOURS SCHEDULED
Status: DISCONTINUED | OUTPATIENT
Start: 2019-06-22 | End: 2019-06-22 | Stop reason: HOSPADM

## 2019-06-22 RX ORDER — DOXYCYCLINE 100 MG/1
100 CAPSULE ORAL EVERY 12 HOURS SCHEDULED
Qty: 10 CAPSULE | Refills: 0 | Status: SHIPPED | OUTPATIENT
Start: 2019-06-22 | End: 2019-06-22

## 2019-06-22 RX ORDER — AMOXICILLIN AND CLAVULANATE POTASSIUM 562.5; 437.5; 62.5 MG/1; MG/1; MG/1
2 TABLET, FILM COATED, EXTENDED RELEASE ORAL EVERY 12 HOURS SCHEDULED
Status: DISCONTINUED | OUTPATIENT
Start: 2019-06-22 | End: 2019-06-22 | Stop reason: HOSPADM

## 2019-06-22 RX ADMIN — SODIUM CHLORIDE, PRESERVATIVE FREE 3 ML: 5 INJECTION INTRAVENOUS at 08:20

## 2019-06-22 RX ADMIN — DOXYCYCLINE 100 MG: 100 CAPSULE ORAL at 08:19

## 2019-06-22 RX ADMIN — IPRATROPIUM BROMIDE AND ALBUTEROL SULFATE 3 ML: 2.5; .5 SOLUTION RESPIRATORY (INHALATION) at 13:08

## 2019-06-22 RX ADMIN — VITAMIN D, TAB 1000IU (100/BT) 1000 UNITS: 25 TAB at 08:19

## 2019-06-22 RX ADMIN — MELOXICAM 15 MG: 7.5 TABLET ORAL at 08:19

## 2019-06-22 RX ADMIN — IPRATROPIUM BROMIDE AND ALBUTEROL SULFATE 3 ML: 2.5; .5 SOLUTION RESPIRATORY (INHALATION) at 07:58

## 2019-06-22 RX ADMIN — TAZOBACTAM SODIUM AND PIPERACILLIN SODIUM 3.38 G: 375; 3 INJECTION, SOLUTION INTRAVENOUS at 00:12

## 2019-06-22 RX ADMIN — AMOXICILLIN AND CLAVULANATE POTASSIUM 2 TABLET: 562.5; 437.5; 62.5 TABLET, MULTILAYER, EXTENDED RELEASE ORAL at 08:19

## 2019-06-22 RX ADMIN — GABAPENTIN 300 MG: 300 CAPSULE ORAL at 08:19

## 2019-06-23 ENCOUNTER — READMISSION MANAGEMENT (OUTPATIENT)
Dept: CALL CENTER | Facility: HOSPITAL | Age: 84
End: 2019-06-23

## 2019-06-23 NOTE — OUTREACH NOTE
Prep Survey      Responses   Facility patient discharged from?  [facility]   Is patient eligible?  Yes   Discharge diagnosis  Acute on chronic respiratory failure with hypoxia COPD  Aspiration pneumonitis    Does the patient have one of the following disease processes/diagnoses(primary or secondary)?  COPD/Pneumonia   Does the patient have Home health ordered?  Yes   What is the Home health agency?   [facility]     Is there a DME ordered?  Yes   What DME was ordered?  [facility]   Prep survey completed?  Yes          Sariah Alanis RN

## 2019-06-25 ENCOUNTER — READMISSION MANAGEMENT (OUTPATIENT)
Dept: CALL CENTER | Facility: HOSPITAL | Age: 84
End: 2019-06-25

## 2019-06-25 NOTE — OUTREACH NOTE
COPD/PN Week 1 Survey      Responses   Facility patient discharged from?  [facility]   Does the patient have one of the following disease processes/diagnoses(primary or secondary)?  COPD/Pneumonia   Is there a successful TCM telephone encounter documented?  No   Was the primary reason for admission:  COPD exacerbation [with Pneumonia]   Week 1 attempt successful?  Yes   Call start time  1604   Call end time  1605   Discharge diagnosis  Acute on chronic respiratory failure with hypoxia COPD  Aspiration pneumonitis    Meds reviewed with patient/caregiver?  Yes   Is the patient having any side effects they believe may be caused by any medication additions or changes?  No   Does the patient have all medications ordered at discharge?  Yes   Is the patient taking all medications as directed (includes completed medication regime)?  Yes   Does the patient have a primary care provider?   Yes   Does the patient have an appointment with their PCP or pulmonologist within 7 days of discharge?  Yes   Comments regarding PCP  Dr Merchant/ PCP   Has the patient kept scheduled appointments due by today?  Yes   What is the Home health agency?   [facility]     Has home health visited the patient within 72 hours of discharge?  Yes   What DME was ordered?  [facility]    [facility]   Has all DME been delivered?  Yes   Psychosocial issues?  No   Did the patient receive a copy of their discharge instructions?  Yes   Nursing interventions  Reviewed instructions with patient   What is the patient's perception of their health status since discharge?  Improving   Nursing Interventions  Nurse provided patient education   Are the patient's immunizations up to date?   Yes   Is the patient/caregiver able to teach back the hierarchy of who to call/visit for symptoms/problems? PCP, Specialist, Home health nurse, Urgent Care, ED, 911  Yes   Is the patient able to teach back COPD zones?  Yes   Nursing interventions  Education provided on various zones   Patient  reports what zone on this call?  Green Zone   Green Zone  Reports doing well, Breathing without shortness of breath, Usual activity and exercise level, Usual amount of phlegm/mucus without difficulty coughing up, Sleeping well, Appetite is good   Green Zone interventions:  Take daily medications, Avoid indoor/outdoor triggers, Continue regular exercise/diet plan   Is the patient/caregiver able to teach back signs and symptoms of worsening condition:  Fever/chills, Shortness of breath, Chest pain   Is the patient/caregiver able to teach back importance of completing antibiotic course of treatment?  Yes   Week 1 call completed?  Yes          Mark Clark RN

## 2019-07-02 ENCOUNTER — READMISSION MANAGEMENT (OUTPATIENT)
Dept: CALL CENTER | Facility: HOSPITAL | Age: 84
End: 2019-07-02

## 2019-07-02 NOTE — OUTREACH NOTE
COPD/PN Week 2 Survey      Responses   Facility patient discharged from?  [facility]   Does the patient have one of the following disease processes/diagnoses(primary or secondary)?  COPD/Pneumonia   Was the primary reason for admission:  COPD exacerbation   Week 2 attempt successful?  No   Unsuccessful attempts  Attempt 1          Nayla Kenny RN

## 2019-07-03 ENCOUNTER — READMISSION MANAGEMENT (OUTPATIENT)
Dept: CALL CENTER | Facility: HOSPITAL | Age: 84
End: 2019-07-03

## 2019-07-03 NOTE — OUTREACH NOTE
COPD/PN Week 2 Survey      Responses   Facility patient discharged from?  [facility]   Does the patient have one of the following disease processes/diagnoses(primary or secondary)?  COPD/Pneumonia   Was the primary reason for admission:  COPD exacerbation   Week 2 attempt successful?  No   Unsuccessful attempts  Attempt 2          Vickie Larsen RN

## 2019-07-29 ENCOUNTER — OUTSIDE FACILITY SERVICE (OUTPATIENT)
Dept: HOSPITALIST | Facility: HOSPITAL | Age: 84
End: 2019-07-29

## 2019-07-29 PROCEDURE — G0180 MD CERTIFICATION HHA PATIENT: HCPCS | Performed by: INTERNAL MEDICINE

## 2020-01-01 ENCOUNTER — CONSULT (OUTPATIENT)
Dept: CARDIOLOGY | Facility: CLINIC | Age: 85
End: 2020-01-01

## 2020-01-01 VITALS
HEIGHT: 61 IN | SYSTOLIC BLOOD PRESSURE: 100 MMHG | BODY MASS INDEX: 20.39 KG/M2 | HEART RATE: 63 BPM | WEIGHT: 108 LBS | DIASTOLIC BLOOD PRESSURE: 50 MMHG

## 2020-01-01 DIAGNOSIS — R01.1 HEART MURMUR: Primary | ICD-10-CM

## 2020-01-01 PROCEDURE — 99203 OFFICE O/P NEW LOW 30 MIN: CPT | Performed by: INTERNAL MEDICINE

## 2020-01-01 PROCEDURE — 93000 ELECTROCARDIOGRAM COMPLETE: CPT | Performed by: INTERNAL MEDICINE

## 2020-01-01 RX ORDER — MULTIPLE VITAMINS W/ MINERALS TAB 9MG-400MCG
1 TAB ORAL DAILY
COMMUNITY

## 2020-06-02 ENCOUNTER — OFFICE VISIT (OUTPATIENT)
Dept: PULMONOLOGY | Facility: CLINIC | Age: 85
End: 2020-06-02

## 2020-06-02 VITALS
TEMPERATURE: 97.3 F | BODY MASS INDEX: 19.03 KG/M2 | HEART RATE: 77 BPM | HEIGHT: 61 IN | SYSTOLIC BLOOD PRESSURE: 138 MMHG | DIASTOLIC BLOOD PRESSURE: 70 MMHG | WEIGHT: 100.8 LBS | OXYGEN SATURATION: 90 %

## 2020-06-02 DIAGNOSIS — J43.2 CENTRILOBULAR EMPHYSEMA (HCC): Primary | ICD-10-CM

## 2020-06-02 DIAGNOSIS — J96.21 ACUTE ON CHRONIC RESPIRATORY FAILURE WITH HYPOXIA (HCC): ICD-10-CM

## 2020-06-02 DIAGNOSIS — J44.9 CHRONIC OBSTRUCTIVE PULMONARY DISEASE, UNSPECIFIED COPD TYPE (HCC): ICD-10-CM

## 2020-06-02 PROCEDURE — 99214 OFFICE O/P EST MOD 30 MIN: CPT | Performed by: NURSE PRACTITIONER

## 2020-06-02 RX ORDER — GUAIFENESIN 600 MG/1
600 TABLET, EXTENDED RELEASE ORAL 2 TIMES DAILY
Qty: 60 TABLET | Refills: 6 | Status: SHIPPED | OUTPATIENT
Start: 2020-06-02 | End: 2020-01-01

## 2020-06-02 NOTE — PATIENT INSTRUCTIONS
Take Anoro daily (inhaler, I gave you samples)    Take mucinex 600mg twice a day.  I called this in for you.      Wear your oxygen when walking around the house.  You don't have to wear your oxygen when sitting in the chair.  Wear oxygen at night every night.

## 2020-06-02 NOTE — PROGRESS NOTES
[facility] Pulmonary Follow up    CHIEF COMPLAINT    Dyspnea    HISTORY OF PRESENT ILLNESS    Elizabeth Oliveira is a 89 y.o.female here today for follow-up of her dyspnea and COPD.  She was last seen in the office in June 2019.  She denies any respiratory illnesses or exacerbations since her last appointment.  She has noticed worsening shortness of breath with activity.  She states that she is fairly inactive at home.    She has tried inhalers in the past but cannot remember if they worked or not.  She does take slow breaks to recover from her shortness of breath.  She has an albuterol rescue inhaler that she will occasionally use.    She continues to wear 2 L nasal cannula at nighttime.  She denies any sleeping difficulties.  She also has portable oxygen but does not wear it on a regular basis.    She denies fever, chills, sputum production, hemoptysis, night sweats, weight loss, chest pain or palpitations.  She has some mild lower extremity edema.  She states that it is not worsened over the last year.  She denies any allergy or sinus difficulties.  She denies reflux symptoms.     She is up-to-date on her current vaccinations.                Patient Active Problem List   Diagnosis   • Fall   • Closed fracture of right femur (CMS/HCC)   • Arthritis   • Thrombocytopenia (CMS/HCC)   • Centrilobular emphysema (CMS/HCC)   • BAUMAN (dyspnea on exertion)   • Oxygen desaturation, exercise   • Acute on chronic respiratory failure with hypoxia (CMS/HCC)   • COPD (chronic obstructive pulmonary disease) (CMS/HCC)   • Myelodysplastic syndrome (CMS/HCC)   • Aspiration pneumonitis (CMS/HCC)   • Bronchitis       Allergies   Allergen Reactions   • Cortisone        Current Outpatient Medications:   •  albuterol sulfate  (90 Base) MCG/ACT inhaler, Inhale 2 puffs Every 6 (Six) Hours As Needed for Wheezing., Disp: , Rfl:   •  cholecalciferol (VITAMIN D3) 1000 UNITS tablet, Take 1,000 Units by mouth Daily., Disp: , Rfl:   •   Cyanocobalamin 1000 MCG capsule, Take 1,000 mcg by mouth Daily., Disp: , Rfl:   •  gabapentin (NEURONTIN) 300 MG capsule, Take 300 mg by mouth 2 (Two) Times a Day., Disp: , Rfl:   •  meloxicam (MOBIC) 15 MG tablet, Take 15 mg by mouth Daily., Disp: , Rfl:   •  Pyridoxine HCl (VITAMIN B6) 200 MG tablet, Take 1 tablet by mouth Daily., Disp: , Rfl:   No current facility-administered medications for this visit.     Facility-Administered Medications Ordered in Other Visits:   •  bupivacaine PF (MARCAINE) 0.25 % injection, , , PRN, Lanius, Monico, CRNA, 40 mL at 07/17/17 1625  MEDICATION LIST AND ALLERGIES REVIEWED.    Social History     Tobacco Use   • Smoking status: Former Smoker     Packs/day: 1.50     Years: 40.00     Pack years: 60.00     Types: Cigarettes     Last attempt to quit: 2010     Years since quitting: 10.4   • Smokeless tobacco: Never Used   Substance Use Topics   • Alcohol use: Yes     Alcohol/week: 7.0 standard drinks     Types: 7 Shots of liquor per week     Comment: COCKTAIL EVERY NIGHT   • Drug use: No       FAMILY AND SOCIAL HISTORY REVIEWED.    Review of Systems   Constitutional: Negative for activity change, appetite change, fatigue, fever and unexpected weight change.   HENT: Negative for congestion, postnasal drip, rhinorrhea, sinus pressure, sore throat and voice change.    Eyes: Negative for visual disturbance.   Respiratory: Positive for shortness of breath. Negative for cough, chest tightness and wheezing.    Cardiovascular: Negative for chest pain, palpitations and leg swelling.   Gastrointestinal: Negative for abdominal distention, abdominal pain, nausea and vomiting.   Endocrine: Negative for cold intolerance and heat intolerance.   Genitourinary: Negative for difficulty urinating and urgency.   Musculoskeletal: Negative for arthralgias, back pain and neck pain.   Skin: Negative for color change and pallor.   Allergic/Immunologic: Negative for environmental allergies and food allergies.  "  Neurological: Negative for dizziness, syncope, weakness and light-headedness.   Hematological: Negative for adenopathy. Does not bruise/bleed easily.   Psychiatric/Behavioral: Negative for agitation and behavioral problems.   .    /70   Pulse 77   Temp 97.3 °F (36.3 °C)   Ht 154.9 cm (61\")   Wt 45.7 kg (100 lb 12.8 oz)   LMP  (LMP Unknown)   SpO2 90% Comment: resting at room air  BMI 19.05 kg/m²     Immunization History   Administered Date(s) Administered   • Fluzone High Dose =>65 Years ([employer] ONLY) 09/09/2019   • Pneumococcal Polysaccharide (PPSV23) 10/01/2014       Physical Exam   Constitutional: She is oriented to person, place, and time. She appears well-developed and well-nourished.   HENT:   Head: Normocephalic and atraumatic.   Eyes: Pupils are equal, round, and reactive to light.   Neck: Normal range of motion. Neck supple. No thyromegaly present.   Cardiovascular: Normal rate, regular rhythm, normal heart sounds and intact distal pulses. Exam reveals no gallop and no friction rub.   No murmur heard.  Pulmonary/Chest: Effort normal and breath sounds normal. No respiratory distress. She has no wheezes. She has no rales. She exhibits no tenderness.   Abdominal: Soft. Bowel sounds are normal. There is no tenderness.   Musculoskeletal: Normal range of motion. She exhibits no edema.   Lymphadenopathy:     She has no cervical adenopathy.   Neurological: She is alert and oriented to person, place, and time.   Skin: Skin is warm and dry. Capillary refill takes less than 2 seconds. She is not diaphoretic.   Psychiatric: She has a normal mood and affect. Her behavior is normal.   Nursing note and vitals reviewed.        RESULTS    Xr Chest Pa & Lateral    Result Date: 6/2/2020  Chronic changes including hyperinflated appearance of obstructive pulmonary disease without acute parenchymal process.  D:  06/02/2020 E:  06/02/2020  This report was finalized on 6/2/2020 7:13 PM by Dr. Mauro Gruber.  "       PROBLEM LIST    Problem List Items Addressed This Visit        Respiratory    Centrilobular emphysema (CMS/HCC) - Primary    Relevant Orders    XR Chest PA & Lateral    Acute on chronic respiratory failure with hypoxia (CMS/HCC)    COPD (chronic obstructive pulmonary disease) (CMS/HCC)    Relevant Orders    XR Chest PA & Lateral            DISCUSSION  Ms. Stallworth was here for follow-up of her COPD.  Her oxygenation was at 76 while walking into the office today.  I did advise her that she need to be wearing her oxygen with activity at all times.  She did have her oxygen in the car but did not put it on before she started walking.  We did have a long discussion about wearing her oxygen with activity.    She will continue to wear her oxygen at 2 L nasal cannula for her acute on chronic respiratory failure with hypoxia.    I gave her some samples of Anoro to use daily for her COPD.  I did give her written instructions on how to use this.  She will continue to use her albuterol as needed for shortness of breath.    We reviewed her chest x-ray in the office today and she does appear to have no acute pulmonary process.    She will follow-up in 2 to 3 months or sooner if her symptoms worsen.  She will call with any additional concerns or questions.  I spent 25 minutes with the patient. I spent > 50% percent of this time counseling and discussing diagnosis, prognosis, diagnostic testing, evaluation, current status, treatment options and management.    CAMPBELL Messer  06/02/20201:49 PM  Electronically signed     Please note that portions of this note were completed with a voice recognition program. Efforts were made to edit the dictations, but occasionally words are mistranscribed.      CC: Azeem Merchant MD

## 2020-06-25 NOTE — TELEPHONE ENCOUNTER
Ordered Rx Anoro Ellipta per chart via fax to [facility] per pt's request. Pt notified and verbalized understanding.

## 2020-09-08 ENCOUNTER — OFFICE VISIT (OUTPATIENT)
Dept: PULMONOLOGY | Facility: CLINIC | Age: 85
End: 2020-09-08

## 2020-09-08 VITALS
DIASTOLIC BLOOD PRESSURE: 60 MMHG | OXYGEN SATURATION: 96 % | TEMPERATURE: 98.2 F | SYSTOLIC BLOOD PRESSURE: 120 MMHG | HEIGHT: 61 IN | BODY MASS INDEX: 20.2 KG/M2 | WEIGHT: 107 LBS | HEART RATE: 72 BPM

## 2020-09-08 DIAGNOSIS — J96.21 ACUTE ON CHRONIC RESPIRATORY FAILURE WITH HYPOXIA (HCC): Primary | ICD-10-CM

## 2020-09-08 DIAGNOSIS — R06.09 DOE (DYSPNEA ON EXERTION): ICD-10-CM

## 2020-09-08 DIAGNOSIS — J43.2 CENTRILOBULAR EMPHYSEMA (HCC): ICD-10-CM

## 2020-09-08 PROCEDURE — 99214 OFFICE O/P EST MOD 30 MIN: CPT | Performed by: NURSE PRACTITIONER

## 2020-09-08 NOTE — PROGRESS NOTES
[facility] Pulmonary Follow up    CHIEF COMPLAINT    dyspnea    HISTORY OF PRESENT ILLNESS    Elizabeth Oliveira is a 89 y.o.female here today for follow-up of her dyspnea and COPD.  She was last seen in the office by me in June.  She denies any respiratory illnesses or exacerbations since her last appointment.    She is currently been on Anoro but rely mostly on samples as the insurance does not cover this inhaler well.  She does notice an improvement in her breathing while being on Anoro.  She does need some samples if there are some today.    She continues to wear 2 L nasal cannula at nighttime.  She denies any sleeping difficulties.  She does have portable oxygen but states that it is too heavy to carry.  She does not use her oxygen during the day.    She denies fever, chills, sputum production, hemoptysis, night sweats, weight loss, chest pain or palpitations.  She has some mild lower extremity edema.  She states that it is not worsened over the last year.  She denies any allergy or sinus difficulties.  She denies reflux symptoms.     She is up-to-date on her current vaccinations.     She is currently working with physical therapy for her right leg weakness.  She states that she is noticed improvement in her walk since this time.  Patient Active Problem List   Diagnosis   • Fall   • Closed fracture of right femur (CMS/HCC)   • Arthritis   • Thrombocytopenia (CMS/HCC)   • Centrilobular emphysema (CMS/HCC)   • BAUMAN (dyspnea on exertion)   • Oxygen desaturation, exercise   • Acute on chronic respiratory failure with hypoxia (CMS/HCC)   • COPD (chronic obstructive pulmonary disease) (CMS/HCC)   • Myelodysplastic syndrome (CMS/HCC)   • Aspiration pneumonitis (CMS/HCC)   • Bronchitis       Allergies   Allergen Reactions   • Cortisone        Current Outpatient Medications:   •  albuterol sulfate  (90 Base) MCG/ACT inhaler, Inhale 2 puffs Every 6 (Six) Hours As Needed for Wheezing., Disp: , Rfl:   •  cholecalciferol  (VITAMIN D3) 1000 UNITS tablet, Take 1,000 Units by mouth Daily., Disp: , Rfl:   •  Cyanocobalamin 1000 MCG capsule, Take 1,000 mcg by mouth Daily., Disp: , Rfl:   •  gabapentin (NEURONTIN) 300 MG capsule, Take 300 mg by mouth 2 (Two) Times a Day., Disp: , Rfl:   •  guaiFENesin (Mucinex) 600 MG 12 hr tablet, Take 1 tablet by mouth 2 (Two) Times a Day., Disp: 60 tablet, Rfl: 6  •  meloxicam (MOBIC) 15 MG tablet, Take 15 mg by mouth Daily., Disp: , Rfl:   •  Pyridoxine HCl (VITAMIN B6) 200 MG tablet, Take 1 tablet by mouth Daily., Disp: , Rfl:   •  umeclidinium-vilanterol (ANORO ELLIPTA) 62.5-25 MCG/INH aerosol powder  inhaler, Inhale 1 puff Daily. 1 inh once a day, Disp: 30 each, Rfl: 3  No current facility-administered medications for this visit.     Facility-Administered Medications Ordered in Other Visits:   •  bupivacaine PF (MARCAINE) 0.25 % injection, , , PRN, Lanius, Monico, CRNA, 40 mL at 07/17/17 1625  MEDICATION LIST AND ALLERGIES REVIEWED.    Social History     Tobacco Use   • Smoking status: Former Smoker     Packs/day: 1.50     Years: 40.00     Pack years: 60.00     Types: Cigarettes     Last attempt to quit: 2010     Years since quitting: 10.6   • Smokeless tobacco: Never Used   Substance Use Topics   • Alcohol use: Yes     Alcohol/week: 7.0 standard drinks     Types: 7 Shots of liquor per week     Comment: COCKTAIL EVERY NIGHT   • Drug use: No       FAMILY AND SOCIAL HISTORY REVIEWED.    Review of Systems   Constitutional: Negative for activity change, appetite change, fatigue, fever and unexpected weight change.   HENT: Negative for congestion, postnasal drip, rhinorrhea, sinus pressure, sore throat and voice change.    Eyes: Negative for visual disturbance.   Respiratory: Positive for shortness of breath. Negative for cough, chest tightness and wheezing.    Cardiovascular: Negative for chest pain, palpitations and leg swelling.   Gastrointestinal: Negative for abdominal distention, abdominal pain,  "nausea and vomiting.   Endocrine: Negative for cold intolerance and heat intolerance.   Genitourinary: Negative for difficulty urinating and urgency.   Musculoskeletal: Negative for arthralgias, back pain and neck pain.   Skin: Negative for color change and pallor.   Allergic/Immunologic: Negative for environmental allergies and food allergies.   Neurological: Negative for dizziness, syncope, weakness and light-headedness.   Hematological: Negative for adenopathy. Does not bruise/bleed easily.   Psychiatric/Behavioral: Negative for agitation and behavioral problems.   .    /60   Pulse 72   Temp 98.2 °F (36.8 °C)   Ht 154.9 cm (61\")   Wt 48.5 kg (107 lb)   LMP  (LMP Unknown)   SpO2 96% Comment: RESTING, ROOM AIR  Breastfeeding No   BMI 20.22 kg/m²     Immunization History   Administered Date(s) Administered   • Fluzone High Dose =>65 Years ([employer] ONLY) 09/09/2019   • Pneumococcal Polysaccharide (PPSV23) 10/01/2014       Physical Exam   Constitutional: She is oriented to person, place, and time. She appears well-developed and well-nourished.   HENT:   Head: Normocephalic and atraumatic.   Eyes: Pupils are equal, round, and reactive to light.   Neck: Normal range of motion. Neck supple. No thyromegaly present.   Cardiovascular: Normal rate, regular rhythm, normal heart sounds and intact distal pulses. Exam reveals no gallop and no friction rub.   No murmur heard.  Pulmonary/Chest: Effort normal and breath sounds normal. No respiratory distress. She has no wheezes. She has no rales. She exhibits no tenderness.   Abdominal: Soft. Bowel sounds are normal. There is no tenderness.   Musculoskeletal: Normal range of motion. She exhibits no edema.   Lymphadenopathy:     She has no cervical adenopathy.   Neurological: She is alert and oriented to person, place, and time.   Skin: Skin is warm and dry. Capillary refill takes less than 2 seconds. She is not diaphoretic.   Psychiatric: She has a normal mood and " affect. Her behavior is normal.   Nursing note and vitals reviewed.        RESULTS      PROBLEM LIST    Problem List Items Addressed This Visit        Respiratory    Centrilobular emphysema (CMS/HCC)    BAUMAN (dyspnea on exertion)    Acute on chronic respiratory failure with hypoxia (CMS/HCC) - Primary            DISCUSSION    Ms. Oliveira was here for follow-up of her COPD and dyspnea.  She seems to be doing well from a pulmonary standpoint.  She remains on Anoro daily for COPD.  I did provide her with some samples in the office today.  I did advise her to call me and I would give her more samples as she ran low.    I did encourage her to use her albuterol as needed for shortness of breath.    She will continue to wear her oxygen at 2 L nasal cannula at nighttime.  I did encourage her to use her oxygen during the day when she was short of breath.    She will follow-up as needed in our office.  I did advise her to call with any additional concerns or questions.  I spent 25 minutes with the patient. I spent > 50% percent of this time counseling and discussing diagnosis, prognosis, diagnostic testing, evaluation, current status, treatment options and management.    CAMPBELL Messer  09/08/20203:01 PM  Electronically signed     Please note that portions of this note were completed with a voice recognition program. Efforts were made to edit the dictations, but occasionally words are mistranscribed.      CC: Azeem Merchant MD

## 2020-10-12 ENCOUNTER — DOCUMENTATION (OUTPATIENT)
Dept: PULMONOLOGY | Facility: CLINIC | Age: 85
End: 2020-10-12

## 2020-10-12 NOTE — PROGRESS NOTES
Pt called in requesting samples of Anoro Ellipta. Pt is aware that we no longer keep samples up front and to let us know when they're outside.  Please get samples and take them up front to pt.             Pt  picked up samples at 1005. SP

## 2020-11-09 ENCOUNTER — HOSPITAL ENCOUNTER (OUTPATIENT)
Dept: GENERAL RADIOLOGY | Facility: HOSPITAL | Age: 85
Discharge: HOME | End: 2020-11-09
Admitting: FAMILY MEDICINE

## 2020-11-09 ENCOUNTER — TRANSCRIBE ORDERS (OUTPATIENT)
Dept: ADMINISTRATIVE | Facility: HOSPITAL | Age: 85
End: 2020-11-09

## 2020-11-09 DIAGNOSIS — M25.512 LEFT SHOULDER PAIN, UNSPECIFIED CHRONICITY: ICD-10-CM

## 2020-11-09 DIAGNOSIS — M25.512 LEFT SHOULDER PAIN, UNSPECIFIED CHRONICITY: Primary | ICD-10-CM

## 2020-11-09 PROCEDURE — 73030 X-RAY EXAM OF SHOULDER: CPT

## 2020-12-02 ENCOUNTER — TELEPHONE (OUTPATIENT)
Dept: PULMONOLOGY | Facility: CLINIC | Age: 85
End: 2020-12-02

## 2020-12-14 NOTE — PROGRESS NOTES
Subjective:     Encounter Date:12/14/2020    Primary Care Physician: Azeem Merchant MD      Patient ID: Elizabeth Oliveira is a 89 y.o. female.    Chief Complaint:Heart Murmur    PROBLEM LIST:  1. Cardiac murmur  2. COPD  3. IBS  4. Macular degeneration  5. Arthritis.  6. Myelodysplastic syndrome  7. Lumbar spondylosis  8. Surgeries:  a. Bilateral cataract extraction  b. Right femur reconstruction  c. Bilateral hip replacement  d. Left knee surgery  e. Left knee replacement  f. [place] teeth extracted  g. Tonsillectomy       Allergies   Allergen Reactions   • Cortisone          Current Outpatient Medications:   •  albuterol sulfate  (90 Base) MCG/ACT inhaler, Inhale 2 puffs Every 6 (Six) Hours As Needed for Wheezing., Disp: , Rfl:   •  cholecalciferol (VITAMIN D3) 1000 UNITS tablet, Take 1,000 Units by mouth Daily., Disp: , Rfl:   •  Cyanocobalamin 1000 MCG capsule, Take 1,000 mcg by mouth Daily., Disp: , Rfl:   •  gabapentin (NEURONTIN) 300 MG capsule, Take 300 mg by mouth 2 (Two) Times a Day., Disp: , Rfl:   •  meloxicam (MOBIC) 15 MG tablet, Take 15 mg by mouth Daily., Disp: , Rfl:   •  multivitamin with minerals (PRESERVISION AREDS PO), Take 1 tablet by mouth Daily., Disp: , Rfl:   •  Pyridoxine HCl (VITAMIN B6) 200 MG tablet, Take 1 tablet by mouth Daily., Disp: , Rfl:   •  umeclidinium-vilanterol (ANORO ELLIPTA) 62.5-25 MCG/INH aerosol powder  inhaler, Inhale 1 puff Daily. 1 inh once a day, Disp: 30 each, Rfl: 3  No current facility-administered medications for this visit.     Facility-Administered Medications Ordered in Other Visits:   •  bupivacaine PF (MARCAINE) 0.25 % injection, , , PRN, Piedad, Monico, DARRICK, 40 mL at 07/17/17 1625        History of Present Illness    Patient is a 89-year-old  female who we are seeing today for further evaluation of murmur.  She denies any chest pain, pressure or tightness.  She notes that she went to her primary care physician for routine visit and was  told to follow-up with cardiology.  She is unclear of why she is here.  She notes chronic shortness of breath which is overall unchanged.  She denies any cardiac testing within the last 4 to 5 years.  No syncope, near syncope.  She notes some occasional edema which is improved with elevation of her extremities.  Given abnormal exam findings she was referred here for further evaluation.    The following portions of the patient's history were reviewed and updated as appropriate: allergies, current medications, past family history, past medical history, past social history, past surgical history and problem list.    Family History   Problem Relation Age of Onset   • Heart failure Mother    • Leukemia Father        Social History     Tobacco Use   • Smoking status: Former Smoker     Packs/day: 1.50     Years: 40.00     Pack years: 60.00     Types: Cigarettes     Quit date: 2010     Years since quitting: 10.9   • Smokeless tobacco: Never Used   Substance Use Topics   • Alcohol use: Yes     Alcohol/week: 7.0 standard drinks     Types: 7 Shots of liquor per week     Comment: COCKTAIL EVERY NIGHT   • Drug use: No         Review of Systems   Constitution: Positive for malaise/fatigue. Negative for fever.   HENT: Negative for nosebleeds.    Eyes: Positive for blurred vision. Negative for redness and visual disturbance.   Cardiovascular: Negative for orthopnea, palpitations and paroxysmal nocturnal dyspnea.   Respiratory: Positive for shortness of breath. Negative for cough, snoring, sputum production and wheezing.    Hematologic/Lymphatic: Negative for bleeding problem.   Skin: Negative for flushing, itching and rash.   Musculoskeletal: Positive for arthritis and myalgias. Negative for falls, joint pain and muscle cramps.   Gastrointestinal: Negative for abdominal pain, diarrhea, heartburn, nausea and vomiting.   Genitourinary: Negative for hematuria.   Neurological: Positive for excessive daytime sleepiness. Negative for  "dizziness, headaches, tremors and weakness.   Psychiatric/Behavioral: Positive for memory loss. Negative for substance abuse. The patient is nervous/anxious.           Objective:   /50 (BP Location: Left arm, Patient Position: Sitting)   Pulse 63   Ht 154.9 cm (61\")   Wt 49 kg (108 lb)   LMP  (LMP Unknown)   BMI 20.41 kg/m²         Vitals signs reviewed.   Constitutional:       Appearance: Well-developed and not in distress. Cachectic and frail.   Eyes:      Conjunctiva/sclera: Conjunctivae normal.      Pupils: Pupils are equal, round, and reactive to light.   HENT:      Head: Normocephalic and atraumatic.    Mouth/Throat:      Pharynx: Oropharynx is clear.   Neck:      Thyroid: Thyroid normal. No thyromegaly.      Vascular: Normal carotid pulses. No carotid bruit or JVD. JVD normal.      Lymphadenopathy: No cervical adenopathy.   Pulmonary:      Effort: No respiratory distress.      Breath sounds: No wheezing. No rales.   Chest:      Chest wall: Not tender to palpatation.   Cardiovascular:      Normal rate. Regular rhythm.      Murmurs: There is a grade 2/6 systolic murmur at the URSB, LLSB and apex.      No gallop.   Pulses:     Carotid: 2+ bilaterally with bruit on the right.     Dorsalis pedis: 2+ bilaterally.     Posterior tibial: 2+ bilaterally.  Abdominal:      General: There is no distension or abdominal bruit.      Palpations: There is no abdominal mass.      Tenderness: There is no abdominal tenderness. There is no rebound.   Musculoskeletal:         General: No tenderness or deformity.      Extremities: No clubbing present.  Skin:     General: Skin is warm and dry. There is no cyanosis.      Findings: No rash.   Neurological:      Mental Status: Alert, oriented to person, place, and time and oriented to person, place and time.           ECG 12 Lead    Date/Time: 12/14/2020 12:37 PM  Performed by: Be River MD  Authorized by: Be River MD   Comparison: not compared with previous " ECG   Previous ECG: no previous ECG available  Rhythm: sinus rhythm    Clinical impression: normal ECG                  Assessment:   Assessment/Plan      Diagnoses and all orders for this visit:    1. Heart murmur (Primary)  -     ECG 12 Lead      For a 89-year-old female with new murmur, decreased function capacity due to frailty, COPD.  We will check an echocardiogram for evaluation.  Otherwise recommendations to follow the above       Mary HIGHTOWER scribed portions of this dictation for Dr. Be River.    Dictated utilizing Dragon dictation

## 2021-01-01 ENCOUNTER — APPOINTMENT (OUTPATIENT)
Dept: GENERAL RADIOLOGY | Facility: HOSPITAL | Age: 86
End: 2021-01-01

## 2021-01-01 ENCOUNTER — TELEPHONE (OUTPATIENT)
Dept: CARDIOLOGY | Facility: CLINIC | Age: 86
End: 2021-01-01

## 2021-01-01 ENCOUNTER — HOSPITAL ENCOUNTER (INPATIENT)
Facility: HOSPITAL | Age: 86
LOS: 4 days | End: 2021-12-13
Attending: INTERNAL MEDICINE | Admitting: INTERNAL MEDICINE

## 2021-01-01 ENCOUNTER — TRANSCRIBE ORDERS (OUTPATIENT)
Dept: ADMINISTRATIVE | Facility: HOSPITAL | Age: 86
End: 2021-01-01

## 2021-01-01 ENCOUNTER — HOSPITAL ENCOUNTER (INPATIENT)
Facility: HOSPITAL | Age: 86
LOS: 6 days | End: 2021-12-09
Attending: EMERGENCY MEDICINE | Admitting: INTERNAL MEDICINE

## 2021-01-01 ENCOUNTER — HOSPITAL ENCOUNTER (OUTPATIENT)
Dept: GENERAL RADIOLOGY | Facility: HOSPITAL | Age: 86
Discharge: HOME | End: 2021-04-14
Admitting: FAMILY MEDICINE

## 2021-01-01 ENCOUNTER — APPOINTMENT (OUTPATIENT)
Dept: CT IMAGING | Facility: HOSPITAL | Age: 86
End: 2021-01-01

## 2021-01-01 ENCOUNTER — HOSPITAL ENCOUNTER (OUTPATIENT)
Dept: CARDIOLOGY | Facility: HOSPITAL | Age: 86
Discharge: HOME | End: 2021-01-22
Admitting: INTERNAL MEDICINE

## 2021-01-01 VITALS
RESPIRATION RATE: 16 BRPM | WEIGHT: 105 LBS | BODY MASS INDEX: 19.32 KG/M2 | HEART RATE: 90 BPM | OXYGEN SATURATION: 100 % | DIASTOLIC BLOOD PRESSURE: 70 MMHG | TEMPERATURE: 98.3 F | HEIGHT: 62 IN | SYSTOLIC BLOOD PRESSURE: 123 MMHG

## 2021-01-01 VITALS — WEIGHT: 108 LBS | HEIGHT: 61 IN | BODY MASS INDEX: 20.39 KG/M2

## 2021-01-01 VITALS
TEMPERATURE: 98 F | RESPIRATION RATE: 16 BRPM | SYSTOLIC BLOOD PRESSURE: 139 MMHG | OXYGEN SATURATION: 95 % | WEIGHT: 95.1 LBS | BODY MASS INDEX: 17.5 KG/M2 | HEART RATE: 98 BPM | DIASTOLIC BLOOD PRESSURE: 76 MMHG | HEIGHT: 62 IN

## 2021-01-01 DIAGNOSIS — J69.0 ASPIRATION PNEUMONITIS (HCC): ICD-10-CM

## 2021-01-01 DIAGNOSIS — D64.9 ANEMIA, UNSPECIFIED TYPE: ICD-10-CM

## 2021-01-01 DIAGNOSIS — R09.02 HYPOXIA: ICD-10-CM

## 2021-01-01 DIAGNOSIS — C34.90 MALIGNANT NEOPLASM OF LUNG, UNSPECIFIED LATERALITY, UNSPECIFIED PART OF LUNG (HCC): ICD-10-CM

## 2021-01-01 DIAGNOSIS — R04.0 EPISTAXIS: ICD-10-CM

## 2021-01-01 DIAGNOSIS — D69.6 THROMBOCYTOPENIA (HCC): ICD-10-CM

## 2021-01-01 DIAGNOSIS — D47.1 MYELOPROLIFERATIVE DISORDER (HCC): ICD-10-CM

## 2021-01-01 DIAGNOSIS — Z87.09 HISTORY OF COPD: ICD-10-CM

## 2021-01-01 DIAGNOSIS — R01.1 HEART MURMUR: ICD-10-CM

## 2021-01-01 DIAGNOSIS — R13.12 OROPHARYNGEAL DYSPHAGIA: Primary | ICD-10-CM

## 2021-01-01 DIAGNOSIS — M25.552 HIP PAIN, LEFT: Primary | ICD-10-CM

## 2021-01-01 LAB
ABO GROUP BLD: NORMAL
ALBUMIN SERPL-MCNC: 3.1 G/DL (ref 3.5–5.2)
ALBUMIN SERPL-MCNC: 3.9 G/DL (ref 3.5–5.2)
ALBUMIN/GLOB SERPL: 1.2 G/DL
ALBUMIN/GLOB SERPL: 1.3 G/DL
ALP SERPL-CCNC: 75 U/L (ref 39–117)
ALP SERPL-CCNC: 86 U/L (ref 39–117)
ALT SERPL W P-5'-P-CCNC: 8 U/L (ref 1–33)
ALT SERPL W P-5'-P-CCNC: 8 U/L (ref 1–33)
ANION GAP SERPL CALCULATED.3IONS-SCNC: 10 MMOL/L (ref 5–15)
ANION GAP SERPL CALCULATED.3IONS-SCNC: 10 MMOL/L (ref 5–15)
ANION GAP SERPL CALCULATED.3IONS-SCNC: 11 MMOL/L (ref 5–15)
ANION GAP SERPL CALCULATED.3IONS-SCNC: 11 MMOL/L (ref 5–15)
ANISOCYTOSIS BLD QL: ABNORMAL
AST SERPL-CCNC: 12 U/L (ref 1–32)
AST SERPL-CCNC: 12 U/L (ref 1–32)
BACTERIA SPEC AEROBE CULT: ABNORMAL
BACTERIA SPEC AEROBE CULT: NORMAL
BACTERIA UR QL AUTO: ABNORMAL /HPF
BASOPHILS # BLD AUTO: 0 10*3/MM3 (ref 0–0.2)
BASOPHILS # BLD AUTO: 0.01 10*3/MM3 (ref 0–0.2)
BASOPHILS # BLD MANUAL: 0 10*3/MM3 (ref 0–0.2)
BASOPHILS NFR BLD AUTO: 0 % (ref 0–1.5)
BASOPHILS NFR BLD AUTO: 0.2 % (ref 0–1.5)
BASOPHILS NFR BLD MANUAL: 0 % (ref 0–1.5)
BH BB BLOOD EXPIRATION DATE: NORMAL
BH BB BLOOD TYPE BARCODE: 5100
BH BB BLOOD TYPE BARCODE: 5100
BH BB BLOOD TYPE BARCODE: 9500
BH BB DISPENSE STATUS: NORMAL
BH BB PRODUCT CODE: NORMAL
BH BB UNIT NUMBER: NORMAL
BH CV ECHO MEAS - AO MAX PG (FULL): 8.6 MMHG
BH CV ECHO MEAS - AO MAX PG: 12 MMHG
BH CV ECHO MEAS - AO MEAN PG (FULL): 4.8 MMHG
BH CV ECHO MEAS - AO MEAN PG: 5.8 MMHG
BH CV ECHO MEAS - AO ROOT AREA (BSA CORRECTED): 2.3
BH CV ECHO MEAS - AO ROOT AREA: 8.6 CM^2
BH CV ECHO MEAS - AO ROOT DIAM: 3.3 CM
BH CV ECHO MEAS - AO V2 MAX: 174.6 CM/SEC
BH CV ECHO MEAS - AO V2 MEAN: 109.6 CM/SEC
BH CV ECHO MEAS - AO V2 VTI: 39.4 CM
BH CV ECHO MEAS - AVA(I,A): 1.6 CM^2
BH CV ECHO MEAS - AVA(I,D): 1.6 CM^2
BH CV ECHO MEAS - AVA(V,A): 1.7 CM^2
BH CV ECHO MEAS - AVA(V,D): 1.7 CM^2
BH CV ECHO MEAS - BSA(HAYCOCK): 1.4 M^2
BH CV ECHO MEAS - BSA: 1.4 M^2
BH CV ECHO MEAS - BZI_BMI: 20.7 KILOGRAMS/M^2
BH CV ECHO MEAS - BZI_METRIC_HEIGHT: 154 CM
BH CV ECHO MEAS - BZI_METRIC_WEIGHT: 49 KG
BH CV ECHO MEAS - EDV(CUBED): 91.1 ML
BH CV ECHO MEAS - EDV(MOD-SP2): 97.7 ML
BH CV ECHO MEAS - EDV(MOD-SP4): 79.8 ML
BH CV ECHO MEAS - EDV(TEICH): 92.4 ML
BH CV ECHO MEAS - EF(CUBED): 73.2 %
BH CV ECHO MEAS - EF(MOD-BP): 50.9 %
BH CV ECHO MEAS - EF(MOD-SP2): 56.9 %
BH CV ECHO MEAS - EF(MOD-SP4): 46.1 %
BH CV ECHO MEAS - EF(TEICH): 65.2 %
BH CV ECHO MEAS - ESV(CUBED): 24.4 ML
BH CV ECHO MEAS - ESV(MOD-SP2): 42.1 ML
BH CV ECHO MEAS - ESV(MOD-SP4): 43 ML
BH CV ECHO MEAS - ESV(TEICH): 32.2 ML
BH CV ECHO MEAS - FS: 35.6 %
BH CV ECHO MEAS - IVS/LVPW: 0.89
BH CV ECHO MEAS - IVSD: 0.8 CM
BH CV ECHO MEAS - LA DIMENSION: 3.4 CM
BH CV ECHO MEAS - LA/AO: 1
BH CV ECHO MEAS - LAD MAJOR: 4.9 CM
BH CV ECHO MEAS - LAT PEAK E' VEL: 9.9 CM/SEC
BH CV ECHO MEAS - LATERAL E/E' RATIO: 8.1
BH CV ECHO MEAS - LV DIASTOLIC VOL/BSA (35-75): 55.1 ML/M^2
BH CV ECHO MEAS - LV MASS(C)D: 123.1 GRAMS
BH CV ECHO MEAS - LV MASS(C)DI: 85 GRAMS/M^2
BH CV ECHO MEAS - LV MAX PG: 3.4 MMHG
BH CV ECHO MEAS - LV MEAN PG: 1 MMHG
BH CV ECHO MEAS - LV SYSTOLIC VOL/BSA (12-30): 29.7 ML/M^2
BH CV ECHO MEAS - LV V1 MAX: 92.8 CM/SEC
BH CV ECHO MEAS - LV V1 MEAN: 49.1 CM/SEC
BH CV ECHO MEAS - LV V1 VTI: 19.7 CM
BH CV ECHO MEAS - LVIDD: 4.5 CM
BH CV ECHO MEAS - LVIDS: 2.9 CM
BH CV ECHO MEAS - LVLD AP2: 7.5 CM
BH CV ECHO MEAS - LVLD AP4: 7.2 CM
BH CV ECHO MEAS - LVLS AP2: 6.3 CM
BH CV ECHO MEAS - LVLS AP4: 5.8 CM
BH CV ECHO MEAS - LVOT AREA (M): 3.1 CM^2
BH CV ECHO MEAS - LVOT AREA: 3.1 CM^2
BH CV ECHO MEAS - LVOT DIAM: 2 CM
BH CV ECHO MEAS - LVPWD: 0.9 CM
BH CV ECHO MEAS - MED PEAK E' VEL: 7.6 CM/SEC
BH CV ECHO MEAS - MEDIAL E/E' RATIO: 10.5
BH CV ECHO MEAS - MV A MAX VEL: 74.4 CM/SEC
BH CV ECHO MEAS - MV DEC SLOPE: 384.5 CM/SEC^2
BH CV ECHO MEAS - MV DEC TIME: 0.24 SEC
BH CV ECHO MEAS - MV E MAX VEL: 79.8 CM/SEC
BH CV ECHO MEAS - MV E/A: 1.1
BH CV ECHO MEAS - MV MAX PG: 4.7 MMHG
BH CV ECHO MEAS - MV MEAN PG: 2 MMHG
BH CV ECHO MEAS - MV P1/2T MAX VEL: 116 CM/SEC
BH CV ECHO MEAS - MV P1/2T: 88.4 MSEC
BH CV ECHO MEAS - MV V2 MAX: 108 CM/SEC
BH CV ECHO MEAS - MV V2 MEAN: 60.8 CM/SEC
BH CV ECHO MEAS - MV V2 VTI: 35.7 CM
BH CV ECHO MEAS - MVA P1/2T LCG: 1.9 CM^2
BH CV ECHO MEAS - MVA(P1/2T): 2.5 CM^2
BH CV ECHO MEAS - MVA(VTI): 1.7 CM^2
BH CV ECHO MEAS - PA ACC TIME: 0.1 SEC
BH CV ECHO MEAS - PA PR(ACCEL): 34.5 MMHG
BH CV ECHO MEAS - RAP SYSTOLE: 8 MMHG
BH CV ECHO MEAS - RVSP: 38 MMHG
BH CV ECHO MEAS - SI(AO): 232.6 ML/M^2
BH CV ECHO MEAS - SI(CUBED): 46.1 ML/M^2
BH CV ECHO MEAS - SI(LVOT): 42.8 ML/M^2
BH CV ECHO MEAS - SI(MOD-SP2): 38.4 ML/M^2
BH CV ECHO MEAS - SI(MOD-SP4): 25.4 ML/M^2
BH CV ECHO MEAS - SI(TEICH): 41.6 ML/M^2
BH CV ECHO MEAS - SV(AO): 336.6 ML
BH CV ECHO MEAS - SV(CUBED): 66.7 ML
BH CV ECHO MEAS - SV(LVOT): 61.9 ML
BH CV ECHO MEAS - SV(MOD-SP2): 55.6 ML
BH CV ECHO MEAS - SV(MOD-SP4): 36.8 ML
BH CV ECHO MEAS - SV(TEICH): 60.2 ML
BH CV ECHO MEAS - TAPSE (>1.6): 1.7 CM
BH CV ECHO MEAS - TR MAX PG: 30 MMHG
BH CV ECHO MEAS - TR MAX VEL: 274.3 CM/SEC
BH CV ECHO MEASUREMENTS AVERAGE E/E' RATIO: 9.12
BH CV VAS BP LEFT ARM: NORMAL MMHG
BH CV XLRA - RV BASE: 3 CM
BH CV XLRA - RV LENGTH: 4.4 CM
BH CV XLRA - RV MID: 2.6 CM
BH CV XLRA - TDI S': 11.7 CM/SEC
BILIRUB SERPL-MCNC: 0.5 MG/DL (ref 0–1.2)
BILIRUB SERPL-MCNC: 0.6 MG/DL (ref 0–1.2)
BILIRUB UR QL STRIP: NEGATIVE
BILIRUB UR QL STRIP: NEGATIVE
BLD GP AB SCN SERPL QL: NEGATIVE
BUN SERPL-MCNC: 12 MG/DL (ref 8–23)
BUN SERPL-MCNC: 16 MG/DL (ref 8–23)
BUN SERPL-MCNC: 19 MG/DL (ref 8–23)
BUN SERPL-MCNC: 23 MG/DL (ref 8–23)
BUN/CREAT SERPL: 24 (ref 7–25)
BUN/CREAT SERPL: 33.3 (ref 7–25)
BUN/CREAT SERPL: 38.1 (ref 7–25)
BUN/CREAT SERPL: 41.8 (ref 7–25)
CALCIUM SPEC-SCNC: 8.6 MG/DL (ref 8.2–9.6)
CALCIUM SPEC-SCNC: 9 MG/DL (ref 8.2–9.6)
CALCIUM SPEC-SCNC: 9.2 MG/DL (ref 8.2–9.6)
CALCIUM SPEC-SCNC: 9.7 MG/DL (ref 8.2–9.6)
CHLORIDE SERPL-SCNC: 101 MMOL/L (ref 98–107)
CHLORIDE SERPL-SCNC: 102 MMOL/L (ref 98–107)
CHLORIDE SERPL-SCNC: 102 MMOL/L (ref 98–107)
CHLORIDE SERPL-SCNC: 104 MMOL/L (ref 98–107)
CLARITY UR: ABNORMAL
CLARITY UR: CLEAR
CO2 SERPL-SCNC: 27 MMOL/L (ref 22–29)
CO2 SERPL-SCNC: 27 MMOL/L (ref 22–29)
CO2 SERPL-SCNC: 29 MMOL/L (ref 22–29)
CO2 SERPL-SCNC: 30 MMOL/L (ref 22–29)
COLOR UR: YELLOW
COLOR UR: YELLOW
COPPER SERPL-MCNC: 127 UG/DL (ref 80–158)
CREAT SERPL-MCNC: 0.42 MG/DL (ref 0.57–1)
CREAT SERPL-MCNC: 0.5 MG/DL (ref 0.57–1)
CREAT SERPL-MCNC: 0.55 MG/DL (ref 0.57–1)
CREAT SERPL-MCNC: 0.57 MG/DL (ref 0.57–1)
CYTOLOGIST CVX/VAG CYTO: NORMAL
D-LACTATE SERPL-SCNC: 0.7 MMOL/L (ref 0.5–2)
D-LACTATE SERPL-SCNC: 1.2 MMOL/L (ref 0.5–2)
DEPRECATED RDW RBC AUTO: 58.2 FL (ref 37–54)
DEPRECATED RDW RBC AUTO: 59.7 FL (ref 37–54)
DEPRECATED RDW RBC AUTO: 59.8 FL (ref 37–54)
DEPRECATED RDW RBC AUTO: 60.7 FL (ref 37–54)
DEPRECATED RDW RBC AUTO: 63.5 FL (ref 37–54)
DEPRECATED RDW RBC AUTO: 63.5 FL (ref 37–54)
EOSINOPHIL # BLD AUTO: 0.01 10*3/MM3 (ref 0–0.4)
EOSINOPHIL # BLD AUTO: 0.02 10*3/MM3 (ref 0–0.4)
EOSINOPHIL # BLD AUTO: 0.05 10*3/MM3 (ref 0–0.4)
EOSINOPHIL # BLD AUTO: 0.05 10*3/MM3 (ref 0–0.4)
EOSINOPHIL # BLD MANUAL: 0 10*3/MM3 (ref 0–0.4)
EOSINOPHIL NFR BLD AUTO: 0.2 % (ref 0.3–6.2)
EOSINOPHIL NFR BLD AUTO: 0.6 % (ref 0.3–6.2)
EOSINOPHIL NFR BLD AUTO: 1.3 % (ref 0.3–6.2)
EOSINOPHIL NFR BLD AUTO: 1.6 % (ref 0.3–6.2)
EOSINOPHIL NFR BLD MANUAL: 0 % (ref 0.3–6.2)
ERYTHROCYTE [DISTWIDTH] IN BLOOD BY AUTOMATED COUNT: 15 % (ref 12.3–15.4)
ERYTHROCYTE [DISTWIDTH] IN BLOOD BY AUTOMATED COUNT: 15.2 % (ref 12.3–15.4)
ERYTHROCYTE [DISTWIDTH] IN BLOOD BY AUTOMATED COUNT: 15.5 % (ref 12.3–15.4)
ERYTHROCYTE [DISTWIDTH] IN BLOOD BY AUTOMATED COUNT: 15.6 % (ref 12.3–15.4)
ERYTHROCYTE [DISTWIDTH] IN BLOOD BY AUTOMATED COUNT: 15.7 % (ref 12.3–15.4)
ERYTHROCYTE [DISTWIDTH] IN BLOOD BY AUTOMATED COUNT: 15.9 % (ref 12.3–15.4)
FERRITIN SERPL-MCNC: 114.9 NG/ML (ref 13–150)
FOLATE SERPL-MCNC: 13.7 NG/ML (ref 4.78–24.2)
GFR SERPL CREATININE-BSD FRML MDRD: 104 ML/MIN/1.73
GFR SERPL CREATININE-BSD FRML MDRD: 116 ML/MIN/1.73
GFR SERPL CREATININE-BSD FRML MDRD: 141 ML/MIN/1.73
GFR SERPL CREATININE-BSD FRML MDRD: 99 ML/MIN/1.73
GLOBULIN UR ELPH-MCNC: 2.4 GM/DL
GLOBULIN UR ELPH-MCNC: 3.2 GM/DL
GLUCOSE SERPL-MCNC: 118 MG/DL (ref 65–99)
GLUCOSE SERPL-MCNC: 128 MG/DL (ref 65–99)
GLUCOSE SERPL-MCNC: 82 MG/DL (ref 65–99)
GLUCOSE SERPL-MCNC: 90 MG/DL (ref 65–99)
GLUCOSE UR STRIP-MCNC: NEGATIVE MG/DL
GLUCOSE UR STRIP-MCNC: NEGATIVE MG/DL
HAPTOGLOB SERPL-MCNC: 208 MG/DL (ref 30–200)
HCT VFR BLD AUTO: 21.5 % (ref 34–46.6)
HCT VFR BLD AUTO: 23 % (ref 34–46.6)
HCT VFR BLD AUTO: 23.4 % (ref 34–46.6)
HCT VFR BLD AUTO: 26.8 % (ref 34–46.6)
HCT VFR BLD AUTO: 27 % (ref 34–46.6)
HCT VFR BLD AUTO: 31.3 % (ref 34–46.6)
HGB BLD-MCNC: 10.1 G/DL (ref 12–15.9)
HGB BLD-MCNC: 7.1 G/DL (ref 12–15.9)
HGB BLD-MCNC: 7.3 G/DL (ref 12–15.9)
HGB BLD-MCNC: 7.4 G/DL (ref 12–15.9)
HGB BLD-MCNC: 8.6 G/DL (ref 12–15.9)
HGB BLD-MCNC: 8.7 G/DL (ref 12–15.9)
HGB UR QL STRIP.AUTO: NEGATIVE
HGB UR QL STRIP.AUTO: NEGATIVE
HOLD SPECIMEN: NORMAL
HYALINE CASTS UR QL AUTO: ABNORMAL /LPF
IMM GRANULOCYTES # BLD AUTO: 0.01 10*3/MM3 (ref 0–0.05)
IMM GRANULOCYTES # BLD AUTO: 0.01 10*3/MM3 (ref 0–0.05)
IMM GRANULOCYTES # BLD AUTO: 0.03 10*3/MM3 (ref 0–0.05)
IMM GRANULOCYTES # BLD AUTO: 0.03 10*3/MM3 (ref 0–0.05)
IMM GRANULOCYTES NFR BLD AUTO: 0.3 % (ref 0–0.5)
IMM GRANULOCYTES NFR BLD AUTO: 0.3 % (ref 0–0.5)
IMM GRANULOCYTES NFR BLD AUTO: 0.7 % (ref 0–0.5)
IMM GRANULOCYTES NFR BLD AUTO: 1 % (ref 0–0.5)
INR PPP: 1.1 (ref 0.85–1.16)
IRON 24H UR-MRATE: 32 MCG/DL (ref 37–145)
IRON SATN MFR SERPL: 13 % (ref 20–50)
KETONES UR QL STRIP: ABNORMAL
KETONES UR QL STRIP: ABNORMAL
LDH SERPL-CCNC: 181 U/L (ref 135–214)
LEFT ATRIUM VOLUME INDEX: 37.8 ML/M^2
LEFT ATRIUM VOLUME: 54.7 ML
LEUKOCYTE ESTERASE UR QL STRIP.AUTO: ABNORMAL
LEUKOCYTE ESTERASE UR QL STRIP.AUTO: NEGATIVE
LV EF 2D ECHO EST: 55 %
LYMPHOCYTES # BLD AUTO: 0.35 10*3/MM3 (ref 0.7–3.1)
LYMPHOCYTES # BLD AUTO: 0.42 10*3/MM3 (ref 0.7–3.1)
LYMPHOCYTES # BLD AUTO: 0.54 10*3/MM3 (ref 0.7–3.1)
LYMPHOCYTES # BLD AUTO: 0.55 10*3/MM3 (ref 0.7–3.1)
LYMPHOCYTES # BLD MANUAL: 0.7 10*3/MM3 (ref 0.7–3.1)
LYMPHOCYTES NFR BLD AUTO: 17.3 % (ref 19.6–45.3)
LYMPHOCYTES NFR BLD AUTO: 17.9 % (ref 19.6–45.3)
LYMPHOCYTES NFR BLD AUTO: 9.2 % (ref 19.6–45.3)
LYMPHOCYTES NFR BLD AUTO: 9.7 % (ref 19.6–45.3)
LYMPHOCYTES NFR BLD MANUAL: 9 % (ref 5–12)
MACROCYTES BLD QL SMEAR: ABNORMAL
MAGNESIUM SERPL-MCNC: 1.8 MG/DL (ref 1.7–2.3)
MAXIMAL PREDICTED HEART RATE: 129 BPM
MCH RBC QN AUTO: 34.5 PG (ref 26.6–33)
MCH RBC QN AUTO: 34.7 PG (ref 26.6–33)
MCH RBC QN AUTO: 34.9 PG (ref 26.6–33)
MCH RBC QN AUTO: 34.9 PG (ref 26.6–33)
MCH RBC QN AUTO: 35.4 PG (ref 26.6–33)
MCH RBC QN AUTO: 35.7 PG (ref 26.6–33)
MCHC RBC AUTO-ENTMCNC: 31.2 G/DL (ref 31.5–35.7)
MCHC RBC AUTO-ENTMCNC: 31.9 G/DL (ref 31.5–35.7)
MCHC RBC AUTO-ENTMCNC: 32.2 G/DL (ref 31.5–35.7)
MCHC RBC AUTO-ENTMCNC: 32.3 G/DL (ref 31.5–35.7)
MCHC RBC AUTO-ENTMCNC: 32.5 G/DL (ref 31.5–35.7)
MCHC RBC AUTO-ENTMCNC: 33 G/DL (ref 31.5–35.7)
MCV RBC AUTO: 107.6 FL (ref 79–97)
MCV RBC AUTO: 108 FL (ref 79–97)
MCV RBC AUTO: 108 FL (ref 79–97)
MCV RBC AUTO: 108.4 FL (ref 79–97)
MCV RBC AUTO: 109.8 FL (ref 79–97)
MCV RBC AUTO: 112 FL (ref 79–97)
METAMYELOCYTES NFR BLD MANUAL: 4 % (ref 0–0)
MONOCYTES # BLD AUTO: 0.3 10*3/MM3 (ref 0.1–0.9)
MONOCYTES # BLD AUTO: 0.3 10*3/MM3 (ref 0.1–0.9)
MONOCYTES # BLD AUTO: 0.33 10*3/MM3 (ref 0.1–0.9)
MONOCYTES # BLD AUTO: 0.39 10*3/MM3 (ref 0.1–0.9)
MONOCYTES # BLD: 0.28 10*3/MM3 (ref 0.1–0.9)
MONOCYTES NFR BLD AUTO: 10.3 % (ref 5–12)
MONOCYTES NFR BLD AUTO: 10.6 % (ref 5–12)
MONOCYTES NFR BLD AUTO: 6.9 % (ref 5–12)
MONOCYTES NFR BLD AUTO: 9.8 % (ref 5–12)
NEUTROPHILS # BLD AUTO: 2.05 10*3/MM3 (ref 1.7–7)
NEUTROPHILS NFR BLD AUTO: 2.14 10*3/MM3 (ref 1.7–7)
NEUTROPHILS NFR BLD AUTO: 2.22 10*3/MM3 (ref 1.7–7)
NEUTROPHILS NFR BLD AUTO: 2.99 10*3/MM3 (ref 1.7–7)
NEUTROPHILS NFR BLD AUTO: 3.58 10*3/MM3 (ref 1.7–7)
NEUTROPHILS NFR BLD AUTO: 69.7 % (ref 42.7–76)
NEUTROPHILS NFR BLD AUTO: 71.2 % (ref 42.7–76)
NEUTROPHILS NFR BLD AUTO: 78.9 % (ref 42.7–76)
NEUTROPHILS NFR BLD AUTO: 82.3 % (ref 42.7–76)
NEUTROPHILS NFR BLD MANUAL: 30 % (ref 42.7–76)
NEUTS BAND NFR BLD MANUAL: 35 % (ref 0–5)
NEUTS VAC BLD QL SMEAR: ABNORMAL
NITRITE UR QL STRIP: NEGATIVE
NITRITE UR QL STRIP: NEGATIVE
NRBC BLD AUTO-RTO: 0 /100 WBC (ref 0–0.2)
NT-PROBNP SERPL-MCNC: 648.8 PG/ML (ref 0–1800)
PATH INTERP BLD-IMP: NORMAL
PH UR STRIP.AUTO: 5.5 [PH] (ref 5–8)
PH UR STRIP.AUTO: 6 [PH] (ref 5–8)
PLAT MORPH BLD: NORMAL
PLAT MORPH BLD: NORMAL
PLATELET # BLD AUTO: 125 10*3/MM3 (ref 140–450)
PLATELET # BLD AUTO: 127 10*3/MM3 (ref 140–450)
PLATELET # BLD AUTO: 135 10*3/MM3 (ref 140–450)
PLATELET # BLD AUTO: 14 10*3/MM3 (ref 140–450)
PLATELET # BLD AUTO: 60 10*3/MM3 (ref 140–450)
PLATELET # BLD AUTO: 69 10*3/MM3 (ref 140–450)
PMV BLD AUTO: 10 FL (ref 6–12)
PMV BLD AUTO: 10.3 FL (ref 6–12)
PMV BLD AUTO: 11.1 FL (ref 6–12)
PMV BLD AUTO: 11.1 FL (ref 6–12)
PMV BLD AUTO: 9.6 FL (ref 6–12)
POTASSIUM SERPL-SCNC: 4.1 MMOL/L (ref 3.5–5.2)
POTASSIUM SERPL-SCNC: 4.1 MMOL/L (ref 3.5–5.2)
POTASSIUM SERPL-SCNC: 4.4 MMOL/L (ref 3.5–5.2)
POTASSIUM SERPL-SCNC: 4.5 MMOL/L (ref 3.5–5.2)
PROCALCITONIN SERPL-MCNC: 0.09 NG/ML (ref 0–0.25)
PROCALCITONIN SERPL-MCNC: 0.48 NG/ML (ref 0–0.25)
PROT SERPL-MCNC: 5.5 G/DL (ref 6–8.5)
PROT SERPL-MCNC: 7.1 G/DL (ref 6–8.5)
PROT UR QL STRIP: ABNORMAL
PROT UR QL STRIP: NEGATIVE
PROTHROMBIN TIME: 13.8 SECONDS (ref 11.4–14.4)
QT INTERVAL: 360 MS
QTC INTERVAL: 415 MS
RBC # BLD AUTO: 1.99 10*6/MM3 (ref 3.77–5.28)
RBC # BLD AUTO: 2.09 10*6/MM3 (ref 3.77–5.28)
RBC # BLD AUTO: 2.13 10*6/MM3 (ref 3.77–5.28)
RBC # BLD AUTO: 2.49 10*6/MM3 (ref 3.77–5.28)
RBC # BLD AUTO: 2.49 10*6/MM3 (ref 3.77–5.28)
RBC # BLD AUTO: 2.85 10*6/MM3 (ref 3.77–5.28)
RBC # UR STRIP: ABNORMAL /HPF
RBC MORPH BLD: NORMAL
REF LAB TEST METHOD: ABNORMAL
RETICS # AUTO: 0.07 10*6/MM3 (ref 0.02–0.13)
RETICS/RBC NFR AUTO: 3.32 % (ref 0.7–1.9)
RH BLD: NEGATIVE
SARS-COV-2 RNA PNL SPEC NAA+PROBE: NOT DETECTED
SODIUM SERPL-SCNC: 139 MMOL/L (ref 136–145)
SODIUM SERPL-SCNC: 141 MMOL/L (ref 136–145)
SODIUM SERPL-SCNC: 142 MMOL/L (ref 136–145)
SODIUM SERPL-SCNC: 142 MMOL/L (ref 136–145)
SP GR UR STRIP: 1.01 (ref 1–1.03)
SP GR UR STRIP: 1.02 (ref 1–1.03)
SQUAMOUS #/AREA URNS HPF: ABNORMAL /HPF
STRESS TARGET HR: 110 BPM
T&S EXPIRATION DATE: NORMAL
TIBC SERPL-MCNC: 238 MCG/DL (ref 298–536)
TRANSFERRIN SERPL-MCNC: 160 MG/DL (ref 200–360)
TROPONIN T SERPL-MCNC: <0.01 NG/ML (ref 0–0.03)
UNIT  ABO: NORMAL
UNIT  RH: NORMAL
UROBILINOGEN UR QL STRIP: ABNORMAL
UROBILINOGEN UR QL STRIP: ABNORMAL
VARIANT LYMPHS NFR BLD MANUAL: 19 % (ref 19.6–45.3)
VARIANT LYMPHS NFR BLD MANUAL: 3 % (ref 0–5)
VIT B12 BLD-MCNC: >2000 PG/ML (ref 211–946)
WBC # UR STRIP: ABNORMAL /HPF
WBC MORPH BLD: NORMAL
WBC NRBC COR # BLD: 3.07 10*3/MM3 (ref 3.4–10.8)
WBC NRBC COR # BLD: 3.12 10*3/MM3 (ref 3.4–10.8)
WBC NRBC COR # BLD: 3.16 10*3/MM3 (ref 3.4–10.8)
WBC NRBC COR # BLD: 3.79 10*3/MM3 (ref 3.4–10.8)
WBC NRBC COR # BLD: 4.09 10*3/MM3 (ref 3.4–10.8)
WBC NRBC COR # BLD: 4.35 10*3/MM3 (ref 3.4–10.8)
WHOLE BLOOD HOLD SPECIMEN: NORMAL
WHOLE BLOOD HOLD SPECIMEN: NORMAL
ZINC SERPL-MCNC: 137 UG/DL (ref 44–115)

## 2021-01-01 PROCEDURE — 93005 ELECTROCARDIOGRAM TRACING: CPT | Performed by: EMERGENCY MEDICINE

## 2021-01-01 PROCEDURE — 85025 COMPLETE CBC W/AUTO DIFF WBC: CPT | Performed by: EMERGENCY MEDICINE

## 2021-01-01 PROCEDURE — 25010000002 FUROSEMIDE PER 20 MG: Performed by: NURSE PRACTITIONER

## 2021-01-01 PROCEDURE — 99285 EMERGENCY DEPT VISIT HI MDM: CPT

## 2021-01-01 PROCEDURE — 87040 BLOOD CULTURE FOR BACTERIA: CPT | Performed by: INTERNAL MEDICINE

## 2021-01-01 PROCEDURE — 92526 ORAL FUNCTION THERAPY: CPT

## 2021-01-01 PROCEDURE — 94799 UNLISTED PULMONARY SVC/PX: CPT

## 2021-01-01 PROCEDURE — 25010000002 HALOPERIDOL LACTATE PER 5 MG: Performed by: NURSE PRACTITIONER

## 2021-01-01 PROCEDURE — 83540 ASSAY OF IRON: CPT | Performed by: INTERNAL MEDICINE

## 2021-01-01 PROCEDURE — G0378 HOSPITAL OBSERVATION PER HR: HCPCS

## 2021-01-01 PROCEDURE — 85610 PROTHROMBIN TIME: CPT | Performed by: EMERGENCY MEDICINE

## 2021-01-01 PROCEDURE — 83605 ASSAY OF LACTIC ACID: CPT | Performed by: INTERNAL MEDICINE

## 2021-01-01 PROCEDURE — 99232 SBSQ HOSP IP/OBS MODERATE 35: CPT | Performed by: NURSE PRACTITIONER

## 2021-01-01 PROCEDURE — 25010000002 LORAZEPAM PER 2 MG: Performed by: INTERNAL MEDICINE

## 2021-01-01 PROCEDURE — 99231 SBSQ HOSP IP/OBS SF/LOW 25: CPT | Performed by: PHYSICIAN ASSISTANT

## 2021-01-01 PROCEDURE — 25010000002 MORPHINE PER 10 MG: Performed by: NURSE PRACTITIONER

## 2021-01-01 PROCEDURE — 80053 COMPREHEN METABOLIC PANEL: CPT | Performed by: INTERNAL MEDICINE

## 2021-01-01 PROCEDURE — 83735 ASSAY OF MAGNESIUM: CPT | Performed by: NURSE PRACTITIONER

## 2021-01-01 PROCEDURE — 74230 X-RAY XM SWLNG FUNCJ C+: CPT

## 2021-01-01 PROCEDURE — 99233 SBSQ HOSP IP/OBS HIGH 50: CPT | Performed by: PHYSICIAN ASSISTANT

## 2021-01-01 PROCEDURE — 82746 ASSAY OF FOLIC ACID SERUM: CPT | Performed by: INTERNAL MEDICINE

## 2021-01-01 PROCEDURE — 99233 SBSQ HOSP IP/OBS HIGH 50: CPT | Performed by: INTERNAL MEDICINE

## 2021-01-01 PROCEDURE — U0004 COV-19 TEST NON-CDC HGH THRU: HCPCS | Performed by: INTERNAL MEDICINE

## 2021-01-01 PROCEDURE — 97165 OT EVAL LOW COMPLEX 30 MIN: CPT

## 2021-01-01 PROCEDURE — 87040 BLOOD CULTURE FOR BACTERIA: CPT | Performed by: NURSE PRACTITIONER

## 2021-01-01 PROCEDURE — 99232 SBSQ HOSP IP/OBS MODERATE 35: CPT | Performed by: INTERNAL MEDICINE

## 2021-01-01 PROCEDURE — 85025 COMPLETE CBC W/AUTO DIFF WBC: CPT | Performed by: NURSE PRACTITIONER

## 2021-01-01 PROCEDURE — 97530 THERAPEUTIC ACTIVITIES: CPT

## 2021-01-01 PROCEDURE — 25010000002 LORAZEPAM PER 2 MG: Performed by: NURSE PRACTITIONER

## 2021-01-01 PROCEDURE — 86901 BLOOD TYPING SEROLOGIC RH(D): CPT | Performed by: EMERGENCY MEDICINE

## 2021-01-01 PROCEDURE — 80048 BASIC METABOLIC PNL TOTAL CA: CPT | Performed by: NURSE PRACTITIONER

## 2021-01-01 PROCEDURE — 94761 N-INVAS EAR/PLS OXIMETRY MLT: CPT

## 2021-01-01 PROCEDURE — 86900 BLOOD TYPING SEROLOGIC ABO: CPT | Performed by: EMERGENCY MEDICINE

## 2021-01-01 PROCEDURE — 94760 N-INVAS EAR/PLS OXIMETRY 1: CPT

## 2021-01-01 PROCEDURE — 99238 HOSP IP/OBS DSCHRG MGMT 30/<: CPT | Performed by: PHYSICIAN ASSISTANT

## 2021-01-01 PROCEDURE — 99223 1ST HOSP IP/OBS HIGH 75: CPT | Performed by: NURSE PRACTITIONER

## 2021-01-01 PROCEDURE — 85045 AUTOMATED RETICULOCYTE COUNT: CPT | Performed by: INTERNAL MEDICINE

## 2021-01-01 PROCEDURE — 25010000002 FUROSEMIDE PER 20 MG: Performed by: PHYSICIAN ASSISTANT

## 2021-01-01 PROCEDURE — 97535 SELF CARE MNGMENT TRAINING: CPT

## 2021-01-01 PROCEDURE — 86850 RBC ANTIBODY SCREEN: CPT | Performed by: EMERGENCY MEDICINE

## 2021-01-01 PROCEDURE — 85027 COMPLETE CBC AUTOMATED: CPT | Performed by: PHYSICIAN ASSISTANT

## 2021-01-01 PROCEDURE — 36430 TRANSFUSION BLD/BLD COMPNT: CPT

## 2021-01-01 PROCEDURE — 25010000002 CEFTRIAXONE PER 250 MG: Performed by: PHYSICIAN ASSISTANT

## 2021-01-01 PROCEDURE — 94640 AIRWAY INHALATION TREATMENT: CPT

## 2021-01-01 PROCEDURE — P9100 PATHOGEN TEST FOR PLATELETS: HCPCS

## 2021-01-01 PROCEDURE — 81001 URINALYSIS AUTO W/SCOPE: CPT | Performed by: NURSE PRACTITIONER

## 2021-01-01 PROCEDURE — 82525 ASSAY OF COPPER: CPT | Performed by: INTERNAL MEDICINE

## 2021-01-01 PROCEDURE — 85025 COMPLETE CBC W/AUTO DIFF WBC: CPT | Performed by: INTERNAL MEDICINE

## 2021-01-01 PROCEDURE — 83605 ASSAY OF LACTIC ACID: CPT | Performed by: NURSE PRACTITIONER

## 2021-01-01 PROCEDURE — 84630 ASSAY OF ZINC: CPT | Performed by: INTERNAL MEDICINE

## 2021-01-01 PROCEDURE — 82607 VITAMIN B-12: CPT | Performed by: INTERNAL MEDICINE

## 2021-01-01 PROCEDURE — 84484 ASSAY OF TROPONIN QUANT: CPT | Performed by: EMERGENCY MEDICINE

## 2021-01-01 PROCEDURE — 85060 BLOOD SMEAR INTERPRETATION: CPT | Performed by: INTERNAL MEDICINE

## 2021-01-01 PROCEDURE — 80053 COMPREHEN METABOLIC PANEL: CPT | Performed by: EMERGENCY MEDICINE

## 2021-01-01 PROCEDURE — 92610 EVALUATE SWALLOWING FUNCTION: CPT

## 2021-01-01 PROCEDURE — 85007 BL SMEAR W/DIFF WBC COUNT: CPT | Performed by: EMERGENCY MEDICINE

## 2021-01-01 PROCEDURE — 97110 THERAPEUTIC EXERCISES: CPT

## 2021-01-01 PROCEDURE — 99233 SBSQ HOSP IP/OBS HIGH 50: CPT | Performed by: NURSE PRACTITIONER

## 2021-01-01 PROCEDURE — 93306 TTE W/DOPPLER COMPLETE: CPT

## 2021-01-01 PROCEDURE — 93306 TTE W/DOPPLER COMPLETE: CPT | Performed by: INTERNAL MEDICINE

## 2021-01-01 PROCEDURE — 99222 1ST HOSP IP/OBS MODERATE 55: CPT | Performed by: INTERNAL MEDICINE

## 2021-01-01 PROCEDURE — 99232 SBSQ HOSP IP/OBS MODERATE 35: CPT | Performed by: PHYSICIAN ASSISTANT

## 2021-01-01 PROCEDURE — 83615 LACTATE (LD) (LDH) ENZYME: CPT | Performed by: INTERNAL MEDICINE

## 2021-01-01 PROCEDURE — 83880 ASSAY OF NATRIURETIC PEPTIDE: CPT | Performed by: EMERGENCY MEDICINE

## 2021-01-01 PROCEDURE — 83010 ASSAY OF HAPTOGLOBIN QUANT: CPT | Performed by: INTERNAL MEDICINE

## 2021-01-01 PROCEDURE — 84145 PROCALCITONIN (PCT): CPT | Performed by: NURSE PRACTITIONER

## 2021-01-01 PROCEDURE — 85007 BL SMEAR W/DIFF WBC COUNT: CPT | Performed by: NURSE PRACTITIONER

## 2021-01-01 PROCEDURE — 92611 MOTION FLUOROSCOPY/SWALLOW: CPT

## 2021-01-01 PROCEDURE — 25010000002 MORPHINE PER 10 MG: Performed by: INTERNAL MEDICINE

## 2021-01-01 PROCEDURE — 81003 URINALYSIS AUTO W/O SCOPE: CPT | Performed by: NURSE PRACTITIONER

## 2021-01-01 PROCEDURE — 71275 CT ANGIOGRAPHY CHEST: CPT

## 2021-01-01 PROCEDURE — 99223 1ST HOSP IP/OBS HIGH 75: CPT | Performed by: INTERNAL MEDICINE

## 2021-01-01 PROCEDURE — 84466 ASSAY OF TRANSFERRIN: CPT | Performed by: INTERNAL MEDICINE

## 2021-01-01 PROCEDURE — 0 IOPAMIDOL PER 1 ML: Performed by: EMERGENCY MEDICINE

## 2021-01-01 PROCEDURE — 71045 X-RAY EXAM CHEST 1 VIEW: CPT

## 2021-01-01 PROCEDURE — P9035 PLATELET PHERES LEUKOREDUCED: HCPCS

## 2021-01-01 PROCEDURE — 87086 URINE CULTURE/COLONY COUNT: CPT | Performed by: PHYSICIAN ASSISTANT

## 2021-01-01 PROCEDURE — 82728 ASSAY OF FERRITIN: CPT | Performed by: INTERNAL MEDICINE

## 2021-01-01 PROCEDURE — 97162 PT EVAL MOD COMPLEX 30 MIN: CPT

## 2021-01-01 PROCEDURE — 73502 X-RAY EXAM HIP UNI 2-3 VIEWS: CPT

## 2021-01-01 RX ORDER — IPRATROPIUM BROMIDE AND ALBUTEROL SULFATE 2.5; .5 MG/3ML; MG/3ML
3 SOLUTION RESPIRATORY (INHALATION)
Status: DISCONTINUED | OUTPATIENT
Start: 2021-01-01 | End: 2021-01-01

## 2021-01-01 RX ORDER — GABAPENTIN 300 MG/1
300 CAPSULE ORAL 2 TIMES DAILY
Status: DISCONTINUED | OUTPATIENT
Start: 2021-01-01 | End: 2021-01-01 | Stop reason: HOSPADM

## 2021-01-01 RX ORDER — GABAPENTIN 300 MG/1
300 CAPSULE ORAL 2 TIMES DAILY
Status: DISCONTINUED | OUTPATIENT
Start: 2021-01-01 | End: 2021-01-01

## 2021-01-01 RX ORDER — KETOROLAC TROMETHAMINE 30 MG/ML
15 INJECTION, SOLUTION INTRAMUSCULAR; INTRAVENOUS EVERY 6 HOURS PRN
Status: DISCONTINUED | OUTPATIENT
Start: 2021-01-01 | End: 2021-01-01

## 2021-01-01 RX ORDER — DOCUSATE SODIUM 100 MG/1
100 CAPSULE, LIQUID FILLED ORAL 2 TIMES DAILY
Status: DISCONTINUED | OUTPATIENT
Start: 2021-01-01 | End: 2021-01-01

## 2021-01-01 RX ORDER — FUROSEMIDE 10 MG/ML
20 INJECTION INTRAMUSCULAR; INTRAVENOUS ONCE
Status: COMPLETED | OUTPATIENT
Start: 2021-01-01 | End: 2021-01-01

## 2021-01-01 RX ORDER — SODIUM CHLORIDE 0.9 % (FLUSH) 0.9 %
10 SYRINGE (ML) INJECTION AS NEEDED
Status: DISCONTINUED | OUTPATIENT
Start: 2021-01-01 | End: 2021-01-01 | Stop reason: HOSPADM

## 2021-01-01 RX ORDER — GLYCOPYRROLATE 0.2 MG/ML
0.2 INJECTION INTRAMUSCULAR; INTRAVENOUS EVERY 6 HOURS PRN
Status: DISCONTINUED | OUTPATIENT
Start: 2021-01-01 | End: 2021-12-14 | Stop reason: HOSPADM

## 2021-01-01 RX ORDER — HALOPERIDOL 5 MG/ML
0.5 INJECTION INTRAMUSCULAR EVERY 6 HOURS PRN
Status: DISCONTINUED | OUTPATIENT
Start: 2021-01-01 | End: 2021-01-01 | Stop reason: HOSPADM

## 2021-01-01 RX ORDER — BISACODYL 10 MG
10 SUPPOSITORY, RECTAL RECTAL DAILY PRN
Status: CANCELLED | OUTPATIENT
Start: 2021-01-01

## 2021-01-01 RX ORDER — MORPHINE SULFATE 2 MG/ML
2 INJECTION, SOLUTION INTRAMUSCULAR; INTRAVENOUS
Status: DISCONTINUED | OUTPATIENT
Start: 2021-01-01 | End: 2021-12-14 | Stop reason: HOSPADM

## 2021-01-01 RX ORDER — GLYCOPYRROLATE 0.2 MG/ML
0.2 INJECTION INTRAMUSCULAR; INTRAVENOUS EVERY 4 HOURS PRN
Status: CANCELLED | OUTPATIENT
Start: 2021-01-01

## 2021-01-01 RX ORDER — POLYVINYL ALCOHOL 14 MG/ML
2 SOLUTION/ DROPS OPHTHALMIC
Status: DISCONTINUED | OUTPATIENT
Start: 2021-01-01 | End: 2021-01-01

## 2021-01-01 RX ORDER — QUETIAPINE FUMARATE 25 MG/1
25 TABLET, FILM COATED ORAL NIGHTLY
Status: DISCONTINUED | OUTPATIENT
Start: 2021-01-01 | End: 2021-01-01

## 2021-01-01 RX ORDER — MULTIVITAMIN WITH IRON
200 TABLET ORAL DAILY
Status: DISCONTINUED | OUTPATIENT
Start: 2021-01-01 | End: 2021-01-01

## 2021-01-01 RX ORDER — SODIUM CHLORIDE 0.9 % (FLUSH) 0.9 %
10 SYRINGE (ML) INJECTION AS NEEDED
Status: CANCELLED | OUTPATIENT
Start: 2021-01-01

## 2021-01-01 RX ORDER — POLYVINYL ALCOHOL 14 MG/ML
2 SOLUTION/ DROPS OPHTHALMIC
Status: DISCONTINUED | OUTPATIENT
Start: 2021-01-01 | End: 2021-12-14 | Stop reason: HOSPADM

## 2021-01-01 RX ORDER — POLYVINYL ALCOHOL 14 MG/ML
2 SOLUTION/ DROPS OPHTHALMIC 2 TIMES DAILY
Status: DISCONTINUED | OUTPATIENT
Start: 2021-01-01 | End: 2021-12-14 | Stop reason: HOSPADM

## 2021-01-01 RX ORDER — GUAIFENESIN 600 MG/1
600 TABLET, EXTENDED RELEASE ORAL EVERY 12 HOURS SCHEDULED
Status: DISCONTINUED | OUTPATIENT
Start: 2021-01-01 | End: 2021-01-01

## 2021-01-01 RX ORDER — ACETAMINOPHEN 325 MG/1
650 TABLET ORAL EVERY 4 HOURS PRN
Status: DISCONTINUED | OUTPATIENT
Start: 2021-01-01 | End: 2021-01-01

## 2021-01-01 RX ORDER — HALOPERIDOL 5 MG/ML
0.5 INJECTION INTRAMUSCULAR EVERY 6 HOURS PRN
Status: CANCELLED | OUTPATIENT
Start: 2021-01-01

## 2021-01-01 RX ORDER — BISACODYL 5 MG/1
10 TABLET, DELAYED RELEASE ORAL DAILY PRN
Status: DISCONTINUED | OUTPATIENT
Start: 2021-01-01 | End: 2021-01-01

## 2021-01-01 RX ORDER — ALBUTEROL SULFATE 2.5 MG/3ML
2.5 SOLUTION RESPIRATORY (INHALATION) EVERY 6 HOURS PRN
Status: DISCONTINUED | OUTPATIENT
Start: 2021-01-01 | End: 2021-01-01

## 2021-01-01 RX ORDER — SCOLOPAMINE TRANSDERMAL SYSTEM 1 MG/1
1 PATCH, EXTENDED RELEASE TRANSDERMAL
Status: CANCELLED | OUTPATIENT
Start: 2021-01-01

## 2021-01-01 RX ORDER — LORAZEPAM 2 MG/ML
0.25 INJECTION INTRAMUSCULAR EVERY 12 HOURS SCHEDULED
Status: DISCONTINUED | OUTPATIENT
Start: 2021-01-01 | End: 2021-01-01 | Stop reason: HOSPADM

## 2021-01-01 RX ORDER — MORPHINE SULFATE 2 MG/ML
2 INJECTION, SOLUTION INTRAMUSCULAR; INTRAVENOUS EVERY 6 HOURS
Status: DISCONTINUED | OUTPATIENT
Start: 2021-01-01 | End: 2021-12-14 | Stop reason: HOSPADM

## 2021-01-01 RX ORDER — LORAZEPAM 2 MG/ML
0.25 INJECTION INTRAMUSCULAR EVERY 6 HOURS
Status: DISCONTINUED | OUTPATIENT
Start: 2021-01-01 | End: 2021-12-14 | Stop reason: HOSPADM

## 2021-01-01 RX ORDER — MORPHINE SULFATE 2 MG/ML
1 INJECTION, SOLUTION INTRAMUSCULAR; INTRAVENOUS
Status: DISCONTINUED | OUTPATIENT
Start: 2021-01-01 | End: 2021-01-01 | Stop reason: HOSPADM

## 2021-01-01 RX ORDER — IPRATROPIUM BROMIDE AND ALBUTEROL SULFATE 2.5; .5 MG/3ML; MG/3ML
3 SOLUTION RESPIRATORY (INHALATION) EVERY 4 HOURS PRN
Status: DISCONTINUED | OUTPATIENT
Start: 2021-01-01 | End: 2021-01-01 | Stop reason: HOSPADM

## 2021-01-01 RX ORDER — SODIUM CHLORIDE 0.9 % (FLUSH) 0.9 %
10 SYRINGE (ML) INJECTION EVERY 12 HOURS SCHEDULED
Status: DISCONTINUED | OUTPATIENT
Start: 2021-01-01 | End: 2021-01-01 | Stop reason: HOSPADM

## 2021-01-01 RX ORDER — ACETAMINOPHEN 160 MG/5ML
650 SOLUTION ORAL EVERY 6 HOURS PRN
Status: DISCONTINUED | OUTPATIENT
Start: 2021-01-01 | End: 2021-01-01 | Stop reason: HOSPADM

## 2021-01-01 RX ORDER — ONDANSETRON 2 MG/ML
4 INJECTION INTRAMUSCULAR; INTRAVENOUS EVERY 6 HOURS PRN
Status: CANCELLED | OUTPATIENT
Start: 2021-01-01

## 2021-01-01 RX ORDER — LORAZEPAM 2 MG/ML
0.25 INJECTION INTRAMUSCULAR EVERY 12 HOURS SCHEDULED
Status: DISCONTINUED | OUTPATIENT
Start: 2021-01-01 | End: 2021-01-01

## 2021-01-01 RX ORDER — CHOLECALCIFEROL (VITAMIN D3) 125 MCG
5 CAPSULE ORAL NIGHTLY PRN
Status: DISCONTINUED | OUTPATIENT
Start: 2021-01-01 | End: 2021-01-01

## 2021-01-01 RX ORDER — MORPHINE SULFATE 2 MG/ML
1 INJECTION, SOLUTION INTRAMUSCULAR; INTRAVENOUS EVERY 6 HOURS
Status: DISCONTINUED | OUTPATIENT
Start: 2021-01-01 | End: 2021-01-01

## 2021-01-01 RX ORDER — ACETAMINOPHEN 650 MG/1
650 SUPPOSITORY RECTAL EVERY 4 HOURS PRN
Status: DISCONTINUED | OUTPATIENT
Start: 2021-01-01 | End: 2021-12-14 | Stop reason: HOSPADM

## 2021-01-01 RX ORDER — ONDANSETRON 2 MG/ML
4 INJECTION INTRAMUSCULAR; INTRAVENOUS EVERY 6 HOURS PRN
Status: DISCONTINUED | OUTPATIENT
Start: 2021-01-01 | End: 2021-01-01 | Stop reason: HOSPADM

## 2021-01-01 RX ORDER — FUROSEMIDE 10 MG/ML
20 INJECTION INTRAMUSCULAR; INTRAVENOUS EVERY 6 HOURS PRN
Status: DISCONTINUED | OUTPATIENT
Start: 2021-01-01 | End: 2021-12-14 | Stop reason: HOSPADM

## 2021-01-01 RX ORDER — BISACODYL 10 MG
10 SUPPOSITORY, RECTAL RECTAL DAILY PRN
Status: DISCONTINUED | OUTPATIENT
Start: 2021-01-01 | End: 2021-01-01 | Stop reason: HOSPADM

## 2021-01-01 RX ORDER — ACETAMINOPHEN 160 MG/5ML
650 SOLUTION ORAL EVERY 6 HOURS PRN
Status: CANCELLED | OUTPATIENT
Start: 2021-01-01

## 2021-01-01 RX ORDER — LORAZEPAM 2 MG/ML
0.5 INJECTION INTRAMUSCULAR EVERY 4 HOURS PRN
Status: CANCELLED | OUTPATIENT
Start: 2021-01-01 | End: 2021-01-01

## 2021-01-01 RX ORDER — MORPHINE SULFATE 2 MG/ML
1 INJECTION, SOLUTION INTRAMUSCULAR; INTRAVENOUS
Status: CANCELLED | OUTPATIENT
Start: 2021-01-01

## 2021-01-01 RX ORDER — BISACODYL 10 MG
10 SUPPOSITORY, RECTAL RECTAL DAILY PRN
Status: DISCONTINUED | OUTPATIENT
Start: 2021-01-01 | End: 2021-12-14 | Stop reason: HOSPADM

## 2021-01-01 RX ORDER — ALBUTEROL SULFATE 2.5 MG/3ML
2.5 SOLUTION RESPIRATORY (INHALATION) EVERY 6 HOURS PRN
Status: DISCONTINUED | OUTPATIENT
Start: 2021-01-01 | End: 2021-01-01 | Stop reason: HOSPADM

## 2021-01-01 RX ORDER — HALOPERIDOL 5 MG/ML
1 INJECTION INTRAMUSCULAR EVERY 4 HOURS PRN
Status: DISCONTINUED | OUTPATIENT
Start: 2021-01-01 | End: 2021-12-14 | Stop reason: HOSPADM

## 2021-01-01 RX ORDER — SCOLOPAMINE TRANSDERMAL SYSTEM 1 MG/1
1 PATCH, EXTENDED RELEASE TRANSDERMAL
Status: DISCONTINUED | OUTPATIENT
Start: 2021-01-01 | End: 2021-12-14 | Stop reason: HOSPADM

## 2021-01-01 RX ORDER — IPRATROPIUM BROMIDE AND ALBUTEROL SULFATE 2.5; .5 MG/3ML; MG/3ML
3 SOLUTION RESPIRATORY (INHALATION) EVERY 4 HOURS PRN
Status: DISCONTINUED | OUTPATIENT
Start: 2021-01-01 | End: 2021-12-14 | Stop reason: HOSPADM

## 2021-01-01 RX ORDER — IPRATROPIUM BROMIDE AND ALBUTEROL SULFATE 2.5; .5 MG/3ML; MG/3ML
3 SOLUTION RESPIRATORY (INHALATION) EVERY 4 HOURS PRN
Status: CANCELLED | OUTPATIENT
Start: 2021-01-01

## 2021-01-01 RX ORDER — MULTIPLE VITAMINS W/ MINERALS TAB 9MG-400MCG
1 TAB ORAL DAILY
Status: DISCONTINUED | OUTPATIENT
Start: 2021-01-01 | End: 2021-01-01

## 2021-01-01 RX ORDER — LORAZEPAM 2 MG/ML
0.5 INJECTION INTRAMUSCULAR EVERY 4 HOURS PRN
Status: DISCONTINUED | OUTPATIENT
Start: 2021-01-01 | End: 2021-12-14 | Stop reason: HOSPADM

## 2021-01-01 RX ORDER — LORAZEPAM 2 MG/ML
0.5 INJECTION INTRAMUSCULAR EVERY 4 HOURS PRN
Status: DISCONTINUED | OUTPATIENT
Start: 2021-01-01 | End: 2021-01-01 | Stop reason: HOSPADM

## 2021-01-01 RX ORDER — LORAZEPAM 2 MG/ML
0.25 INJECTION INTRAMUSCULAR EVERY 12 HOURS SCHEDULED
Status: CANCELLED | OUTPATIENT
Start: 2021-01-01 | End: 2021-12-15

## 2021-01-01 RX ORDER — CALCIUM CARBONATE 200(500)MG
2 TABLET,CHEWABLE ORAL 2 TIMES DAILY PRN
Status: DISCONTINUED | OUTPATIENT
Start: 2021-01-01 | End: 2021-01-01

## 2021-01-01 RX ORDER — FUROSEMIDE 10 MG/ML
20 INJECTION INTRAMUSCULAR; INTRAVENOUS
Status: DISCONTINUED | OUTPATIENT
Start: 2021-01-01 | End: 2021-12-14 | Stop reason: HOSPADM

## 2021-01-01 RX ORDER — MELATONIN
1000 DAILY
Status: DISCONTINUED | OUTPATIENT
Start: 2021-01-01 | End: 2021-01-01

## 2021-01-01 RX ORDER — ALBUTEROL SULFATE 2.5 MG/3ML
2.5 SOLUTION RESPIRATORY (INHALATION) EVERY 6 HOURS PRN
Status: CANCELLED | OUTPATIENT
Start: 2021-01-01

## 2021-01-01 RX ORDER — SCOLOPAMINE TRANSDERMAL SYSTEM 1 MG/1
1 PATCH, EXTENDED RELEASE TRANSDERMAL
Status: DISCONTINUED | OUTPATIENT
Start: 2021-01-01 | End: 2021-01-01 | Stop reason: HOSPADM

## 2021-01-01 RX ORDER — SODIUM CHLORIDE 0.9 % (FLUSH) 0.9 %
10 SYRINGE (ML) INJECTION EVERY 12 HOURS SCHEDULED
Status: CANCELLED | OUTPATIENT
Start: 2021-01-01

## 2021-01-01 RX ORDER — LANOLIN ALCOHOL/MO/W.PET/CERES
1000 CREAM (GRAM) TOPICAL DAILY
Status: DISCONTINUED | OUTPATIENT
Start: 2021-01-01 | End: 2021-01-01

## 2021-01-01 RX ORDER — ONDANSETRON 2 MG/ML
4 INJECTION INTRAMUSCULAR; INTRAVENOUS EVERY 6 HOURS PRN
Status: DISCONTINUED | OUTPATIENT
Start: 2021-01-01 | End: 2021-12-14 | Stop reason: HOSPADM

## 2021-01-01 RX ORDER — MORPHINE SULFATE 2 MG/ML
1 INJECTION, SOLUTION INTRAMUSCULAR; INTRAVENOUS
Status: DISCONTINUED | OUTPATIENT
Start: 2021-01-01 | End: 2021-01-01

## 2021-01-01 RX ORDER — GLYCOPYRROLATE 0.2 MG/ML
0.2 INJECTION INTRAMUSCULAR; INTRAVENOUS EVERY 4 HOURS PRN
Status: DISCONTINUED | OUTPATIENT
Start: 2021-01-01 | End: 2021-01-01 | Stop reason: HOSPADM

## 2021-01-01 RX ORDER — GABAPENTIN 300 MG/1
300 CAPSULE ORAL 2 TIMES DAILY
Status: CANCELLED | OUTPATIENT
Start: 2021-01-01

## 2021-01-01 RX ORDER — OXYMETAZOLINE HYDROCHLORIDE 0.05 G/100ML
1 SPRAY NASAL ONCE
Status: DISCONTINUED | OUTPATIENT
Start: 2021-01-01 | End: 2021-01-01

## 2021-01-01 RX ADMIN — CYANOCOBALAMIN TAB 1000 MCG 1000 MCG: 1000 TAB at 10:10

## 2021-01-01 RX ADMIN — POLYVINYL ALCOHOL 2 DROP: 14 SOLUTION/ DROPS OPHTHALMIC at 11:42

## 2021-01-01 RX ADMIN — POLYVINYL ALCOHOL 2 DROP: 14 SOLUTION/ DROPS OPHTHALMIC at 12:10

## 2021-01-01 RX ADMIN — LORAZEPAM 0.25 MG: 2 INJECTION INTRAMUSCULAR; INTRAVENOUS at 17:36

## 2021-01-01 RX ADMIN — MORPHINE SULFATE 2 MG: 2 INJECTION, SOLUTION INTRAMUSCULAR; INTRAVENOUS at 01:10

## 2021-01-01 RX ADMIN — DOCUSATE SODIUM 100 MG: 100 CAPSULE, LIQUID FILLED ORAL at 22:09

## 2021-01-01 RX ADMIN — LORAZEPAM 0.5 MG: 2 INJECTION INTRAMUSCULAR; INTRAVENOUS at 16:43

## 2021-01-01 RX ADMIN — SODIUM CHLORIDE, PRESERVATIVE FREE 10 ML: 5 INJECTION INTRAVENOUS at 10:48

## 2021-01-01 RX ADMIN — MORPHINE SULFATE 2 MG: 2 INJECTION, SOLUTION INTRAMUSCULAR; INTRAVENOUS at 22:57

## 2021-01-01 RX ADMIN — MORPHINE SULFATE 2 MG: 2 INJECTION, SOLUTION INTRAMUSCULAR; INTRAVENOUS at 10:50

## 2021-01-01 RX ADMIN — DOCUSATE SODIUM 100 MG: 100 CAPSULE, LIQUID FILLED ORAL at 21:16

## 2021-01-01 RX ADMIN — ACETAMINOPHEN 650 MG: 325 TABLET ORAL at 12:18

## 2021-01-01 RX ADMIN — DOCUSATE SODIUM 100 MG: 100 CAPSULE, LIQUID FILLED ORAL at 09:36

## 2021-01-01 RX ADMIN — POLYVINYL ALCOHOL 2 DROP: 14 SOLUTION/ DROPS OPHTHALMIC at 22:57

## 2021-01-01 RX ADMIN — IPRATROPIUM BROMIDE AND ALBUTEROL SULFATE 3 ML: 2.5; .5 SOLUTION RESPIRATORY (INHALATION) at 07:09

## 2021-01-01 RX ADMIN — IPRATROPIUM BROMIDE AND ALBUTEROL SULFATE 3 ML: 2.5; .5 SOLUTION RESPIRATORY (INHALATION) at 22:30

## 2021-01-01 RX ADMIN — MORPHINE SULFATE 1 MG: 2 INJECTION, SOLUTION INTRAMUSCULAR; INTRAVENOUS at 09:32

## 2021-01-01 RX ADMIN — LORAZEPAM 0.25 MG: 2 INJECTION INTRAMUSCULAR; INTRAVENOUS at 05:26

## 2021-01-01 RX ADMIN — ACETAMINOPHEN ORAL SOLUTION 649.6 MG: 650 SOLUTION ORAL at 03:48

## 2021-01-01 RX ADMIN — MORPHINE SULFATE 1 MG: 2 INJECTION, SOLUTION INTRAMUSCULAR; INTRAVENOUS at 16:31

## 2021-01-01 RX ADMIN — DOCUSATE SODIUM 100 MG: 100 CAPSULE, LIQUID FILLED ORAL at 08:43

## 2021-01-01 RX ADMIN — LORAZEPAM 0.25 MG: 2 INJECTION INTRAMUSCULAR; INTRAVENOUS at 01:10

## 2021-01-01 RX ADMIN — MINERAL OIL: 1000 LIQUID ORAL at 09:45

## 2021-01-01 RX ADMIN — GABAPENTIN 300 MG: 300 CAPSULE ORAL at 03:00

## 2021-01-01 RX ADMIN — LORAZEPAM 0.5 MG: 2 INJECTION INTRAMUSCULAR; INTRAVENOUS at 15:49

## 2021-01-01 RX ADMIN — LORAZEPAM 0.25 MG: 2 INJECTION INTRAMUSCULAR; INTRAVENOUS at 08:57

## 2021-01-01 RX ADMIN — DOCUSATE SODIUM 100 MG: 100 CAPSULE, LIQUID FILLED ORAL at 08:59

## 2021-01-01 RX ADMIN — Medication 1000 UNITS: at 10:10

## 2021-01-01 RX ADMIN — IPRATROPIUM BROMIDE AND ALBUTEROL SULFATE 3 ML: 2.5; .5 SOLUTION RESPIRATORY (INHALATION) at 19:54

## 2021-01-01 RX ADMIN — FUROSEMIDE 20 MG: 10 INJECTION, SOLUTION INTRAVENOUS at 08:34

## 2021-01-01 RX ADMIN — SODIUM CHLORIDE, PRESERVATIVE FREE 10 ML: 5 INJECTION INTRAVENOUS at 20:01

## 2021-01-01 RX ADMIN — BARIUM SULFATE 20 ML: 400 PASTE ORAL at 10:15

## 2021-01-01 RX ADMIN — FUROSEMIDE 20 MG: 10 INJECTION, SOLUTION INTRAVENOUS at 11:31

## 2021-01-01 RX ADMIN — SODIUM CHLORIDE, PRESERVATIVE FREE 10 ML: 5 INJECTION INTRAVENOUS at 09:36

## 2021-01-01 RX ADMIN — SODIUM CHLORIDE 1 G: 900 INJECTION INTRAVENOUS at 13:57

## 2021-01-01 RX ADMIN — MORPHINE SULFATE 2 MG: 2 INJECTION, SOLUTION INTRAMUSCULAR; INTRAVENOUS at 05:05

## 2021-01-01 RX ADMIN — MINERAL OIL: 1000 LIQUID ORAL at 17:17

## 2021-01-01 RX ADMIN — MULTIPLE VITAMINS W/ MINERALS TAB 1 TABLET: TAB at 08:43

## 2021-01-01 RX ADMIN — DOXYCYCLINE 100 MG: 100 INJECTION, POWDER, LYOPHILIZED, FOR SOLUTION INTRAVENOUS at 15:17

## 2021-01-01 RX ADMIN — MULTIPLE VITAMINS W/ MINERALS TAB 1 TABLET: TAB at 09:00

## 2021-01-01 RX ADMIN — MORPHINE SULFATE 2 MG: 2 INJECTION, SOLUTION INTRAMUSCULAR; INTRAVENOUS at 16:54

## 2021-01-01 RX ADMIN — MINERAL OIL: 1000 LIQUID ORAL at 16:53

## 2021-01-01 RX ADMIN — MINERAL OIL: 1000 LIQUID ORAL at 22:56

## 2021-01-01 RX ADMIN — FUROSEMIDE 20 MG: 10 INJECTION, SOLUTION INTRAVENOUS at 09:43

## 2021-01-01 RX ADMIN — SODIUM CHLORIDE, PRESERVATIVE FREE 10 ML: 5 INJECTION INTRAVENOUS at 09:00

## 2021-01-01 RX ADMIN — LORAZEPAM 0.25 MG: 2 INJECTION INTRAMUSCULAR; INTRAVENOUS at 21:46

## 2021-01-01 RX ADMIN — DOXYCYCLINE 100 MG: 100 INJECTION, POWDER, LYOPHILIZED, FOR SOLUTION INTRAVENOUS at 01:22

## 2021-01-01 RX ADMIN — FUROSEMIDE 20 MG: 10 INJECTION, SOLUTION INTRAVENOUS at 17:16

## 2021-01-01 RX ADMIN — IPRATROPIUM BROMIDE AND ALBUTEROL SULFATE 3 ML: 2.5; .5 SOLUTION RESPIRATORY (INHALATION) at 11:31

## 2021-01-01 RX ADMIN — HALOPERIDOL LACTATE 1 MG: 5 INJECTION, SOLUTION INTRAMUSCULAR at 23:12

## 2021-01-01 RX ADMIN — Medication 1000 UNITS: at 08:43

## 2021-01-01 RX ADMIN — POLYVINYL ALCOHOL 2 DROP: 14 SOLUTION/ DROPS OPHTHALMIC at 11:59

## 2021-01-01 RX ADMIN — IPRATROPIUM BROMIDE AND ALBUTEROL SULFATE 3 ML: 2.5; .5 SOLUTION RESPIRATORY (INHALATION) at 08:01

## 2021-01-01 RX ADMIN — Medication 200 MG: at 08:44

## 2021-01-01 RX ADMIN — MORPHINE SULFATE 2 MG: 2 INJECTION, SOLUTION INTRAMUSCULAR; INTRAVENOUS at 15:46

## 2021-01-01 RX ADMIN — DOCUSATE SODIUM 100 MG: 100 CAPSULE, LIQUID FILLED ORAL at 08:58

## 2021-01-01 RX ADMIN — IPRATROPIUM BROMIDE AND ALBUTEROL SULFATE 3 ML: 2.5; .5 SOLUTION RESPIRATORY (INHALATION) at 21:42

## 2021-01-01 RX ADMIN — SODIUM CHLORIDE, PRESERVATIVE FREE 10 ML: 5 INJECTION INTRAVENOUS at 20:34

## 2021-01-01 RX ADMIN — GLYCOPYRROLATE 0.2 MG: 0.2 INJECTION INTRAMUSCULAR; INTRAVENOUS at 07:51

## 2021-01-01 RX ADMIN — LORAZEPAM 0.25 MG: 2 INJECTION INTRAMUSCULAR; INTRAVENOUS at 22:56

## 2021-01-01 RX ADMIN — MINERAL OIL: 1000 LIQUID ORAL at 18:24

## 2021-01-01 RX ADMIN — GABAPENTIN 300 MG: 300 CAPSULE ORAL at 08:55

## 2021-01-01 RX ADMIN — GABAPENTIN 300 MG: 300 CAPSULE ORAL at 08:44

## 2021-01-01 RX ADMIN — SODIUM CHLORIDE, PRESERVATIVE FREE 10 ML: 5 INJECTION INTRAVENOUS at 22:10

## 2021-01-01 RX ADMIN — Medication 200 MG: at 10:09

## 2021-01-01 RX ADMIN — MORPHINE SULFATE 2 MG: 2 INJECTION, SOLUTION INTRAMUSCULAR; INTRAVENOUS at 05:25

## 2021-01-01 RX ADMIN — GLYCOPYRROLATE 0.2 MG: 0.2 INJECTION INTRAMUSCULAR; INTRAVENOUS at 11:31

## 2021-01-01 RX ADMIN — MINERAL OIL: 1000 LIQUID ORAL at 01:10

## 2021-01-01 RX ADMIN — MORPHINE SULFATE 1 MG: 2 INJECTION, SOLUTION INTRAMUSCULAR; INTRAVENOUS at 23:01

## 2021-01-01 RX ADMIN — MORPHINE SULFATE 2 MG: 2 INJECTION, SOLUTION INTRAMUSCULAR; INTRAVENOUS at 11:48

## 2021-01-01 RX ADMIN — MORPHINE SULFATE 2 MG: 2 INJECTION, SOLUTION INTRAMUSCULAR; INTRAVENOUS at 09:44

## 2021-01-01 RX ADMIN — GABAPENTIN 300 MG: 300 CAPSULE ORAL at 21:16

## 2021-01-01 RX ADMIN — LORAZEPAM 0.25 MG: 2 INJECTION INTRAMUSCULAR; INTRAVENOUS at 18:13

## 2021-01-01 RX ADMIN — LORAZEPAM 0.25 MG: 2 INJECTION INTRAMUSCULAR; INTRAVENOUS at 20:01

## 2021-01-01 RX ADMIN — CYANOCOBALAMIN TAB 1000 MCG 1000 MCG: 1000 TAB at 08:59

## 2021-01-01 RX ADMIN — MORPHINE SULFATE 2 MG: 2 INJECTION, SOLUTION INTRAMUSCULAR; INTRAVENOUS at 08:27

## 2021-01-01 RX ADMIN — GLYCOPYRROLATE 0.2 MG: 0.2 INJECTION INTRAMUSCULAR; INTRAVENOUS at 05:41

## 2021-01-01 RX ADMIN — IOPAMIDOL 80 ML: 755 INJECTION, SOLUTION INTRAVENOUS at 15:24

## 2021-01-01 RX ADMIN — Medication 5 MG: at 20:01

## 2021-01-01 RX ADMIN — IPRATROPIUM BROMIDE AND ALBUTEROL SULFATE 3 ML: 2.5; .5 SOLUTION RESPIRATORY (INHALATION) at 19:40

## 2021-01-01 RX ADMIN — BARIUM SULFATE 50 ML: 400 SUSPENSION ORAL at 10:15

## 2021-01-01 RX ADMIN — LORAZEPAM 0.25 MG: 2 INJECTION INTRAMUSCULAR; INTRAVENOUS at 10:50

## 2021-01-01 RX ADMIN — LORAZEPAM 0.25 MG: 2 INJECTION INTRAMUSCULAR; INTRAVENOUS at 09:43

## 2021-01-01 RX ADMIN — POLYVINYL ALCOHOL 2 DROP: 14 SOLUTION/ DROPS OPHTHALMIC at 05:11

## 2021-01-01 RX ADMIN — MORPHINE SULFATE 1 MG: 2 INJECTION, SOLUTION INTRAMUSCULAR; INTRAVENOUS at 11:33

## 2021-01-01 RX ADMIN — SODIUM CHLORIDE, PRESERVATIVE FREE 10 ML: 5 INJECTION INTRAVENOUS at 08:58

## 2021-01-01 RX ADMIN — LORAZEPAM 0.25 MG: 2 INJECTION INTRAMUSCULAR; INTRAVENOUS at 05:35

## 2021-01-01 RX ADMIN — CYANOCOBALAMIN TAB 1000 MCG 1000 MCG: 1000 TAB at 08:44

## 2021-01-01 RX ADMIN — IPRATROPIUM BROMIDE AND ALBUTEROL SULFATE 3 ML: 2.5; .5 SOLUTION RESPIRATORY (INHALATION) at 20:26

## 2021-01-01 RX ADMIN — GUAIFENESIN 600 MG: 600 TABLET, EXTENDED RELEASE ORAL at 03:18

## 2021-01-01 RX ADMIN — IPRATROPIUM BROMIDE AND ALBUTEROL SULFATE 3 ML: 2.5; .5 SOLUTION RESPIRATORY (INHALATION) at 13:03

## 2021-01-01 RX ADMIN — MORPHINE SULFATE 2 MG: 2 INJECTION, SOLUTION INTRAMUSCULAR; INTRAVENOUS at 05:36

## 2021-01-01 RX ADMIN — GABAPENTIN 300 MG: 300 CAPSULE ORAL at 10:10

## 2021-01-01 RX ADMIN — LORAZEPAM 0.25 MG: 2 INJECTION INTRAMUSCULAR; INTRAVENOUS at 11:59

## 2021-01-01 RX ADMIN — BARIUM SULFATE 100 ML: 0.81 POWDER, FOR SUSPENSION ORAL at 10:15

## 2021-01-01 RX ADMIN — MINERAL OIL: 1000 LIQUID ORAL at 05:30

## 2021-01-01 RX ADMIN — LORAZEPAM 0.25 MG: 2 INJECTION INTRAMUSCULAR; INTRAVENOUS at 23:23

## 2021-01-01 RX ADMIN — MORPHINE SULFATE 2 MG: 2 INJECTION, SOLUTION INTRAMUSCULAR; INTRAVENOUS at 12:50

## 2021-01-01 RX ADMIN — IPRATROPIUM BROMIDE AND ALBUTEROL SULFATE 3 ML: 2.5; .5 SOLUTION RESPIRATORY (INHALATION) at 19:33

## 2021-01-01 RX ADMIN — LORAZEPAM 0.25 MG: 2 INJECTION INTRAMUSCULAR; INTRAVENOUS at 09:32

## 2021-01-01 RX ADMIN — SCOPALAMINE 1 PATCH: 1 PATCH, EXTENDED RELEASE TRANSDERMAL at 12:11

## 2021-01-01 RX ADMIN — MORPHINE SULFATE 1 MG: 2 INJECTION, SOLUTION INTRAMUSCULAR; INTRAVENOUS at 11:31

## 2021-01-01 RX ADMIN — POLYVINYL ALCOHOL 2 DROP: 14 SOLUTION/ DROPS OPHTHALMIC at 23:24

## 2021-01-01 RX ADMIN — IPRATROPIUM BROMIDE AND ALBUTEROL SULFATE 3 ML: 2.5; .5 SOLUTION RESPIRATORY (INHALATION) at 11:40

## 2021-01-01 RX ADMIN — Medication 200 MG: at 08:57

## 2021-01-01 RX ADMIN — Medication 5 MG: at 22:09

## 2021-01-01 RX ADMIN — FUROSEMIDE 20 MG: 10 INJECTION, SOLUTION INTRAMUSCULAR; INTRAVENOUS at 12:41

## 2021-01-01 RX ADMIN — MINERAL OIL: 1000 LIQUID ORAL at 11:42

## 2021-01-01 RX ADMIN — SODIUM CHLORIDE, PRESERVATIVE FREE 10 ML: 5 INJECTION INTRAVENOUS at 21:46

## 2021-01-01 RX ADMIN — DOCUSATE SODIUM 100 MG: 100 CAPSULE, LIQUID FILLED ORAL at 20:34

## 2021-01-01 RX ADMIN — SODIUM CHLORIDE, PRESERVATIVE FREE 10 ML: 5 INJECTION INTRAVENOUS at 20:19

## 2021-01-01 RX ADMIN — MORPHINE SULFATE 2 MG: 2 INJECTION, SOLUTION INTRAMUSCULAR; INTRAVENOUS at 17:35

## 2021-01-01 RX ADMIN — MORPHINE SULFATE 2 MG: 2 INJECTION, SOLUTION INTRAMUSCULAR; INTRAVENOUS at 23:24

## 2021-01-01 RX ADMIN — LORAZEPAM 0.25 MG: 2 INJECTION INTRAMUSCULAR; INTRAVENOUS at 05:05

## 2021-01-01 RX ADMIN — GABAPENTIN 300 MG: 300 CAPSULE ORAL at 09:34

## 2021-01-01 RX ADMIN — DOXYCYCLINE 100 MG: 100 INJECTION, POWDER, LYOPHILIZED, FOR SOLUTION INTRAVENOUS at 02:05

## 2021-01-01 RX ADMIN — GABAPENTIN 300 MG: 300 CAPSULE ORAL at 09:36

## 2021-01-01 RX ADMIN — GABAPENTIN 300 MG: 300 CAPSULE ORAL at 21:46

## 2021-01-01 RX ADMIN — BISACODYL 10 MG: 10 SUPPOSITORY RECTAL at 11:47

## 2021-01-01 RX ADMIN — MORPHINE SULFATE 2 MG: 2 INJECTION, SOLUTION INTRAMUSCULAR; INTRAVENOUS at 11:46

## 2021-01-01 RX ADMIN — LORAZEPAM 0.25 MG: 2 INJECTION INTRAMUSCULAR; INTRAVENOUS at 21:16

## 2021-01-01 RX ADMIN — IPRATROPIUM BROMIDE AND ALBUTEROL SULFATE 3 ML: 2.5; .5 SOLUTION RESPIRATORY (INHALATION) at 12:58

## 2021-01-01 RX ADMIN — SODIUM CHLORIDE 1 G: 900 INJECTION INTRAVENOUS at 14:17

## 2021-01-01 RX ADMIN — FUROSEMIDE 20 MG: 10 INJECTION, SOLUTION INTRAVENOUS at 18:14

## 2021-01-01 RX ADMIN — MINERAL OIL: 1000 LIQUID ORAL at 23:24

## 2021-01-01 RX ADMIN — MORPHINE SULFATE 2 MG: 2 INJECTION, SOLUTION INTRAMUSCULAR; INTRAVENOUS at 17:15

## 2021-01-01 RX ADMIN — GABAPENTIN 300 MG: 300 CAPSULE ORAL at 20:01

## 2021-01-01 RX ADMIN — SCOPALAMINE 1 PATCH: 1 PATCH, EXTENDED RELEASE TRANSDERMAL at 12:42

## 2021-01-01 RX ADMIN — SODIUM CHLORIDE, PRESERVATIVE FREE 10 ML: 5 INJECTION INTRAVENOUS at 21:17

## 2021-01-01 RX ADMIN — MORPHINE SULFATE 1 MG: 2 INJECTION, SOLUTION INTRAMUSCULAR; INTRAVENOUS at 05:30

## 2021-01-01 RX ADMIN — FUROSEMIDE 20 MG: 10 INJECTION, SOLUTION INTRAVENOUS at 08:09

## 2021-01-01 RX ADMIN — DOXYCYCLINE 100 MG: 100 INJECTION, POWDER, LYOPHILIZED, FOR SOLUTION INTRAVENOUS at 12:41

## 2021-01-01 RX ADMIN — IPRATROPIUM BROMIDE AND ALBUTEROL SULFATE 3 ML: 2.5; .5 SOLUTION RESPIRATORY (INHALATION) at 16:19

## 2021-01-01 RX ADMIN — SODIUM CHLORIDE 1 G: 900 INJECTION INTRAVENOUS at 12:58

## 2021-01-01 RX ADMIN — IPRATROPIUM BROMIDE AND ALBUTEROL SULFATE 3 ML: 2.5; .5 SOLUTION RESPIRATORY (INHALATION) at 16:30

## 2021-01-01 RX ADMIN — MORPHINE SULFATE 2 MG: 2 INJECTION, SOLUTION INTRAMUSCULAR; INTRAVENOUS at 11:59

## 2021-01-01 RX ADMIN — DOXYCYCLINE 100 MG: 100 INJECTION, POWDER, LYOPHILIZED, FOR SOLUTION INTRAVENOUS at 13:50

## 2021-01-01 RX ADMIN — MINERAL OIL: 1000 LIQUID ORAL at 17:43

## 2021-01-01 RX ADMIN — MINERAL OIL: 1000 LIQUID ORAL at 11:59

## 2021-01-01 RX ADMIN — MINERAL OIL: 1000 LIQUID ORAL at 17:37

## 2021-01-01 RX ADMIN — SODIUM CHLORIDE, POTASSIUM CHLORIDE, SODIUM LACTATE AND CALCIUM CHLORIDE 500 ML: 600; 310; 30; 20 INJECTION, SOLUTION INTRAVENOUS at 14:01

## 2021-01-01 RX ADMIN — GABAPENTIN 300 MG: 300 CAPSULE ORAL at 22:09

## 2021-01-01 RX ADMIN — MINERAL OIL: 1000 LIQUID ORAL at 05:35

## 2021-01-01 RX ADMIN — GABAPENTIN 300 MG: 300 CAPSULE ORAL at 08:58

## 2021-01-01 RX ADMIN — Medication 1000 UNITS: at 08:58

## 2021-01-01 RX ADMIN — FUROSEMIDE 20 MG: 10 INJECTION, SOLUTION INTRAVENOUS at 17:09

## 2021-01-01 RX ADMIN — LORAZEPAM 0.25 MG: 2 INJECTION INTRAMUSCULAR; INTRAVENOUS at 17:15

## 2021-01-01 RX ADMIN — LORAZEPAM 0.25 MG: 2 INJECTION INTRAMUSCULAR; INTRAVENOUS at 09:36

## 2021-01-01 RX ADMIN — DOXYCYCLINE 100 MG: 100 INJECTION, POWDER, LYOPHILIZED, FOR SOLUTION INTRAVENOUS at 01:57

## 2021-01-01 RX ADMIN — MORPHINE SULFATE 2 MG: 2 INJECTION, SOLUTION INTRAMUSCULAR; INTRAVENOUS at 18:14

## 2021-01-01 RX ADMIN — MINERAL OIL: 1000 LIQUID ORAL at 23:02

## 2021-01-01 RX ADMIN — IPRATROPIUM BROMIDE AND ALBUTEROL SULFATE 3 ML: 2.5; .5 SOLUTION RESPIRATORY (INHALATION) at 16:03

## 2021-01-01 RX ADMIN — LORAZEPAM 0.25 MG: 2 INJECTION INTRAMUSCULAR; INTRAVENOUS at 10:21

## 2021-01-01 RX ADMIN — DOCUSATE SODIUM 100 MG: 100 CAPSULE, LIQUID FILLED ORAL at 20:01

## 2021-01-01 RX ADMIN — GLYCOPYRROLATE 0.2 MG: 0.2 INJECTION INTRAMUSCULAR; INTRAVENOUS at 03:35

## 2021-01-01 RX ADMIN — SODIUM CHLORIDE, PRESERVATIVE FREE 10 ML: 5 INJECTION INTRAVENOUS at 08:44

## 2021-01-01 RX ADMIN — LORAZEPAM 0.25 MG: 2 INJECTION INTRAMUSCULAR; INTRAVENOUS at 09:34

## 2021-01-01 RX ADMIN — Medication 5 MG: at 20:34

## 2021-01-01 RX ADMIN — QUETIAPINE FUMARATE 25 MG: 25 TABLET ORAL at 19:01

## 2021-01-01 RX ADMIN — MINERAL OIL: 1000 LIQUID ORAL at 05:06

## 2021-01-01 RX ADMIN — LORAZEPAM 0.5 MG: 2 INJECTION INTRAMUSCULAR; INTRAVENOUS at 05:30

## 2021-01-01 RX ADMIN — DOCUSATE SODIUM 100 MG: 100 CAPSULE, LIQUID FILLED ORAL at 10:10

## 2021-01-01 RX ADMIN — DOCUSATE SODIUM 100 MG: 100 CAPSULE, LIQUID FILLED ORAL at 21:46

## 2021-01-01 RX ADMIN — IPRATROPIUM BROMIDE AND ALBUTEROL SULFATE 3 ML: 2.5; .5 SOLUTION RESPIRATORY (INHALATION) at 09:00

## 2021-01-01 RX ADMIN — IPRATROPIUM BROMIDE AND ALBUTEROL SULFATE 3 ML: 2.5; .5 SOLUTION RESPIRATORY (INHALATION) at 12:14

## 2021-01-01 RX ADMIN — LORAZEPAM 0.25 MG: 2 INJECTION INTRAMUSCULAR; INTRAVENOUS at 16:53

## 2021-01-01 RX ADMIN — FUROSEMIDE 20 MG: 10 INJECTION, SOLUTION INTRAVENOUS at 05:41

## 2021-01-01 RX ADMIN — FUROSEMIDE 20 MG: 10 INJECTION, SOLUTION INTRAVENOUS at 17:36

## 2021-01-01 RX ADMIN — GUAIFENESIN 600 MG: 600 TABLET, EXTENDED RELEASE ORAL at 10:10

## 2021-01-01 RX ADMIN — GABAPENTIN 300 MG: 300 CAPSULE ORAL at 20:34

## 2021-01-22 NOTE — TELEPHONE ENCOUNTER
Left VM advising of below.    ----- Message from Be River MD sent at 1/22/2021  2:10 PM EST -----  As expected, murmur is benign.  No further follow-up necessary

## 2021-12-02 PROBLEM — R09.02 HYPOXIA: Status: ACTIVE | Noted: 2021-01-01

## 2021-12-02 PROBLEM — R04.0 EPISTAXIS: Status: ACTIVE | Noted: 2021-01-01

## 2021-12-02 PROBLEM — R91.8 MASS OF RIGHT LUNG: Status: ACTIVE | Noted: 2021-01-01

## 2021-12-02 NOTE — H&P
[facility] Medicine Services  Clinical Decision Unit (CDU)  History and Physical    Patient Name: Elizabeth Oliveira  : 1929  MRN: [id number]  Primary Care Physician: Azeem Merchant MD  Date of admission: 2021 12:35 PM      Subjective   Subjective     Chief Complaint:  Epistaxis    HPI:  Elizabeth Oliveira is a 89 y.o. female with pmh COPD on home O2 2L NC Rx  (however wears intermittently during the daily), myeloproliferative disorder (never worked up/ seen by hematology), arthritis, neuropathy that presents for acute epistaxis that started around 0830 today. Presented to hospital around  and bleeding stopped about that time. Was unable to stop at home. She did not have oxygen on upon arrival and found to be hypoxic in the 70s. Patient placed on 4L NC with eventual improvement.    Dr. Mcnally called from ED and recommended transfusing 2 pack of Platelets and following up in office on Monday. Niece at bedside concerned she is non compliant with oxygen and too weak to go home. She does have some help/ caretakers for few hours daily. + fatigue, increased weakness in last month per report. No falls, lightheadedness, dizziness, CP, palpitations. No recent weight loss.    CTA chest completed revealing 19x 10mm RUL mass suspicious for primary malignancy  Ptl 14K    COVID Details:    Symptoms:    [] NONE [] Fever []  Cough [x] Shortness of breath [] Change in taste/smell    The patient has a COVID HM Topic on their chart, and they are fully vaccinated.    Review of Systems   Constitutional: Positive for activity change and fatigue. Negative for appetite change and unexpected weight change.   HENT: Positive for nosebleeds.    Eyes: Negative.    Respiratory: Positive for shortness of breath.    Cardiovascular: Negative.    Gastrointestinal: Negative.    Endocrine: Positive for cold intolerance.   Genitourinary: Negative.    Musculoskeletal: Positive for arthralgias.    Skin: Negative.    Neurological: Positive for weakness. Negative for dizziness and light-headedness.   Hematological: Negative.    Psychiatric/Behavioral: Negative.         All other systems reviewed and negative    Personal History     Past Medical History:   Diagnosis Date   • COPD (chronic obstructive pulmonary disease) (HCC)    • Depression    • Dyslipidemia    • Dyslipidemia    • IBS (irritable bowel syndrome) 2016   • Macular degeneration    • Myelodysplastic syndrome (HCC)     anemia, thrombocytopemia   • Osteoarthritis    • Other spondylosis with radiculopathy, lumbar region        Past Surgical History:   Procedure Laterality Date   • CATARACT EXTRACTION, BILATERAL     • HIP SURGERY     • KNEE SURGERY     • REVISION OF TOTAL HIP FEMORAL Right 07/2017   • TOTAL HIP ARTHROPLASTY Right    • TOTAL HIP ARTHROPLASTY Left    • TOTAL KNEE ARTHROPLASTY Left     2006       Family History:  family history includes Heart failure in her mother; Leukemia in her father. Otherwise pertinent FHx was reviewed and unremarkable.     Social History:  reports that she quit smoking about 11 years ago. Her smoking use included cigarettes. She has a 60.00 pack-year smoking history. She has never used smokeless tobacco. She reports current alcohol use of about 7.0 standard drinks of alcohol per week. She reports that she does not use drugs.  Social History     Social History Narrative    Lives alone. Has part time assistance as needed.       Medications:  Cyanocobalamin, Vitamin B6, albuterol sulfate HFA, cholecalciferol, gabapentin, meloxicam, multivitamin with minerals, and umeclidinium-vilanterol    Allergies   Allergen Reactions   • Cortisone        Objective   Objective     Vital Signs:   Temp:  [98.4 °F (36.9 °C)] 98.4 °F (36.9 °C)  Heart Rate:  [77-80] 77  Resp:  [16-17] 17  BP: (122-140)/(58-76) 140/72    Physical Exam   Constitutional: Awake, alert, frail elderly female  Eyes: PERRLA, sclerae anicteric, no conjunctival  injection  HENT: NCAT, mucous membranes moist  Neck: Supple, no thyromegaly, no lymphadenopathy, trachea midline  Respiratory: Coarse bilaterally, nonlabored respirations on 4L NC  Cardiovascular: RRR, no murmurs, rubs, or gallops, palpable pedal pulses bilaterally  Gastrointestinal: Positive bowel sounds, soft, nontender, nondistended  Musculoskeletal: No bilateral ankle edema, no clubbing or cyanosis to extremities  Psychiatric: Appropriate affect, cooperative  Neurologic: Oriented x 3, THOMAS freely, generalized weakness, Cranial Nerves grossly intact to confrontation, speech clear  Skin: No rashes      Results Reviewed:  I have personally reviewed most recent data and agree with findings, most notably:     LAB RESULTS:      Lab 12/02/21  1247   WBC 4.35   HEMOGLOBIN 10.1*   HEMATOCRIT 31.3*   PLATELETS 14*   NEUTROS ABS 3.58   IMMATURE GRANS (ABS) 0.03   LYMPHS ABS 0.42*   MONOS ABS 0.30   EOS ABS 0.01   .8*   PROTIME 13.8         Lab 12/02/21  1247   SODIUM 141   POTASSIUM 4.5   CHLORIDE 101   CO2 29.0   ANION GAP 11.0   BUN 23   CREATININE 0.55*   GLUCOSE 118*   CALCIUM 9.7*         Lab 12/02/21  1247   TOTAL PROTEIN 7.1   ALBUMIN 3.90   GLOBULIN 3.2   ALT (SGPT) 8   AST (SGOT) 12   BILIRUBIN 0.6   ALK PHOS 86         Lab 12/02/21  1247   PROBNP 648.8   TROPONIN T <0.010   PROTIME 13.8   INR 1.10             Lab 12/02/21  1533   ABO TYPING O   RH TYPING Negative   ANTIBODY SCREEN Negative         Brief Urine Lab Results     None        Microbiology Results (last 10 days)     ** No results found for the last 240 hours. **           Results for orders placed during the hospital encounter of 01/22/21    Adult Transthoracic Echo Complete W/ Cont if Necessary Per Protocol    Interpretation Summary  · Estimated left ventricular EF = 55%  · Aortic sclerosis without significant stenosis        Assessment/Plan   Assessment / Plan     Active Hospital Problems    Diagnosis  POA   • **Acute on chronic respiratory  failure with hypoxia (HCC) [J96.21]  Yes   • Epistaxis [R04.0]  Unknown   • Mass of right lung [R91.8]  Unknown   • COPD (chronic obstructive pulmonary disease) (HCC) [J44.9]  Yes   • Myelodysplastic syndrome (HCC) [D46.9]  Yes     anemia, thrombocytopemia     • BAUMAN (dyspnea on exertion) [R06.00]  Yes   • Thrombocytopenia (HCC) [D69.6]  Yes       Plan:  -- epistaxis resolved. Continue to monitor for further bleeding  -- PTL 14K. Transfuse 2 pack Platelets, discussed with Dr. Mcnally  -- will consult Dr. Mcnally in am  -- continue oxgyen as tolerates and further education/ home instruction  -- RUL mass 19x 10mm suspicious for primary lung lesion. Discussed with patient and niece at bedside. Would like to pursue work up at this point. Has been followed with Ms. Paula HIGHTOWER at [facility] for COPD  -- continue scheduled/ prn nebs  -- PT/OT eval  -- am labs      CODE STATUS:    Level Of Support Discussed With: Patient  Code Status (Patient has no pulse and is not breathing): CPR (Attempt to Resuscitate)  Medical Interventions (Patient has pulse or is breathing): Full Support      Admission Status: OBSERVATION status, however if further evaluation or treatment plans warrant, status may change.  Based upon current information, I predict patient's care encounter to be less than or equal to 2 midnights.        Discharge Blueprint (criteria for discharge):   1. Resolved Epistaxis  2. Stable VSS/ oxygen sats on oxygen  3. eval by therapy completed  4. eval completed by Hem/Onc    Jennifer Acosta, APRN  12/02/21

## 2021-12-03 PROBLEM — E43 SEVERE MALNUTRITION (HCC): Status: ACTIVE | Noted: 2021-01-01

## 2021-12-03 NOTE — PROGRESS NOTES
[facility] Medicine Services  PROGRESS NOTE    Patient Name: Elizabeth Oliveira  : 1929  MRN: [id number]    Date of Admission: 2021  Primary Care Physician: Azeem Merchant MD    Subjective   Subjective     CC:  Epistaxis    HPI:  I have seen and evaluated the patient this morning.  Comfortable in bed.  Not in acute distress.  No further epistaxis.  Minimal shortness of breath.  Daughter at bedside and updated about the plan of care    ROS:  10 point review of systems is negative except for what is mentioned in HPI      Objective   Objective     Vital Signs:   Temp:  [97 °F (36.1 °C)-99.4 °F (37.4 °C)] 97 °F (36.1 °C)  Heart Rate:  [65-85] 74  Resp:  [16-18] 16  BP: (100-140)/() 100/56  Flow (L/min):  [4-5] 5     Physical Exam:  General: Chronically looking, cachectic, conversant  Head: Some dried blood noted around both nostrils.  Eyes:   Conjunctivae and sclerae normal, no Icterus. No pallor  Ears:  Ears appear intact with no abnormalities noted  Throat: No oral lesions, no thrush, oral mucosa moist  Neck: Supple, trachea midline, no thyromegaly  Back:   No kyphoscoliosis present. No tenderness to palpation,   no sacral edema  Lungs: Markedly diminished air entry right upper lung zone, no crackles or wheezing appreciated   Heart:  Normal S1 and S2, no murmur, no gallop, No JVD, no lower extremity swelling  Abdomen:  Soft, no tenderness, no organomegaly, normal bowel sounds  Normal bowel sounds, no masses, no organomegaly. Soft, nontender, nondistended, no guarding, no rebound tenderness.  Extremities: No gross abnormalities, no clubbing, pulses palpable and equal bilaterally  Skin: No bleeding, bruising or rash, normal skin turgor and elasticity  Neurologic: Cranial nerves appear intact with no evidence of facial asymmetry, normal motor and sensory functions in all 4 extremities  Psych: Alert and oriented x 3, normal mood    Results Reviewed:  LAB RESULTS:       Lab 12/03/21  0327 12/02/21  1247   WBC 3.07* 4.35   HEMOGLOBIN 7.3* 10.1*   HEMATOCRIT 23.4* 31.3*   PLATELETS 135* 14*   NEUTROS ABS 2.14 3.58   IMMATURE GRANS (ABS) 0.03 0.03   LYMPHS ABS 0.55* 0.42*   MONOS ABS 0.30 0.30   EOS ABS 0.05 0.01   .0* 109.8*   PROTIME  --  13.8         Lab 12/03/21  0327 12/02/21  1247   SODIUM 142 141   POTASSIUM 4.4 4.5   CHLORIDE 104 101   CO2 27.0 29.0   ANION GAP 11.0 11.0   BUN 16 23   CREATININE 0.42* 0.55*   GLUCOSE 82 118*   CALCIUM 9.0 9.7*         Lab 12/02/21  1247   TOTAL PROTEIN 7.1   ALBUMIN 3.90   GLOBULIN 3.2   ALT (SGPT) 8   AST (SGOT) 12   BILIRUBIN 0.6   ALK PHOS 86         Lab 12/02/21  1247   PROBNP 648.8   TROPONIN T <0.010   PROTIME 13.8   INR 1.10             Lab 12/02/21  1533   ABO TYPING O   RH TYPING Negative   ANTIBODY SCREEN Negative         Brief Urine Lab Results     None          Microbiology Results Abnormal     Procedure Component Value - Date/Time    COVID-19, [facility] IN-HOUSE NP/OP SWAB IN UTM/VTM/[facility] TRANSPORT MEDIA 24HR TAT - Swab, Oropharynx [[id number]]  (Normal) Collected: 12/02/21 1736    Lab Status: Final result Specimen: Swab from Oropharynx Updated: 12/02/21 2135     COVID19 Not Detected    Narrative:      Fact sheet for providers: https://www.fda.gov/media/168724/download     Fact sheet for patients: https://www.fda.gov/media/109923/download    Test performed by RT PCR.          CT Angiogram Chest    Result Date: 12/2/2021  EXAMINATION: CT ANGIOGRAM CHEST- 12/02/2021  INDICATION: hypoxia, hemoptysis  TECHNIQUE: Spiral acquisition 3 mm post IV contrast images through the chest and upper abdomen with sagittal and coronal 2-D reconstructions.  The radiation dose reduction device was turned on for each scan per the ALARA (As Low as Reasonably Achievable) protocol.  COMPARISON: Chest radiograph 06/02/2020  FINDINGS: History indicates hypoxia, hemoptysis.  There is good contrast opacification of the pulmonary arteries.  No filling defects are identified to suggest pulmonary embolic disease. There is no evidence of thoracic aortic aneurysm or dissection, no evidence of pericardial effusion or significant mediastinal lymphadenopathy. There is unusually increased, low density soft tissue around the lower thoracic spine, axial images 50 through 80. On the left, this measures water density to moderate fat density. On the right, this measures water density to low fat density. Considerations include paraspinous lipomas, less likely extra medullary hematopoiesis. Well-differentiated liposarcoma would be considered unlikely with this appearance. Likewise, lymphoma would be unlikely to have this appearance.  Lung window images show a stellate, 1.9 x 1.0 cm mass in the right upper lobe, anterior to the right hilum, very worrisome for primary neoplasm. There is prominent pulmonary vasculature and perhaps very early interstitial edema elsewhere. No other pulmonary parenchymal nodule is seen. There is a minimal right pleural effusion.  Included images of the upper abdomen show fatty liver change. Spleen is not enlarged. Included pancreatic tail, adrenal glands, and upper renal poles appear unremarkable. Bony structures appear grossly intact.      Impression: 1. 19 x 10 mm stellate right upper lobe lesion worrisome for primary neoplasm. 2. No evidence of pulmonary embolus. 3. Mild pulmonary vascular congestion, suspected early interstitial edema, and trace right pleural effusion. 4. Unusual smooth margined paraspinous soft tissue lesions in the lower region, measuring between fat and water density, favored to be incidental as discussed above, and which should be followed.  D:  12/02/2021 E:  12/02/2021         Results for orders placed during the hospital encounter of 01/22/21    Adult Transthoracic Echo Complete W/ Cont if Necessary Per Protocol    Interpretation Summary  · Estimated left ventricular EF = 55%  · Aortic sclerosis without  significant stenosis      I have reviewed the medications:  Scheduled Meds:cholecalciferol, 1,000 Units, Oral, Daily  docusate sodium, 100 mg, Oral, BID  gabapentin, 300 mg, Oral, BID  ipratropium-albuterol, 3 mL, Nebulization, 4x Daily - RT  multivitamin with minerals, 1 tablet, Oral, Daily  oxymetazoline, 1 spray, Nasal, Once  sodium chloride, 10 mL, Intravenous, Q12H  vitamin B-12, 1,000 mcg, Oral, Daily  vitamin B-6, 200 mg, Oral, Daily      Continuous Infusions:   PRN Meds:.•  acetaminophen  •  albuterol  •  bisacodyl  •  calcium carbonate  •  melatonin  •  ondansetron  •  sodium chloride  •  sodium chloride  •  sodium chloride    Assessment/Plan   Assessment & Plan     Active Hospital Problems    Diagnosis  POA   • **Acute on chronic respiratory failure with hypoxia (HCC) [J96.21]  Yes   • Epistaxis [R04.0]  Unknown   • Mass of right lung [R91.8]  Unknown   • COPD (chronic obstructive pulmonary disease) (HCC) [J44.9]  Yes   • Myelodysplastic syndrome (HCC) [D46.9]  Yes   • BAUMAN (dyspnea on exertion) [R06.00]  Yes   • Thrombocytopenia (HCC) [D69.6]  Yes      Resolved Hospital Problems   No resolved problems to display.        Brief Hospital Course to date:  Elizabeth Oliveira is a 89 y.o. female with past medical history of COPD on home O2 2L NC, presumed myeloproliferative disease (never worked up/ seen by hematology), chronic arthritis with neuropathy, who presented to the hospital with acute epistaxis and was found to have severe thrombocytopenia and worsening hypoxemia.    Assessment and plan:  Acute blood loss anemia secondary to severe epistaxis  Epistaxis, resolved  Severe thrombocytopenia  Presumed myelodysplastic syndrome  · Patient presented with severe epistaxis and blood loss anemia.  Hemoglobin is 7.3 down from 10 on admission  · Monitor hemoglobin and transfuse hemoglobin less than 7  · Received 2 units of platelets in the ER per Dr. Mcnally/ hematology recommendations  · No further epistaxis  noted  · Need hematology oncology follow-up after discharge from the hospital    Acute on chronic hypoxic respiratory failure  COPD with chronic hypoxic respiratory failure  Right upper lung mass concerning for malignancy  · Discussed the goals of care with the patient's daughter bedside  · She was the patient to be evaluated by pulmonary team even though she is not interested in any aggressive measures like radiation therapy or chemotherapy.  · Nebulizer treatment  · No indication for antibiotic therapy  · Pulmonary team consulted.  Appreciate help and recommendations    Malnutrition with cachexia  · Dietary consult    Acute debility  · PT and OT      with eventual improvement.     Dr. Mcnally called from ED and recommended transfusing 2 pack of Platelets and following up in office on Monday. Niece at bedside concerned she is non compliant with oxygen and too weak to go home. She does have some help/ caretakers for few hours daily. + fatigue, increased weakness in last month per report. No falls, lightheadedness, dizziness, CP, palpitations. No recent weight loss.     CTA chest completed revealing 19x 10mm RUL mass suspicious for primary malignancy  Ptl 14K      **Acute on chronic respiratory failure with hypoxia (HCC) [J96.21]   Yes    • Epistaxis [R04.0]   Unknown   • Mass of right lung [R91.8]   Unknown   • COPD (chronic obstructive pulmonary disease) (HCC) [J44.9]   Yes   • Myelodysplastic syndrome (HCC) [D46.9]   Yes       anemia, thrombocytopemia      • BAUMAN (dyspnea on exertion) [R06.00]   Yes   • Thrombocytopenia (HCC) [D69.6]   Yes         Assessment & plan:  Epistaxis        resolved. Continue to monitor for further bleeding  -- PTL 14K. Transfuse 2 pack Platelets, discussed with Dr. Mcnally  -- will consult Dr. Mcnally in am  -- continue oxgyen as tolerates and further education/ home instruction  -- RUL mass 19x 10mm suspicious for primary lung lesion. Discussed with patient and niece at bedside. Would like to  pursue work up at this point. Has been followed with Ms. Paula HIGHTOWER at [facility] for COPD  -- continue scheduled/ prn nebs  -- PT/OT eval  -- am labs           DVT prophylaxis:  Mechanical DVT prophylaxis orders are present.            Disposition: I expect the patient to be discharged tbd    CODE STATUS:   Code Status and Medical Interventions:   Ordered at: 12/02/21 1720     Level Of Support Discussed With:    Patient     Code Status (Patient has no pulse and is not breathing):    CPR (Attempt to Resuscitate)     Medical Interventions (Patient has pulse or is breathing):    Full Support       Cornelio Babb MD  12/03/21

## 2021-12-03 NOTE — PLAN OF CARE
Goal Outcome Evaluation:  Plan of Care Reviewed With: patient        Progress: improving  Outcome Summary: PT eval completed. Presents w/ R lung mass per CTA chest, hypox w/ desat to mid 80's w/ any mvmt (MD decr O2 from 5 L to 4 1/2), low HGB 7.3 despite 2 units plts., decr LE strength/endurance & impaired funct mobil. Rolled L/R w/ mod A using bedrail & drawsheet, but desat to 85%. Scooted to HOB X 2 (pre & s/p ther exer) w/ drawsheet & max 2A. Performed ther exer BLE in sup w/ rests s/p every few reps, d/t desat to 84-85% (EOB & UIC def.as result). Respirex to 300 cc. Limited by decr processing, mod SOB, perineal pain, anemia & 'freezing' throughout. HR to 78. Recommend SNF at d/c.

## 2021-12-03 NOTE — PLAN OF CARE
Goal Outcome Evaluation:    SLP evaluation completed. Will address dysphagia. Please see note for further details and recommendations.

## 2021-12-03 NOTE — PAYOR COMM NOTE
"Elizabeth Oliveira (89 y.o. Female)             Date of Birth Social Security Number Address Home Phone MRN    12/30/1929  [address]  [place] [address] [phone] [id number]    Gnosticism Marital Status             Moravian Single       Admission Date Admission Type Admitting Provider Attending Provider Department, Room/Bed    12/2/21 Emergency Cornelio Babb MD Younis, Mohamed Ahmed, MD [facility] 5F, S502/1    Discharge Date Discharge Disposition Discharge Destination                         Attending Provider: Cornelio Babb MD    Allergies: Cortisone    Isolation: None   Infection: None   Code Status: CPR   Advance Care Planning Activity    Ht: 157.5 cm (62\")   Wt: 47.6 kg (105 lb)    Admission Cmt: None   Principal Problem: Acute on chronic respiratory failure with hypoxia (HCC) [J96.21]                 Active Insurance as of 12/2/2021     Primary Coverage     Payor Plan Insurance Group Employer/Plan Group    ANTHEM MEDICARE REPLACEMENT ANTHEM MEDICARE ADVANTAGE [id number]     Payor Plan Address Payor Plan Phone Number Payor Plan Fax Number Effective Dates    [address] [phone]  1/1/2017 - None Entered    [facility] [id number]       Subscriber Name Subscriber Birth Date Member ID       ELIZABETH OLIVEIRA 12/30/1929 [id number]                 Emergency Contacts      (Rel.) Home Phone Work Phone Mobile Phone    CASEY MARQUEZ (Relative) [phone] -- --    Analilia Soliman (Care Giver) [phone] -- --               History & Physical      Jennifer Acosta, APRN at 12/02/21 1716     Attestation signed by Db Womack MD at 12/02/21 0417      I have reviewed this documentation and agree.  Attending Cosignature     I supervised care of the patient on day of service with direct care provided by the advanced care provider (APC).    Db Womack MD  12/02/21                            [facility] Medicine " Services  Clinical Decision Unit (CDU)  History and Physical    Patient Name: Elizabeth Oliveira  : 1929  MRN: [id number]  Primary Care Physician: Azeem Merchant MD  Date of admission: 2021 12:35 PM      Subjective   Subjective     Chief Complaint:  Epistaxis    HPI:  Elizabeth Oliveira is a 89 y.o. female with pmh COPD on home O2 2L NC Rx  (however wears intermittently during the daily), myeloproliferative disorder (never worked up/ seen by hematology), arthritis, neuropathy that presents for acute epistaxis that started around 0830 today. Presented to hospital around  and bleeding stopped about that time. Was unable to stop at home. She did not have oxygen on upon arrival and found to be hypoxic in the 70s. Patient placed on 4L NC with eventual improvement.    Dr. Mcnally called from ED and recommended transfusing 2 pack of Platelets and following up in office on Monday. Niece at bedside concerned she is non compliant with oxygen and too weak to go home. She does have some help/ caretakers for few hours daily. + fatigue, increased weakness in last month per report. No falls, lightheadedness, dizziness, CP, palpitations. No recent weight loss.    CTA chest completed revealing 19x 10mm RUL mass suspicious for primary malignancy  Ptl 14K    COVID Details:    Symptoms:    [] NONE [] Fever []  Cough [x] Shortness of breath [] Change in taste/smell    The patient has a COVID HM Topic on their chart, and they are fully vaccinated.    Review of Systems   Constitutional: Positive for activity change and fatigue. Negative for appetite change and unexpected weight change.   HENT: Positive for nosebleeds.    Eyes: Negative.    Respiratory: Positive for shortness of breath.    Cardiovascular: Negative.    Gastrointestinal: Negative.    Endocrine: Positive for cold intolerance.   Genitourinary: Negative.    Musculoskeletal: Positive for arthralgias.   Skin: Negative.    Neurological: Positive for  weakness. Negative for dizziness and light-headedness.   Hematological: Negative.    Psychiatric/Behavioral: Negative.         All other systems reviewed and negative    Personal History     Past Medical History:   Diagnosis Date   • COPD (chronic obstructive pulmonary disease) (HCC)    • Depression    • Dyslipidemia    • Dyslipidemia    • IBS (irritable bowel syndrome) 2016   • Macular degeneration    • Myelodysplastic syndrome (HCC)     anemia, thrombocytopemia   • Osteoarthritis    • Other spondylosis with radiculopathy, lumbar region        Past Surgical History:   Procedure Laterality Date   • CATARACT EXTRACTION, BILATERAL     • HIP SURGERY     • KNEE SURGERY     • REVISION OF TOTAL HIP FEMORAL Right 07/2017   • TOTAL HIP ARTHROPLASTY Right    • TOTAL HIP ARTHROPLASTY Left    • TOTAL KNEE ARTHROPLASTY Left     2006       Family History:  family history includes Heart failure in her mother; Leukemia in her father. Otherwise pertinent FHx was reviewed and unremarkable.     Social History:  reports that she quit smoking about 11 years ago. Her smoking use included cigarettes. She has a 60.00 pack-year smoking history. She has never used smokeless tobacco. She reports current alcohol use of about 7.0 standard drinks of alcohol per week. She reports that she does not use drugs.  Social History     Social History Narrative    Lives alone. Has part time assistance as needed.       Medications:  Cyanocobalamin, Vitamin B6, albuterol sulfate HFA, cholecalciferol, gabapentin, meloxicam, multivitamin with minerals, and umeclidinium-vilanterol    Allergies   Allergen Reactions   • Cortisone        Objective   Objective     Vital Signs:   Temp:  [98.4 °F (36.9 °C)] 98.4 °F (36.9 °C)  Heart Rate:  [77-80] 77  Resp:  [16-17] 17  BP: (122-140)/(58-76) 140/72    Physical Exam   Constitutional: Awake, alert, frail elderly female  Eyes: PERRLA, sclerae anicteric, no conjunctival injection  HENT: NCAT, mucous membranes  moist  Neck: Supple, no thyromegaly, no lymphadenopathy, trachea midline  Respiratory: Coarse bilaterally, nonlabored respirations on 4L NC  Cardiovascular: RRR, no murmurs, rubs, or gallops, palpable pedal pulses bilaterally  Gastrointestinal: Positive bowel sounds, soft, nontender, nondistended  Musculoskeletal: No bilateral ankle edema, no clubbing or cyanosis to extremities  Psychiatric: Appropriate affect, cooperative  Neurologic: Oriented x 3, THOMAS freely, generalized weakness, Cranial Nerves grossly intact to confrontation, speech clear  Skin: No rashes      Results Reviewed:  I have personally reviewed most recent data and agree with findings, most notably:     LAB RESULTS:      Lab 12/02/21  1247   WBC 4.35   HEMOGLOBIN 10.1*   HEMATOCRIT 31.3*   PLATELETS 14*   NEUTROS ABS 3.58   IMMATURE GRANS (ABS) 0.03   LYMPHS ABS 0.42*   MONOS ABS 0.30   EOS ABS 0.01   .8*   PROTIME 13.8         Lab 12/02/21  1247   SODIUM 141   POTASSIUM 4.5   CHLORIDE 101   CO2 29.0   ANION GAP 11.0   BUN 23   CREATININE 0.55*   GLUCOSE 118*   CALCIUM 9.7*         Lab 12/02/21  1247   TOTAL PROTEIN 7.1   ALBUMIN 3.90   GLOBULIN 3.2   ALT (SGPT) 8   AST (SGOT) 12   BILIRUBIN 0.6   ALK PHOS 86         Lab 12/02/21  1247   PROBNP 648.8   TROPONIN T <0.010   PROTIME 13.8   INR 1.10             Lab 12/02/21  1533   ABO TYPING O   RH TYPING Negative   ANTIBODY SCREEN Negative         Brief Urine Lab Results     None        Microbiology Results (last 10 days)     ** No results found for the last 240 hours. **           Results for orders placed during the hospital encounter of 01/22/21    Adult Transthoracic Echo Complete W/ Cont if Necessary Per Protocol    Interpretation Summary  · Estimated left ventricular EF = 55%  · Aortic sclerosis without significant stenosis        Assessment/Plan   Assessment / Plan     Active Hospital Problems    Diagnosis  POA   • **Acute on chronic respiratory failure with hypoxia (HCC) [J96.21]  Yes   •  "Epistaxis [R04.0]  Unknown   • Mass of right lung [R91.8]  Unknown   • COPD (chronic obstructive pulmonary disease) (HCC) [J44.9]  Yes   • Myelodysplastic syndrome (HCC) [D46.9]  Yes     anemia, thrombocytopemia     • BAUMAN (dyspnea on exertion) [R06.00]  Yes   • Thrombocytopenia (HCC) [D69.6]  Yes       Plan:  -- epistaxis resolved. Continue to monitor for further bleeding  -- PTL 14K. Transfuse 2 pack Platelets, discussed with Dr. Mcnally  -- will consult Dr. Mcnally in am  -- continue oxgyen as tolerates and further education/ home instruction  -- RUL mass 19x 10mm suspicious for primary lung lesion. Discussed with patient and niece at bedside. Would like to pursue work up at this point. Has been followed with Ms. Paula HIGHTOWER at [facility] for COPD  -- continue scheduled/ prn nebs  -- PT/OT eval  -- am labs      CODE STATUS:    Level Of Support Discussed With: Patient  Code Status (Patient has no pulse and is not breathing): CPR (Attempt to Resuscitate)  Medical Interventions (Patient has pulse or is breathing): Full Support            Discharge Blueprint (criteria for discharge):   1. Resolved Epistaxis  2. Stable VSS/ oxygen sats on oxygen  3. eval by therapy completed  4. eval completed by Hem/Onc    CAMPBELL Villela  12/02/21        Electronically signed by Db Womack MD at 12/02/21 2318          Emergency Department Notes      Wali Coreas MD at 12/02/21 2022          Subjective   89-year-old female presents for evaluation of nosebleed.  Of note, the patient has a history of COPD.  She reportedly wears home oxygen \"as needed\" at home.  She lives alone.  She went and saw her primary care physician today and was sent here for both epistaxis and hypoxia.  She was noted to have room air oxygen saturations in the 70s at the office.  She is not anticoagulated.  She denies any known triggers for her epistaxis and denies any preceding trauma or injury.  A gauze was placed in her right nare, EMS " was called, and she was brought to our facility to be evaluated.          Review of Systems   HENT: Positive for nosebleeds.    Respiratory: Positive for shortness of breath.    All other systems reviewed and are negative.      Past Medical History:   Diagnosis Date   • COPD (chronic obstructive pulmonary disease) (HCC)    • Depression    • Dyslipidemia    • Dyslipidemia    • IBS (irritable bowel syndrome)    • Macular degeneration    • Myelodysplastic syndrome (HCC)     anemia, thrombocytopemia   • Osteoarthritis    • Other spondylosis with radiculopathy, lumbar region        Allergies   Allergen Reactions   • Cortisone        Past Surgical History:   Procedure Laterality Date   • CATARACT EXTRACTION, BILATERAL     • HIP SURGERY     • KNEE SURGERY     • REVISION OF TOTAL HIP FEMORAL Right 2017   • TOTAL HIP ARTHROPLASTY Right    • TOTAL HIP ARTHROPLASTY Left    • TOTAL KNEE ARTHROPLASTY Left            Family History   Problem Relation Age of Onset   • Heart failure Mother    • Leukemia Father        Social History     Socioeconomic History   • Marital status: Single   Tobacco Use   • Smoking status: Former Smoker     Packs/day: 1.50     Years: 40.00     Pack years: 60.00     Types: Cigarettes     Quit date:      Years since quittin.9   • Smokeless tobacco: Never Used   Substance and Sexual Activity   • Alcohol use: Yes     Alcohol/week: 7.0 standard drinks     Types: 7 Shots of liquor per week     Comment: COCKTAIL EVERY NIGHT   • Drug use: No   • Sexual activity: Defer           Objective   Physical Exam  Vitals and nursing note reviewed.   Constitutional:       General: She is not in acute distress.     Appearance: She is well-developed. She is not diaphoretic.      Comments: Chronically ill-appearing elderly female   HENT:      Head: Normocephalic and atraumatic.      Nose:      Comments: Bloodsoaked gauze noted in right nare, no septal hematoma present, oropharynx is clear and devoid of any  "blood  Eyes:      Pupils: Pupils are equal, round, and reactive to light.   Cardiovascular:      Rate and Rhythm: Normal rate and regular rhythm.      Heart sounds: Normal heart sounds. No murmur heard.  No friction rub. No gallop.    Pulmonary:      Effort: Pulmonary effort is normal. No respiratory distress.      Breath sounds: Normal breath sounds. No wheezing or rales.   Abdominal:      General: Bowel sounds are normal. There is no distension.      Palpations: Abdomen is soft. There is no mass.      Tenderness: There is no abdominal tenderness. There is no guarding or rebound.   Musculoskeletal:         General: Normal range of motion.      Cervical back: Neck supple.   Skin:     General: Skin is warm and dry.      Findings: No erythema or rash.   Neurological:      General: No focal deficit present.      Mental Status: She is alert and oriented to person, place, and time.   Psychiatric:         Mood and Affect: Mood normal.         Thought Content: Thought content normal.         Judgment: Judgment normal.         Procedures          ED Course  ED Course as of 12/02/21 2026   Thu Dec 02, 2021   1404 89-year-old female sent from her primary care physician's office to be evaluated for nosebleed and hypoxia.  Of note, the patient wears home oxygen \"as needed\" at home.  The patient is not anticoagulated. [DD]   1444 On arrival to the ED, the patient has a bloodsoaked pledget in her right nare.  She is hypoxic on room air.  Supplemental oxygen given via nasal cannula.  Normal work of breathing noted.  No blood noted to oropharynx.  Direct pressure and Afrin administered.  The patient's epistaxis resolved with conservative treatment.  Labs remarkable for platelet count of 14,000.  The patient has a history of myeloproliferative disorder.  I discussed her case with Dr. Mcnally who recommended platelet transfusion and having her follow-up with him in the office on Monday assuming that her bleeding resolved.  I agree with " "his expert assessment. [DD]   1627 After reviewing the patient's chest CTA results, I went back in and discussed her findings with both her and her niece at bedside--most notably the lesion concerning for underlying malignancy.  The patient lives alone.  She wears home oxygen \"as needed.\"  At home but her niece reports that she \"does not know how to use it properly.\"  Currently, she is requiring 4 L with oxygen saturations in the low 90s.  She needs to be transfused.  Her niece is requesting that she be admitted to the hospital as she lives alone, and I feel that this is completely reasonable.  Awaiting callback from the hospitalist at this time. [DD]   1632 I discussed the patient's case with Dr. Womack, and the patient will be admitted under her care for further evaluation and treatment.  The patient is aware/agreeable with the plan at this time. [DD]      ED Course User Index  [DD] Wali Coreas MD                                         Recent Results (from the past 24 hour(s))   CBC Auto Differential    Collection Time: 12/02/21 12:47 PM    Specimen: Blood   Result Value Ref Range    WBC 4.35 3.40 - 10.80 10*3/mm3    RBC 2.85 (L) 3.77 - 5.28 10*6/mm3    Hemoglobin 10.1 (L) 12.0 - 15.9 g/dL    Hematocrit 31.3 (L) 34.0 - 46.6 %    .8 (H) 79.0 - 97.0 fL    MCH 35.4 (H) 26.6 - 33.0 pg    MCHC 32.3 31.5 - 35.7 g/dL    RDW 15.9 (H) 12.3 - 15.4 %    RDW-SD 63.5 (H) 37.0 - 54.0 fl    Platelets 14 (C) 140 - 450 10*3/mm3    Neutrophil % 82.3 (H) 42.7 - 76.0 %    Lymphocyte % 9.7 (L) 19.6 - 45.3 %    Monocyte % 6.9 5.0 - 12.0 %    Eosinophil % 0.2 (L) 0.3 - 6.2 %    Basophil % 0.2 0.0 - 1.5 %    Immature Grans % 0.7 (H) 0.0 - 0.5 %    Neutrophils, Absolute 3.58 1.70 - 7.00 10*3/mm3    Lymphocytes, Absolute 0.42 (L) 0.70 - 3.10 10*3/mm3    Monocytes, Absolute 0.30 0.10 - 0.90 10*3/mm3    Eosinophils, Absolute 0.01 0.00 - 0.40 10*3/mm3    Basophils, Absolute 0.01 0.00 - 0.20 10*3/mm3    Immature Grans, Absolute " 0.03 0.00 - 0.05 10*3/mm3    nRBC 0.0 0.0 - 0.2 /100 WBC   Comprehensive Metabolic Panel    Collection Time: 12/02/21 12:47 PM    Specimen: Blood   Result Value Ref Range    Glucose 118 (H) 65 - 99 mg/dL    BUN 23 8 - 23 mg/dL    Creatinine 0.55 (L) 0.57 - 1.00 mg/dL    Sodium 141 136 - 145 mmol/L    Potassium 4.5 3.5 - 5.2 mmol/L    Chloride 101 98 - 107 mmol/L    CO2 29.0 22.0 - 29.0 mmol/L    Calcium 9.7 (H) 8.2 - 9.6 mg/dL    Total Protein 7.1 6.0 - 8.5 g/dL    Albumin 3.90 3.50 - 5.20 g/dL    ALT (SGPT) 8 1 - 33 U/L    AST (SGOT) 12 1 - 32 U/L    Alkaline Phosphatase 86 39 - 117 U/L    Total Bilirubin 0.6 0.0 - 1.2 mg/dL    eGFR Non African Amer 104 >60 mL/min/1.73    Globulin 3.2 gm/dL    A/G Ratio 1.2 g/dL    BUN/Creatinine Ratio 41.8 (H) 7.0 - 25.0    Anion Gap 11.0 5.0 - 15.0 mmol/L   Green Top (Gel)    Collection Time: 12/02/21 12:47 PM   Result Value Ref Range    Extra Tube Hold for add-ons.    Lavender Top    Collection Time: 12/02/21 12:47 PM   Result Value Ref Range    Extra Tube hold for add-on    Gold Top - SST    Collection Time: 12/02/21 12:47 PM   Result Value Ref Range    Extra Tube Hold for add-ons.    Gray Top    Collection Time: 12/02/21 12:47 PM   Result Value Ref Range    Extra Tube Hold for add-ons.    Light Blue Top    Collection Time: 12/02/21 12:47 PM   Result Value Ref Range    Extra Tube hold for add-on    Troponin    Collection Time: 12/02/21 12:47 PM    Specimen: Blood   Result Value Ref Range    Troponin T <0.010 0.000 - 0.030 ng/mL   BNP    Collection Time: 12/02/21 12:47 PM    Specimen: Blood   Result Value Ref Range    proBNP 648.8 0.0-1,800.0 pg/mL   Scan Slide    Collection Time: 12/02/21 12:47 PM    Specimen: Blood   Result Value Ref Range    RBC Morphology Normal Normal    WBC Morphology Normal Normal    Platelet Morphology Normal Normal   Protime-INR    Collection Time: 12/02/21 12:47 PM    Specimen: Blood   Result Value Ref Range    Protime 13.8 11.4 - 14.4 Seconds    INR  1.10 0.85 - 1.16   ECG 12 Lead    Collection Time: 12/02/21  1:02 PM   Result Value Ref Range    QT Interval 360 ms    QTC Interval 415 ms   Type & Screen    Collection Time: 12/02/21  3:33 PM    Specimen: Blood   Result Value Ref Range    ABO Type O     RH type Negative     Antibody Screen Negative     T&S Expiration Date 12/5/2021 11:59:59 PM    Prepare Platelet Pheresis, 2 Units    Collection Time: 12/02/21  7:54 PM   Result Value Ref Range    Product Code F0157Y67     Unit Number [id number]     UNIT  ABO O     UNIT  RH POS     Dispense Status RE     Blood Expiration Date 202112052359     Blood Type Barcode 5100     Product Code V6998B88     Unit Number [id number]     UNIT  ABO O     UNIT  RH POS     Dispense Status IS     Blood Expiration Date 202112052359     Blood Type Barcode 5100     Product Code J2263C24     Unit Number [id number]     UNIT  ABO O     UNIT  RH NEG     Dispense Status XM     Blood Expiration Date 202112052359     Blood Type Barcode 9500      Note: In addition to lab results from this visit, the labs listed above may include labs taken at another facility or during a different encounter within the last 24 hours. Please correlate lab times with ED admission and discharge times for further clarification of the services performed during this visit.    CT Angiogram Chest   Preliminary Result   1. 19 x 10 mm stellate right upper lobe lesion worrisome for primary   neoplasm.   2. No evidence of pulmonary embolus.   3. Mild pulmonary vascular congestion, suspected early interstitial   edema, and trace right pleural effusion.   4. Unusual smooth margined paraspinous soft tissue lesions in the lower   region, measuring between fat and water density, favored to be   incidental as discussed above, and which should be followed.       D:  12/02/2021   E:  12/02/2021                Vitals:    12/02/21 1737 12/02/21 1800 12/02/21 1911 12/02/21 1933   BP: (!) 131/101 139/66 139/70    BP  Location: Right arm Left arm Right arm    Patient Position: Lying Lying Lying    Pulse: 75 76 85 77   Resp: 17 18 18 16   Temp: 97.9 °F (36.6 °C) 98 °F (36.7 °C) 98.5 °F (36.9 °C)    TempSrc: Oral Oral Oral    SpO2: 98% 97%  94%   Weight:       Height:         Medications   sodium chloride 0.9 % flush 10 mL (has no administration in time range)   sodium chloride 0.9 % flush 10 mL (has no administration in time range)   oxymetazoline (AFRIN) nasal spray 1 spray (1 spray Nasal Not Given 12/2/21 1558)   cholecalciferol (VITAMIN D3) tablet 1,000 Units (has no administration in time range)   vitamin B-12 (CYANOCOBALAMIN) tablet 1,000 mcg (has no administration in time range)   gabapentin (NEURONTIN) capsule 300 mg (has no administration in time range)   multivitamin with minerals 1 tablet (has no administration in time range)   vitamin B-6 (PYRIDOXINE) tablet 200 mg (has no administration in time range)   ipratropium-albuterol (DUO-NEB) nebulizer solution 3 mL (3 mL Nebulization Given 12/2/21 1933)   albuterol (PROVENTIL) nebulizer solution 0.083% 2.5 mg/3mL (has no administration in time range)   sodium chloride 0.9 % flush 10 mL (has no administration in time range)   sodium chloride 0.9 % flush 10 mL (has no administration in time range)   acetaminophen (TYLENOL) tablet 650 mg (has no administration in time range)   melatonin tablet 5 mg (has no administration in time range)   ondansetron (ZOFRAN) injection 4 mg (has no administration in time range)   calcium carbonate (TUMS) chewable tablet 500 mg (200 mg elemental) (has no administration in time range)   docusate sodium (COLACE) capsule 100 mg (has no administration in time range)   bisacodyl (DULCOLAX) EC tablet 10 mg (has no administration in time range)   lactated ringers bolus 500 mL (0 mL Intravenous Stopped 12/2/21 1508)   iopamidol (ISOVUE-370) 76 % injection 80 mL (80 mL Intravenous Given 12/2/21 1524)     ECG/EMG Results (last 24 hours)     Procedure  Component Value Units Date/Time    ECG 12 Lead [[id number]] Collected: 12/02/21 1302     Updated: 12/02/21 1908     QT Interval 360 ms      QTC Interval 415 ms     Narrative:      Test Reason : SOA Protocol  Blood Pressure :   */*   mmHG  Vent. Rate :  80 BPM     Atrial Rate :  80 BPM     P-R Int : 158 ms          QRS Dur :  76 ms      QT Int : 360 ms       P-R-T Axes :  88  51  56 degrees     QTc Int : 415 ms    Normal sinus rhythm  Normal ECG  When compared with ECG of 20-JUN-2019 14:16,  T wave amplitude has increased in Lateral leads  Confirmed by MD Coreas Michael ([id number]) on 12/2/2021 7:07:45 PM    Referred By: EDMD           Confirmed By: Jagjit Coreas MD        ECG 12 Lead   Final Result   Test Reason : SOA Protocol   Blood Pressure :   */*   mmHG   Vent. Rate :  80 BPM     Atrial Rate :  80 BPM      P-R Int : 158 ms          QRS Dur :  76 ms       QT Int : 360 ms       P-R-T Axes :  88  51  56 degrees      QTc Int : 415 ms      Normal sinus rhythm   Normal ECG   When compared with ECG of 20-JUN-2019 14:16,   T wave amplitude has increased in Lateral leads   Confirmed by MD Coreas Michael ([id number]) on 12/2/2021 7:07:45 PM      Referred By: EDMD           Confirmed By: Jagjit Coreas MD                  [facility]    Final diagnoses:   Epistaxis   Hypoxia   Malignant neoplasm of lung, unspecified laterality, unspecified part of lung (HCC)   Thrombocytopenia (HCC)   Anemia, unspecified type   Myeloproliferative disorder (HCC)   History of COPD       ED Disposition  ED Disposition     ED Disposition Condition Comment    Decision to Admit  Level of Care: Observation Unit [28]   Diagnosis: Hypoxia [534750]   Bed Request Comments: 2G/F or 5F            No follow-up provider specified.       Medication List      No changes were made to your prescriptions during this visit.          Wali Coreas MD  12/02/21 2026      Electronically signed by Wali Coreas MD at 12/02/21 2026       Vital Signs (last day)      Date/Time Temp Temp src Pulse Resp BP Patient Position SpO2    12/03/21 0700 97 (36.1) Oral 74 16 100/56 Lying 94    12/03/21 0500 -- -- 78 -- -- -- --    12/03/21 0325 99.4 (37.4) Oral 65 18 108/58 Lying 98    12/03/21 0100 -- -- 72 -- -- -- --    12/02/21 2334 -- -- 69 16 112/77 Lying 97    12/02/21 2315 97.6 (36.4) Axillary 73 16 126/58 -- 92    12/02/21 2215 98.9 (37.2) Axillary 76 16 132/63 -- 92    12/02/21 2100 -- -- 73 -- -- -- --    12/02/21 1933 -- -- 77 16 -- -- 94    12/02/21 1911 98.5 (36.9) Oral 85 18 139/70 Lying --    12/02/21 1800 98 (36.7) Oral 76 18 139/66 Lying 97    12/02/21 1737 97.9 (36.6) Oral 75 17 131/101 Lying 98    12/02/21 1716 98.4 (36.9) -- 77 17 140/72 -- 97    12/02/21 14:03:01 -- -- 79 17 122/58 Lying 98    12/02/21 1242 98.4 (36.9) Oral 80 16 129/76 -- 80          Oxygen Therapy (last day)     Date/Time SpO2 Device (Oxygen Therapy) Flow (L/min) Oxygen Concentration (%) ETCO2 (mmHg)    12/03/21 0700 94 -- -- -- --    12/03/21 0600 -- nasal cannula 5 -- --    12/03/21 0400 -- humidified; nasal cannula 5 -- --    12/03/21 0325 98 -- -- -- --    12/03/21 0200 -- humidified; nasal cannula 5 -- --    12/03/21 0000 -- humidified; nasal cannula 5 -- --    12/02/21 2334 97 -- -- -- --    12/02/21 2315 92 -- -- -- --    12/02/21 2215 92 nasal cannula 4 -- --    12/02/21 2200 -- nasal cannula 4 -- --    12/02/21 2000 -- nasal cannula 5 -- --    12/02/21 1933 94 nasal cannula 5 -- --    12/02/21 1800 97 nasal cannula -- -- --    12/02/21 1737 98 nasal cannula -- -- --    12/02/21 1716 97 -- -- -- --    12/02/21 14:03:01 98 nasal cannula -- -- --    12/02/21 1242 80 room air -- -- --            Facility-Administered Medications as of 12/3/2021   Medication Dose Route Frequency Provider Last Rate Last Admin   • acetaminophen (TYLENOL) tablet 650 mg  650 mg Oral Q4H PRN Jennifer Acosta APRN       • albuterol (PROVENTIL) nebulizer solution 0.083% 2.5 mg/3mL  2.5 mg Nebulization Q6H PRN Dave  Jennifer ALVAREZ, APRCASIE       • bisacodyl (DULCOLAX) EC tablet 10 mg  10 mg Oral Daily PRN Jennifer Acosta APRCASIE       • bupivacaine PF (MARCAINE) 0.25 % injection    PRN Monico Herbert CRNA   40 mL at 07/17/17 1625   • calcium carbonate (TUMS) chewable tablet 500 mg (200 mg elemental)  2 tablet Oral BID PRN Jennifer Acosta APRN       • cholecalciferol (VITAMIN D3) tablet 1,000 Units  1,000 Units Oral Daily Jennifer Acosta APRN   1,000 Units at 12/03/21 0843   • docusate sodium (COLACE) capsule 100 mg  100 mg Oral BID Jennifer Acosta APRN   100 mg at 12/03/21 0843   • gabapentin (NEURONTIN) capsule 300 mg  300 mg Oral BID Jennifer Acosta APRN   300 mg at 12/03/21 0844   • [COMPLETED] iopamidol (ISOVUE-370) 76 % injection 80 mL  80 mL Intravenous Once in imaging Wali Coreas MD   80 mL at 12/02/21 1524   • ipratropium-albuterol (DUO-NEB) nebulizer solution 3 mL  3 mL Nebulization 4x Daily - RT Jennifer Acosta APRN   3 mL at 12/02/21 1933   • [COMPLETED] lactated ringers bolus 500 mL  500 mL Intravenous Once Wali Coreas MD   Stopped at 12/02/21 1508   • melatonin tablet 5 mg  5 mg Oral Nightly PRN Jennifer Acosta APRN   5 mg at 12/02/21 2209   • multivitamin with minerals 1 tablet  1 tablet Oral Daily Jennifer Acosta APRN   1 tablet at 12/03/21 0843   • ondansetron (ZOFRAN) injection 4 mg  4 mg Intravenous Q6H PRN Jennifer Acosta APRN       • oxymetazoline (AFRIN) nasal spray 1 spray  1 spray Nasal Once Wali Coreas MD       • sodium chloride 0.9 % flush 10 mL  10 mL Intravenous PRN Wali Coreas MD       • sodium chloride 0.9 % flush 10 mL  10 mL Intravenous PRN Wali Coreas MD       • sodium chloride 0.9 % flush 10 mL  10 mL Intravenous Q12H Jennifer Acosta APRN   10 mL at 12/03/21 0844   • sodium chloride 0.9 % flush 10 mL  10 mL Intravenous PRN Jennifer Acosta APRN       • vitamin B-12 (CYANOCOBALAMIN) tablet 1,000 mcg  1,000 mcg Oral Daily Dave  Jennifer ALVAREZ, APRN   1,000 mcg at 12/03/21 0844   • vitamin B-6 (PYRIDOXINE) tablet 200 mg  200 mg Oral Daily Jennifer Acosta APRN   200 mg at 12/03/21 0844       Lab Results (last 24 hours)     Procedure Component Value Units Date/Time    CBC Auto Differential [[id number]]  (Abnormal) Collected: 12/03/21 0327    Specimen: Blood Updated: 12/03/21 0431     WBC 3.07 10*3/mm3      RBC 2.09 10*6/mm3      Hemoglobin 7.3 g/dL      Hematocrit 23.4 %      .0 fL      MCH 34.9 pg      MCHC 31.2 g/dL      RDW 15.7 %      RDW-SD 63.5 fl      MPV 9.6 fL      Platelets 135 10*3/mm3      Comment: Result checked        Neutrophil % 69.7 %      Lymphocyte % 17.9 %      Monocyte % 9.8 %      Eosinophil % 1.6 %      Basophil % 0.0 %      Immature Grans % 1.0 %      Neutrophils, Absolute 2.14 10*3/mm3      Lymphocytes, Absolute 0.55 10*3/mm3      Monocytes, Absolute 0.30 10*3/mm3      Eosinophils, Absolute 0.05 10*3/mm3      Basophils, Absolute 0.00 10*3/mm3      Immature Grans, Absolute 0.03 10*3/mm3      nRBC 0.0 /100 WBC     Basic Metabolic Panel [[id number]]  (Abnormal) Collected: 12/03/21 0327    Specimen: Blood Updated: 12/03/21 0423     Glucose 82 mg/dL      BUN 16 mg/dL      Creatinine 0.42 mg/dL      Sodium 142 mmol/L      Potassium 4.4 mmol/L      Chloride 104 mmol/L      CO2 27.0 mmol/L      Calcium 9.0 mg/dL      eGFR Non African Amer 141 mL/min/1.73      BUN/Creatinine Ratio 38.1     Anion Gap 11.0 mmol/L     Narrative:      GFR Normal >60  Chronic Kidney Disease <60  Kidney Failure <15      COVID-19, [facility] IN-HOUSE NP/OP SWAB IN UTM/VTM/[facility] TRANSPORT MEDIA 24HR TAT - Swab, Oropharynx [[id number]]  (Normal) Collected: 12/02/21 1736    Specimen: Swab from Oropharynx Updated: 12/02/21 2135     COVID19 Not Detected    Narrative:      Fact sheet for providers: https://www.fda.gov/media/588026/download     Fact sheet for patients: https://www.fda.gov/media/645645/download    Test performed by RT PCR.     [place] Draw [[id number]] Collected: 12/02/21 1247    Specimen: Blood Updated: 12/02/21 1700    Narrative:      The following orders were created for panel order [place] Draw.  Procedure                               Abnormality         Status                     ---------                               -----------         ------                     Green Top (Gel)[[id number]]                                  Final result               Lavender Top[[id number]]                                     Final result               Gold Top - SST[[id number]]                                   Final result               Collier Top[[id number]]                                         Final result               Light Blue Top[[id number]]                                   Final result                 Please view results for these tests on the individual orders.    [place] Top [[id number]] Collected: 12/02/21 1247    Specimen: Blood Updated: 12/02/21 1700     Extra Tube Hold for add-ons.     Comment: Auto resulted.       Light Blue Top [[id number]] Collected: 12/02/21 1247    Specimen: Blood Updated: 12/02/21 1400     Extra Tube hold for add-on     Comment: Auto resulted       Green Top (Gel) [[id number]] Collected: 12/02/21 1247    Specimen: Blood Updated: 12/02/21 1400     Extra Tube Hold for add-ons.     Comment: Auto resulted.       Gold Top - SST [[id number]] Collected: 12/02/21 1247    Specimen: Blood Updated: 12/02/21 1400     Extra Tube Hold for add-ons.     Comment: Auto resulted.       Lavender Top [[id number]] Collected: 12/02/21 1247    Specimen: Blood Updated: 12/02/21 1400     Extra Tube hold for add-on     Comment: Auto resulted       CBC Auto Differential [[id number]]  (Abnormal) Collected: 12/02/21 1247    Specimen: Blood Updated: 12/02/21 1345     WBC 4.35 10*3/mm3      RBC 2.85 10*6/mm3      Hemoglobin 10.1 g/dL      Hematocrit 31.3 %      .8 fL      MCH 35.4 pg      MCHC 32.3 g/dL      RDW 15.9 %      RDW-SD 63.5 fl       Platelets 14 10*3/mm3      Neutrophil % 82.3 %      Lymphocyte % 9.7 %      Monocyte % 6.9 %      Eosinophil % 0.2 %      Basophil % 0.2 %      Immature Grans % 0.7 %      Neutrophils, Absolute 3.58 10*3/mm3      Lymphocytes, Absolute 0.42 10*3/mm3      Monocytes, Absolute 0.30 10*3/mm3      Eosinophils, Absolute 0.01 10*3/mm3      Basophils, Absolute 0.01 10*3/mm3      Immature Grans, Absolute 0.03 10*3/mm3      nRBC 0.0 /100 WBC     Scan Slide [[id number]]  (Normal) Collected: 12/02/21 1247    Specimen: Blood Updated: 12/02/21 1345     RBC Morphology Normal     WBC Morphology Normal     Platelet Morphology Normal    Protime-INR [[id number]]  (Normal) Collected: 12/02/21 1247    Specimen: Blood Updated: 12/02/21 1323     Protime 13.8 Seconds      INR 1.10    Comprehensive Metabolic Panel [[id number]]  (Abnormal) Collected: 12/02/21 1247    Specimen: Blood Updated: 12/02/21 1319     Glucose 118 mg/dL      BUN 23 mg/dL      Creatinine 0.55 mg/dL      Sodium 141 mmol/L      Potassium 4.5 mmol/L      Chloride 101 mmol/L      CO2 29.0 mmol/L      Calcium 9.7 mg/dL      Total Protein 7.1 g/dL      Albumin 3.90 g/dL      ALT (SGPT) 8 U/L      AST (SGOT) 12 U/L      Alkaline Phosphatase 86 U/L      Total Bilirubin 0.6 mg/dL      eGFR Non African Amer 104 mL/min/1.73      Globulin 3.2 gm/dL      Comment: Calculated Result        A/G Ratio 1.2 g/dL      BUN/Creatinine Ratio 41.8     Anion Gap 11.0 mmol/L     Narrative:      GFR Normal >60  Chronic Kidney Disease <60  Kidney Failure <15      Troponin [[id number]]  (Normal) Collected: 12/02/21 1247    Specimen: Blood Updated: 12/02/21 1319     Troponin T <0.010 ng/mL     Narrative:      Troponin T Reference Range:  <= 0.03 ng/mL-   Negative for AMI  >0.03 ng/mL-     Abnormal for myocardial necrosis.  Clinicians would have to utilize clinical acumen, EKG, Troponin and serial changes to determine if it is an Acute Myocardial Infarction or myocardial injury due to an underlying  chronic condition.       Results may be falsely decreased if patient taking Biotin.      BNP [[id number]]  (Normal) Collected: 12/02/21 1247    Specimen: Blood Updated: 12/02/21 1317     proBNP 648.8 pg/mL     Narrative:      Among patients with dyspnea, NT-proBNP is highly sensitive for the detection of acute congestive heart failure. In addition NT-proBNP of <300 pg/ml effectively rules out acute congestive heart failure with 99% negative predictive value.    Results may be falsely decreased if patient taking Biotin.          Imaging Results (Last 24 Hours)     Procedure Component Value Units Date/Time    CT Angiogram Chest [[id number]] Collected: 12/02/21 1559     Updated: 12/02/21 1613    Narrative:      EXAMINATION: CT ANGIOGRAM CHEST- 12/02/2021     INDICATION: hypoxia, hemoptysis     TECHNIQUE: Spiral acquisition 3 mm post IV contrast images through the  chest and upper abdomen with sagittal and coronal 2-D reconstructions.     The radiation dose reduction device was turned on for each scan per the  ALARA (As Low as Reasonably Achievable) protocol.     COMPARISON: Chest radiograph 06/02/2020     FINDINGS: History indicates hypoxia, hemoptysis.     There is good contrast opacification of the pulmonary arteries. No  filling defects are identified to suggest pulmonary embolic disease.  There is no evidence of thoracic aortic aneurysm or dissection, no  evidence of pericardial effusion or significant mediastinal  lymphadenopathy. There is unusually increased, low density soft tissue  around the lower thoracic spine, axial images 50 through 80. On the  left, this measures water density to moderate fat density. On the right,  this measures water density to low fat density. Considerations include  paraspinous lipomas, less likely extra medullary hematopoiesis.  Well-differentiated liposarcoma would be considered unlikely with this  appearance. Likewise, lymphoma would be unlikely to have this  appearance.     Lung  window images show a stellate, 1.9 x 1.0 cm mass in the right upper  lobe, anterior to the right hilum, very worrisome for primary neoplasm.  There is prominent pulmonary vasculature and perhaps very early  interstitial edema elsewhere. No other pulmonary parenchymal nodule is  seen. There is a minimal right pleural effusion.     Included images of the upper abdomen show fatty liver change. Spleen is  not enlarged. Included pancreatic tail, adrenal glands, and upper renal  poles appear unremarkable. Bony structures appear grossly intact.       Impression:      1. 19 x 10 mm stellate right upper lobe lesion worrisome for primary  neoplasm.  2. No evidence of pulmonary embolus.  3. Mild pulmonary vascular congestion, suspected early interstitial  edema, and trace right pleural effusion.  4. Unusual smooth margined paraspinous soft tissue lesions in the lower  region, measuring between fat and water density, favored to be  incidental as discussed above, and which should be followed.     D:  12/02/2021  E:  12/02/2021

## 2021-12-03 NOTE — PLAN OF CARE
Goal Outcome Evaluation:  Plan of Care Reviewed With: patient        Progress: no change  Outcome Summary: OT eval completed Pt presents with very limited activity tolerance for activity and ADL completion today, maxAx2 scooting upright in bed, desaturations to mid 80s with talking and minimal exertion with UEs, cues throughout for PLB, recom IPOT d/c SNF vs home with 24/7 assist and HHOT

## 2021-12-03 NOTE — THERAPY EVALUATION
Patient Name: Elizabeth Oliveira  : 1929    MRN: [id number]                              Today's Date: 12/3/2021       Admit Date: 2021    Visit Dx:     ICD-10-CM ICD-9-CM   1. Epistaxis  R04.0 784.7   2. Hypoxia  R09.02 799.02   3. Malignant neoplasm of lung, unspecified laterality, unspecified part of lung (HCC)  C34.90 162.9   4. Thrombocytopenia (HCC)  D69.6 287.5   5. Anemia, unspecified type  D64.9 285.9   6. Myeloproliferative disorder (HCC)  D47.1 238.79   7. History of COPD  Z87.09 V12.69     Patient Active Problem List   Diagnosis   • Fall   • Closed fracture of right femur (HCC)   • Arthritis   • Thrombocytopenia (HCC)   • Centrilobular emphysema (HCC)   • BAUMAN (dyspnea on exertion)   • Oxygen desaturation, exercise   • Acute on chronic respiratory failure with hypoxia (HCC)   • COPD (chronic obstructive pulmonary disease) (HCC)   • Myelodysplastic syndrome (HCC)   • Aspiration pneumonitis (HCC)   • Bronchitis   • Hypoxia   • Epistaxis   • Mass of right lung     Past Medical History:   Diagnosis Date   • COPD (chronic obstructive pulmonary disease) (HCC)    • Depression    • Dyslipidemia    • Dyslipidemia    • IBS (irritable bowel syndrome)    • Macular degeneration    • Myelodysplastic syndrome (HCC)     anemia, thrombocytopemia   • Osteoarthritis    • Other spondylosis with radiculopathy, lumbar region      Past Surgical History:   Procedure Laterality Date   • CATARACT EXTRACTION, BILATERAL     • HIP SURGERY     • KNEE SURGERY     • REVISION OF TOTAL HIP FEMORAL Right 2017   • TOTAL HIP ARTHROPLASTY Right    • TOTAL HIP ARTHROPLASTY Left    • TOTAL KNEE ARTHROPLASTY Left           General Information     Row Name 21 1125          Physical Therapy Time and Intention    Document Type evaluation  -DM     Mode of Treatment physical therapy  -DM     Row Name 21 1125          General Information    Patient Profile Reviewed yes  -DM     Prior Level of Function  "independent:; all household mobility; gait; transfer; ADL's; max assist:; cooking; cleaning; dependent:; driving; shopping; yard work  indep gt w/ AD (refers to gold cane,but also has R wx & std wx);has CG's 3x/wk for few hrs(cook,clean);Sponge bathes;per niece to ER MD, non-compli.w/ home 2L o2(intermitt use; has o2 on both levels of home);has lift chair, stair lift  -DM     Existing Precautions/Restrictions fall; oxygen therapy device and L/min; other (see comments)  CTA chest: R lung mass (? malig);sev. COPD;myelodysplas. synd. w/ thrombocy.(nosebleed at adm;HGB 10.1 yest.at adm,but 7.3 today s/p 2 units plts);spondylolith w/ radiculop; prev L TKR,B THR, Rev R THR d/t fx  -DM     Barriers to Rehab medically complex; cognitive status; ineffective coping; hearing deficit; visual deficit  h/o Dep.; mac deg.  -DM     Row Name 12/03/21 1125          Living Environment    Lives With alone; other (see comments)  niece checks & accomp.to ER; Hired CG's 3 d/wk  -DM     Row Name 12/03/21 1125          Home Main Entrance    Number of Stairs, Main Entrance one  -DM     Stair Railings, Main Entrance none  -DM     Row Name 12/03/21 1125          Stairs Within Home, Primary    Stairs, Within Home, Primary 2-st.home w/ stair lift (2nd fl. B/B);has show.ch. but not using (sponge bathes)  -DM     Number of Stairs, Within Home, Primary other (see comments)  stair lift  -DM     Row Name 12/03/21 1125          Cognition    Orientation Status (Cognition) oriented to; person; disoriented to; place; situation; time; verbal cues/prompts needed for orientation  slow responses; can recall name; persev. w/ \" I'm so cold\" (placed 3 blkts & sheet over pt)  -DM     Row Name 12/03/21 1125          Safety Issues, Functional Mobility    Safety Issues Affecting Function (Mobility) ability to follow commands; awareness of need for assistance; insight into deficits/self-awareness; safety precaution awareness; safety precautions " follow-through/compliance; sequencing abilities  -DM     Impairments Affecting Function (Mobility) cognition; endurance/activity tolerance; pain; shortness of breath; strength; visual/perceptual  Max cueing d/t mac. deg. & [place], & persist.mouth breather (cont. reminded of PLB technique)  -DM     Cognitive Impairments, Mobility Safety/Performance insight into deficits/self-awareness; safety precaution awareness; safety precaution follow-through; sequencing abilities  -DM           User Key  (r) = Recorded By, (t) = Taken By, (c) = Cosigned By    Initials Name Provider Type    DM Belen Mendoza, PT Physical Therapist               Mobility     Row Name 12/03/21 1125          Bed Mobility    Bed Mobility rolling right; rolling left; scooting/bridging  -DM     Rolling Left [place] (Bed Mobility) verbal cues; moderate assist (50% patient effort)  -DM     Rolling Right [place] (Bed Mobility) verbal cues; moderate assist (50% patient effort)  -DM     Scooting/Bridging [place] (Bed Mobility) verbal cues; maximum assist (25% patient effort); 2 person assist  scooted to HOB X 2 w/ draw sheet (pre & s/p ther exer); cued to assist/push w/ LE's  -DM     Assistive Device (Bed Mobility) bed rails; draw sheet; head of bed elevated  -DM     Comment (Bed Mobility) respirex to 300 cc (x 5);MD in prior & turned o2 down from 5 L to4 1/2 L;sats in low 90's at rest init.,but immed decr to mid 80's w/ mvmt;practiced PLB exer(mouth breather by habit);c/o perineal area pain from pure wick cath w/ bed mobil; def. EOB d/t desat'g  -DM     Row Name 12/03/21 1125          Transfers    Comment (Transfers) def d/t hypoxia/desat.  -DM     Row Name 12/03/21 1125          Bed-Chair Transfer    Bed-Chair [place] (Transfers) unable to assess  -DM     Row Name 12/03/21 1125          Sit-Stand Transfer    Sit-Stand [place] (Transfers) unable to assess  -DM     Row Name 12/03/21 1125          Gait/Stairs (Locomotion)     [place] Level (Gait) unable to assess  -DM     Comment (Gait/Stairs) def.  -DM           User Key  (r) = Recorded By, (t) = Taken By, (c) = Cosigned By    Initials Name Provider Type    DM Belen Mendoza, PT Physical Therapist               Obj/Interventions     Row Name 12/03/21 1125          Range of Motion Comprehensive    General Range of Motion lower extremity range of motion deficits identified  -DM     Comment, General Range of Motion B hips harding. 25-50 % d/t OA, prev TKR & THR's, incr SOB/desat. w/ any mvmt, & tensing d/t 'Freezing'  -DM     Row Name 12/03/21 1125          Strength Comprehensive (MMT)    General Manual Muscle Testing (MMT) Assessment lower extremity strength deficits identified  -DM     Comment, General Manual Muscle Testing (MMT) Assessment BLE grossly 3- to 4-/5  -DM     Row Name 12/03/21 1125          Motor Skills    Therapeutic Exercise hip; knee; ankle  -DM     Row Name 12/03/21 [facility]          Hip (Therapeutic Exercise)    Hip (Therapeutic Exercise) AROM (active range of motion); AAROM (active assistive range of motion); isometric exercises  -DM     Hip AROM (Therapeutic Exercise) bilateral; extension; aDduction; supine; 10 repetitions  grav asst ext. w/ h.slides  -DM     Hip AAROM (Therapeutic Exercise) bilateral; flexion; aBduction; external rotation; internal rotation; supine; 10 repetitions  -DM     Hip Isometrics (Therapeutic Exercise) bilateral; gluteal sets; 10 repetitions; supine; other (see comments)  also 5 reps abdom sets;c/o pure wick pain w/ GS  -DM     Row Name 12/03/21 [facility]          Knee (Therapeutic Exercise)    Knee (Therapeutic Exercise) AROM (active range of motion); AAROM (active assistive range of motion); isometric exercises  -DM     Knee AROM (Therapeutic Exercise) bilateral; SAQ (short arc quad); LAQ (long arc quad); heel slides; supine; 10 repetitions; other (see comments)  ALSO 2-3 reps SLR, but desat to 84%  -DM     Knee AAROM (Therapeutic Exercise)  bilateral; flexion; supine; 10 repetitions  -DM     Knee Isometrics (Therapeutic Exercise) bilateral; hamstring sets; quad sets; supine; 10 repetitions  -DM     Row Name 12/03/21 1125          Ankle (Therapeutic Exercise)    Ankle (Therapeutic Exercise) AROM (active range of motion); AAROM (active assistive range of motion)  -DM     Ankle AROM (Therapeutic Exercise) bilateral; dorsiflexion; supine; 10 repetitions  -DM     Ankle AAROM (Therapeutic Exercise) bilateral; supine; 10 repetitions; other (see comments)  AC  -DM           User Key  (r) = Recorded By, (t) = Taken By, (c) = Cosigned By    Initials Name Provider Type    DM Belen Mendoza, PT Physical Therapist               Goals/Plan     Row Name 12/03/21 1125          Bed Mobility Goal 1 (PT)    Activity/Assistive Device (Bed Mobility Goal 1, PT) bed mobility activities, all  -DM     [place] Level/Cues Needed (Bed Mobility Goal 1, PT) moderate assist (50-74% patient effort)  -DM     Time Frame (Bed Mobility Goal 1, PT) long term goal (LTG); 1 week  -DM     Row Name 12/03/21 1125          Transfer Goal 1 (PT)    Activity/Assistive Device (Transfer Goal 1, PT) sit-to-stand/stand-to-sit; bed-to-chair/chair-to-bed; walker, rolling  -DM     [place] Level/Cues Needed (Transfer Goal 1, PT) moderate assist (50-74% patient effort)  -DM     Time Frame (Transfer Goal 1, PT) long term goal (LTG); 1 week  -DM     Row Name 12/03/21 1125          Gait Training Goal 1 (PT)    Activity/Assistive Device (Gait Training Goal 1, PT) gait (walking locomotion); assistive device use; walker, rolling  VSS; O2 sat > 92%  -DM     [place] Level (Gait Training Goal 1, PT) moderate assist (50-74% patient effort)  -DM     Distance (Gait Training Goal 1, PT) 25  -DM     Time Frame (Gait Training Goal 1, PT) long term goal (LTG); 1 week  -DM     Row Name 12/03/21 1125          Patient Education Goal (PT)    Activity (Patient Education Goal, PT) HEP exer  -DM      [place]/Cues/Accuracy (Memory Goal 2, PT) demonstrates adequately; verbalizes understanding  -DM     Time Frame (Patient Education Goal, PT) long term goal (LTG); 1 week  -DM           User Key  (r) = Recorded By, (t) = Taken By, (c) = Cosigned By    Initials Name Provider Type    DM Belen Mendoza, PT Physical Therapist               Clinical Impression     Row Name 12/03/21 1125          Pain    Additional Documentation Pain Scale: FACES Pre/Post-Treatment (Group)  -DM     Row Name 12/03/21 1125          Pain Scale: Numbers Pre/Post-Treatment    Pain Intervention(s) Repositioned; Rest  -DM     Row Name 12/03/21 1125          Pain Scale: FACES Pre/Post-Treatment    Pain: FACES Scale, Pretreatment 2-->hurts little bit  -DM     Posttreatment Pain Rating 4-->hurts little more  -DM     Pain Location perineum  -DM     Pre/Posttreatment Pain Comment Reposn pure wick cath & smoothed wrinkles from draw pads;pt noted mod.relief  -DM     Row Name 12/03/21 1125          Plan of Care Review    Plan of Care Reviewed With patient  -DM     Progress improving  -DM     Outcome Summary PT eval completed. Presents w/ R lung mass per CTA chest, hypox w/ desat to mid 80's w/ any mvmt (MD decr O2 from 5 L to 4 1/2), low HGB 7.3 despite 2 units plts., decr LE strength/endurance & impaired funct mobil. Rolled L/R w/ mod A using bedrail & drawsheet, but desat to 85%. Scooted to HOB X 2 (pre & s/p ther exer) w/ drawsheet & max 2A. Performed ther exer BLE in sup w/ rests s/p every few reps, d/t desat to 84-85% (EOB & UIC def.as result). Respirex to 300 cc. Limited by decr processing, mod SOB, perineal pain, anemia & 'freezing' throughout. HR to 78. Recommend SNF at d/c.  -DM     Row Name 12/03/21 1125          Therapy Assessment/Plan (PT)    Patient/Family Therapy Goals Statement (PT) improved funct mobil  -DM     Rehab Potential (PT) good, to achieve stated therapy goals  -DM     Criteria for Skilled Interventions Met (PT) yes; meets  criteria; skilled treatment is necessary  -DM     Row Name 12/03/21 1125          Vital Signs    Pre Systolic BP Rehab 124  100/56 prior  -DM     Pre Treatment Diastolic BP 54  -DM     Post Systolic BP Rehab 133  -DM     Post Treatment Diastolic BP 58  -DM     Pretreatment Heart Rate (beats/min) 74  -DM     Intratreatment Heart Rate (beats/min) 78  -DM     Posttreatment Heart Rate (beats/min) 73  -DM     Pre SpO2 (%) 100  -DM     O2 Delivery Pre Treatment supplemental O2  -DM     Intra SpO2 (%) 84  -DM     O2 Delivery Intra Treatment supplemental O2  -DM     Post SpO2 (%) 99  -DM     O2 Delivery Post Treatment supplemental O2  -DM     Pre Patient Position Supine  -DM     Intra Patient Position Side Lying  -DM     Post Patient Position Supine  -DM     Row Name 12/03/21 1125          Positioning and Restraints    Pre-Treatment Position in bed  -DM     Post Treatment Position bed  -DM     In Bed notified nsg; fowlers; call light within reach; encouraged to call for assist; exit alarm on; SCD pump applied; heels elevated  [employer] for lunch (tray prepped by PT, & fed pt 5-6 bites, as pt unwilling to remove blankets from arms)  -DM           User Key  (r) = Recorded By, (t) = Taken By, (c) = Cosigned By    Initials Name Provider Type    DM Belen Mendoza, PT Physical Therapist               Outcome Measures     Row Name 12/03/21 1125 12/03/21 0845       How much help from another person do you currently need...    Turning from your back to your side while in flat bed without using bedrails? 2  -DM 3  -MR    Moving from lying on back to sitting on the side of a flat bed without bedrails? 2  -DM 3  -MR    Moving to and from a bed to a chair (including a wheelchair)? 2  -DM 2  -MR    Standing up from a chair using your arms (e.g., wheelchair, bedside chair)? 2  -DM 2  -MR    Climbing 3-5 steps with a railing? 2  -DM 2  -MR    To walk in hospital room? 2  -DM 2  -MR    AM-PAC 6 Clicks Score (PT) 12  -DM 14  -MR    Row Name  12/03/21 1152 12/03/21 1125       Functional Assessment    Outcome Measure Options AM-PAC 6 Clicks Daily Activity (OT)  -KF AM-PAC 6 Clicks Basic Mobility (PT)  -DM          User Key  (r) = Recorded By, (t) = Taken By, (c) = Cosigned By    Initials Name Provider Type    DM Belen Mendoza, PT Physical Therapist    Fabiana Cam, OT Occupational Therapist    Aba Quiroz, RN Registered Nurse                             Physical Therapy Education                 Title: PT OT SLP Therapies (In Progress)     Topic: Physical Therapy (In Progress)     Point: Mobility training (In Progress)     Learning Progress Summary           Patient Acceptance, E,D, NR by DM at 12/3/2021 1240                   Point: Home exercise program (In Progress)     Learning Progress Summary           Patient Acceptance, E,D, NR by DM at 12/3/2021 1240                   Point: Body mechanics (In Progress)     Learning Progress Summary           Patient Acceptance, E,D, NR by DM at 12/3/2021 1240                   Point: Precautions (In Progress)     Learning Progress Summary           Patient Acceptance, E,D, NR by DM at 12/3/2021 1240                               User Key     Initials Effective Dates Name Provider Type Discipline    DM 06/16/21 -  Belen Mendoza, PT Physical Therapist PT              PT Recommendation and Plan  Planned Therapy Interventions (PT): balance training, bed mobility training, gait training, home exercise program, patient/family education, strengthening, transfer training  Plan of Care Reviewed With: patient  Progress: improving  Outcome Summary: PT eval completed. Presents w/ R lung mass per CTA chest, hypox w/ desat to mid 80's w/ any mvmt (MD decr O2 from 5 L to 4 1/2), low HGB 7.3 despite 2 units plts., decr LE strength/endurance & impaired funct mobil. Rolled L/R w/ mod A using bedrail & drawsheet, but desat to 85%. Scooted to HOB X 2 (pre & s/p ther exer) w/ drawsheet & max 2A. Performed ther  exer BLE in sup w/ rests s/p every few reps, d/t desat to 84-85% (EOB & UIC def.as result). Respirex to 300 cc. Limited by decr processing, mod SOB, perineal pain, anemia & 'freezing' throughout. HR to 78. Recommend SNF at d/c.     Time Calculation:    PT Charges     Row Name 12/03/21 1241             Time Calculation    Start Time 1125  -DM      PT Received On 12/03/21  -DM      PT Goal Re-Cert Due Date 12/13/21  -DM              Time Calculation- PT    Total Timed Code Minutes- PT 76 minute(s)  -DM              Timed Charges    94754 - PT Therapeutic Activity Minutes 16  -DM              Untimed Charges    PT Eval/Re-eval Minutes 60  -DM              Total Minutes    Timed Charges Total Minutes 16  -DM      Untimed Charges Total Minutes 60  -DM       Total Minutes 76  -DM            User Key  (r) = Recorded By, (t) = Taken By, (c) = Cosigned By    Initials Name Provider Type    DM Belen Mendoza, PT Physical Therapist              Therapy Charges for Today     Code Description Service Date Service Provider Modifiers Qty    19992030003 HC PT THERAPEUTIC ACT EA 15 MIN 12/3/2021 Belen Mendoza, PT GP 1    34198342529 HC PT EVAL MOD COMPLEXITY 4 12/3/2021 Belen Mendoza, PT GP 1          PT G-Codes  Outcome Measure Options: AM-PAC 6 Clicks Daily Activity (OT)  AM-PAC 6 Clicks Score (PT): 12  AM-PAC 6 Clicks Score (OT): 11    Belen Mendoza PT  12/3/2021

## 2021-12-03 NOTE — ED PROVIDER NOTES
"Subjective   89-year-old female presents for evaluation of nosebleed.  Of note, the patient has a history of COPD.  She reportedly wears home oxygen \"as needed\" at home.  She lives alone.  She went and saw her primary care physician today and was sent here for both epistaxis and hypoxia.  She was noted to have room air oxygen saturations in the 70s at the office.  She is not anticoagulated.  She denies any known triggers for her epistaxis and denies any preceding trauma or injury.  A gauze was placed in her right nare, EMS was called, and she was brought to our facility to be evaluated.          Review of Systems   HENT: Positive for nosebleeds.    Respiratory: Positive for shortness of breath.    All other systems reviewed and are negative.      Past Medical History:   Diagnosis Date   • COPD (chronic obstructive pulmonary disease) (HCC)    • Depression    • Dyslipidemia    • Dyslipidemia    • IBS (irritable bowel syndrome)    • Macular degeneration    • Myelodysplastic syndrome (HCC)     anemia, thrombocytopemia   • Osteoarthritis    • Other spondylosis with radiculopathy, lumbar region        Allergies   Allergen Reactions   • Cortisone        Past Surgical History:   Procedure Laterality Date   • CATARACT EXTRACTION, BILATERAL     • HIP SURGERY     • KNEE SURGERY     • REVISION OF TOTAL HIP FEMORAL Right 2017   • TOTAL HIP ARTHROPLASTY Right    • TOTAL HIP ARTHROPLASTY Left    • TOTAL KNEE ARTHROPLASTY Left            Family History   Problem Relation Age of Onset   • Heart failure Mother    • Leukemia Father        Social History     Socioeconomic History   • Marital status: Single   Tobacco Use   • Smoking status: Former Smoker     Packs/day: 1.50     Years: 40.00     Pack years: 60.00     Types: Cigarettes     Quit date:      Years since quittin.9   • Smokeless tobacco: Never Used   Substance and Sexual Activity   • Alcohol use: Yes     Alcohol/week: 7.0 standard drinks     Types: 7 Shots " "of liquor per week     Comment: COCKTAIL EVERY NIGHT   • Drug use: No   • Sexual activity: Defer           Objective   Physical Exam  Vitals and nursing note reviewed.   Constitutional:       General: She is not in acute distress.     Appearance: She is well-developed. She is not diaphoretic.      Comments: Chronically ill-appearing elderly female   HENT:      Head: Normocephalic and atraumatic.      Nose:      Comments: Bloodsoaked gauze noted in right nare, no septal hematoma present, oropharynx is clear and devoid of any blood  Eyes:      Pupils: Pupils are equal, round, and reactive to light.   Cardiovascular:      Rate and Rhythm: Normal rate and regular rhythm.      Heart sounds: Normal heart sounds. No murmur heard.  No friction rub. No gallop.    Pulmonary:      Effort: Pulmonary effort is normal. No respiratory distress.      Breath sounds: Normal breath sounds. No wheezing or rales.   Abdominal:      General: Bowel sounds are normal. There is no distension.      Palpations: Abdomen is soft. There is no mass.      Tenderness: There is no abdominal tenderness. There is no guarding or rebound.   Musculoskeletal:         General: Normal range of motion.      Cervical back: Neck supple.   Skin:     General: Skin is warm and dry.      Findings: No erythema or rash.   Neurological:      General: No focal deficit present.      Mental Status: She is alert and oriented to person, place, and time.   Psychiatric:         Mood and Affect: Mood normal.         Thought Content: Thought content normal.         Judgment: Judgment normal.         Procedures           ED Course  ED Course as of 12/02/21 2026   Thu Dec 02, 2021   4403 89-year-old female sent from her primary care physician's office to be evaluated for nosebleed and hypoxia.  Of note, the patient wears home oxygen \"as needed\" at home.  The patient is not anticoagulated. [DD]   8253 On arrival to the ED, the patient has a bloodsoaked pledget in her right nare.  " "She is hypoxic on room air.  Supplemental oxygen given via nasal cannula.  Normal work of breathing noted.  No blood noted to oropharynx.  Direct pressure and Afrin administered.  The patient's epistaxis resolved with conservative treatment.  Labs remarkable for platelet count of 14,000.  The patient has a history of myeloproliferative disorder.  I discussed her case with Dr. Mcnally who recommended platelet transfusion and having her follow-up with him in the office on Monday assuming that her bleeding resolved.  I agree with his expert assessment. [DD]   1627 After reviewing the patient's chest CTA results, I went back in and discussed her findings with both her and her niece at bedside--most notably the lesion concerning for underlying malignancy.  The patient lives alone.  She wears home oxygen \"as needed.\"  At home but her niece reports that she \"does not know how to use it properly.\"  Currently, she is requiring 4 L with oxygen saturations in the low 90s.  She needs to be transfused.  Her niece is requesting that she be admitted to the hospital as she lives alone, and I feel that this is completely reasonable.  Awaiting callback from the hospitalist at this time. [DD]   1632 I discussed the patient's case with Dr. Womack, and the patient will be admitted under her care for further evaluation and treatment.  The patient is aware/agreeable with the plan at this time. [DD]      ED Course User Index  [DD] Wali Coreas MD                                         Recent Results (from the past 24 hour(s))   CBC Auto Differential    Collection Time: 12/02/21 12:47 PM    Specimen: Blood   Result Value Ref Range    WBC 4.35 3.40 - 10.80 10*3/mm3    RBC 2.85 (L) 3.77 - 5.28 10*6/mm3    Hemoglobin 10.1 (L) 12.0 - 15.9 g/dL    Hematocrit 31.3 (L) 34.0 - 46.6 %    .8 (H) 79.0 - 97.0 fL    MCH 35.4 (H) 26.6 - 33.0 pg    MCHC 32.3 31.5 - 35.7 g/dL    RDW 15.9 (H) 12.3 - 15.4 %    RDW-SD 63.5 (H) 37.0 - 54.0 fl    " Platelets 14 (C) 140 - 450 10*3/mm3    Neutrophil % 82.3 (H) 42.7 - 76.0 %    Lymphocyte % 9.7 (L) 19.6 - 45.3 %    Monocyte % 6.9 5.0 - 12.0 %    Eosinophil % 0.2 (L) 0.3 - 6.2 %    Basophil % 0.2 0.0 - 1.5 %    Immature Grans % 0.7 (H) 0.0 - 0.5 %    Neutrophils, Absolute 3.58 1.70 - 7.00 10*3/mm3    Lymphocytes, Absolute 0.42 (L) 0.70 - 3.10 10*3/mm3    Monocytes, Absolute 0.30 0.10 - 0.90 10*3/mm3    Eosinophils, Absolute 0.01 0.00 - 0.40 10*3/mm3    Basophils, Absolute 0.01 0.00 - 0.20 10*3/mm3    Immature Grans, Absolute 0.03 0.00 - 0.05 10*3/mm3    nRBC 0.0 0.0 - 0.2 /100 WBC   Comprehensive Metabolic Panel    Collection Time: 12/02/21 12:47 PM    Specimen: Blood   Result Value Ref Range    Glucose 118 (H) 65 - 99 mg/dL    BUN 23 8 - 23 mg/dL    Creatinine 0.55 (L) 0.57 - 1.00 mg/dL    Sodium 141 136 - 145 mmol/L    Potassium 4.5 3.5 - 5.2 mmol/L    Chloride 101 98 - 107 mmol/L    CO2 29.0 22.0 - 29.0 mmol/L    Calcium 9.7 (H) 8.2 - 9.6 mg/dL    Total Protein 7.1 6.0 - 8.5 g/dL    Albumin 3.90 3.50 - 5.20 g/dL    ALT (SGPT) 8 1 - 33 U/L    AST (SGOT) 12 1 - 32 U/L    Alkaline Phosphatase 86 39 - 117 U/L    Total Bilirubin 0.6 0.0 - 1.2 mg/dL    eGFR Non African Amer 104 >60 mL/min/1.73    Globulin 3.2 gm/dL    A/G Ratio 1.2 g/dL    BUN/Creatinine Ratio 41.8 (H) 7.0 - 25.0    Anion Gap 11.0 5.0 - 15.0 mmol/L   Green Top (Gel)    Collection Time: 12/02/21 12:47 PM   Result Value Ref Range    Extra Tube Hold for add-ons.    Lavender Top    Collection Time: 12/02/21 12:47 PM   Result Value Ref Range    Extra Tube hold for add-on    Gold Top - SST    Collection Time: 12/02/21 12:47 PM   Result Value Ref Range    Extra Tube Hold for add-ons.    Gray Top    Collection Time: 12/02/21 12:47 PM   Result Value Ref Range    Extra Tube Hold for add-ons.    Light Blue Top    Collection Time: 12/02/21 12:47 PM   Result Value Ref Range    Extra Tube hold for add-on    Troponin    Collection Time: 12/02/21 12:47 PM     Specimen: Blood   Result Value Ref Range    Troponin T <0.010 0.000 - 0.030 ng/mL   BNP    Collection Time: 12/02/21 12:47 PM    Specimen: Blood   Result Value Ref Range    proBNP 648.8 0.0-1,800.0 pg/mL   Scan Slide    Collection Time: 12/02/21 12:47 PM    Specimen: Blood   Result Value Ref Range    RBC Morphology Normal Normal    WBC Morphology Normal Normal    Platelet Morphology Normal Normal   Protime-INR    Collection Time: 12/02/21 12:47 PM    Specimen: Blood   Result Value Ref Range    Protime 13.8 11.4 - 14.4 Seconds    INR 1.10 0.85 - 1.16   ECG 12 Lead    Collection Time: 12/02/21  1:02 PM   Result Value Ref Range    QT Interval 360 ms    QTC Interval 415 ms   Type & Screen    Collection Time: 12/02/21  3:33 PM    Specimen: Blood   Result Value Ref Range    ABO Type O     RH type Negative     Antibody Screen Negative     T&S Expiration Date 12/5/2021 11:59:59 PM    Prepare Platelet Pheresis, 2 Units    Collection Time: 12/02/21  7:54 PM   Result Value Ref Range    Product Code B7357I58     Unit Number [id number]     UNIT  ABO O     UNIT  RH POS     Dispense Status RE     Blood Expiration Date 202112052359     Blood Type Barcode 5100     Product Code L8859C50     Unit Number [id number]     UNIT  ABO O     UNIT  RH POS     Dispense Status IS     Blood Expiration Date 202112052359     Blood Type Barcode 5100     Product Code H4975H34     Unit Number [id number]     UNIT  ABO O     UNIT  RH NEG     Dispense Status XM     Blood Expiration Date 202112052359     Blood Type Barcode 9500      Note: In addition to lab results from this visit, the labs listed above may include labs taken at another facility or during a different encounter within the last 24 hours. Please correlate lab times with ED admission and discharge times for further clarification of the services performed during this visit.    CT Angiogram Chest   Preliminary Result   1. 19 x 10 mm stellate right upper lobe lesion worrisome for  primary   neoplasm.   2. No evidence of pulmonary embolus.   3. Mild pulmonary vascular congestion, suspected early interstitial   edema, and trace right pleural effusion.   4. Unusual smooth margined paraspinous soft tissue lesions in the lower   region, measuring between fat and water density, favored to be   incidental as discussed above, and which should be followed.       D:  12/02/2021   E:  12/02/2021                Vitals:    12/02/21 1737 12/02/21 1800 12/02/21 1911 12/02/21 1933   BP: (!) 131/101 139/66 139/70    BP Location: Right arm Left arm Right arm    Patient Position: Lying Lying Lying    Pulse: 75 76 85 77   Resp: 17 18 18 16   Temp: 97.9 °F (36.6 °C) 98 °F (36.7 °C) 98.5 °F (36.9 °C)    TempSrc: Oral Oral Oral    SpO2: 98% 97%  94%   Weight:       Height:         Medications   sodium chloride 0.9 % flush 10 mL (has no administration in time range)   sodium chloride 0.9 % flush 10 mL (has no administration in time range)   oxymetazoline (AFRIN) nasal spray 1 spray (1 spray Nasal Not Given 12/2/21 1558)   cholecalciferol (VITAMIN D3) tablet 1,000 Units (has no administration in time range)   vitamin B-12 (CYANOCOBALAMIN) tablet 1,000 mcg (has no administration in time range)   gabapentin (NEURONTIN) capsule 300 mg (has no administration in time range)   multivitamin with minerals 1 tablet (has no administration in time range)   vitamin B-6 (PYRIDOXINE) tablet 200 mg (has no administration in time range)   ipratropium-albuterol (DUO-NEB) nebulizer solution 3 mL (3 mL Nebulization Given 12/2/21 1933)   albuterol (PROVENTIL) nebulizer solution 0.083% 2.5 mg/3mL (has no administration in time range)   sodium chloride 0.9 % flush 10 mL (has no administration in time range)   sodium chloride 0.9 % flush 10 mL (has no administration in time range)   acetaminophen (TYLENOL) tablet 650 mg (has no administration in time range)   melatonin tablet 5 mg (has no administration in time range)   ondansetron  (ZOFRAN) injection 4 mg (has no administration in time range)   calcium carbonate (TUMS) chewable tablet 500 mg (200 mg elemental) (has no administration in time range)   docusate sodium (COLACE) capsule 100 mg (has no administration in time range)   bisacodyl (DULCOLAX) EC tablet 10 mg (has no administration in time range)   lactated ringers bolus 500 mL (0 mL Intravenous Stopped 12/2/21 1508)   iopamidol (ISOVUE-370) 76 % injection 80 mL (80 mL Intravenous Given 12/2/21 1524)     ECG/EMG Results (last 24 hours)     Procedure Component Value Units Date/Time    ECG 12 Lead [[id number]] Collected: 12/02/21 1302     Updated: 12/02/21 1908     QT Interval 360 ms      QTC Interval 415 ms     Narrative:      Test Reason : SOA Protocol  Blood Pressure :   */*   mmHG  Vent. Rate :  80 BPM     Atrial Rate :  80 BPM     P-R Int : 158 ms          QRS Dur :  76 ms      QT Int : 360 ms       P-R-T Axes :  88  51  56 degrees     QTc Int : 415 ms    Normal sinus rhythm  Normal ECG  When compared with ECG of 20-JUN-2019 14:16,  T wave amplitude has increased in Lateral leads  Confirmed by MD Coreas Michael ([id number]) on 12/2/2021 7:07:45 PM    Referred By: MARYAM           Confirmed By: Jagjit Coreas MD        ECG 12 Lead   Final Result   Test Reason : SOA Protocol   Blood Pressure :   */*   mmHG   Vent. Rate :  80 BPM     Atrial Rate :  80 BPM      P-R Int : 158 ms          QRS Dur :  76 ms       QT Int : 360 ms       P-R-T Axes :  88  51  56 degrees      QTc Int : 415 ms      Normal sinus rhythm   Normal ECG   When compared with ECG of 20-JUN-2019 14:16,   T wave amplitude has increased in Lateral leads   Confirmed by MD Coreas Michael ([id number]) on 12/2/2021 7:07:45 PM      Referred By: MARYAM           Confirmed By: Jagjit Coreas MD                  [facility]    Final diagnoses:   Epistaxis   Hypoxia   Malignant neoplasm of lung, unspecified laterality, unspecified part of lung (HCC)   Thrombocytopenia (HCC)   Anemia, unspecified type    Myeloproliferative disorder (HCC)   History of COPD       ED Disposition  ED Disposition     ED Disposition Condition Comment    Decision to Admit  Level of Care: Observation Unit [28]   Diagnosis: Hypoxia [713505]   Bed Request Comments: 2G/F or 5F            No follow-up provider specified.       Medication List      No changes were made to your prescriptions during this visit.          Wali Coreas MD  12/02/21 2026

## 2021-12-03 NOTE — PLAN OF CARE
Problem: Adult Inpatient Plan of Care  Goal: Plan of Care Review  Outcome: Ongoing, Progressing  Flowsheets (Taken 12/2/2021 2672)  Outcome Summary: Pt arrived on floor from ED. ED transfused one unit platelets. 2 unit platelets ordered. Will continue to monitor  Goal: Patient-Specific Goal (Individualized)  Outcome: Ongoing, Progressing  Goal: Absence of Hospital-Acquired Illness or Injury  Outcome: Ongoing, Progressing  Goal: Optimal Comfort and Wellbeing  Outcome: Ongoing, Progressing  Goal: Readiness for Transition of Care  Outcome: Ongoing, Progressing   Goal Outcome Evaluation:              Outcome Summary: Pt arrived on floor from ED. ED transfused one unit platelets. 2 unit platelets ordered. Will continue to monitor

## 2021-12-03 NOTE — PLAN OF CARE
Second unit of platelets transfused this shift- platelets increased to 135 this AM.   Patient is confused but pleasant.   O2 increased to 5L humidified NC this shift.      Problem: Adult Inpatient Plan of Care  Goal: Plan of Care Review  Outcome: Ongoing, Progressing  Goal: Patient-Specific Goal (Individualized)  Outcome: Ongoing, Progressing  Goal: Absence of Hospital-Acquired Illness or Injury  Outcome: Ongoing, Progressing  Intervention: Identify and Manage Fall Risk  Recent Flowsheet Documentation  Taken 12/3/2021 0400 by Kesha Roman RN  Safety Promotion/Fall Prevention:   nonskid shoes/slippers when out of bed   assistive device/personal items within reach   fall prevention program maintained   activity supervised  Taken 12/3/2021 0200 by Kesha Roman RN  Safety Promotion/Fall Prevention:   nonskid shoes/slippers when out of bed   safety round/check completed   fall prevention program maintained   activity supervised   assistive device/personal items within reach  Taken 12/3/2021 0000 by Kesha Roman RN  Safety Promotion/Fall Prevention:   safety round/check completed   nonskid shoes/slippers when out of bed   fall prevention program maintained   activity supervised   assistive device/personal items within reach  Taken 12/2/2021 2200 by Kesha Roman RN  Safety Promotion/Fall Prevention:   nonskid shoes/slippers when out of bed   safety round/check completed   fall prevention program maintained   assistive device/personal items within reach   activity supervised  Taken 12/2/2021 2000 by Kesha Roman RN  Safety Promotion/Fall Prevention:   fall prevention program maintained   assistive device/personal items within reach   activity supervised   nonskid shoes/slippers when out of bed   safety round/check completed  Intervention: Prevent Skin Injury  Recent Flowsheet Documentation  Taken 12/3/2021 0400 by Kesha Roman RN  Body Position: position maintained  Skin Protection:   incontinence pads utilized   adhesive  use limited   tubing/devices free from skin contact   transparent dressing maintained  Taken 12/3/2021 0200 by Kesha Roman RN  Body Position: position maintained  Skin Protection:   adhesive use limited   incontinence pads utilized   transparent dressing maintained   tubing/devices free from skin contact  Taken 12/3/2021 0000 by Kesha Roman RN  Body Position: position maintained  Skin Protection:   adhesive use limited   incontinence pads utilized  Taken 12/2/2021 2200 by Kesha Roman RN  Body Position: position maintained  Skin Protection:   transparent dressing maintained   tubing/devices free from skin contact   adhesive use limited   incontinence pads utilized  Taken 12/2/2021 2000 by Kesha Roman RN  Body Position: sitting up in bed  Skin Protection:   adhesive use limited   incontinence pads utilized   transparent dressing maintained  Goal: Optimal Comfort and Wellbeing  Outcome: Ongoing, Progressing  Intervention: Provide Person-Centered Care  Recent Flowsheet Documentation  Taken 12/2/2021 2000 by Kesha Roman RN  Trust Relationship/Rapport:   care explained   reassurance provided   thoughts/feelings acknowledged   questions encouraged  Goal: Readiness for Transition of Care  Outcome: Ongoing, Progressing     Problem: Skin Injury Risk Increased  Goal: Skin Health and Integrity  Outcome: Ongoing, Progressing  Intervention: Optimize Skin Protection  Recent Flowsheet Documentation  Taken 12/3/2021 0400 by Kesha Roman RN  Pressure Reduction Techniques:   sit time limited to 2 hours   frequent weight shift encouraged   heels elevated off bed   positioned off wounds  Head of Bed (HOB): HOB elevated  Pressure Reduction Devices:   heel offloading device utilized   positioning supports utilized   pressure-redistributing mattress utilized  Skin Protection:   incontinence pads utilized   adhesive use limited   tubing/devices free from skin contact   transparent dressing maintained  Taken 12/3/2021 0200 by Jessie  HERNÁN Rebolledo  Pressure Reduction Techniques:   pressure points protected   weight shift assistance provided   frequent weight shift encouraged   heels elevated off bed  Head of Bed ([facility]): [facility] elevated  Pressure Reduction Devices:   pressure-redistributing mattress utilized   positioning supports utilized   heel offloading device utilized  Skin Protection:   adhesive use limited   incontinence pads utilized   transparent dressing maintained   tubing/devices free from skin contact  Taken 12/3/2021 0000 by Kesha Roman RN  Pressure Reduction Techniques:   frequent weight shift encouraged   heels elevated off bed   positioned off wounds  Head of Bed ([facility]): [facility] elevated  Pressure Reduction Devices:   heel offloading device utilized   positioning supports utilized  Skin Protection:   adhesive use limited   incontinence pads utilized  Taken 12/2/2021 2200 by Kesha Roman RN  Pressure Reduction Techniques:   pressure points protected   heels elevated off bed   frequent weight shift encouraged  Head of Bed ([facility]): [facility] elevated  Pressure Reduction Devices:   positioning supports utilized   heel offloading device utilized  Skin Protection:   transparent dressing maintained   tubing/devices free from skin contact   adhesive use limited   incontinence pads utilized  Taken 12/2/2021 2000 by Kesha Roman RN  Pressure Reduction Techniques:   heels elevated off bed   positioned off wounds   pressure points protected  Head of Bed ([facility]): [facility] elevated  Pressure Reduction Devices:   pressure-redistributing mattress utilized   positioning supports utilized   heel offloading device utilized  Skin Protection:   adhesive use limited   incontinence pads utilized   transparent dressing maintained     Problem: Fall Injury Risk  Goal: Absence of Fall and Fall-Related Injury  Outcome: Ongoing, Progressing  Intervention: Promote Injury-Free Environment  Recent Flowsheet Documentation  Taken 12/3/2021 0400 by Kesha Roman RN  Safety Promotion/Fall  Prevention:   nonskid shoes/slippers when out of bed   assistive device/personal items within reach   fall prevention program maintained   activity supervised  Taken 12/3/2021 0200 by Kesha Roman RN  Safety Promotion/Fall Prevention:   nonskid shoes/slippers when out of bed   safety round/check completed   fall prevention program maintained   activity supervised   assistive device/personal items within reach  Taken 12/3/2021 0000 by Kesha Roman RN  Safety Promotion/Fall Prevention:   safety round/check completed   nonskid shoes/slippers when out of bed   fall prevention program maintained   activity supervised   assistive device/personal items within reach  Taken 12/2/2021 2200 by Kesha Roman RN  Safety Promotion/Fall Prevention:   nonskid shoes/slippers when out of bed   safety round/check completed   fall prevention program maintained   assistive device/personal items within reach   activity supervised  Taken 12/2/2021 2000 by Kesha Roman RN  Safety Promotion/Fall Prevention:   fall prevention program maintained   assistive device/personal items within reach   activity supervised   nonskid shoes/slippers when out of bed   safety round/check completed     Problem: COPD Comorbidity  Goal: Maintenance of COPD Symptom Control  Outcome: Ongoing, Progressing

## 2021-12-03 NOTE — CONSULTS
Pulmonary Hospital Consultation       Reason for Consultation: Acute on chronic respiratory failure with hypoxia (HCC)  Referring Provider: Cornelio Babb MD      History of Present Illness     We are asked to evaluate this patient for abnormal chest CT scan.  89-year-old female with a history of COPD on home oxygen 2 L nasal cannula, myeloproliferative disorder, arthritis, neuropathy presented with acute epistaxis yesterday.  Patient tried conservative measures at home but did not work and was brought to hospital.  In the ER found to be hypoxic and placed on nasal cannula oxygen.  Patient was noted to have platelet count of 14,000 and hematology was consulted and patient was given 2 units of packed platelets and admitted to the hospital for further care.  Due to hypoxia patient underwent CT angiogram of chest which showed 19 x 10 mm dilated right upper lobe lesion.  No evidence of pulmonary embolism, small right pleural effusion noted.  There is also a paraspinous soft tissue around the lower thoracic vertebrae which is with rounded margins and of unclear etiology.  Patient was seen at bedside.  She tells me that she used to smoke but has not smoked for years.  She does have cough at home.  Does not produce any sputum.  No hemoptysis.  No fever, chills or night sweats.  No change in appetite.  States that she lives by herself and she has somebody come help her clean the house and cook for her.  Not very mobile in the house.  Denies any sick contacts recently or any travel recently either.    Problem List, Surgical History, Family, Social History, and ROS     Patient Active Problem List    Diagnosis    • *Acute on chronic respiratory failure with hypoxia (HCC) [J96.21]    • Hypoxia [R09.02]    • Epistaxis [R04.0]    • Mass of right lung [R91.8]    • Bronchitis [J40]    • COPD (chronic obstructive pulmonary disease) (HCC) [J44.9]    • Myelodysplastic syndrome (HCC) [D46.9]    • Aspiration pneumonitis (HCC)  [J69.0]    • Centrilobular emphysema (HCC) [J43.2]    • BAUMAN (dyspnea on exertion) [R06.00]    • Oxygen desaturation, exercise [R09.02]    • Fall [W19.XXXA]    • Closed fracture of right femur (HCC) [S72.91XA]    • Arthritis [M19.90]    • Thrombocytopenia (HCC) [D69.6]      Past Surgical History:   Procedure Laterality Date   • CATARACT EXTRACTION, BILATERAL     • HIP SURGERY     • KNEE SURGERY     • REVISION OF TOTAL HIP FEMORAL Right 2017   • TOTAL HIP ARTHROPLASTY Right    • TOTAL HIP ARTHROPLASTY Left    • TOTAL KNEE ARTHROPLASTY Left            Allergies   Allergen Reactions   • Cortisone      Current Facility-Administered Medications on File Prior to Encounter   Medication   • bupivacaine PF (MARCAINE) 0.25 % injection     Current Outpatient Medications on File Prior to Encounter   Medication Sig   • albuterol sulfate  (90 Base) MCG/ACT inhaler Inhale 2 puffs Every 6 (Six) Hours As Needed for Wheezing.   • Anoro Ellipta 62.5-25 MCG/INH aerosol powder  inhaler INHALE 1 PUFF BY MOUTH ONCE DAILY   • cholecalciferol (VITAMIN D3) 1000 UNITS tablet Take 1,000 Units by mouth Daily.   • Cyanocobalamin 1000 MCG capsule Take 1,000 mcg by mouth Daily.   • gabapentin (NEURONTIN) 300 MG capsule Take 300 mg by mouth 2 (Two) Times a Day.   • meloxicam (MOBIC) 15 MG tablet Take 15 mg by mouth Daily.   • multivitamin with minerals (PRESERVISION AREDS PO) Take 1 tablet by mouth Daily.   • Pyridoxine HCl (VITAMIN B6) 200 MG tablet Take 1 tablet by mouth Daily.     MEDICATION LIST AND ALLERGIES REVIEWED.    Family History   Problem Relation Age of Onset   • Heart failure Mother    • Leukemia Father      Social History     Tobacco Use   • Smoking status: Former Smoker     Packs/day: 1.50     Years: 40.00     Pack years: 60.00     Types: Cigarettes     Quit date:      Years since quittin.9   • Smokeless tobacco: Never Used   Substance Use Topics   • Alcohol use: Yes     Alcohol/week: 7.0 standard drinks      "Types: 7 Shots of liquor per week     Comment: COCKTAIL EVERY NIGHT   • Drug use: No     Social History     Social History Narrative    Lives alone. Has part time assistance as needed.     FAMILY AND SOCIAL HISTORY REVIEWED.    Review of Systems  Limited review of systems due to patient factors.    Physical Exam and Clinical Information   /54 (BP Location: Right arm, Patient Position: Sitting)   Pulse 76   Temp 97 °F (36.1 °C) (Oral)   Resp 18   Ht 157.5 cm (62\")   Wt 47.6 kg (105 lb)   LMP  (LMP Unknown)   SpO2 93%   BMI 19.20 kg/m²   Physical Exam    General Appearance: Awake, alert, in no acute distress on nasal canula  Head:    Atraumatic, Normocephalic, without obvious abnormality  Lungs:   B/L Breath sounds present with decreased breath sounds on bases, no wheezing heard, occ crackles.   Heart: S1 and S2 present, no murmur,  Abdomen: Soft, non-tender, no guarding or rigidity, bowel sounds positive.  Extremities: Atraumatic, no cyanosis or clubbing,  no edema, warm to touch.  Pulses: Positive and symmetric.  Neurologic:  Moving all four extremities.  Psychologic: Appropriate affect, Cooperative.     Results from last 7 days   Lab Units 12/03/21  0327 12/02/21  1247   WBC 10*3/mm3 3.07* 4.35   HEMOGLOBIN g/dL 7.3* 10.1*   PLATELETS 10*3/mm3 135* 14*     Results from last 7 days   Lab Units 12/03/21  0327 12/02/21  1247   SODIUM mmol/L 142 141   POTASSIUM mmol/L 4.4 4.5   CO2 mmol/L 27.0 29.0   BUN mg/dL 16 23   CREATININE mg/dL 0.42* 0.55*   GLUCOSE mg/dL 82 118*     Estimated Creatinine Clearance: 34.4 mL/min (A) (by C-G formula based on SCr of 0.42 mg/dL (L)).          No results found for: LACTATE     Images: CTA reviewed personally and showed 19 x 10 mm right upper lobe stellate lesion without any significant adenopathy.  Small right pleural effusion.  Patient also has lower thoracic vertebrae paraspinous soft tissue density.  I reviewed the patient's results and images.     Impression     " Acute on chronic respiratory failure with hypoxia (HCC)    Thrombocytopenia (HCC)    BAUMAN (dyspnea on exertion)    COPD (chronic obstructive pulmonary disease) (HCC)    Myelodysplastic syndrome (HCC)    Epistaxis    Mass of right lung    Plan/Recommendations     This is a frail 89-year-old female with severe thrombocytopenia patient presenting with spontaneous epistaxis.  Incidentally found to have right upper lobe stellate lesion.  There is soft tissue component to this nodule as well.  Given her smoking history would be concerning for underlying malignant process.  Does not appear pulmonary AVM.  Discussed with patient as well as patient's niece who is patient's power of  over the phone.  Patient is quite frail and to do a biopsy of this lesion on we will have to put her under anesthesia to do navigational biopsy or CT-guided FNA would be very high risk given lot of normal lung tissue to traverse through which will increase the risk of pneumothorax.  Given her ongoing thrombocytopenia issues she will be very high risk of bleeding as well.  Given her age and other factors I recommended that we do not proceed with biopsy or any aggressive measures currently.  I offered her other options including outpatient PET scan to evaluate if there is any PET avidity which could open up [employer] option.  Other option would be to have repeat CT scan in 2 to 3 months timeframe to see if this nodule is growing.  If it remains stable then can be followed up with serial CT scans.  In the meantime we will also determine how her prognosis is from her potential bone marrow disease after visit with hematology and we can decide on further course.  Patient's niece was agreeable with current plan of care with short-term follow-up.  I will be happy to follow her along in 2 to 3 months time with repeat CT scan which can be arranged on discharge.    There is also unusual paraspinal soft tissue density  around lower thoracic vertebra  of unclear etiology.  There does not appear abscess or infectious process.  Patient is moving lower extremities without any obvious or profound weakness either.    Thank you for allowing me to participate in the care of this patient.      Time spent 40 min time (exclusive of procedure time)  including high complexity decision making and discussions with patient and her family members.       Graeme Herrera MD, [facility]  Pulmonary and Critical Care Medicine  12/03/21 12:53 EST     CC: Azeem Merchant MD

## 2021-12-03 NOTE — PLAN OF CARE
Goal Outcome Evaluation:  Plan of Care Reviewed With: patient           Outcome Summary: Pt VSS on 4L O2. Pt has had uneventful shift. Tylenol given for pain, otherwise no complaints.

## 2021-12-03 NOTE — PROGRESS NOTES
"Clinical Nutrition   Reason For Visit: Identified at risk by screening criteria, MST score 2+, Admission assessment, Reduced oral intake, \"Unsure\" unintentional weight loss    Patient Name: Elizabeth Oliveira  YOB: 1929  MRN: [id number]  Date of Encounter: 12/03/21 10:25 EST  Admission date: 12/2/2021        Nutrition Assessment     Admission Problem List:    Acute on chronic respiratory failure with hypoxia (HCC)    Thrombocytopenia (HCC)    BAUMAN (dyspnea on exertion)    COPD (chronic obstructive pulmonary disease) (HCC)    Myelodysplastic syndrome (HCC)    Epistaxis    Mass of right lung          PMH: She  has a past medical history of COPD (chronic obstructive pulmonary disease) (HCC), Depression, Dyslipidemia, Dyslipidemia, IBS (irritable bowel syndrome) (2016), Macular degeneration, Myelodysplastic syndrome (HCC), Osteoarthritis, and Other spondylosis with radiculopathy, lumbar region.   PSxH: She  has a past surgical history that includes Knee surgery; Hip surgery; Total knee arthroplasty (Left); Total hip arthroplasty (Right); Revision of total hip femoral (Right, 07/2017); Total hip arthroplasty (Left); and Cataract extraction, bilateral.        Reported/Observed/Food/Nutrition Related History     Pt reported weight loss and decreased PO.      Anthropometrics   Height: 62in  Weight: 105lb  BMI: 19.20  BMI classification: Normal: 18.5-24.9kg/m2   IBW: 110lb    UBW: 110lb stated by pt  Weight change: pt reports weight loss, amount unknown at this time, continuing to assess.      Nutrition Focused Physical Exam     Pt meets criteria for severe malnutrition, see MSA note for full assessment.      Labs reviewed   Labs reviewed: Yes    Results from last 7 days   Lab Units 12/03/21  0327 12/02/21  1247   SODIUM mmol/L 142 141   POTASSIUM mmol/L 4.4 4.5   CHLORIDE mmol/L 104 101   CO2 mmol/L 27.0 29.0   BUN mg/dL 16 23   CREATININE mg/dL 0.42* 0.55*   GLUCOSE mg/dL 82 118*   CALCIUM mg/dL 9.0 9.7* "     Results from last 7 days   Lab Units 12/03/21  0327 12/02/21  1247   WBC 10*3/mm3 3.07* 4.35   ALBUMIN g/dL  --  3.90         No results found for: HGBA1C  Medications reviewed   Medications reviewed: Yes  Scheduled: D3, B6, B12, MVI, Colace      Current Nutrition Prescription   PO: Diet Regular no intake recorded, pt observed with intake 5% breakfast tray  Oral Nutrition Supplement: Orders Placed This Encounter      Dietary Nutrition Supplements Boost Plus; chocolate   X2 / day      Nutrition Diagnosis     Problem Malnutrition    Etiology COPD   Signs/Symptoms PO intake <75% past 1-2m, severe muscle wasting, and severe fat loss.       Goal:   General: Nutrition to support treatment  PO: Increase intake       Intervention   Intervention: Follow treatment progress, Care plan reviewed, Encourage intake, Supplement provided, Education provided rt malnutrition        Monitoring/Evaluation:   Monitoring/Evaluation: Per protocol, PO intake, Supplement intake, Weight, POC/GOC    Aleah Lund RD  Time Spent: 30

## 2021-12-03 NOTE — THERAPY EVALUATION
Patient Name: Elizabeth Oliveira  : 1929    MRN: [id number]                              Today's Date: 12/3/2021       Admit Date: 2021    Visit Dx:     ICD-10-CM ICD-9-CM   1. Epistaxis  R04.0 784.7   2. Hypoxia  R09.02 799.02   3. Malignant neoplasm of lung, unspecified laterality, unspecified part of lung (HCC)  C34.90 162.9   4. Thrombocytopenia (HCC)  D69.6 287.5   5. Anemia, unspecified type  D64.9 285.9   6. Myeloproliferative disorder (HCC)  D47.1 238.79   7. History of COPD  Z87.09 V12.69     Patient Active Problem List   Diagnosis   • Fall   • Closed fracture of right femur (HCC)   • Arthritis   • Thrombocytopenia (HCC)   • Centrilobular emphysema (HCC)   • BAUMAN (dyspnea on exertion)   • Oxygen desaturation, exercise   • Acute on chronic respiratory failure with hypoxia (HCC)   • COPD (chronic obstructive pulmonary disease) (HCC)   • Myelodysplastic syndrome (HCC)   • Aspiration pneumonitis (HCC)   • Bronchitis   • Hypoxia   • Epistaxis   • Mass of right lung     Past Medical History:   Diagnosis Date   • COPD (chronic obstructive pulmonary disease) (HCC)    • Depression    • Dyslipidemia    • Dyslipidemia    • IBS (irritable bowel syndrome) 2016   • Macular degeneration    • Myelodysplastic syndrome (HCC)     anemia, thrombocytopemia   • Osteoarthritis    • Other spondylosis with radiculopathy, lumbar region      Past Surgical History:   Procedure Laterality Date   • CATARACT EXTRACTION, BILATERAL     • HIP SURGERY     • KNEE SURGERY     • REVISION OF TOTAL HIP FEMORAL Right 2017   • TOTAL HIP ARTHROPLASTY Right    • TOTAL HIP ARTHROPLASTY Left    • TOTAL KNEE ARTHROPLASTY Left           General Information     Row Name 21 1139          OT Time and Intention    Document Type evaluation  -KF     Mode of Treatment occupational therapy  -KF     Row Name 21 1139          General Information    Patient Profile Reviewed yes  -KF     Prior Level of Function independent:; all  household mobility; transfer; ADL's; max assist:; cooking; cleaning  caregiver 3xwk for cooking and cleaning  -KF     Existing Precautions/Restrictions fall; oxygen therapy device and L/min  -KF     Barriers to Rehab medically complex; cognitive status; hearing deficit  -     Row Name 12/03/21 1139          Occupational Profile    Environmental Supports and Barriers (Occupational Profile) stair lift, has only been sponge bathing as of recently, has [place] shower with chair, O2 but unclear baseline L per Pt  -KF     Row Name 12/03/21 1139          Living Environment    Lives With alone  -     Row Name 12/03/21 1139          Home Main Entrance    Number of Stairs, Main Entrance one  -KF     Stair Railings, Main Entrance none  -     Row Name 12/03/21 1139          Stairs Within Home, Primary    Stairs, Within Home, Primary stairs with stair lift for bedroom upstairs  -KF     Number of Stairs, Within Home, Primary other (see comments)  -     Row Name 12/03/21 1139          Cognition    Orientation Status (Cognition) oriented to; person; disoriented to; place; situation; time; verbal cues/prompts needed for orientation  -     Row Name 12/03/21 1139          Safety Issues, Functional Mobility    Safety Issues Affecting Function (Mobility) awareness of need for assistance; safety precaution awareness; insight into deficits/self-awareness; judgment  -KF     Impairments Affecting Function (Mobility) cognition; balance; endurance/activity tolerance; shortness of breath  -KF     Cognitive Impairments, Mobility Safety/Performance insight into deficits/self-awareness; awareness, need for assistance; judgment  -KF     Comment, Safety Issues/Impairments (Mobility) with talking in bed sitting upright desaturations to 86%  at this time very limited activity tolerance and tolerated minimal BUE AROM  -KF           User Key  (r) = Recorded By, (t) = Taken By, (c) = Cosigned By    Initials Name Provider Type    Fabiana Cam  M, OT Occupational Therapist                 Mobility/ADL's     Row Name 12/03/21 1143          Bed Mobility    Bed Mobility scooting/bridging  -     Scooting/Bridging [place] (Bed Mobility) maximum assist (25% patient effort); 2 person assist  -     Bed Mobility, Safety Issues decreased use of arms for pushing/pulling; decreased use of legs for bridging/pushing  -     Assistive Device (Bed Mobility) draw sheet; head of bed elevated  -     Comment (Bed Mobility) able to assist in scooting up towards HOB however due to desaturations of 80s deferred EOB or OOB at this time, cued frequently throughout for PLB  -     Row Name 12/03/21 1143          Transfers    Comment (Transfers) unable at this time 2/2 respiratory status  -     Row Name 12/03/21 1143          Functional Mobility    Functional Mobility- Ind. Level not tested; unable to perform  -     Row Name 12/03/21 1143          Activities of Daily Living    BADL Assessment/Intervention upper body dressing; lower body dressing  -     Row Name 12/03/21 1143          Upper Body Dressing Assessment/Training    [place] Level (Upper Body Dressing) don; front opening garment; minimum assist (75% patient effort)  -KF     Position (Upper Body Dressing) sitting up in bed  -     Row Name 12/03/21 1143          Lower Body Dressing Assessment/Training    [place] Level (Lower Body Dressing) don; doff; socks; dependent (less than 25% patient effort)  -KF     Position (Lower Body Dressing) sitting up in bed  -           User Key  (r) = Recorded By, (t) = Taken By, (c) = Cosigned By    Initials Name Provider Type    KF Fabiana Ann, OT Occupational Therapist               Obj/Interventions     Row Name 12/03/21 1144          Sensory Assessment (Somatosensory)    Sensory Assessment (Somatosensory) UE sensation intact  -     Row Name 12/03/21 1144          Vision Assessment/Intervention    Visual Impairment/Limitations corrective lenses  full-time  -KF     Row Name 12/03/21 1144          Range of Motion Comprehensive    General Range of Motion bilateral upper extremity ROM WNL  -KF     Row Name 12/03/21 1144          Strength Comprehensive (MMT)    General Manual Muscle Testing (MMT) Assessment upper extremity strength deficits identified  -KF     Comment, General Manual Muscle Testing (MMT) Assessment BUE grossly 3+/5 and 4/5 B   -KF     Row Name 12/03/21 1144          Motor Skills    Motor Skills coordination  -KF     Coordination WFL; bilateral; upper extremity  -KF     Row Name 12/03/21 1144          Balance    Comment, Balance unable to assess at this time, good alignment maintained supported sitting upright in bed  -KF           User Key  (r) = Recorded By, (t) = Taken By, (c) = Cosigned By    Initials Name Provider Type    KF Fabiana Ann, OT Occupational Therapist               Goals/Plan     Row Name 12/03/21 1151          Bed Mobility Goal 1 (OT)    Activity/Assistive Device (Bed Mobility Goal 1, OT) sidelying to sit/sit to sidelying  -KF     [place] Level/Cues Needed (Bed Mobility Goal 1, OT) contact guard assist; verbal cues required  -KF     Time Frame (Bed Mobility Goal 1, OT) long term goal (LTG); 10 days  -KF     Progress/Outcomes (Bed Mobility Goal 1, OT) goal ongoing  -KF     Row Name 12/03/21 1151          Transfer Goal 1 (OT)    Activity/Assistive Device (Transfer Goal 1, OT) sit-to-stand/stand-to-sit  -KF     [place] Level/Cues Needed (Transfer Goal 1, OT) contact guard assist  -KF     Time Frame (Transfer Goal 1, OT) long term goal (LTG); 10 days  -KF     Progress/Outcome (Transfer Goal 1, OT) goal ongoing  -KF     Row Name 12/03/21 1151          Grooming Goal 1 (OT)    Activity/Device (Grooming Goal 1, OT) hair care; oral care; wash face, hands  -KF     [place] (Grooming Goal 1, OT) set-up required  -KF     Time Frame (Grooming Goal 1, OT) long term goal (LTG); 10 days  -KF     Strategies/Barriers  (Grooming Goal 1, OT) with O2 above 90% throughout on NC  -KF     Progress/Outcome (Grooming Goal 1, OT) goal ongoing  -KF     Row Name 12/03/21 1151          Therapy Assessment/Plan (OT)    Planned Therapy Interventions (OT) activity tolerance training; adaptive equipment training; BADL retraining; cognitive/visual perception retraining; occupation/activity based interventions; strengthening exercise; transfer/mobility retraining; functional balance retraining; ROM/therapeutic exercise; patient/caregiver education/training  -KF           User Key  (r) = Recorded By, (t) = Taken By, (c) = Cosigned By    Initials Name Provider Type    KF Fabiana Ann, OT Occupational Therapist               Clinical Impression     Row Name 12/03/21 1146          Pain Assessment    Additional Documentation Pain Scale: Numbers Pre/Post-Treatment (Group)  -     Row Name 12/03/21 1146          Pain Scale: Numbers Pre/Post-Treatment    Pretreatment Pain Rating 0/10 - no pain  -KF     Posttreatment Pain Rating 0/10 - no pain  -KF     Pre/Posttreatment Pain Comment tolerated  -KF     Pain Intervention(s) Repositioned; Ambulation/increased activity  -     Row Name 12/03/21 1146          Plan of Care Review    Plan of Care Reviewed With patient  -KF     Progress no change  -KF     Outcome Summary OT eval completed Pt presents with very limited activity tolerance for activity and ADL completion today, maxAx2 scooting upright in bed, desaturations to mid 80s with talking and minimal exertion with UEs, cues throughout for PLB, recom IPOT d/c SNF vs home with 24/7 assist and HHOT  -KF     Row Name 12/03/21 1146          Therapy Assessment/Plan (OT)    Rehab Potential (OT) good, to achieve stated therapy goals  -KF     Criteria for Skilled Therapeutic Interventions Met (OT) yes; meets criteria; skilled treatment is necessary  -KF     Therapy Frequency (OT) daily  -KF     Row Name 12/03/21 1146          Therapy Plan Review/Discharge Plan  (OT)    Anticipated Discharge Disposition (OT) skilled nursing facility  -KF     Row Name 12/03/21 1146          Vital Signs    Pre Systolic BP Rehab 124  RN cleared VSS no dizziness  -KF     Pre Treatment Diastolic BP 54  -KF     Pre SpO2 (%) 99  -KF     O2 Delivery Pre Treatment supplemental O2  -KF     Intra SpO2 (%) 85  -KF     O2 Delivery Intra Treatment supplemental O2  -KF     Post SpO2 (%) 91  -KF     O2 Delivery Post Treatment supplemental O2  -KF     Pre Patient Position Supine  -KF     Intra Patient Position Supine  -KF     Post Patient Position Supine  -KF     Rest Breaks  --  frequent for PLB  -KF     Row Name 12/03/21 1146          Positioning and Restraints    Pre-Treatment Position in bed  -KF     Post Treatment Position bed  -KF     In Bed notified nsg; supine; fowlers; call light within reach; encouraged to call for assist; exit alarm on; side rails up x2; with PT; legs elevated  -KF           User Key  (r) = Recorded By, (t) = Taken By, (c) = Cosigned By    Initials Name Provider Type    KF Fabiana Ann, OT Occupational Therapist               Outcome Measures     Row Name 12/03/21 1152          How much help from another is currently needed...    Putting on and taking off regular lower body clothing? 1  -KF     Bathing (including washing, rinsing, and drying) 1  -KF     Toileting (which includes using toilet bed pan or urinal) 1  -KF     Putting on and taking off regular upper body clothing 2  -KF     Taking care of personal grooming (such as brushing teeth) 3  -KF     Eating meals 3  -KF     AM-PAC 6 Clicks Score (OT) 11  -KF     Row Name 12/03/21 0845          How much help from another person do you currently need...    Turning from your back to your side while in flat bed without using bedrails? 3  -MR     Moving from lying on back to sitting on the side of a flat bed without bedrails? 3  -MR     Moving to and from a bed to a chair (including a wheelchair)? 2  -MR     Standing up from a  chair using your arms (e.g., wheelchair, bedside chair)? 2  -MR     Climbing 3-5 steps with a railing? 2  -MR     To walk in hospital room? 2  -MR     AM-PAC 6 Clicks Score (PT) 14  -MR     Row Name 12/03/21 1152          Functional Assessment    Outcome Measure Options AM-PAC 6 Clicks Daily Activity (OT)  -           User Key  (r) = Recorded By, (t) = Taken By, (c) = Cosigned By    Initials Name Provider Type    KF Fabiana Ann, OT Occupational Therapist    Aba Quiroz, RN Registered Nurse                Occupational Therapy Education                 Title: PT OT SLP Therapies (In Progress)     Topic: Occupational Therapy (In Progress)     Point: ADL training (Done)     Description:   Instruct learner(s) on proper safety adaptation and remediation techniques during self care or transfers.   Instruct in proper use of assistive devices.              Learning Progress Summary           Patient Acceptance, E,TB,D, VU,NR by  at 12/3/2021 1153                   Point: Home exercise program (Not Started)     Description:   Instruct learner(s) on appropriate technique for monitoring, assisting and/or progressing therapeutic exercises/activities.              Learner Progress:  Not documented in this visit.          Point: Precautions (Done)     Description:   Instruct learner(s) on prescribed precautions during self-care and functional transfers.              Learning Progress Summary           Patient Acceptance, E,TB,D, VU,NR by  at 12/3/2021 1153                   Point: Body mechanics (Done)     Description:   Instruct learner(s) on proper positioning and spine alignment during self-care, functional mobility activities and/or exercises.              Learning Progress Summary           Patient Acceptance, E,TB,D, VU,NR by  at 12/3/2021 1153                               User Key     Initials Effective Dates Name Provider Type [facility] 06/16/21 -  Fabiana Ann OT Occupational Therapist OT               OT Recommendation and Plan  Planned Therapy Interventions (OT): activity tolerance training, adaptive equipment training, BADL retraining, cognitive/visual perception retraining, occupation/activity based interventions, strengthening exercise, transfer/mobility retraining, functional balance retraining, ROM/therapeutic exercise, patient/caregiver education/training  Therapy Frequency (OT): daily  Plan of Care Review  Plan of Care Reviewed With: patient  Progress: no change  Outcome Summary: OT eval completed Pt presents with very limited activity tolerance for activity and ADL completion today, maxAx2 scooting upright in bed, desaturations to mid 80s with talking and minimal exertion with UEs, cues throughout for PLB, recom IPOT d/c SNF vs home with 24/7 assist and HHOT     Time Calculation:    Time Calculation- OT     Row Name 12/03/21 1107             Time Calculation- OT    OT Start Time 1107  -KF      OT Received On 12/03/21  -KF      OT Goal Re-Cert Due Date 12/13/21  -KF              Untimed Charges    OT Eval/Re-eval Minutes 46  -KF              Total Minutes    Untimed Charges Total Minutes 46  -KF       Total Minutes 46  -KF            User Key  (r) = Recorded By, (t) = Taken By, (c) = Cosigned By    Initials Name Provider Type    KF Fabiana Ann OT Occupational Therapist              Therapy Charges for Today     Code Description Service Date Service Provider Modifiers Qty    [id number]  OT EVAL LOW COMPLEXITY 4 12/3/2021 Fabiana Ann OT GO 1               Fabiana Ann OT  12/3/2021

## 2021-12-03 NOTE — PROGRESS NOTES
Malnutrition Severity Assessment    Patient Name:  Elizabeth Oliveira  YOB: 1929  MRN: [id number]  Admit Date:  12/2/2021    Patient meets criteria for : Severe Malnutrition    Malnutrition Severity Assessment  Malnutrition Type: Chronic Disease - Related Malnutrition  Malnutrition Type (last 8 hours)     Malnutrition Severity Assessment     Row Name 12/03/21 1402       Malnutrition Severity Assessment    Malnutrition Type Chronic Disease - Related Malnutrition    Row Name 12/03/21 1402       Insufficient Energy Intake     Insufficient Energy Intake Findings Moderate    Insufficient Energy Intake  <75% of est. energy requirement for > or equal to 1 month    Row Name 12/03/21 1402       Unintentional Weight Loss     Unintentional Weight Loss Findings --  unable to get weight at this time    Row Name 12/03/21 1402       Muscle Loss    Loss of Muscle Mass Findings Severe    [place] Region Severe - deep hollowing/scooping, lack of muscle to touch, facial bones well defined    Clavicle Bone Region Severe - protruding prominent bone    Acromion Bone Region Severe - squared shoulders, bones, and acromion process protrusion prominent    Scapular Bone Region Severe - prominent bones, depressions easily visible between ribs, scapula, spine, shoulders    Dorsal Hand Region Moderate - slight depression    Row Name 12/03/21 1402       Fat Loss    Subcutaneous Fat Loss Findings Severe    Orbital Region  Severe - pronounced hollowness/depression, dark circles, loose saggy skin    Upper Arm Region Severe - mostly skin, very little space between folds, fingers touch    Row Name 12/03/21 1402       Declining Functional Status    Declining Functional Status Findings N/A    Row Name 12/03/21 1402       Criteria Met (Must meet criteria for severity in at least 2 of these categories: M Wasting, Fat Loss, Fluid, Secondary Signs, Wt. Status, Intake)    Patient meets criteria for  Severe Malnutrition                 Electronically signed by:  Aleah Lund RD  12/03/21 14:04 EST

## 2021-12-03 NOTE — THERAPY EVALUATION
Acute Care - Speech Language Pathology   Swallow Initial Evaluation [facility]   Clinical Swallow Evaluation       Patient Name: Elizabeth Oliveira  : 1929  MRN: [id number]  Today's Date: 12/3/2021               Admit Date: 2021    Visit Dx:     ICD-10-CM ICD-9-CM   1. Epistaxis  R04.0 784.7   2. Hypoxia  R09.02 799.02   3. Malignant neoplasm of lung, unspecified laterality, unspecified part of lung (HCC)  C34.90 162.9   4. Thrombocytopenia (HCC)  D69.6 287.5   5. Anemia, unspecified type  D64.9 285.9   6. Myeloproliferative disorder (HCC)  D47.1 238.79   7. History of COPD  Z87.09 V12.69   8. Aspiration pneumonitis (HCC)  J69.0 507.0   9. Dysphagia, unspecified type  R13.10 787.20     Patient Active Problem List   Diagnosis   • Fall   • Closed fracture of right femur (HCC)   • Arthritis   • Thrombocytopenia (HCC)   • Centrilobular emphysema (HCC)   • BAUMAN (dyspnea on exertion)   • Oxygen desaturation, exercise   • Acute on chronic respiratory failure with hypoxia (HCC)   • COPD (chronic obstructive pulmonary disease) (HCC)   • Myelodysplastic syndrome (HCC)   • Aspiration pneumonitis (HCC)   • Bronchitis   • Hypoxia   • Epistaxis   • Mass of right lung   • Severe malnutrition (CMS/HCC)     Past Medical History:   Diagnosis Date   • COPD (chronic obstructive pulmonary disease) (HCC)    • Depression    • Dyslipidemia    • Dyslipidemia    • IBS (irritable bowel syndrome)    • Macular degeneration    • Myelodysplastic syndrome (HCC)     anemia, thrombocytopemia   • Osteoarthritis    • Other spondylosis with radiculopathy, lumbar region      Past Surgical History:   Procedure Laterality Date   • CATARACT EXTRACTION, BILATERAL     • HIP SURGERY     • KNEE SURGERY     • REVISION OF TOTAL HIP FEMORAL Right 2017   • TOTAL HIP ARTHROPLASTY Right    • TOTAL HIP ARTHROPLASTY Left    • TOTAL KNEE ARTHROPLASTY Left            SLP Recommendation and Plan  SLP Swallowing Diagnosis: functional oral phase,  suspected pharyngeal dysphagia (12/03/21 1500)  SLP Diet Recommendation: mechanical soft with no mixed consistencies, nectar thick liquids (12/03/21 1500)  Recommended Precautions and Strategies: general aspiration precautions (12/03/21 1500)  SLP Rec. for Method of Medication Administration: meds whole, with pudding or applesauce, as tolerated (12/03/21 1500)     Monitor for Signs of Aspiration: yes, notify SLP if any concerns (12/03/21 1500)  Recommended Diagnostics: FEES (12/03/21 1500)  Swallow Criteria for Skilled Therapeutic Interventions Met: demonstrates skilled criteria (12/03/21 1500)  Anticipated Discharge Disposition (SLP): unknown, anticipate therapy at next level of care (12/03/21 1500)  Rehab Potential/Prognosis, Swallowing: good, to achieve stated therapy goals (12/03/21 1500)     Predicted Duration Therapy Intervention (Days): until discharge (12/03/21 1500)                                SWALLOW EVALUATION (last 72 hours)     SLP Adult Swallow Evaluation     Row Name 12/03/21 1500                   Rehab Evaluation    Document Type evaluation  -CH        Subjective Information no complaints  -        Patient Observations alert; cooperative; agree to therapy  -        Patient/Family/Caregiver Comments/Observations No family present  -        Patient Effort adequate  -        Symptoms Noted During/After Treatment none  -                  General Information    Patient Profile Reviewed yes  -        Pertinent History Of Current Problem Referral received d/t pna dx, difficulty swallowing meds. Dx RU lobe mass, R pleural effusion  -        Current Method of Nutrition regular textures; thin liquids  -        Prior Level of Function-Communication WFL  -        Prior Level of Function-Swallowing no diet consistency restrictions  -        Plans/Goals Discussed with patient; agreed upon  -        Barriers to Rehab none identified  -        Patient's Goals for Discharge return home   -                  Pain    Additional Documentation Pain Scale: FACES Pre/Post-Treatment (Group)  -                  Pain Scale: FACES Pre/Post-Treatment    Pain: FACES Scale, Pretreatment 0-->no hurt  -CH        Posttreatment Pain Rating 0-->no hurt  -CH                  Oral Motor Structure and Function    Dentition Assessment upper dentures/partial in place; lower dentures/partial in place  -        Secretion Management WNL/WFL  -CH        Mucosal Quality moist, healthy  -CH        Volitional Swallow WFL  -CH        Volitional Cough WFL  -CH                  Oral Musculature and Cranial Nerve Assessment    Oral Motor General Assessment generalized oral motor weakness  -                  General Eating/Swallowing Observations    Respiratory Support Currently in Use nasal cannula  -        Eating/Swallowing Skills fed by SLP  -        Positioning During Eating upright 90 degree; upright in bed  -        Utensils Used spoon; cup; straw  -        Consistencies Trialed regular textures; soft textures; pureed; thin liquids; nectar/syrup-thick liquids  -        Pre SpO2 (%) 96  -CH        Post SpO2 (%) 94  -CH                  Respiratory    Respiratory Status WFL  -CH                  Clinical Swallow Eval    Oral Prep Phase WFL  -CH        Oral Transit WFL  -CH        Oral Residue WFL  -CH        Pharyngeal Phase suspected pharyngeal impairment  -CH        Esophageal Phase unremarkable  -        Clinical Swallow Evaluation Summary Patient with cough at baseline. Oral phase grossly WFL. Delayed cough with thin liquids via spoon, cup and straw. No s/s with NT liquids via cup, cough via straw. No s/s with pureed or regular solids. Recommend: lvl 4 diet and NT liquids. Meds whole with pureed. FEES to further evaluate swallow.  -                  Pharyngeal Phase Concerns    Pharyngeal Phase Concerns cough  -                  Clinical Impression    SLP Swallowing Diagnosis functional oral phase;  suspected pharyngeal dysphagia  -        Functional Impact risk of aspiration/pneumonia; risk of malnutrition; risk of dehydration  -        Rehab Potential/Prognosis, Swallowing good, to achieve stated therapy goals  -        Swallow Criteria for Skilled Therapeutic Interventions Met demonstrates skilled criteria  -                  SLP Treatment Clinical Impressions    Plan for Continued Treatment (SLP) continue treatment per plan of care  -        Care Plan Review care plan/treatment goals reviewed  -                  Recommendations    Predicted Duration Therapy Intervention (Days) until discharge  -        SLP Diet Recommendation mechanical soft with no mixed consistencies; nectar thick liquids  -        Recommended Diagnostics FEES  -        Recommended Precautions and Strategies general aspiration precautions  -        Oral Care Recommendations Oral Care BID/PRN; Swab  -        SLP Rec. for Method of Medication Administration meds whole; with pudding or applesauce; as tolerated  -        Monitor for Signs of Aspiration yes; notify SLP if any concerns  -        Anticipated Discharge Disposition (SLP) unknown; anticipate therapy at next level of care  -              User Key  (r) = Recorded By, (t) = Taken By, (c) = Cosigned By    Initials Name Effective Dates     Gardenia Paniagua, MS CCC-SLP 06/16/21 -                 EDUCATION  The patient has been educated in the following areas:   Dysphagia (Swallowing Impairment) Oral Care/Hydration Modified Diet Instruction.              Time Calculation:    Time Calculation- SLP     Row Name 12/03/21 1625             Time Calculation- [facility]    SLP Start Time 1500  -CH      SLP Received On 12/03/21  -              Untimed Charges    SLP Eval/Re-eval  ST Eval Oral Pharyng Swallow - 16981  -      [id number] Eval Oral Pharyng Swallow Minutes 55  -CH              Total Minutes    Untimed Charges Total Minutes 55  -CH       Total Minutes 55  -CH             User Key  (r) = Recorded By, (t) = Taken By, (c) = Cosigned By    Initials Name Provider Type     Gardenia Paniagua MS CCC-SLP Speech and Language Pathologist                Therapy Charges for Today     Code Description Service Date Service Provider Modifiers Qty    [id number]  ST EVAL ORAL PHARYNG SWALLOW 4 12/3/2021 Gardenia Paniagua MS CCC-SLP GN 1               Gardenia Paniagua MS CCC-BRODY  12/3/2021

## 2021-12-03 NOTE — CASE MANAGEMENT/SOCIAL WORK
Continued Stay Note  [facility]     Patient Name: Elizabeth Oliveira  MRN: [id number]  Today's Date: 12/3/2021    Admit Date: 12/2/2021     Discharge Plan     Row Name 12/03/21 0906       Plan    Plan home with HH    Patient/Family in Agreement with Plan yes    Plan Comments Pt lives alone in [facility]. She has a caregiver who comes into the home three days a week and another one who comes in the evening 6 days a week. She uses a walker for mobility and home O2 from [employer]. She also owns a lift and shower bench. Pt is followed by her PCP and has drug coverage. Per family plan for discharge is to return home and they would like HH to follow her. CM will arrange HH at discharge.    Final Discharge Disposition Code 06 - home with home health care               Discharge Codes    No documentation.                     Lynnette Marte RN

## 2021-12-04 NOTE — PLAN OF CARE
Problem: Adult Inpatient Plan of Care  Goal: Plan of Care Review  Outcome: Ongoing, Progressing   Goal Outcome Evaluation:         SLP evaluation completed. Will continue to address dysphagia. Please see note for further details and recommendations.

## 2021-12-04 NOTE — PLAN OF CARE
Goal Outcome Evaluation:     Pt resting in bed without current complaint, pt is confused to situation, time, place; has attempted to remove o2 several times today; MD aware of inc temp and has order BC; see speech notes regarding swallowing cont to monitor

## 2021-12-04 NOTE — PLAN OF CARE
Patient's O2  demand fluctuates between 4-6L depending on activity. Currently patient is resting on 4L NC with SPO2 98%.One attempt to get out of bed overnight, patient was redirected and able to remain in bed.       Problem: Adult Inpatient Plan of Care  Goal: Plan of Care Review  Outcome: Ongoing, Progressing  Goal: Patient-Specific Goal (Individualized)  Outcome: Ongoing, Progressing  Goal: Absence of Hospital-Acquired Illness or Injury  Outcome: Ongoing, Progressing  Intervention: Identify and Manage Fall Risk  Recent Flowsheet Documentation  Taken 12/4/2021 0400 by Kesha Roman RN  Safety Promotion/Fall Prevention:   safety round/check completed   nonskid shoes/slippers when out of bed   activity supervised   fall prevention program maintained   assistive device/personal items within reach  Taken 12/4/2021 0200 by Kesha Roman RN  Safety Promotion/Fall Prevention:   fall prevention program maintained   activity supervised   assistive device/personal items within reach   nonskid shoes/slippers when out of bed   safety round/check completed  Taken 12/4/2021 0000 by Kesha Roman RN  Safety Promotion/Fall Prevention:   safety round/check completed   nonskid shoes/slippers when out of bed   activity supervised   assistive device/personal items within reach   fall prevention program maintained  Taken 12/3/2021 2230 by Kesha Roman RN  Safety Promotion/Fall Prevention:   fall prevention program maintained   clutter free environment maintained   assistive device/personal items within reach   activity supervised   nonskid shoes/slippers when out of bed   safety round/check completed  Taken 12/3/2021 2036 by Kesha Roman RN  Safety Promotion/Fall Prevention:   safety round/check completed   nonskid shoes/slippers when out of bed   activity supervised   assistive device/personal items within reach   fall prevention program maintained  Intervention: Prevent Skin Injury  Recent Flowsheet Documentation  Taken 12/4/2021 0400  by Kesha Roman RN  Body Position: position changed independently  Taken 12/4/2021 0200 by Kesha Roman RN  Body Position: position changed independently  Taken 12/4/2021 0000 by Kesha Roman RN  Body Position: position changed independently  Taken 12/3/2021 2230 by Kesha Roman RN  Body Position: position maintained  Taken 12/3/2021 2036 by Kesha Roman RN  Body Position: sitting up in bed  Skin Protection:   adhesive use limited   incontinence pads utilized   transparent dressing maintained  Intervention: Prevent and Manage VTE (venous thromboembolism) Risk  Recent Flowsheet Documentation  Taken 12/3/2021 2036 by Kesha Roman RN  VTE Prevention/Management:   bilateral   sequential compression devices on  Goal: Optimal Comfort and Wellbeing  Outcome: Ongoing, Progressing  Intervention: Provide Person-Centered Care  Recent Flowsheet Documentation  Taken 12/3/2021 2036 by Kesha Romna RN  Trust Relationship/Rapport:   care explained   choices provided   thoughts/feelings acknowledged   questions encouraged  Goal: Readiness for Transition of Care  Outcome: Ongoing, Progressing     Problem: Skin Injury Risk Increased  Goal: Skin Health and Integrity  Outcome: Ongoing, Progressing  Intervention: Optimize Skin Protection  Recent Flowsheet Documentation  Taken 12/4/2021 0400 by Kesha Roman RN  Head of Bed (HOB): HOB elevated  Taken 12/4/2021 0200 by Kesha Roman RN  Head of Bed (HOB): HOB elevated  Taken 12/4/2021 0000 by Kesha Roman RN  Head of Bed (HOB): HOB elevated  Taken 12/3/2021 2230 by Kesha Roman RN  Head of Bed (HOB): HOB elevated  Taken 12/3/2021 2036 by Kesha Roman RN  Pressure Reduction Techniques:   positioned off wounds   frequent weight shift encouraged  Head of Bed (HOB): HOB elevated  Pressure Reduction Devices:   pressure-redistributing mattress utilized   positioning supports utilized  Skin Protection:   adhesive use limited   incontinence pads utilized   transparent dressing maintained      Problem: Fall Injury Risk  Goal: Absence of Fall and Fall-Related Injury  Outcome: Ongoing, Progressing  Intervention: Promote Injury-Free Environment  Recent Flowsheet Documentation  Taken 12/4/2021 0400 by Kesha Roman RN  Safety Promotion/Fall Prevention:   safety round/check completed   nonskid shoes/slippers when out of bed   activity supervised   fall prevention program maintained   assistive device/personal items within reach  Taken 12/4/2021 0200 by Kesha Roman RN  Safety Promotion/Fall Prevention:   fall prevention program maintained   activity supervised   assistive device/personal items within reach   nonskid shoes/slippers when out of bed   safety round/check completed  Taken 12/4/2021 0000 by Kesha Roman RN  Safety Promotion/Fall Prevention:   safety round/check completed   nonskid shoes/slippers when out of bed   activity supervised   assistive device/personal items within reach   fall prevention program maintained  Taken 12/3/2021 2230 by Kesha Roman RN  Safety Promotion/Fall Prevention:   fall prevention program maintained   clutter free environment maintained   assistive device/personal items within reach   activity supervised   nonskid shoes/slippers when out of bed   safety round/check completed  Taken 12/3/2021 2036 by Kesha Roman, HERNÁN  Safety Promotion/Fall Prevention:   safety round/check completed   nonskid shoes/slippers when out of bed   activity supervised   assistive device/personal items within reach   fall prevention program maintained     Problem: COPD Comorbidity  Goal: Maintenance of COPD Symptom Control  Outcome: Ongoing, Progressing

## 2021-12-04 NOTE — PROGRESS NOTES
[facility] Medicine Services  PROGRESS NOTE    Patient Name: Elizabeth Oliveira  : 1929  MRN: [id number]    Date of Admission: 2021  Primary Care Physician: Azeem Merchant MD    Subjective   Subjective     CC:  Epistaxis     HPI:  Patient is sitting up in bed in NAD. She states she feels a little SOA this am unable to cough up any mucus. No acute events overnight per nursing.     ROS:  Gen- No fevers, chills  CV- No chest pain, palpitations  Resp- + cough, dyspnea  GI- No N/V/D, abd pain        Objective   Objective     Vital Signs:   Temp:  [98.1 °F (36.7 °C)-101.1 °F (38.4 °C)] 99 °F (37.2 °C)  Heart Rate:  [74-92] 81  Resp:  [14-19] 18  BP: (117-164)/(53-73) 164/70  Flow (L/min):  [4-5] 4     Physical Exam:  Constitutional: No acute distress, awake, alert, cachectic   HENT: NCAT, mucous membranes moist  Respiratory: Clear to auscultation bilaterally, respiratory effort normal, rhonchi upper air way, 5L 97%  Cardiovascular: RRR, no murmurs, rubs, or gallops  Gastrointestinal: Positive bowel sounds, soft, nontender, nondistended  Musculoskeletal: No bilateral ankle edema  Psychiatric: Appropriate affect, cooperative  Neurologic: Oriented x 3, strength symmetric in all extremities, Cranial Nerves grossly intact to confrontation, speech clear  Skin: No rashes      Results Reviewed:  LAB RESULTS:      Lab 21  0410 21  1510 21  0327 21  1247   WBC 3.79 3.12* 3.07* 4.35   HEMOGLOBIN 7.4* 7.1* 7.3* 10.1*   HEMATOCRIT 23.0* 21.5* 23.4* 31.3*   PLATELETS 127* 125* 135* 14*   NEUTROS ABS 2.99 2.22 2.14 3.58   IMMATURE GRANS (ABS) 0.01 0.01 0.03 0.03   LYMPHS ABS 0.35* 0.54* 0.55* 0.42*   MONOS ABS 0.39 0.33 0.30 0.30   EOS ABS 0.05 0.02 0.05 0.01   .0* 108.0* 112.0* 109.8*   PROCALCITONIN 0.09  --   --   --      --   --   --    PROTIME  --   --   --  13.8         Lab 21  0410 21  0327 21  1247   SODIUM 139 142 141    POTASSIUM 4.1 4.4 4.5   CHLORIDE 102 104 101   CO2 27.0 27.0 29.0   ANION GAP 10.0 11.0 11.0   BUN 12 16 23   CREATININE 0.50* 0.42* 0.55*   GLUCOSE 90 82 118*   CALCIUM 8.6 9.0 9.7*         Lab 12/04/21  0410 12/02/21  1247   TOTAL PROTEIN 5.5* 7.1   ALBUMIN 3.10* 3.90   GLOBULIN 2.4 3.2   ALT (SGPT) 8 8   AST (SGOT) 12 12   BILIRUBIN 0.5 0.6   ALK PHOS 75 86         Lab 12/02/21  1247   PROBNP 648.8   TROPONIN T <0.010   PROTIME 13.8   INR 1.10             Lab 12/04/21  0410 12/02/21  1533   IRON 32*  --    IRON SATURATION 13*  --    TIBC 238*  --    TRANSFERRIN 160*  --    FERRITIN 114.90  --    ABO TYPING  --  O   RH TYPING  --  Negative   ANTIBODY SCREEN  --  Negative         Brief Urine Lab Results  (Last result in the past 365 days)      Color   Clarity   Blood   Leuk Est   Nitrite   Protein   CREAT   Urine HCG        12/04/21 0958 Yellow   Clear   Negative   Negative   Negative   Negative                 Microbiology Results Abnormal     Procedure Component Value - Date/Time    COVID-19, [facility] IN-HOUSE NP/OP SWAB IN UTM/VTM/[facility] TRANSPORT MEDIA 24HR TAT - Swab, Oropharynx [[id number]]  (Normal) Collected: 12/02/21 1736    Lab Status: Final result Specimen: Swab from Oropharynx Updated: 12/02/21 2135     COVID19 Not Detected    Narrative:      Fact sheet for providers: https://www.fda.gov/media/494554/download     Fact sheet for patients: https://www.fda.gov/media/699528/download    Test performed by RT PCR.          FL Video Swallow With Speech Single Contrast    Result Date: 12/4/2021  EXAMINATION: FL VIDEO SWALLOW W SPEECH SINGLE-CONTRAST-  INDICATION: Dysphagia; R04.0-Epistaxis; R09.02-Hypoxemia; C34.90-Malignant neoplasm of unspecified part of unspecified bronchus or lung; D69.6-Thrombocytopenia, unspecified; D64.9-Anemia, unspecified; D47.1-Chronic myeloproliferative disease; Z87.09-Personal history of other diseases of the respiratory system; J69.0-Pneumonitis due to inhalation of food  and vomit; R13.10-Dysphagia, unspecified silent aspiration  TECHNIQUE: 1.4 minutes of fluoroscopy and 9 images used for modified barium swallow  COMPARISON: NONE  FINDINGS: Fluoroscopy was provided for modified barium swallow. The patient was evaluated the sitting position. The. Various consistencies of barium were administered to the patient. The speech therapy team was on hand for the procedure. Please to the speech therapy team's report for full details.       Impression: Fluoroscopy for modified barium swallow. Please see the speech therapy change and report for full details.        CT Angiogram Chest    Result Date: 12/2/2021  EXAMINATION: CT ANGIOGRAM CHEST- 12/02/2021  INDICATION: hypoxia, hemoptysis  TECHNIQUE: Spiral acquisition 3 mm post IV contrast images through the chest and upper abdomen with sagittal and coronal 2-D reconstructions.  The radiation dose reduction device was turned on for each scan per the ALARA (As Low as Reasonably Achievable) protocol.  COMPARISON: Chest radiograph 06/02/2020  FINDINGS: History indicates hypoxia, hemoptysis.  There is good contrast opacification of the pulmonary arteries. No filling defects are identified to suggest pulmonary embolic disease. There is no evidence of thoracic aortic aneurysm or dissection, no evidence of pericardial effusion or significant mediastinal lymphadenopathy. There is unusually increased, low density soft tissue around the lower thoracic spine, axial images 50 through 80. On the left, this measures water density to moderate fat density. On the right, this measures water density to low fat density. Considerations include paraspinous lipomas, less likely extra medullary hematopoiesis. Well-differentiated liposarcoma would be considered unlikely with this appearance. Likewise, lymphoma would be unlikely to have this appearance.  Lung window images show a stellate, 1.9 x 1.0 cm mass in the right upper lobe, anterior to the right hilum, very  worrisome for primary neoplasm. There is prominent pulmonary vasculature and perhaps very early interstitial edema elsewhere. No other pulmonary parenchymal nodule is seen. There is a minimal right pleural effusion.  Included images of the upper abdomen show fatty liver change. Spleen is not enlarged. Included pancreatic tail, adrenal glands, and upper renal poles appear unremarkable. Bony structures appear grossly intact.      Impression: 1. 19 x 10 mm stellate right upper lobe lesion worrisome for primary neoplasm. 2. No evidence of pulmonary embolus. 3. Mild pulmonary vascular congestion, suspected early interstitial edema, and trace right pleural effusion. 4. Unusual smooth margined paraspinous soft tissue lesions in the lower region, measuring between fat and water density, favored to be incidental as discussed above, and which should be followed.  D:  12/02/2021 E:  12/02/2021         Results for orders placed during the hospital encounter of 01/22/21    Adult Transthoracic Echo Complete W/ Cont if Necessary Per Protocol    Interpretation Summary  · Estimated left ventricular EF = 55%  · Aortic sclerosis without significant stenosis      I have reviewed the medications:  Scheduled Meds:cholecalciferol, 1,000 Units, Oral, Daily  docusate sodium, 100 mg, Oral, BID  gabapentin, 300 mg, Oral, BID  guaiFENesin, 600 mg, Oral, Q12H  ipratropium-albuterol, 3 mL, Nebulization, 4x Daily - RT  multivitamin with minerals, 1 tablet, Oral, Daily  oxymetazoline, 1 spray, Nasal, Once  sodium chloride, 10 mL, Intravenous, Q12H  vitamin B-12, 1,000 mcg, Oral, Daily  vitamin B-6, 200 mg, Oral, Daily      Continuous Infusions:   PRN Meds:.•  acetaminophen  •  albuterol  •  bisacodyl  •  calcium carbonate  •  melatonin  •  ondansetron  •  sodium chloride  •  sodium chloride  •  sodium chloride    Assessment/Plan   Assessment & Plan     Active Hospital Problems    Diagnosis  POA   • **Acute on chronic respiratory failure with  hypoxia (HCC) [J96.21]  Yes   • Severe malnutrition (CMS/HCC) [E43]  Yes   • Epistaxis [R04.0]  Unknown   • Mass of right lung [R91.8]  Unknown   • COPD (chronic obstructive pulmonary disease) (HCC) [J44.9]  Yes   • Myelodysplastic syndrome (HCC) [D46.9]  Yes   • BAUMAN (dyspnea on exertion) [R06.00]  Yes   • Thrombocytopenia (HCC) [D69.6]  Yes      Resolved Hospital Problems   No resolved problems to display.        Brief Hospital Course to date:  Elizabeth Oliveira is a 89 y.o. female with past medical history of COPD on home O2 2L NC, presumed myeloproliferative disease (never worked up/ seen by hematology), chronic arthritis with neuropathy, who presented to the hospital with acute epistaxis and was found to have severe thrombocytopenia and worsening hypoxemia.     Assessment and plan:  Acute blood loss anemia secondary to severe epistaxis  Epistaxis, resolved  Severe thrombocytopenia  Presumed myelodysplastic syndrome  · Patient presented with severe epistaxis and blood loss anemia.  Hemoglobin is 7.3 down from 10 on admission  · Monitor hemoglobin and transfuse hemoglobin less than 7 and PLT < 20k  · Received 2 units of platelets in the ER per Dr. Mcnally/ hematology recommendations  · No further epistaxis noted  · Need hematology oncology follow-up after discharge from the hospital     Acute on chronic hypoxic respiratory failure  COPD with chronic hypoxic respiratory failure  Right upper lung mass 19x10 mm concerning for malignancy  · Discussed the goals of care with the patient's daughter bedside  · She was the patient to be evaluated by pulmonary team even though she is not interested in any aggressive measures like radiation therapy or chemotherapy.  · Nebulizer treatment  · No indication for antibiotic therapy  · Pulmonary team consulted.  Appreciate help and recommendations  · --possible PEt/CT as outpatient   · Pt had fever 101 per nurse pt had a lot of covers on once removed it was normal, procal neg, UA  neg monitor      Malnutrition with cachexia  · Dietary consult     Acute debility  · PT and OT        DVT prophylaxis:  Mechanical DVT prophylaxis orders are present.       AM-PAC 6 Clicks Score (PT): 12 (12/03/21 1125)    Disposition: I expect the patient to be discharged TBD     CODE STATUS:   Code Status and Medical Interventions:   Ordered at: 12/02/21 1720     Level Of Support Discussed With:    Patient     Code Status (Patient has no pulse and is not breathing):    CPR (Attempt to Resuscitate)     Medical Interventions (Patient has pulse or is breathing):    Full Support       Lynnette Harvey, APRN  12/04/21

## 2021-12-04 NOTE — CONSULTS
HEMATOLOGY/ONCOLOGY INPATIENT CONSULT    REASON FOR CONSULT: Anemia and thrombocytopenia and right lung lesion    Subjective   HISTORY OF PRESENT ILLNESS;   Ms. Oliveira is a 89-year-old lady with past medical history of COPD, hypertension, hyperlipidemia, arthritis, anxiety, former tobacco abuse, thrombocytopenia, anemia who presented to [facility] with epistaxis and hypoxia.  Patient is a poor historian and her history was taken from the medical records.  She had been having significant epistaxis for about 1 day.  She presented to the ED where she was found to have a platelet count of 14K.  At that time she was found to be hypoxic in the 70s and placed on 4 L nasal cannula with some improvement.  She was transfused 2 units of platelets with improvement of her platelet count to 127K.  However given her new need for oxygen as well as weakness, she was admitted to the hospital.  She had a CT chest completed in the ER which was notable for a 1.9 x 1.0 cm right upper lobe lesion concerning for malignancy.  Today she is by herself in her room and still has some shortness of breath and cough.  She denies any chest discomfort or abdominal pain.  She states that she lives alone but does have some help.  She is unaware of the medication she is taking or if she started any new medications recently.  She is unaware if she has received the Covid-19 vaccination previously, but per chart review, she had her Pfizer doses in January, February, and August 2021.      Past Medical History:   Diagnosis Date   • COPD (chronic obstructive pulmonary disease) (HCC)    • Depression    • Dyslipidemia    • Dyslipidemia    • IBS (irritable bowel syndrome) 2016   • Macular degeneration    • Myelodysplastic syndrome (HCC)     anemia, thrombocytopemia   • Osteoarthritis    • Other spondylosis with radiculopathy, lumbar region      Past Surgical History:   Procedure Laterality Date   • CATARACT EXTRACTION, BILATERAL     • HIP SURGERY      • KNEE SURGERY     • REVISION OF TOTAL HIP FEMORAL Right 2017   • TOTAL HIP ARTHROPLASTY Right    • TOTAL HIP ARTHROPLASTY Left    • TOTAL KNEE ARTHROPLASTY Left     2006       Current Facility-Administered Medications on File Prior to Encounter   Medication Dose Route Frequency Provider Last Rate Last Admin   • bupivacaine PF (MARCAINE) 0.25 % injection    PRN Monico Herbert CRNA   40 mL at 17 1625     Current Outpatient Medications on File Prior to Encounter   Medication Sig Dispense Refill   • albuterol sulfate  (90 Base) MCG/ACT inhaler Inhale 2 puffs Every 6 (Six) Hours As Needed for Wheezing.     • Anoro Ellipta 62.5-25 MCG/INH aerosol powder  inhaler INHALE 1 PUFF BY MOUTH ONCE DAILY 60 each 3   • cholecalciferol (VITAMIN D3) 1000 UNITS tablet Take 1,000 Units by mouth Daily.     • Cyanocobalamin 1000 MCG capsule Take 1,000 mcg by mouth Daily.     • gabapentin (NEURONTIN) 300 MG capsule Take 300 mg by mouth 2 (Two) Times a Day.     • meloxicam (MOBIC) 15 MG tablet Take 15 mg by mouth Daily.     • multivitamin with minerals (PRESERVISION AREDS PO) Take 1 tablet by mouth Daily.     • Pyridoxine HCl (VITAMIN B6) 200 MG tablet Take 1 tablet by mouth Daily.         Allergies   Allergen Reactions   • Cortisone        Social History     Socioeconomic History   • Marital status: Single   Tobacco Use   • Smoking status: Former Smoker     Packs/day: 1.50     Years: 40.00     Pack years: 60.00     Types: Cigarettes     Quit date:      Years since quittin.9   • Smokeless tobacco: Never Used   Substance and Sexual Activity   • Alcohol use: Yes     Alcohol/week: 7.0 standard drinks     Types: 7 Shots of liquor per week     Comment: COCKTAIL EVERY NIGHT   • Drug use: No   • Sexual activity: Defer       Family History   Problem Relation Age of Onset   • Heart failure Mother    • Leukemia Father          REVIEW OF SYSTEMS:  A 12 point review of systems was performed and is negative except as noted  "above.    Objective   PHYSICAL EXAM:    /70 (BP Location: Right arm, Patient Position: Lying)   Pulse 92   Temp 99 °F (37.2 °C) (Oral)   Resp 18   Ht 157.5 cm (62\")   Wt 47.6 kg (105 lb)   LMP  (LMP Unknown)   SpO2 95%   BMI 19.20 kg/m²       General: Frail appearing female in no acute distress  HEENT: sclerae anicteric, oropharynx clear  Lymphatics: no cervical, supraclavicular, inguinal, or axillary adenopathy  Neck: Supple. No thyromegaly.  Cardiovascular: regular rate and rhythm, no murmurs  Lungs: clear to auscultation bilaterally. No respiratory distress  Abdomen: soft, nontender, nondistended.  No palpable organomegaly  Extremities: no lower extremity edema, cyanosis, or clubbing  Skin: no rashes, lesions, bruising, or petechiae  Neuro: Moves all extremities independently.    Results:    Results from last 7 days   Lab Units 12/04/21  0410 12/03/21  1510 12/03/21  0327   WBC 10*3/mm3 3.79 3.12* 3.07*   HEMOGLOBIN g/dL 7.4* 7.1* 7.3*   PLATELETS 10*3/mm3 127* 125* 135*     Results from last 7 days   Lab Units 12/04/21  0410 12/03/21  0327 12/02/21  1247   SODIUM mmol/L 139 142 141   POTASSIUM mmol/L 4.1 4.4 4.5   CO2 mmol/L 27.0 27.0 29.0   BUN mg/dL 12 16 23   CREATININE mg/dL 0.50* 0.42* 0.55*   GLUCOSE mg/dL 90 82 118*     Results from last 7 days   Lab Units 12/04/21  0410 12/02/21  1247   AST (SGOT) U/L 12 12   ALT (SGPT) U/L 8 8   BILIRUBIN mg/dL 0.5 0.6   ALK PHOS U/L 75 86         FL Video Swallow With Speech Single Contrast    Result Date: 12/4/2021  Fluoroscopy for modified barium swallow. Please see the speech therapy change and report for full details.        CT Angiogram Chest    Result Date: 12/2/2021  1. 19 x 10 mm stellate right upper lobe lesion worrisome for primary neoplasm. 2. No evidence of pulmonary embolus. 3. Mild pulmonary vascular congestion, suspected early interstitial edema, and trace right pleural effusion. 4. Unusual smooth margined paraspinous soft tissue lesions in " the lower region, measuring between fat and water density, favored to be incidental as discussed above, and which should be followed.  D:  12/02/2021 E:  12/02/2021         Assessment    ASSESSMENT & PLAN:  Anemia with thrombocytopenia  -Per chart review, patient has had anemia with hemoglobin ranging between 8.7-12.5 since 2016.  Platelet count has ranged between 50K-137K during that same timeframe.  -Now present with a platelet count of 14K-status post 2 units platelet transfusion on 12/2/2021 with improvement of her platelet count to 135K  -Hemoglobin 10.1 on admission however has dropped to 7.3 during her hospitalization  -Per patient, she does not know if she is seeing a blood doctor in the past regarding her blood counts  -We will plan to check LDH, haptoglobin, vitamin B12, folate, iron studies, zinc, copper levels, reticulocyte count today  -We will consider bone marrow biopsy in the future after further discussion with patient and family  -Okay to transfuse for a hemoglobin less than 7 and a platelet count less than 20k    Right upper lobe lesion  -High concern for malignancy given her previous smoking history  -Has been seen by pulmonary who deemed a biopsy to high risk given patient's clinical morbidities  -We will discuss further with patient and family about the goals of care  -We will consider PET/CT as an outpatient and possible radiation therapy depending on results    Debility  -Patient with significant weakness  -PT/OT following    Acute hypoxic respiratory failure  -Unclear etiology at this time  -Currently still requiring oxygen by nasal cannula for supplementation  -Pulmonary following    Thank you for the consult.  Please do not hesitate to contact with any questions or concerns.  We will continue to follow while inpatient      Mark Adams MD  Hematology and Oncology    12/4/2021  10:39 EST

## 2021-12-04 NOTE — MBS/VFSS/FEES
Acute Care - Speech Language Pathology   Swallow Initial Evaluation [facility]   Modified Barium Swallow Study (MBS)       Patient Name: Elizabeth Oliveira  : 1929  MRN: [id number]  Today's Date: 2021               Admit Date: 2021    Visit Dx:     ICD-10-CM ICD-9-CM   1. Oropharyngeal dysphagia  R13.12 787.22   2. Epistaxis  R04.0 784.7   3. Hypoxia  R09.02 799.02   4. Malignant neoplasm of lung, unspecified laterality, unspecified part of lung (HCC)  C34.90 162.9   5. Thrombocytopenia (HCC)  D69.6 287.5   6. Anemia, unspecified type  D64.9 285.9   7. Myeloproliferative disorder (HCC)  D47.1 238.79   8. History of COPD  Z87.09 V12.69   9. Aspiration pneumonitis (HCC)  J69.0 507.0     Patient Active Problem List   Diagnosis   • Fall   • Closed fracture of right femur (HCC)   • Arthritis   • Thrombocytopenia (HCC)   • Centrilobular emphysema (HCC)   • BAUMAN (dyspnea on exertion)   • Oxygen desaturation, exercise   • Acute on chronic respiratory failure with hypoxia (HCC)   • COPD (chronic obstructive pulmonary disease) (HCC)   • Myelodysplastic syndrome (HCC)   • Aspiration pneumonitis (HCC)   • Bronchitis   • Hypoxia   • Epistaxis   • Mass of right lung   • Severe malnutrition (CMS/HCC)     Past Medical History:   Diagnosis Date   • COPD (chronic obstructive pulmonary disease) (HCC)    • Depression    • Dyslipidemia    • Dyslipidemia    • IBS (irritable bowel syndrome)    • Macular degeneration    • Myelodysplastic syndrome (HCC)     anemia, thrombocytopemia   • Osteoarthritis    • Other spondylosis with radiculopathy, lumbar region      Past Surgical History:   Procedure Laterality Date   • CATARACT EXTRACTION, BILATERAL     • HIP SURGERY     • KNEE SURGERY     • REVISION OF TOTAL HIP FEMORAL Right 2017   • TOTAL HIP ARTHROPLASTY Right    • TOTAL HIP ARTHROPLASTY Left    • TOTAL KNEE ARTHROPLASTY Left            SLP Recommendation and Plan  SLP Swallowing Diagnosis: moderate, oral  dysphagia, pharyngeal dysphagia (12/04/21 1000)  SLP Diet Recommendation: puree, nectar thick liquids (12/04/21 1000)  Recommended Precautions and Strategies: general aspiration precautions (12/04/21 1000)  SLP Rec. for Method of Medication Administration: meds whole, with pudding or applesauce, as tolerated (12/04/21 1000)     Monitor for Signs of Aspiration: yes, notify SLP if any concerns (12/04/21 1000)  Recommended Diagnostics: reassess via VFSS (MBS), reassess via FEES (12/04/21 1000)  Swallow Criteria for Skilled Therapeutic Interventions Met: demonstrates skilled criteria (12/04/21 1000)  Anticipated Discharge Disposition (SLP): unknown, anticipate therapy at next level of care (12/04/21 1000)  Rehab Potential/Prognosis, Swallowing: good, to achieve stated therapy goals (12/04/21 1000)  Therapy Frequency (Swallow): 5 days per week (12/04/21 1000)  Predicted Duration Therapy Intervention (Days): until discharge (12/04/21 1000)                                SWALLOW EVALUATION (last 72 hours)     SLP Adult Swallow Evaluation     Row Name 12/04/21 1000 12/03/21 1500                Rehab Evaluation    Document Type evaluation  MBS  -SG evaluation  -CH       Subjective Information no complaints  -SG no complaints  -CH       Patient Observations alert; cooperative  -SG alert; cooperative; agree to therapy  -CH       Patient/Family/Caregiver Comments/Observations no family present  -SG No family present  -CH       Patient Effort adequate  -SG adequate  -CH       Symptoms Noted During/After Treatment none  -SG none  -CH                General Information    Patient Profile Reviewed yes  -SG yes  -CH       Pertinent History Of Current Problem see inital eval  -SG Referral received d/t pna dx, difficulty swallowing meds. Dx RU lobe mass, R pleural effusion  -CH       Current Method of Nutrition nectar/syrup-thick liquids; mechanical soft, no mixed consistencies  -SG regular textures; thin liquids  -CH        Precautions/Limitations, Vision WF; for purposes of eval  -SG --       Prior Level of Function-Communication WFL  -SG WFL  -       Prior Level of Function-Swallowing no diet consistency restrictions  - no diet consistency restrictions  -       Plans/Goals Discussed with patient; agreed upon  -SG patient; agreed upon  -       Barriers to Rehab none identified  - none identified  -       Patient's Goals for Discharge return home  -SG return home  -                Pain    Additional Documentation Pain Scale: FACES Pre/Post-Treatment (Group)  -SG Pain Scale: FACES Pre/Post-Treatment (Group)  -                Pain Scale: FACES Pre/Post-Treatment    Pain: FACES Scale, Pretreatment 0-->no hurt  -SG 0-->no hurt  -CH       Posttreatment Pain Rating 0-->no hurt  -SG 0-->no hurt  -CH                Oral Motor Structure and Function    Dentition Assessment -- upper dentures/partial in place; lower dentures/partial in place  -       Secretion Management -- WNL/WFL  -CH       Mucosal Quality -- moist, healthy  -       Volitional Swallow -- WFL  -       Volitional Cough -- WFL  -                Oral Musculature and Cranial Nerve Assessment    Oral Motor General Assessment -- generalized oral motor weakness  -                General Eating/Swallowing Observations    Respiratory Support Currently in Use -- nasal cannula  -       Eating/Swallowing Skills -- fed by SLP  -       Positioning During Eating -- upright 90 degree; upright in bed  -       Utensils Used -- spoon; cup; straw  -       Consistencies Trialed -- regular textures; soft textures; pureed; thin liquids; nectar/syrup-thick liquids  -       Pre SpO2 (%) -- 96  -CH       Post SpO2 (%) -- 94  -                Respiratory    Respiratory Status -- WFL  -                Clinical Swallow Eval    Oral Prep Phase -- WFL  -       Oral Transit -- WFL  -       Oral Residue -- WFL  -       Pharyngeal Phase -- suspected pharyngeal  impairment  -CH       Esophageal Phase -- unremarkable  -       Clinical Swallow Evaluation Summary -- Patient with cough at baseline. Oral phase grossly WFL. Delayed cough with thin liquids via spoon, cup and straw. No s/s with NT liquids via cup, cough via straw. No s/s with pureed or regular solids. Recommend: lvl 4 diet and NT liquids. Meds whole with pureed. FEES to further evaluate swallow.  -                Pharyngeal Phase Concerns    Pharyngeal Phase Concerns -- cough  -                MBS/VFSS    Utensils Used spoon; cup; straw  -SG --       Consistencies Trialed regular textures; pureed; thin liquids; nectar/syrup-thick liquids  -SG --                MBS/VFSS Interpretation    Oral Prep Phase impaired oral phase of swallowing  -SG --       Oral Transit Phase impaired  -SG --       Oral Residue impaired  -SG --       VFSS [place] [facility] [place]. Pt alert and cooperative. TEsted with thins, NTL, puree and carcker. Significantly inc'd oral prep time w/ cracker 2' oral weakness. Deep penetration w/ thins via tsp and cup that did not clear and resulted in aspiration after the swallow. Wet, nonproductive, delayed coughing (1-2 mintues after exam completed) suspected 2' aspiration sensation.Mod-severe vallecular residue with did eventually diminish in amount but never fully cleared.This amount of residue could pose risk w/ larger bolus amounts of liquids. Straws should be avoided, small bolus amounts. Rec: L2, NTL, no straws, small and single bites/sips  -SG --                Oral Preparatory Phase    Oral Preparatory Phase prolonged manipulation  -SG --       Prolonged Manipulation regular textures  -SG --                Oral Transit Phase    Impaired Oral Transit Phase delayed initiation of bolus transit; increased A-P transit time  -SG --       Delayed Initiation of bolus transit regular textures  -SG --       Increased A-P Transit Time regular textures  -SG --                Oral Residue     Impaired Oral Residue lingual residue  -SG --       Lingual Residue regular textures; pudding/puree  -SG --                Initiation of Pharyngeal Swallow    Initiation of Pharyngeal Swallow bolus in pyriform sinuses  -SG --       Pharyngeal Phase impaired pharyngeal phase of swallowing  -SG --       Penetration During the Swallow thin liquids; secondary to delayed swallow initiation or mistiming; secondary to reduced vestibular closure; secondary to reduced laryngeal elevation  -SG --       Aspiration After the Swallow thin liquids; secondary to residue; in laryngeal vestibule  -SG --       Response to Penetration no response; deep  -SG --       Response to Aspiration other (see comments)  delayed after exam, suspected 2' aspiration sensation  -SG --       Rosenbek's Scale thin:; 5--->level 5; 8--->level 8  -SG --       Pharyngeal Residue all consistencies tested; valleculae; secondary to reduced base of tongue retraction; pyriform sinuses; secondary to reduced laryngeal elevation; laryngeal vestibule; secondary to reduced hyolaryngeal excursion; posterior pharyngeal wall; secondary to reduced posterior pharyngeal wall stripping  -SG --       Response to Residue cleared residue with spontaneous subsequent swallow; other (see comments)  never fully cleared  -SG --       Attempted Compensatory Maneuvers bolus size; bolus presentation style  -SG --       Response to Attempted Compensatory Maneuvers did not prevent aspiration  -SG --                Clinical Impression    SLP Swallowing Diagnosis moderate; oral dysphagia; pharyngeal dysphagia  -SG functional oral phase; suspected pharyngeal dysphagia  -       Functional Impact risk of aspiration/pneumonia  -SG risk of aspiration/pneumonia; risk of malnutrition; risk of dehydration  -       Rehab Potential/Prognosis, Swallowing good, to achieve stated therapy goals  -SG good, to achieve stated therapy goals  -       Swallow Criteria for Skilled Therapeutic  Interventions Met demonstrates skilled criteria  -SG demonstrates skilled criteria  -                SLP Treatment Clinical Impressions    Plan for Continued Treatment (SLP) continue treatment per plan of care  - continue treatment per plan of care  -       Care Plan Review evaluation/treatment results reviewed; care plan/treatment goals reviewed; risks/benefits reviewed; current/potential barriers reviewed; patient/other agree to care plan  -SG care plan/treatment goals reviewed  -                Recommendations    Therapy Frequency (Swallow) 5 days per week  -SG --       Predicted Duration Therapy Intervention (Days) until discharge  -SG until discharge  -       SLP Diet Recommendation puree; nectar thick liquids  - mechanical soft with no mixed consistencies; nectar thick liquids  -       Recommended Diagnostics reassess via VFSS (MBS); reassess via FEES  -SG FEES  -       Recommended Precautions and Strategies general aspiration precautions  - general aspiration precautions  -       Oral Care Recommendations Oral Care BID/PRN; Swab  -SG Oral Care BID/PRN; Swab  -       SLP Rec. for Method of Medication Administration meds whole; with pudding or applesauce; as tolerated  - meds whole; with pudding or applesauce; as tolerated  -       Monitor for Signs of Aspiration yes; notify SLP if any concerns  -SG yes; notify SLP if any concerns  -       Anticipated Discharge Disposition (SLP) unknown; anticipate therapy at next level of care  -SG unknown; anticipate therapy at next level of care  -                Swallow Goals (SLP)    Oral Nutrition/Hydration Goal Selection (SLP) oral nutrition/hydration, SLP goal 1  -SG --       Lingual Strengthening Goal Selection (SLP) lingual strengthening, SLP goal 1  -SG --       Pharyngeal Strengthening Exercise Goal Selection (SLP) pharyngeal strengthening exercise, SLP goal 1  -SG --       Swallow Management Recall Goal Selection (SLP) swallow  management recall, SLP goal 1  -SG --       Swallow Compensatory Strategies Goal Selection (SLP) swallow compensatory strategies, SLP goal 1  -SG --       Additional Documentation labial strengthening goal selection (SLP); lingual strengthening goal selection (SLP); pharyngeal strengthening exercise goal selection (SLP); swallow compensatory strategies goal selection (SLP); swallow management recall goal selection (SLP)  -SG --                Oral Nutrition/Hydration Goal 1 (SLP)    Oral Nutrition/Hydration Goal 1, SLP LTG: Pt will demonstrat no s/s of aspiration with recommended diet levels  -SG --       Time Frame (Oral Nutrition/Hydration Goal 1, SLP) by discharge  -SG --       Progress/Outcomes (Oral Nutrition/Hydration Goal 1, SLP) goal ongoing  -SG --                Lingual Strengthening Goal 1 (SLP)    Activity (Lingual Strengthening Goal 1, SLP) increase tongue back strength; increase lingual tone/sensation/control/coordination/movement  -SG --       Increase Lingual Tone/Sensation/Control/Coordination/Movement swallow trials  -SG --       Increase Tongue Back Strength lingual resistance exercises  -SG --       [place]/Accuracy (Lingual Strengthening Goal 1, SLP) with moderate cues (50-74% accuracy)  -SG --       Time Frame (Lingual Strengthening Goal 1, SLP) short term goal (STG); by discharge  -SG --       Progress/Outcomes (Lingual Strengthening Goal 1, SLP) goal ongoing  -SG --                Pharyngeal Strengthening Exercise Goal 1 (SLP)    Activity (Pharyngeal Strengthening Goal 1, SLP) increase timing; increase superior movement of the hyolaryngeal complex; increase anterior movement of the hyolaryngeal complex; increase closure at entrance to airway/closure of airway at glottis; increase squeeze/positive pressure generation; increase tongue base retraction  -SG --       Increase Timing prepping - 3 second prep or suck swallow or 3-step swallow  -SG --       Increase Superior Movement of the  Hyolaryngeal Complex chin tuck against resistance (CTAR)  -SG --       Increase Anterior Movement of the Hyolaryngeal Complex chin tuck against resistance (CTAR)  -SG --       Increase Closure at Entrance to Airway/Closure of Airway at Glottis super-supraglottic swallow  -SG --       Increase Squeeze/Positive Pressure Generation hard effortful swallow  -SG --       Increase Tongue Base Retraction shaker  -SG --       [place]/Accuracy (Pharyngeal Strengthening Goal 1, SLP) with moderate cues (50-74% accuracy)  -SG --       Time Frame (Pharyngeal Strengthening Goal 1, SLP) short term goal (STG); by discharge  -SG --       Progress/Outcomes (Pharyngeal Strengthening Goal 1, SLP) goal ongoing  -SG --                Swallow Management Recall Goal 1 (SLP)    Activity (Swallow Management Recall Goal 1, SLP) compensatory swallow strategies/techniques; rationale for use of strategies/techniques; other (see comments); safe liquid viscosity  no straws, small bites and sips  -SG --       [place]/Accuracy (Swallow Management Recall Goal 1, SLP) with moderate cues (50-74% accuracy)  -SG --       Time Frame (Swallow Management Recall Goal 1, SLP) short term goal (STG); by discharge  -SG --       Progress/Outcomes (Swallow Management Recall Goal 1, SLP) goal ongoing  -SG --                Swallow Compensatory Strategies Goal 1 (SLP)    Activity (Swallow Compensatory Strategies/Techniques Goal 1, SLP) small cup sips; small bites; during meal intake; throat clear/extra swallow  -SG --       [place]/Accuracy (Swallow Compensatory Strategies/Techniques Goal 1, SLP) with moderate cues (50-74% accuracy)  -SG --       Time Frame (Swallow Compensatory Strategies/Techniques Goal 1, SLP) short term goal (STG); by discharge  -SG --       Progress/Outcomes (Swallow Compensatory Strategies/Techniques Goal 1, SLP) goal ongoing  -SG --             User Key  (r) = Recorded By, (t) = Taken By, (c) = Cosigned By    Initials Name  Effective Dates    SG Grochristio-Abigail Saldivar, MS CCC-SLP 06/16/21 -     CH Gardenia Paniagua, MS CCC-SLP 06/16/21 -                 EDUCATION  The patient has been educated in the following areas:   Dysphagia (Swallowing Impairment) Oral Care/Hydration Modified Diet Instruction.        SLP GOALS     Row Name 12/04/21 1000             Oral Nutrition/Hydration Goal 1 (SLP)    Oral Nutrition/Hydration Goal 1, SLP LTG: Pt will demonstrat no s/s of aspiration with recommended diet levels  -SG      Time Frame (Oral Nutrition/Hydration Goal 1, SLP) by discharge  -SG      Progress/Outcomes (Oral Nutrition/Hydration Goal 1, SLP) goal ongoing  -SG              Lingual Strengthening Goal 1 (SLP)    Activity (Lingual Strengthening Goal 1, SLP) increase tongue back strength; increase lingual tone/sensation/control/coordination/movement  -SG      Increase Lingual Tone/Sensation/Control/Coordination/Movement swallow trials  -SG      Increase Tongue Back Strength lingual resistance exercises  -SG      [place]/Accuracy (Lingual Strengthening Goal 1, SLP) with moderate cues (50-74% accuracy)  -SG      Time Frame (Lingual Strengthening Goal 1, SLP) short term goal (STG); by discharge  -SG      Progress/Outcomes (Lingual Strengthening Goal 1, SLP) goal ongoing  -SG              Pharyngeal Strengthening Exercise Goal 1 (SLP)    Activity (Pharyngeal Strengthening Goal 1, SLP) increase timing; increase superior movement of the hyolaryngeal complex; increase anterior movement of the hyolaryngeal complex; increase closure at entrance to airway/closure of airway at glottis; increase squeeze/positive pressure generation; increase tongue base retraction  -SG      Increase Timing prepping - 3 second prep or suck swallow or 3-step swallow  -SG      Increase Superior Movement of the Hyolaryngeal Complex chin tuck against resistance (CTAR)  -SG      Increase Anterior Movement of the Hyolaryngeal Complex chin tuck against resistance  (CTAR)  -SG      Increase Closure at Entrance to Airway/Closure of Airway at Glottis super-supraglottic swallow  -SG      Increase Squeeze/Positive Pressure Generation hard effortful swallow  -SG      Increase Tongue Base Retraction shaker  -SG      [place]/Accuracy (Pharyngeal Strengthening Goal 1, SLP) with moderate cues (50-74% accuracy)  -SG      Time Frame (Pharyngeal Strengthening Goal 1, SLP) short term goal (STG); by discharge  -SG      Progress/Outcomes (Pharyngeal Strengthening Goal 1, SLP) goal ongoing  -SG              Swallow Management Recall Goal 1 (SLP)    Activity (Swallow Management Recall Goal 1, SLP) compensatory swallow strategies/techniques; rationale for use of strategies/techniques; other (see comments); safe liquid viscosity  no straws, small bites and sips  -SG      [place]/Accuracy (Swallow Management Recall Goal 1, SLP) with moderate cues (50-74% accuracy)  -SG      Time Frame (Swallow Management Recall Goal 1, SLP) short term goal (STG); by discharge  -SG      Progress/Outcomes (Swallow Management Recall Goal 1, SLP) goal ongoing  -SG              Swallow Compensatory Strategies Goal 1 (SLP)    Activity (Swallow Compensatory Strategies/Techniques Goal 1, SLP) small cup sips; small bites; during meal intake; throat clear/extra swallow  -SG      [place]/Accuracy (Swallow Compensatory Strategies/Techniques Goal 1, SLP) with moderate cues (50-74% accuracy)  -SG      Time Frame (Swallow Compensatory Strategies/Techniques Goal 1, SLP) short term goal (STG); by discharge  -SG      Progress/Outcomes (Swallow Compensatory Strategies/Techniques Goal 1, SLP) goal ongoing  -SG            User Key  (r) = Recorded By, (t) = Taken By, (c) = Cosigned By    Initials Name Provider Type    Abigail Rodriguez MS CCC-SLP Speech and Language Pathologist                   Time Calculation:    Time Calculation- SLP     Row Name 12/04/21 8903             Time Calculation- SLP    SLP  Start Time 1000  -SG      SLP Received On 12/04/21  -            User Key  (r) = Recorded By, (t) = Taken By, (c) = Cosigned By    Initials Name Provider Type    Abigail Rodriguez MS CCC-SLP Speech and Language Pathologist                Therapy Charges for Today     Code Description Service Date Service Provider Modifiers Qty    [id number] HC ST MOTION FLUORO EVAL SWALLOW 6 12/4/2021 Abigail Horton MS CCC-SLP GN 1        Patient was not wearing a face mask and did exhibit coughing during this therapy encounter.  Procedure performed was aerosolizing, involved close contact (within 6 feet for at least 15 minutes or longer), and involved contact with infectious secretions or specimens.  Therapist used appropriate personal protective equipment including gloves, standard procedure mask and eye protection.  Appropriate PPE was worn during the entire therapy session.  Hand hygiene was completed before and after therapy session.          MS ALINA Coats  12/4/2021

## 2021-12-05 NOTE — CONSULTS
Azeem Merchant MD  Consulting physician: Elham Rojas MD    Chief Complaint   Patient presents with   • Nose Bleed     CC: SOA    Reason for consult: GOC, symptom mgmt    HPI:   89 yo F with COPD, Myeloproliferative disease, chronic arthritis with neuropathy, protein calorie malnutrition (alb 3.1), RUL lung mass.    She presented to the [facility] ED on 12/02/2021 due to severe epistaxis.   She has myeloproliferative disorder (previously not evaluated or treated)  ith platelets 14K at time of admission.  Hgb dropped from 10 to 7.3 due to epistaxis.  She did require platelet transfusion and PRBCs for acute blood loss anemia.  CXR  showed RUL 1.9 cm x 1 cm stellate lung mass concerning for malignancy.   She did have Pulmonary consult who did not recommend biopsy due to comorbidities.  She is not interested in further treatment such as radiation or chemotherapy.    She may follow-up with heme/onc and possible PET/CT scan for further evaluation as an outpatient.    Per RN and Rachel Alegria APRN, pt has   Increased work of breathing today.  Oxygen saturations at rest are in the mid 90s on 3 L,  Of her nursing documentation she has rapid be that into the 80s with repositioning.  Patient herself does report a little bit of cough today but is usually nonproductive.  She, however, denies shortness of breath despite observed increased work of breathing.    She is O2 dependent at home and utilizes oxygen overnight.  However, her knees reports that she frequently requires reminders to put her oxygen back in place.    Patient does have some mild confusion.  When asked who lives at home, she reported her mother and father and the dogs. Pt   usually consumes 1 to 2 bourbon drinks each evening since her 20s.  Her niece reports that she has been more forgetful and not drinking bourbon as often recently.    For telephone conversation with her niece/POA/CARMITA/Lara GREENE,  Patient lives alone independently in her home prior to  "hospitalization.  She had someone coming 3 days per week to assist with meals from 5-7 PM.    Niece reports significant decline \"almost daily in\" since admission.    She has had minimal oral intake according to nursing staff.  She reports that she is just not hungry.  MBS done 12/4 showed moderate, oral dysphagia, pharyngeal dysphagia.   SLP Diet Recommendation: puree, nectar thick liquids     H/o hip replacement and has always reported mild pain per niece.  She denied pain today during my visit.  However, she did report earlier to nursing staff.      Past Medical History:   Diagnosis Date   • COPD (chronic obstructive pulmonary disease) (HCC)    • Depression    • Dyslipidemia    • Dyslipidemia    • IBS (irritable bowel syndrome) 2016   • Macular degeneration    • Myelodysplastic syndrome (HCC)     anemia, thrombocytopemia   • Osteoarthritis    • Other spondylosis with radiculopathy, lumbar region      Past Surgical History:   Procedure Laterality Date   • CATARACT EXTRACTION, BILATERAL     • HIP SURGERY     • KNEE SURGERY     • REVISION OF TOTAL HIP FEMORAL Right 07/2017   • TOTAL HIP ARTHROPLASTY Right    • TOTAL HIP ARTHROPLASTY Left    • TOTAL KNEE ARTHROPLASTY Left     2006     Current Facility-Administered Medications   Medication Dose Route Frequency Provider Last Rate Last Admin   • acetaminophen (TYLENOL) tablet 650 mg  650 mg Oral Q4H PRN Jennifer Acosta APRN   650 mg at 12/03/21 1218   • albuterol (PROVENTIL) nebulizer solution 0.083% 2.5 mg/3mL  2.5 mg Nebulization Q6H PRN Jennifer Acosta, APRN       • bisacodyl (DULCOLAX) EC tablet 10 mg  10 mg Oral Daily PRN Jennifer Acosta, APRN       • calcium carbonate (TUMS) chewable tablet 500 mg (200 mg elemental)  2 tablet Oral BID PRN Jennifer Acosta, APRN       • cholecalciferol (VITAMIN D3) tablet 1,000 Units  1,000 Units Oral Daily Jennifer Acosta APRN   1,000 Units at 12/05/21 1010   • docusate sodium (COLACE) capsule 100 mg  100 mg Oral BID " Jennifer Acosta APRN   100 mg at 12/05/21 1010   • gabapentin (NEURONTIN) capsule 300 mg  300 mg Oral BID Jennifer Acosta APRN   300 mg at 12/05/21 1010   • guaiFENesin (MUCINEX) 12 hr tablet 600 mg  600 mg Oral Q12H Lynnette Harvey APRN   600 mg at 12/05/21 1010   • haloperidol lactate (HALDOL) injection 0.5 mg  0.5 mg Intravenous Q6H PRN Cornelio Babb MD       • ipratropium-albuterol (DUO-NEB) nebulizer solution 3 mL  3 mL Nebulization 4x Daily - RT Jennifer Acosta APRN   3 mL at 12/05/21 1630   • LORazepam (ATIVAN) injection 0.5 mg  0.5 mg Intravenous Q4H PRN Cornelio Babb MD       • melatonin tablet 5 mg  5 mg Oral Nightly PRN Jennifer Acosta APRN   5 mg at 12/03/21 2034   • multivitamin with minerals 1 tablet  1 tablet Oral Daily Jennifer Acosta APRN   1 tablet at 12/05/21 0900   • ondansetron (ZOFRAN) injection 4 mg  4 mg Intravenous Q6H PRN Jennifer Acosta APRN       • oxymetazoline (AFRIN) nasal spray 1 spray  1 spray Nasal Once Wali Coreas MD       • QUEtiapine (SEROquel) tablet 25 mg  25 mg Oral Nightly Cornelio Babb MD   25 mg at 12/04/21 1901   • sodium chloride 0.9 % flush 10 mL  10 mL Intravenous PRN Wali Coreas MD       • sodium chloride 0.9 % flush 10 mL  10 mL Intravenous PRN Wali Coreas MD       • sodium chloride 0.9 % flush 10 mL  10 mL Intravenous Q12H Jennifer Acosta APRN   10 mL at 12/05/21 1048   • sodium chloride 0.9 % flush 10 mL  10 mL Intravenous PRN Jennifer Acosta APRN       • vitamin B-12 (CYANOCOBALAMIN) tablet 1,000 mcg  1,000 mcg Oral Daily Jennifer Acosta APRN   1,000 mcg at 12/05/21 1010   • vitamin B-6 (PYRIDOXINE) tablet 200 mg  200 mg Oral Daily Jennifer Acosta APRN   200 mg at 12/05/21 1009     Facility-Administered Medications Ordered in Other Encounters   Medication Dose Route Frequency Provider Last Rate Last Admin   • bupivacaine PF (MARCAINE) 0.25 % injection    PRN Monico Herbert, CRNA  "  40 mL at 17 5938        Allergies   Allergen Reactions   • Cortisone      Family History   Problem Relation Age of Onset   • Heart failure Mother    • Leukemia Father    FHX (cont): Lewy body dementia - sister    Social History     Socioeconomic History   • Marital status: Single   Tobacco Use   • Smoking status: Former Smoker     Packs/day: 1.50     Years: 40.00     Pack years: 60.00     Types: Cigarettes     Quit date:      Years since quittin.9   • Smokeless tobacco: Never Used   Substance and Sexual Activity   • Alcohol use: Yes     Alcohol/week: 7.0 standard drinks     Types: 7 Shots of liquor per week     Comment: COCKTAIL EVERY NIGHT   • Drug use: No   • Sexual activity: Defer   SHX (cont):  Never , no children.    Niece is POA/HCS.    Sister (niece Lara's mother)  10/2020 of Lewy body dementia.  Nephew (Lara's brother)  recently of overdose.    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    Review of Systems - pt answers questions, but I am not sure re. Accuracy of answers  Dyspnea - pt denies but visible increased work of breathing  Pain - pt denies at time of visit, hip pain reports to RN earlier today  Nausea/Vomting - pt denies      General -unsure her regarding weight loss per knees, prior to hospitalization her energy and interaction inability to live alone was good, she reports that she is always cold, afebrile greater than 24 hours.    HEENT -macular degeneration, mild [place], dysphagia per HPI lungs-see HPI  Heart - no CP, no palpitations  GI - no N/V/ diarrhea  MSK - increased weakness since hospitalization, chronic hip pain (denies today) since surgery  Neuro - increased confusion/delirium  Psych - no agitation or anxiety, pleasant interaction      /64 (BP Location: Right arm, Patient Position: Lying)   Pulse 107   Temp 98.4 °F (36.9 °C) (Oral)   Resp 18   Ht 157.5 cm (62\")   Wt " 47.6 kg (105 lb)   LMP  (LMP Unknown)   SpO2 94%   BMI 19.20 kg/m²     Intake/Output Summary (Last 24 hours) at 12/5/2021 1719  Last data filed at 12/5/2021 1600  Gross per 24 hour   Intake --   Output 100 ml   Net -100 ml     Physical Exam:  PPS 30%      General Appearance:   Alert, cooperative, frail, increased work of breathing at rest   Head:   Normocephalic,  Frontal temporal wasting   Eyes:           Lids and lashes normal, conjunctivae and sclerae normal, no  icterus, EOMI, decreased vision due to MD   Ears:   Ears appear intact with no abnormalities noted, hearing grossly normal   Throat:  No oral lesions, no thrush, oral mucosa moist   Neck:  No adenopathy, supple, trachea midline, no thyromegaly,  no JVD   Back:    kphosis present   Lungs:     Decreased breath sounds bilateral bases, coarse rales bilateral upper lobes, no wheezes.    Heart:   Regular, tachycardia   Breast Exam:   Deferred   Abdomen:    Normal bowel sounds, no masses, no organomegaly, soft        non-tender, non-distended   Genitalia:   Deferred   Extremities:  Moves all extremities, no edema, no cyanosis, no            redness       Skin:  multiple bruises due to needle sticks   Lymph nodes:  No palpable adenopathy cervical and supraclavicular    Psych:            Mood and affect appropriate, good eye contact, confusion vs memory deficit   Neurologic:     Face appears symmetric, moves all extremities           Results from last 7 days   Lab Units 12/04/21  0410   WBC 10*3/mm3 3.79   HEMOGLOBIN g/dL 7.4*   HEMATOCRIT % 23.0*   PLATELETS 10*3/mm3 127*   SODIUM mmol/L 139   POTASSIUM mmol/L 4.1   CHLORIDE mmol/L 102   CO2 mmol/L 27.0   BUN mg/dL 12   CREATININE mg/dL 0.50*   CALCIUM mg/dL 8.6   BILIRUBIN mg/dL 0.5   ALK PHOS U/L 75   ALT (SGPT) U/L 8   AST (SGOT) U/L 12   GLUCOSE mg/dL 90     Imaging Results (Last 72 Hours)     Procedure Component Value Units Date/Time    FL Video Swallow With Speech Single Contrast [[id number]] Collected:  12/04/21 1017     Updated: 12/04/21 1550    Narrative:      EXAMINATION: FL VIDEO SWALLOW W SPEECH SINGLE-CONTRAST - 12/04/2021     INDICATION: R04.0-Epistaxis; R09.02-Hypoxemia; C34.90-Malignant neoplasm  of unspecified part of unspecified bronchus or lung;  D69.6-Thrombocytopenia, unspecified; D64.9-Anemia, unspecified;  D47.1-Chronic myeloproliferative disease; Z87.09-Personal history of  other diseases of the respiratory system; J69.0-Pneumonitis due to  inhalation of food and vomit; R13.10-Dysphagia, unspecified. Silent  aspiration.     TECHNIQUE: 1.4 minutes of fluoroscopy and 9 images used for a modified  barium swallow.     COMPARISON: None     FINDINGS: Fluoroscopy was provided for modified barium swallow. The  patient was evaluated in the sitting position. Various consistencies of  barium were administered to the patient. The speech therapy team was on  hand for the procedure. Please to the speech therapy team report for  full details.       Impression:      Fluoroscopy for a modified barium swallow. Please see the  speech therapy team report for full details.     DICTATED:   12/04/2021  EDITED/lfs:   12/04/2021           CT Angiogram Chest [[id number]] Collected: 12/02/21 1559     Updated: 12/04/21 1518    Narrative:      EXAMINATION: CT ANGIOGRAM CHEST- 12/02/2021     INDICATION: hypoxia, hemoptysis     TECHNIQUE: Spiral acquisition 3 mm post IV contrast images through the  chest and upper abdomen with sagittal and coronal 2-D reconstructions.     The radiation dose reduction device was turned on for each scan per the  ALARA (As Low as Reasonably Achievable) protocol.     COMPARISON: Chest radiograph 06/02/2020     FINDINGS: History indicates hypoxia, hemoptysis.     There is good contrast opacification of the pulmonary arteries. No  filling defects are identified to suggest pulmonary embolic disease.  There is no evidence of thoracic aortic aneurysm or dissection, no  evidence of pericardial effusion or  significant mediastinal  lymphadenopathy. There is unusually increased, low density soft tissue  around the lower thoracic spine, axial images 50 through 80. On the  left, this measures water density to moderate fat density. On the right,  this measures water density to low fat density. Considerations include  paraspinous lipomas, less likely extra medullary hematopoiesis.  Well-differentiated liposarcoma would be considered unlikely with this  appearance. Likewise, lymphoma would be unlikely to have this  appearance.     Lung window images show a stellate, 1.9 x 1.0 cm mass in the right upper  lobe, anterior to the right hilum, very worrisome for primary neoplasm.  There is prominent pulmonary vasculature and perhaps very early  interstitial edema elsewhere. No other pulmonary parenchymal nodule is  seen. There is a minimal right pleural effusion.     Included images of the upper abdomen show fatty liver change. Spleen is  not enlarged. Included pancreatic tail, adrenal glands, and upper renal  poles appear unremarkable. Bony structures appear grossly intact.       Impression:      1. 19 x 10 mm stellate right upper lobe lesion worrisome for primary  neoplasm.  2. No evidence of pulmonary embolus.  3. Mild pulmonary vascular congestion, suspected early interstitial  edema, and trace right pleural effusion.  4. Unusual smooth margined paraspinous soft tissue lesions in the lower  region, measuring between fat and water density, favored to be  incidental as discussed above, but which should be followed.     D:  12/02/2021  E:  12/02/2021        This report was finalized on 12/4/2021 3:15 PM by Dr. Tyler Anderson MD.             Impression/Plan:  1.  Dyspnea -increased work of breathing.  Will repeat chest x-ray.  proBNP elevated (648) at time of admission.  ECHO (1/2021) with EF 50-55%.  Baseline COPD, new aspiration/dysphagia, possible fluid overload with IV fluids and transfusion.    Begin low-dose morphine 1 mg q.2  hours p.r.n. dyspnea air or increased work breathing.  New     2.  Delirium -  She has had history of usually alcohol each evening.  Add evening benzodiazepine instead of Seroquel.     Continue Haldol p.r.n..      3. Myelodysplastic syndrome-reviewed with POA regarding pathophysiology and potential need for repeat transfusions.  Reviewed burden versus benefit of repeat transfusions.  Due to the decline I do not know that she would able to follow-up with bone marrow biopsy.      4. Right upper lobe lung mass -new finding on chest x-ray.  Prior history of tobacco use.  Continue aggressive pulmonary toilet.  I do not anticipate she will have any treatment or biopsy.      5. Dysphagia -ST recommended pureed with nectar liquids.  She has had minimal intake.    6. Code status - her Niecedesires to treat reversible causes.  However, she does not want aggressive measures such as CPR, ACLS, or intubation.    7.  Disposition- she will not be able to return to her independent home setting.  Did review with [facility] eligibility and availability in different care settings.  Possible transition to inpatient hospice if she continues to decline as she has last few days.  If she stabilizes prior to or after transition to hospice, she will require probably nursing home placement.  She did cancel her long-term care insurance so does not have additional funding for 24/7 care.     8.  Pill burden- increased dysphagia.  Doubt she will receive benefit at this stage of her disease from her supplements.  Cont symptom management  Medications.    Time: 55 min spent in care of patient. 10 min bedside, 24 min discussion by phone with niece/POA, 21 min chart review, , documentation.     Siena Rojas MD  12/05/21  17:19 EST

## 2021-12-05 NOTE — PROGRESS NOTES
[facility] Medicine Services  PROGRESS NOTE    Patient Name: Elizabeth Oliveira  : 1929  MRN: [id number]    Date of Admission: 2021  Primary Care Physician: Azeem Merchant MD    Subjective   Subjective     CC:  Epistaxis     HPI:  Resting in bed, states she is very cold, weak and her breathing is a little worse today. More SOA than yesterday, now with productive thick cough. Remains on 3 LNC satting low 90's. Nursing states patient desaturates into low 80's with movement and turning in bed. No pain when asked, but cries out in pain when I am examining her bilateral lower extremities.     ROS:  Gen- No fevers, chills  CV- No chest pain, palpitations  Resp- + cough, + dyspnea  GI- No N/V/D, abd pain        Objective   Objective     Vital Signs:   Temp:  [98.1 °F (36.7 °C)-100.7 °F (38.2 °C)] 98.4 °F (36.9 °C)  Heart Rate:  [] 91  Resp:  [16-22] 18  BP: ()/(48-99) 144/64  Flow (L/min):  [2-6] 2     Physical Exam:  Constitutional: No acute distress, awake, alert, frail and chronically ill appearing   HENT: NCAT, mucous membranes dry   Respiratory: Anterior upper lobe rhonchi/ no rales or wheezing, respiratory effort normal on 3-4 LNC satting 91%  Cardiovascular: RRR, no murmurs, rubs, or gallops  Gastrointestinal: Positive bowel sounds, soft, nontender, nondistended  Musculoskeletal: No bilateral ankle edema  Psychiatric: Appropriate affect, cooperative  Neurologic: Oriented x 3, THOMAS, follows commands, speech clear  Skin: No rashes       Results Reviewed:  LAB RESULTS:      Lab 21  0410 21  1510 21  0327 21  1247   WBC  --  3.79 3.12* 3.07* 4.35   HEMOGLOBIN  --  7.4* 7.1* 7.3* 10.1*   HEMATOCRIT  --  23.0* 21.5* 23.4* 31.3*   PLATELETS  --  127* 125* 135* 14*   NEUTROS ABS  --  2.99 2.22 2.14 3.58   IMMATURE GRANS (ABS)  --  0.01 0.01 0.03 0.03   LYMPHS ABS  --  0.35* 0.54* 0.55* 0.42*   MONOS ABS  --  0.39 0.33 0.30 0.30    EOS ABS  --  0.05 0.02 0.05 0.01   MCV  --  108.0* 108.0* 112.0* 109.8*   PROCALCITONIN  --  0.09  --   --   --    LACTATE 0.7  --   --   --   --    LDH  --  181  --   --   --    PROTIME  --   --   --   --  13.8         Lab 12/04/21  0410 12/03/21  0327 12/02/21  1247   SODIUM 139 142 141   POTASSIUM 4.1 4.4 4.5   CHLORIDE 102 104 101   CO2 27.0 27.0 29.0   ANION GAP 10.0 11.0 11.0   BUN 12 16 23   CREATININE 0.50* 0.42* 0.55*   GLUCOSE 90 82 118*   CALCIUM 8.6 9.0 9.7*         Lab 12/04/21  0410 12/02/21  1247   TOTAL PROTEIN 5.5* 7.1   ALBUMIN 3.10* 3.90   GLOBULIN 2.4 3.2   ALT (SGPT) 8 8   AST (SGOT) 12 12   BILIRUBIN 0.5 0.6   ALK PHOS 75 86         Lab 12/02/21  1247   PROBNP 648.8   TROPONIN T <0.010   PROTIME 13.8   INR 1.10             Lab 12/04/21  1709 12/04/21  0410 12/02/21  1533   IRON  --  32*  --    IRON SATURATION  --  13*  --    TIBC  --  238*  --    TRANSFERRIN  --  160*  --    FERRITIN  --  114.90  --    FOLATE 13.70  --   --    VITAMIN B 12 >2,000*  --   --    ABO TYPING  --   --  O   RH TYPING  --   --  Negative   ANTIBODY SCREEN  --   --  Negative         Brief Urine Lab Results  (Last result in the past 365 days)      Color   Clarity   Blood   Leuk Est   Nitrite   Protein   CREAT   Urine HCG        12/04/21 0958 Yellow   Clear   Negative   Negative   Negative   Negative                 Microbiology Results Abnormal     Procedure Component Value - Date/Time    COVID-19, [facility] IN-HOUSE NP/OP SWAB IN UTM/VTM/SALINE TRANSPORT MEDIA 24HR TAT - Swab, Oropharynx [[id number]]  (Normal) Collected: 12/02/21 1736    Lab Status: Final result Specimen: Swab from Oropharynx Updated: 12/02/21 2135     COVID19 Not Detected    Narrative:      Fact sheet for providers: https://www.fda.gov/media/384883/download     Fact sheet for patients: https://www.fda.gov/media/992900/download    Test performed by RT PCR.          FL Video Swallow With Speech Single Contrast    Result Date:  12/4/2021  EXAMINATION: FL VIDEO SWALLOW W SPEECH SINGLE-CONTRAST - 12/04/2021  INDICATION: R04.0-Epistaxis; R09.02-Hypoxemia; C34.90-Malignant neoplasm of unspecified part of unspecified bronchus or lung; D69.6-Thrombocytopenia, unspecified; D64.9-Anemia, unspecified; D47.1-Chronic myeloproliferative disease; Z87.09-Personal history of other diseases of the respiratory system; J69.0-Pneumonitis due to inhalation of food and vomit; R13.10-Dysphagia, unspecified. Silent aspiration.  TECHNIQUE: 1.4 minutes of fluoroscopy and 9 images used for a modified barium swallow.  COMPARISON: None  FINDINGS: Fluoroscopy was provided for modified barium swallow. The patient was evaluated in the sitting position. Various consistencies of barium were administered to the patient. The speech therapy team was on hand for the procedure. Please to the speech therapy team report for full details.      Impression: Fluoroscopy for a modified barium swallow. Please see the speech therapy team report for full details.  DICTATED:   12/04/2021 EDITED/lfs:   12/04/2021          Results for orders placed during the hospital encounter of 01/22/21    Adult Transthoracic Echo Complete W/ Cont if Necessary Per Protocol    Interpretation Summary  · Estimated left ventricular EF = 55%  · Aortic sclerosis without significant stenosis      I have reviewed the medications:  Scheduled Meds:cholecalciferol, 1,000 Units, Oral, Daily  docusate sodium, 100 mg, Oral, BID  gabapentin, 300 mg, Oral, BID  guaiFENesin, 600 mg, Oral, Q12H  ipratropium-albuterol, 3 mL, Nebulization, 4x Daily - RT  multivitamin with minerals, 1 tablet, Oral, Daily  oxymetazoline, 1 spray, Nasal, Once  QUEtiapine, 25 mg, Oral, Nightly  sodium chloride, 10 mL, Intravenous, Q12H  vitamin B-12, 1,000 mcg, Oral, Daily  vitamin B-6, 200 mg, Oral, Daily      Continuous Infusions:   PRN Meds:.•  acetaminophen  •  albuterol  •  bisacodyl  •  calcium carbonate  •  haloperidol lactate  •   LORazepam  •  melatonin  •  ondansetron  •  sodium chloride  •  sodium chloride  •  sodium chloride    Assessment/Plan   Assessment & Plan     Active Hospital Problems    Diagnosis  POA   • **Acute on chronic respiratory failure with hypoxia (HCC) [J96.21]  Yes   • Severe malnutrition (CMS/HCC) [E43]  Yes   • Epistaxis [R04.0]  Unknown   • Mass of right lung [R91.8]  Unknown   • COPD (chronic obstructive pulmonary disease) (HCC) [J44.9]  Yes   • Myelodysplastic syndrome (HCC) [D46.9]  Yes   • BAUMAN (dyspnea on exertion) [R06.00]  Yes   • Thrombocytopenia (HCC) [D69.6]  Yes      Resolved Hospital Problems   No resolved problems to display.        Brief Hospital Course to date:  Elizabeth Oliveira is a 89 y.o. female with past medical history of COPD on home O2 2L NC, presumed myeloproliferative disease (never worked up/ seen by hematology), chronic arthritis with neuropathy, who presented to the hospital with acute epistaxis and was found to have severe thrombocytopenia and worsening hypoxemia.     Assessment and plan:  Acute blood loss anemia secondary to severe epistaxis  Epistaxis, resolved  Severe thrombocytopenia  Presumed myelodysplastic syndrome  · Patient presented with severe epistaxis and blood loss anemia.  Hemoglobin is 7.3 down from 10 on admission  · Monitor hemoglobin and transfuse hemoglobin less than 7 and PLT < 20k  · Received 2 units of platelets in the ER per Dr. Mcnally/ hematology recommendations  · No further epistaxis noted  · Need hematology oncology follow-up after discharge from the hospital and will consider bone marrow biopsy in future     Acute on chronic hypoxic respiratory failure  COPD with chronic hypoxic respiratory failure  Right upper lung mass 19x10 mm concerning for malignancy  · Discussed the goals of care with the patient's daughter bedside  · She was the patient to be evaluated by pulmonary team even though she is not interested in any aggressive measures like radiation therapy or  chemotherapy, nor biopsy due to high risk for pneumo  · Nebulizer treatment  · No indication for antibiotic therapy  · Pulmonary team consulted.  Appreciate help and recommendations  · --possible PEt/CT as outpatient   · Pt had fever 101 per nurse pt had a lot of covers on once removed it was normal, procal neg, UA neg monitor and blood culture was obtained again, currently NGTD     Malnutrition with cachexia  · Dietary consult     Acute debility  · PT and OT-- recommending SNF at discharge. Christa is agreeable to this       I called and had a long discussion with Christa STAHL today. Patient is looking weaker and her respiratory status continues to decline. She is interetsed in Palliative care and DNR/ DNI focusing on comfort and improving quality of life for patient. I have placed these orders in Epic. Ultimate plan at this moment is to go to SNF with palliative care to follow, as family is not able to provide patient with care needed at home. Please have CM start working on this Monday.         DVT prophylaxis:  Mechanical DVT prophylaxis orders are present.       AM-PAC 6 Clicks Score (PT): 12 (12/04/21 2026)    Disposition: I expect the patient to be discharged TBD     CODE STATUS:   Code Status and Medical Interventions:   Ordered at: 12/05/21 1219     Level Of Support Discussed With:    Health Care Surrogate     Code Status (Patient has no pulse and is not breathing):    No CPR (Do Not Attempt to Resuscitate)     Medical Interventions (Patient has pulse or is breathing):    Full Support       Rachel Alegria, CAMPBELL  12/05/21  More than 50% of time spent counseling on current illness and plan of care. Case discussed with: patient, family, nurse   Total time spent face to face with the patient was 35 minutes.  Total time of the encounter was 50 minutes.

## 2021-12-06 NOTE — PLAN OF CARE
Goal Outcome Evaluation:  Plan of Care Reviewed With: patient        Progress: declining   Pt has been sleeping most of the day. 3.5LO2 at this time. She has be refusing most meals and not wanting to drink. Q2 turn. Family at bedside this morning. Possible transfer in to hospice tomorrow.       Problem: Fall Injury Risk  Goal: Absence of Fall and Fall-Related Injury  Outcome: Ongoing, Not Progressing  Intervention: Identify and Manage Contributors to Fall Injury Risk  Recent Flowsheet Documentation  Taken 12/6/2021 1619 by Anusha Jones RN  Medication Review/Management: medications reviewed  Taken 12/6/2021 1400 by Anusha Jones RN  Medication Review/Management: medications reviewed  Taken 12/6/2021 1235 by Anusha Jones RN  Medication Review/Management: medications reviewed  Intervention: Promote Injury-Free Environment  Recent Flowsheet Documentation  Taken 12/6/2021 1619 by Anusha Jones RN  Safety Promotion/Fall Prevention:   activity supervised   assistive device/personal items within reach   clutter free environment maintained   fall prevention program maintained   lighting adjusted   muscle strengthening facilitated   nonskid shoes/slippers when out of bed   room organization consistent   safety round/check completed  Taken 12/6/2021 1400 by Anusha Jonse RN  Safety Promotion/Fall Prevention:   activity supervised   assistive device/personal items within reach   fall prevention program maintained   lighting adjusted   muscle strengthening facilitated   nonskid shoes/slippers when out of bed   room organization consistent   safety round/check completed  Taken 12/6/2021 1235 by Anusha Jones RN  Safety Promotion/Fall Prevention:   assistive device/personal items within reach   activity supervised   clutter free environment maintained   fall prevention program maintained   lighting adjusted   muscle strengthening facilitated   nonskid shoes/slippers when out of bed   room organization  consistent   safety round/check completed     Problem: Palliative Care  Goal: Enhanced Quality of Life  Outcome: Ongoing, Not Progressing

## 2021-12-06 NOTE — PLAN OF CARE
Goal Outcome Evaluation:         PT has remained in pain with any type of movement and moans out loud when turned. Pt vitals have showed sinus tachycardia reaching low 100's and occasional PAC/PVC in NSR . Pt mcconnell had issues with upper respiratory congestion and does not expectorate any sputum. Once I/S was given and pulmonary hygeine promoted including respir txs she did begin to cough up thick yellow phlegm. PTs appetite is very compromised with no oral intake today. She is refusing to eat or drink supplemental shake. Encouragement was given but she continued to refuse any oral intake. Urine output was decreased at 1200hrs so bladder scan showed 527 and 624ml retention. PT did eventually void so she did not need to be cathed. She did haave fecal incontinence at the same time of moderate amouint of soft smeared stool. PTs family was encouraged to come to discuss future care and longterm plans due to declining health. Pt was given emotional support throughout the day due to decling health and increased pain.

## 2021-12-06 NOTE — PROGRESS NOTES
Continued Stay Note  [facility]     Patient Name: Elizabeth Oliveira  MRN: [id number]  Today's Date: 12/6/2021    Admit Date: 12/2/2021     Discharge Plan     Row Name 12/06/21 1304       Plan                                   Ongoing    Plan Comments Hospice referral received, chart reviewed. Visit made to pt, pt appears to be sleeping. Pt's niece/POA Lara present. Lara aware of the hospice referral, requested to meet outside of pt's room. Lara stated pt has been living alone, with intermittent caregivers, which due to pt's decline will no longer be able to do. Lara stated was told that pt may be able to stay at the hospital with hospice care. Teaching done with Lara on inpatient hospice and medicare criteria. At this time pt's intervention are full support, informed Lara for pt to be inpatient hospice pt's interventions would need to be comfort care. Lara stated wants to discuss comfort measures vs full support with family members. Plan made to follow up with Lara tomorrow. Please call [phone] if can be of further assistance. .    Row Name 12/06/21 1041       Plan    Plan     Patient/Family in Agreement with Plan     Plan Comments                Discharge Codes    No documentation.               Expected Discharge Date and Time     Expected Discharge Date Expected Discharge Time    Dec 8, 2021             Belen Austin RN

## 2021-12-06 NOTE — SIGNIFICANT NOTE
Pt developed a fever of 101.6. Nayla Perea was notified and ordered stat blood and urine cultures, CXR, and tylenol. Will continue to monitor. Nayla also stated she would watch results.

## 2021-12-06 NOTE — PLAN OF CARE
Goal Outcome Evaluation:  Plan of Care Reviewed With: patient           Outcome Summary: PT has slept all morning butt is easily arousable. Her niece called to say she will be visiting late morning and she hopes to talk with Hospice doctors. Pt appears in less pain  and discomfort since starting ativan. Anusha Jones RN will assume care this afternoon.

## 2021-12-06 NOTE — PLAN OF CARE
Problem: Palliative Care  Goal: Enhanced Quality of Life  Intervention: Promote Advance Care Planning  Flowsheets (Taken 12/6/2021 1623)  Life Transition/Adjustment:   palliative care discussed   palliative care initiated   Goal Outcome Evaluation:  Plan of Care Reviewed With: durable power of         Progress: declining  Outcome Summary: new palliative consult over the weekend for GOC/pain/support from Rachel HIGHTOWER. Pt having some dyspnea over the weekend.  Plans to watch for another 24 hours and consider hospice. Pt is not eating or drinking and having intermittent agitation, ativan effective. Pt's yonatan gamez is hcs.  continue palliative support and symptom control.Team Meeting 1300 Aleah Trinidad MD,  Suzanne Barnes LCSW, April Zakia APRN, Sanna Zabala RN CHPN, Belen Austin RN CHPN, Mari Troncoso RN CHPN

## 2021-12-06 NOTE — PROGRESS NOTES
[facility] Medicine Services  PROGRESS NOTE    Patient Name: Elizabeth Oliveira  : 1929  MRN: [id number]    Date of Admission: 2021  Primary Care Physician: Azeem Merchant MD    Subjective     CC: f/u anemia / thrombocytopenia, fever     HPI:  In bed. Drowsy and minimally conversant. Per RN, has been more calm with IV Ativan.     ROS:  Unable to obtain complete or reliable ROS due to mental status     Objective     Vital Signs:   Temp:  [98.2 °F (36.8 °C)-101.6 °F (38.7 °C)] 98.3 °F (36.8 °C)  Heart Rate:  [] 82  Resp:  [16-20] 16  BP: (100-144)/(60-73) 100/60  Flow (L/min):  [2-4] 4     Physical Exam:  Constitutional: No acute distress. Drowsy and conversed minimally   HENT: NCAT, mucous membranes moist  Respiratory: Diminished breath sounds bilaterally but no overt rales or rhonchi. 96% on 2L NC   Cardiovascular: RRR, no murmurs, rubs, or gallops  Gastrointestinal: Positive bowel sounds, soft, nontender, nondistended  Musculoskeletal: No bilateral ankle edema  Psychiatric: Calm, cooperative with exam   Neurologic: Moves all extremities spontaneously without focal deficits, speech clear and coherent     Results Reviewed:  LAB RESULTS:      Lab 21  0628 21  2032 21  0410 21  1510 21  0327 21  1247   WBC 3.16*  --  3.79 3.12* 3.07* 4.35   HEMOGLOBIN 8.7*  --  7.4* 7.1* 7.3* 10.1*   HEMATOCRIT 26.8*  --  23.0* 21.5* 23.4* 31.3*   PLATELETS 69*  --  127* 125* 135* 14*   NEUTROS ABS 2.05  --  2.99 2.22 2.14 3.58   IMMATURE GRANS (ABS)  --   --  0.01 0.01 0.03 0.03   LYMPHS ABS  --   --  0.35* 0.54* 0.55* 0.42*   MONOS ABS  --   --  0.39 0.33 0.30 0.30   EOS ABS 0.00  --  0.05 0.02 0.05 0.01   .6*  --  108.0* 108.0* 112.0* 109.8*   PROCALCITONIN 0.48*  --  0.09  --   --   --    LACTATE 1.2 0.7  --   --   --   --    LDH  --   --  181  --   --   --    PROTIME  --   --   --   --   --  13.8         Lab 21  0628 21  0410  12/03/21  0327 12/02/21  1247   SODIUM 142 139 142 141   POTASSIUM 4.1 4.1 4.4 4.5   CHLORIDE 102 102 104 101   CO2 30.0* 27.0 27.0 29.0   ANION GAP 10.0 10.0 11.0 11.0   BUN 19 12 16 23   CREATININE 0.57 0.50* 0.42* 0.55*   GLUCOSE 128* 90 82 118*   CALCIUM 9.2 8.6 9.0 9.7*   MAGNESIUM 1.8  --   --   --          Lab 12/04/21  0410 12/02/21  1247   TOTAL PROTEIN 5.5* 7.1   ALBUMIN 3.10* 3.90   GLOBULIN 2.4 3.2   ALT (SGPT) 8 8   AST (SGOT) 12 12   BILIRUBIN 0.5 0.6   ALK PHOS 75 86         Lab 12/02/21  1247   PROBNP 648.8   TROPONIN T <0.010   PROTIME 13.8   INR 1.10         Lab 12/04/21  1709 12/04/21  0410 12/02/21  1533   IRON  --  32*  --    IRON SATURATION  --  13*  --    TIBC  --  238*  --    TRANSFERRIN  --  160*  --    FERRITIN  --  114.90  --    FOLATE 13.70  --   --    VITAMIN B 12 >2,000*  --   --    ABO TYPING  --   --  O   RH TYPING  --   --  Negative   ANTIBODY SCREEN  --   --  Negative     Brief Urine Lab Results  (Last result in the past 365 days)      Color   Clarity   Blood   Leuk Est   Nitrite   Protein   CREAT   Urine HCG        12/04/21 0958 Yellow   Clear   Negative   Negative   Negative   Negative               Microbiology Results Abnormal     Procedure Component Value - Date/Time    Blood Culture - Blood, Hand, Right [[id number]]  (Normal) Collected: 12/04/21 2031    Lab Status: Preliminary result Specimen: Blood from Hand, Right Updated: 12/05/21 2145     Blood Culture No growth at 24 hours    Narrative:      Aerobic Only    COVID-19, [facility] IN-HOUSE NP/OP SWAB IN UTM/VTM/SALINE TRANSPORT MEDIA 24HR TAT - Swab, Oropharynx [[id number]]  (Normal) Collected: 12/02/21 1736    Lab Status: Final result Specimen: Swab from Oropharynx Updated: 12/02/21 2135     COVID19 Not Detected    Narrative:      Fact sheet for providers: https://www.fda.gov/media/168894/download     Fact sheet for patients: https://www.fda.gov/media/019367/download    Test performed by RT PCR.        FL Video  Swallow With Speech Single Contrast    Result Date: 12/4/2021  EXAMINATION: FL VIDEO SWALLOW W SPEECH SINGLE-CONTRAST - 12/04/2021  INDICATION: R04.0-Epistaxis; R09.02-Hypoxemia; C34.90-Malignant neoplasm of unspecified part of unspecified bronchus or lung; D69.6-Thrombocytopenia, unspecified; D64.9-Anemia, unspecified; D47.1-Chronic myeloproliferative disease; Z87.09-Personal history of other diseases of the respiratory system; J69.0-Pneumonitis due to inhalation of food and vomit; R13.10-Dysphagia, unspecified. Silent aspiration.  TECHNIQUE: 1.4 minutes of fluoroscopy and 9 images used for a modified barium swallow.  COMPARISON: None  FINDINGS: Fluoroscopy was provided for modified barium swallow. The patient was evaluated in the sitting position. Various consistencies of barium were administered to the patient. The speech therapy team was on hand for the procedure. Please to the speech therapy team report for full details.      Impression: Fluoroscopy for a modified barium swallow. Please see the speech therapy team report for full details.  DICTATED:   12/04/2021 EDITED/lfs:   12/04/2021        XR Chest 1 View    Result Date: 12/6/2021  CR Chest 1 Vw INDICATION: Fever and dysphagia, hypoxemia, fever COMPARISON:  CT chest 12/2/2021 FINDINGS: Portable AP view(s) of the chest. Heart size is normal. There appears the left lower lobe medial basilar atelectasis there is mild diffuse interstitial infiltrates throughout the right lung. Bones are unremarkable.     Impression: Left medial basilar atelectasis Mild interstitial prominence throughout the right lung Signer Name: Waqas Crain MD  Signed: 12/6/2021 4:16 AM  Workstation Name: [email or web address]  [facility]    Results for orders placed during the hospital encounter of 01/22/21    Adult Transthoracic Echo Complete W/ Cont if Necessary Per Protocol    Interpretation Summary  · Estimated left ventricular EF = 55%  · Aortic sclerosis without  significant stenosis    I have reviewed the medications:  Scheduled Meds:docusate sodium, 100 mg, Oral, BID  gabapentin, 300 mg, Oral, BID  ipratropium-albuterol, 3 mL, Nebulization, 4x Daily - RT  LORazepam, 0.25 mg, Intravenous, Q12H  oxymetazoline, 1 spray, Nasal, Once  sodium chloride, 10 mL, Intravenous, Q12H      Continuous Infusions:   PRN Meds:.•  acetaminophen  •  acetaminophen  •  albuterol  •  bisacodyl  •  calcium carbonate  •  haloperidol lactate  •  LORazepam  •  melatonin  •  Morphine  •  ondansetron  •  sodium chloride  •  sodium chloride  •  sodium chloride    Assessment & Plan     Active Hospital Problems    Diagnosis  POA   • **Acute on chronic respiratory failure with hypoxia (HCC) [J96.21]  Yes   • Severe malnutrition (CMS/HCC) [E43]  Yes   • Epistaxis [R04.0]  Unknown   • Mass of right lung [R91.8]  Unknown   • COPD (chronic obstructive pulmonary disease) (HCC) [J44.9]  Yes   • Myelodysplastic syndrome (HCC) [D46.9]  Yes   • BAUMAN (dyspnea on exertion) [R06.00]  Yes   • Thrombocytopenia (HCC) [D69.6]  Yes      Resolved Hospital Problems   No resolved problems to display.     Brief Hospital Course to date:  Ms. Elizabeth Oliveira is a 89yoF with PMH significant for chronic respiratory failure secondary to COPD (baseline 2L NC), chronic arthritis, neuropathy and presumed myeloproliferative disease (never worked up or evaluated by hematology). She presented to [facility] ED on 12/2/21 for evaluation of acute epistaxis. She was found to have severe thrombocytopenia and hypoxemia worse than baseline.     Acute blood loss anemia secondary to severe epistaxis, resolved   Severe thrombocytopenia  Presumed myelodysplastic syndrome  · Patient presented with severe epistaxis and blood loss anemia  · Hgb 10.1 on admission, acutely worsened to 7.3 on 12/3 and has since stabilized  · Platelets 14 on admission, s/p 2 units platelets on 12/2  · Continue to monitor Hgb and platelets (transfuse for  Hgb < 7.0 and platelets < 20K)  · Heme/onc has seen. Ordered LDH, haptoglobin, vitamin B12, folate, iron studies, zinc, copper levels and reticulocyte count.   · Depending on goals of care, may need heme/onc follow up and consideration of bone marrow biopsy. Given her current trajectory, I am not sure this will be feasible    Acute on chronic hypoxic respiratory failure  COPD with chronic hypoxic respiratory failure  Right upper lung mass 19x10 mm concerning for malignancy given history of smoking   · Pulmonology consulted per POA's request - recommended against biopsy or other aggressive measures such as radiation or chemotherapy due to risk of pneumothorax and clinical worsening given her baseline functional status   · Consider PET on an outpatient basis - follow up with pulmonology in 2-3 months   · Continue scheduled nebs  · Wean O2 back to baseline 2L NC as able  · Has had fever 100-101 x 3 occurrences. Procalcitonin slightly worse today at 0.48. Repeat CXR concerning for right-sided infiltrate  · Start Rocephin / Doxycycline IV x 5 days for pneumonia coverage    Malnutrition with cachexia  · RD following     Acute debility  · PT/OT following - recommend SNF at [place]. Christa/POA interested in long-term care placement    GOALS OF CARE: Appreciate palliative care evaluation. CODE status has been changed to DNR/DNI. I spoke with niece/POGUILLERMO, Lara, over the telephone today. Hospice has been consulted and Lara is interested in hospice services. She feels that the patient has continuously declined since hospital admission. She would prefer inpatient hospice. We discussed that certain criteria would need to be met in order for her aunt to quality for inpatient hospice and that she would be evaluated daily. CM to work on long-term care placement, in the event she stabilizes and could be discharged with outpatient hospice services.     Following discussion with hospice, would be reasonable to revisit daily lab checks,  PRN transfusions, etc. with family     DVT prophylaxis:Mechanical DVT prophylaxis orders are present.     AM-PAC 6 Clicks Score (PT): 7 (12/05/21 2000)    Disposition: I expect the patient to be discharged TBD    CODE STATUS:   Code Status and Medical Interventions:   Ordered at: 12/05/21 1219     Level Of Support Discussed With:    Health Care Surrogate     Code Status (Patient has no pulse and is not breathing):    No CPR (Do Not Attempt to Resuscitate)     Medical Interventions (Patient has pulse or is breathing):    Full Support     Deloris Norman PA-C  12/06/21

## 2021-12-06 NOTE — CASE MANAGEMENT/SOCIAL WORK
Continued Stay Note  [facility]     Patient Name: Elizaebth Oliveira  MRN: [id number]  Today's Date: 12/6/2021    Admit Date: 12/2/2021     Discharge Plan     Row Name 12/06/21 1041       Plan    Plan Update    Patient/Family in Agreement with Plan other (see comments)    Plan Comments Hospice has been consulted. CM will follow as needed.    Row Name 12/06/21 0813       Plan    Final Discharge Disposition Code 01 - home or self-care               Discharge Codes    No documentation.               Expected Discharge Date and Time     Expected Discharge Date Expected Discharge Time    Dec 8, 2021             Ivy Romo RN

## 2021-12-06 NOTE — DISCHARGE PLACEMENT REQUEST
"Elizabeth Oliveira (89 y.o. Female)     Referred 12/6/2021 by Dr. ANGÉLICA Rojas  PCP- Dr. Azeem Merchant  Dx-Myelodysplastic syndrome/RUL Mass, suspicious for primary malignancy  Tested negative for covid 19 on 12/2/2021        Date of Birth Social Security Number Address Home Phone MRN    12/30/1929  [address]  [place] [address] [phone] [id number]    [employer] Marital Status             Worship Single       Admission Date Admission Type Admitting Provider Attending Provider Department, Room/Bed    12/2/21 Emergency Db Womack MD Lyons, Andrea L, MD [address], [address]    Discharge Date Discharge Disposition Discharge Destination                         Attending Provider: Db Womack MD    Allergies: Cortisone    Isolation: None   Infection: None   Code Status: No CPR   Advance Care Planning Activity    Ht: 157.5 cm (62\")   Wt: 47.6 kg (105 lb)    Admission Cmt: None   Principal Problem: Acute on chronic respiratory failure with hypoxia (HCC) [J96.21]                 Active Insurance as of 12/2/2021     Primary Coverage     Payor Plan Insurance Group Employer/Plan Group    ANTHEM MEDICARE REPLACEMENT ANTHEM MEDICARE ADVANTAGE [id number]     Payor Plan Address Payor Plan Phone Number Payor Plan Fax Number Effective Dates    [address] [phone]  1/1/2017 - None Entered    [facility] [address]       Subscriber Name Subscriber Birth Date Member ID       ELIZABETH OLIVEIRA 12/30/1929 [id number]                 Emergency Contacts      (Rel.) Home Phone Work Phone Mobile Phone    CASEY MARQUEZ (Relative) [phone] -- --    Analilia Soliman (Care Giver) [phone] -- [phone]            Emergency Contact Information     Name Relation Home Work Mobile    CASEY MARQUEZ Relative [phone]      Analilia Soliman Care Giver [phone]          Insurance Information                ANTHEM MEDICARE REPLACEMENT/ANTHEM MEDICARE " ADVANTAGE Phone: [phone]    Subscriber: Elizabeth Oliveira Subscriber#: [id number]    Group#: [id number] Precert#: --             History & Physical      Jennifer Acosta APRN at 21 1716     Attestation signed by Db Womack MD at 21 2275      I have reviewed this documentation and agree.  Attending Cosignature     I supervised care of the patient on day of service with direct care provided by the advanced care provider (APC).    Db Womack MD  21                            [facility] Medicine Services  Clinical Decision Unit (CDU)  History and Physical    Patient Name: Elizabeth Oliveira  : 1929  MRN: [id number]  Primary Care Physician: Azeem Merchant MD  Date of admission: 2021 12:35 PM      Subjective   Subjective     Chief Complaint:  Epistaxis    HPI:  Elizabeth Oliveira is a 89 y.o. female with pmh COPD on home O2 2L NC Rx  (however wears intermittently during the daily), myeloproliferative disorder (never worked up/ seen by hematology), arthritis, neuropathy that presents for acute epistaxis that started around 0830 today. Presented to hospital around  and bleeding stopped about that time. Was unable to stop at home. She did not have oxygen on upon arrival and found to be hypoxic in the 70s. Patient placed on 4L NC with eventual improvement.    Dr. Mcnally called from ED and recommended transfusing 2 pack of Platelets and following up in office on Monday. Niece at bedside concerned she is non compliant with oxygen and too weak to go home. She does have some help/ caretakers for few hours daily. + fatigue, increased weakness in last month per report. No falls, lightheadedness, dizziness, CP, palpitations. No recent weight loss.    CTA chest completed revealing 19x 10mm RUL mass suspicious for primary malignancy  Ptl 14K    COVID Details:    Symptoms:    [] NONE [] Fever []  Cough [x] Shortness of breath [] Change in  taste/smell    The patient has a COVID HM Topic on their chart, and they are fully vaccinated.    Review of Systems   Constitutional: Positive for activity change and fatigue. Negative for appetite change and unexpected weight change.   HENT: Positive for nosebleeds.    Eyes: Negative.    Respiratory: Positive for shortness of breath.    Cardiovascular: Negative.    Gastrointestinal: Negative.    Endocrine: Positive for cold intolerance.   Genitourinary: Negative.    Musculoskeletal: Positive for arthralgias.   Skin: Negative.    Neurological: Positive for weakness. Negative for dizziness and light-headedness.   Hematological: Negative.    Psychiatric/Behavioral: Negative.         All other systems reviewed and negative    Personal History     Past Medical History:   Diagnosis Date   • COPD (chronic obstructive pulmonary disease) (HCC)    • Depression    • Dyslipidemia    • Dyslipidemia    • IBS (irritable bowel syndrome) 2016   • Macular degeneration    • Myelodysplastic syndrome (HCC)     anemia, thrombocytopemia   • Osteoarthritis    • Other spondylosis with radiculopathy, lumbar region        Past Surgical History:   Procedure Laterality Date   • CATARACT EXTRACTION, BILATERAL     • HIP SURGERY     • KNEE SURGERY     • REVISION OF TOTAL HIP FEMORAL Right 07/2017   • TOTAL HIP ARTHROPLASTY Right    • TOTAL HIP ARTHROPLASTY Left    • TOTAL KNEE ARTHROPLASTY Left     2006       Family History:  family history includes Heart failure in her mother; Leukemia in her father. Otherwise pertinent FHx was reviewed and unremarkable.     Social History:  reports that she quit smoking about 11 years ago. Her smoking use included cigarettes. She has a 60.00 pack-year smoking history. She has never used smokeless tobacco. She reports current alcohol use of about 7.0 standard drinks of alcohol per week. She reports that she does not use drugs.  Social History     Social History Narrative    Lives alone. Has part time assistance  as needed.       Medications:  Cyanocobalamin, Vitamin B6, albuterol sulfate HFA, cholecalciferol, gabapentin, meloxicam, multivitamin with minerals, and umeclidinium-vilanterol    Allergies   Allergen Reactions   • Cortisone        Objective   Objective     Vital Signs:   Temp:  [98.4 °F (36.9 °C)] 98.4 °F (36.9 °C)  Heart Rate:  [77-80] 77  Resp:  [16-17] 17  BP: (122-140)/(58-76) 140/72    Physical Exam   Constitutional: Awake, alert, frail elderly female  Eyes: PERRLA, sclerae anicteric, no conjunctival injection  HENT: NCAT, mucous membranes moist  Neck: Supple, no thyromegaly, no lymphadenopathy, trachea midline  Respiratory: Coarse bilaterally, nonlabored respirations on 4L NC  Cardiovascular: RRR, no murmurs, rubs, or gallops, palpable pedal pulses bilaterally  Gastrointestinal: Positive bowel sounds, soft, nontender, nondistended  Musculoskeletal: No bilateral ankle edema, no clubbing or cyanosis to extremities  Psychiatric: Appropriate affect, cooperative  Neurologic: Oriented x 3, THOMAS freely, generalized weakness, Cranial Nerves grossly intact to confrontation, speech clear  Skin: No rashes      Results Reviewed:  I have personally reviewed most recent data and agree with findings, most notably:     LAB RESULTS:      Lab 12/02/21  1247   WBC 4.35   HEMOGLOBIN 10.1*   HEMATOCRIT 31.3*   PLATELETS 14*   NEUTROS ABS 3.58   IMMATURE GRANS (ABS) 0.03   LYMPHS ABS 0.42*   MONOS ABS 0.30   EOS ABS 0.01   .8*   PROTIME 13.8         Lab 12/02/21  1247   SODIUM 141   POTASSIUM 4.5   CHLORIDE 101   CO2 29.0   ANION GAP 11.0   BUN 23   CREATININE 0.55*   GLUCOSE 118*   CALCIUM 9.7*         Lab 12/02/21  1247   TOTAL PROTEIN 7.1   ALBUMIN 3.90   GLOBULIN 3.2   ALT (SGPT) 8   AST (SGOT) 12   BILIRUBIN 0.6   ALK PHOS 86         Lab 12/02/21  1247   PROBNP 648.8   TROPONIN T <0.010   PROTIME 13.8   INR 1.10             Lab 12/02/21  1533   ABO TYPING O   RH TYPING Negative   ANTIBODY SCREEN Negative         Brief  Urine Lab Results     None        Microbiology Results (last 10 days)     ** No results found for the last 240 hours. **           Results for orders placed during the hospital encounter of 01/22/21    Adult Transthoracic Echo Complete W/ Cont if Necessary Per Protocol    Interpretation Summary  · Estimated left ventricular EF = 55%  · Aortic sclerosis without significant stenosis        Assessment/Plan   Assessment / Plan     Active Hospital Problems    Diagnosis  POA   • **Acute on chronic respiratory failure with hypoxia ([facility]) [J96.21]  Yes   • Epistaxis [R04.0]  Unknown   • Mass of right lung [R91.8]  Unknown   • COPD (chronic obstructive pulmonary disease) (HCC) [J44.9]  Yes   • Myelodysplastic syndrome (HCC) [D46.9]  Yes     anemia, thrombocytopemia     • BAUMAN (dyspnea on exertion) [R06.00]  Yes   • Thrombocytopenia (HCC) [D69.6]  Yes       Plan:  -- epistaxis resolved. Continue to monitor for further bleeding  -- PTL 14K. Transfuse 2 pack Platelets, discussed with Dr. Mcnally  -- will consult Dr. Mcnally in am  -- continue oxgyen as tolerates and further education/ home instruction  -- RUL mass 19x 10mm suspicious for primary lung lesion. Discussed with patient and niece at bedside. Would like to pursue work up at this point. Has been followed with Ms. Paula HIGHTOWER at [facility] for [facility]  -- continue scheduled/ prn nebs  -- PT/OT eval  -- am labs      CODE STATUS:    Level Of Support Discussed With: Patient  Code Status (Patient has no pulse and is not breathing): CPR (Attempt to Resuscitate)  Medical Interventions (Patient has pulse or is breathing): Full Support      Admission Status: OBSERVATION status, however if further evaluation or treatment plans warrant, status may change.  Based upon current information, I predict patient's care encounter to be less than or equal to 2 midnights.        Discharge Blueprint (criteria for discharge):   1. Resolved Epistaxis  2. Stable VSS/ oxygen sats on  oxygen  3. eval by therapy completed  4. eval completed by Hem/Onc    CAMPBELL Villela  12/02/21        Electronically signed by Db Womack MD at 12/02/21 2318         Current Facility-Administered Medications   Medication Dose Route Frequency Provider Last Rate Last Admin   • acetaminophen (TYLENOL) 160 MG/5ML solution 650 mg  650 mg Oral Q6H PRN Nayla Perea APRN   649.6 mg at 12/06/21 0348   • acetaminophen (TYLENOL) tablet 650 mg  650 mg Oral Q4H PRN Jennifer Acosta APRN   650 mg at 12/03/21 1218   • albuterol (PROVENTIL) nebulizer solution 0.083% 2.5 mg/3mL  2.5 mg Nebulization Q6H PRN Jennifer Acosta APRN       • bisacodyl (DULCOLAX) EC tablet 10 mg  10 mg Oral Daily PRN Jennifer Acosta APRCASIE       • calcium carbonate (TUMS) chewable tablet 500 mg (200 mg elemental)  2 tablet Oral BID PRN Jennifer Acosta APRCASIE       • docusate sodium (COLACE) capsule 100 mg  100 mg Oral BID Jennifer Acosta APRN   100 mg at 12/06/21 0858   • gabapentin (NEURONTIN) capsule 300 mg  300 mg Oral BID Jennifer Acosta APRN   300 mg at 12/06/21 0858   • haloperidol lactate (HALDOL) injection 0.5 mg  0.5 mg Intravenous Q6H PRN Cornelio Babb MD       • ipratropium-albuterol (DUO-NEB) nebulizer solution 3 mL  3 mL Nebulization 4x Daily - RT Jennifer Acosta APRN   3 mL at 12/05/21 1954   • LORazepam (ATIVAN) injection 0.25 mg  0.25 mg Intravenous Q12H Siena Rojas MD   0.25 mg at 12/06/21 0857   • LORazepam (ATIVAN) injection 0.5 mg  0.5 mg Intravenous Q4H PRN Cornelio Babb MD       • melatonin tablet 5 mg  5 mg Oral Nightly PRN Jennifer Acosta APRN   5 mg at 12/03/21 2034   • morphine injection 1 mg  1 mg Intravenous Q2H PRN Siena Rojas MD       • ondansetron (ZOFRAN) injection 4 mg  4 mg Intravenous Q6H PRN Jennifer Acosta APRN       • oxymetazoline (AFRIN) nasal spray 1 spray  1 spray Nasal Once Wali Coreas MD       • sodium chloride 0.9 % flush 10 mL  10 mL  Intravenous PRN Wali Coreas MD       • sodium chloride 0.9 % flush 10 mL  10 mL Intravenous PRN Wali Coreas MD       • sodium chloride 0.9 % flush 10 mL  10 mL Intravenous Q12H Jennifer Acosta APRN   10 mL at 21 0858   • sodium chloride 0.9 % flush 10 mL  10 mL Intravenous PRN Jennifer Acosta APRN         Facility-Administered Medications Ordered in Other Encounters   Medication Dose Route Frequency Provider Last Rate Last Admin   • bupivacaine PF (MARCAINE) 0.25 % injection    PRN Monico Herbert CRNA   40 mL at 17 1625        Physician Progress Notes (last 72 hours)      Rachel Alegria APRN at 21 1340              [facility] Medicine Services  PROGRESS NOTE    Patient Name: Elizabeth Oliveira  : 1929  MRN: [id number]    Date of Admission: 2021  Primary Care Physician: Azeem Merchant MD    Subjective   Subjective     CC:  Epistaxis     HPI:  Resting in bed, states she is very cold, weak and her breathing is a little worse today. More SOA than yesterday, now with productive thick cough. Remains on 3 LNC satting low 90's. Nursing states patient desaturates into low 80's with movement and turning in bed. No pain when asked, but cries out in pain when I am examining her bilateral lower extremities.     ROS:  Gen- No fevers, chills  CV- No chest pain, palpitations  Resp- + cough, + dyspnea  GI- No N/V/D, abd pain        Objective   Objective     Vital Signs:   Temp:  [98.1 °F (36.7 °C)-100.7 °F (38.2 °C)] 98.4 °F (36.9 °C)  Heart Rate:  [] 91  Resp:  [16-22] 18  BP: ()/(48-99) 144/64  Flow (L/min):  [2-6] 2     Physical Exam:  Constitutional: No acute distress, awake, alert, frail and chronically ill appearing   HENT: NCAT, mucous membranes dry   Respiratory: Anterior upper lobe rhonchi/ no rales or wheezing, respiratory effort normal on 3-4 LNC satting 91%  Cardiovascular: RRR, no murmurs, rubs, or  gallops  Gastrointestinal: Positive bowel sounds, soft, nontender, nondistended  Musculoskeletal: No bilateral ankle edema  Psychiatric: Appropriate affect, cooperative  Neurologic: Oriented x 3, THOMAS, follows commands, speech clear  Skin: No rashes       Results Reviewed:  LAB RESULTS:      Lab 12/04/21 2032 12/04/21 0410 12/03/21  1510 12/03/21  0327 12/02/21  1247   WBC  --  3.79 3.12* 3.07* 4.35   HEMOGLOBIN  --  7.4* 7.1* 7.3* 10.1*   HEMATOCRIT  --  23.0* 21.5* 23.4* 31.3*   PLATELETS  --  127* 125* 135* 14*   NEUTROS ABS  --  2.99 2.22 2.14 3.58   IMMATURE GRANS (ABS)  --  0.01 0.01 0.03 0.03   LYMPHS ABS  --  0.35* 0.54* 0.55* 0.42*   MONOS ABS  --  0.39 0.33 0.30 0.30   EOS ABS  --  0.05 0.02 0.05 0.01   MCV  --  108.0* 108.0* 112.0* 109.8*   PROCALCITONIN  --  0.09  --   --   --    LACTATE 0.7  --   --   --   --    LDH  --  181  --   --   --    PROTIME  --   --   --   --  13.8         Lab 12/04/21 0410 12/03/21  0327 12/02/21  1247   SODIUM 139 142 141   POTASSIUM 4.1 4.4 4.5   CHLORIDE 102 104 101   CO2 27.0 27.0 29.0   ANION GAP 10.0 11.0 11.0   BUN 12 16 23   CREATININE 0.50* 0.42* 0.55*   GLUCOSE 90 82 118*   CALCIUM 8.6 9.0 9.7*         Lab 12/04/21  0410 12/02/21  1247   TOTAL PROTEIN 5.5* 7.1   ALBUMIN 3.10* 3.90   GLOBULIN 2.4 3.2   ALT (SGPT) 8 8   AST (SGOT) 12 12   BILIRUBIN 0.5 0.6   ALK PHOS 75 86         Lab 12/02/21  1247   PROBNP 648.8   TROPONIN T <0.010   PROTIME 13.8   INR 1.10             Lab 12/04/21  1709 12/04/21  0410 12/02/21  1533   IRON  --  32*  --    IRON SATURATION  --  13*  --    TIBC  --  238*  --    TRANSFERRIN  --  160*  --    FERRITIN  --  114.90  --    FOLATE 13.70  --   --    VITAMIN B 12 >2,000*  --   --    ABO TYPING  --   --  O   RH TYPING  --   --  Negative   ANTIBODY SCREEN  --   --  Negative         Brief Urine Lab Results  (Last result in the past 365 days)      Color   Clarity   Blood   Leuk Est   Nitrite   Protein   CREAT   Urine HCG        12/04/21 0907  Yellow   Clear   Negative   Negative   Negative   Negative                 Microbiology Results Abnormal     Procedure Component Value - Date/Time    COVID-19, [facility] IN-HOUSE NP/OP SWAB IN UTM/VTM/SALINE TRANSPORT MEDIA 24HR TAT - Swab, Oropharynx [[id number]]  (Normal) Collected: 12/02/21 1736    Lab Status: Final result Specimen: Swab from Oropharynx Updated: 12/02/21 2135     COVID19 Not Detected    Narrative:      Fact sheet for providers: https://www.fda.gov/media/738931/download     Fact sheet for patients: https://www.fda.gov/media/278575/download    Test performed by RT PCR.          FL Video Swallow With Speech Single Contrast    Result Date: 12/4/2021  EXAMINATION: FL VIDEO SWALLOW W SPEECH SINGLE-CONTRAST - 12/04/2021  INDICATION: R04.0-Epistaxis; R09.02-Hypoxemia; C34.90-Malignant neoplasm of unspecified part of unspecified bronchus or lung; D69.6-Thrombocytopenia, unspecified; D64.9-Anemia, unspecified; D47.1-Chronic myeloproliferative disease; Z87.09-Personal history of other diseases of the respiratory system; J69.0-Pneumonitis due to inhalation of food and vomit; R13.10-Dysphagia, unspecified. Silent aspiration.  TECHNIQUE: 1.4 minutes of fluoroscopy and 9 images used for a modified barium swallow.  COMPARISON: None  FINDINGS: Fluoroscopy was provided for modified barium swallow. The patient was evaluated in the sitting position. Various consistencies of barium were administered to the patient. The speech therapy team was on hand for the procedure. Please to the speech therapy team report for full details.      Impression: Fluoroscopy for a modified barium swallow. Please see the speech therapy team report for full details.  DICTATED:   12/04/2021 EDITED/lfs:   12/04/2021          Results for orders placed during the hospital encounter of 01/22/21    Adult Transthoracic Echo Complete W/ Cont if Necessary Per Protocol    Interpretation Summary  · Estimated left ventricular EF = 55%  ·  Aortic sclerosis without significant stenosis      I have reviewed the medications:  Scheduled Meds:cholecalciferol, 1,000 Units, Oral, Daily  docusate sodium, 100 mg, Oral, BID  gabapentin, 300 mg, Oral, BID  guaiFENesin, 600 mg, Oral, Q12H  ipratropium-albuterol, 3 mL, Nebulization, 4x Daily - RT  multivitamin with minerals, 1 tablet, Oral, Daily  oxymetazoline, 1 spray, Nasal, Once  QUEtiapine, 25 mg, Oral, Nightly  sodium chloride, 10 mL, Intravenous, Q12H  vitamin B-12, 1,000 mcg, Oral, Daily  vitamin B-6, 200 mg, Oral, Daily      Continuous Infusions:   PRN Meds:.•  acetaminophen  •  albuterol  •  bisacodyl  •  calcium carbonate  •  haloperidol lactate  •  LORazepam  •  melatonin  •  ondansetron  •  sodium chloride  •  sodium chloride  •  sodium chloride    Assessment/Plan   Assessment & Plan     Active Hospital Problems    Diagnosis  POA   • **Acute on chronic respiratory failure with hypoxia (HCC) [J96.21]  Yes   • Severe malnutrition (CMS/HCC) [E43]  Yes   • Epistaxis [R04.0]  Unknown   • Mass of right lung [R91.8]  Unknown   • COPD (chronic obstructive pulmonary disease) (HCC) [J44.9]  Yes   • Myelodysplastic syndrome (HCC) [D46.9]  Yes   • BAUMAN (dyspnea on exertion) [R06.00]  Yes   • Thrombocytopenia (HCC) [D69.6]  Yes      Resolved Hospital Problems   No resolved problems to display.        Brief Hospital Course to date:  Elizabeth Oliveira is a 89 y.o. female with past medical history of COPD on home O2 2L NC, presumed myeloproliferative disease (never worked up/ seen by hematology), chronic arthritis with neuropathy, who presented to the hospital with acute epistaxis and was found to have severe thrombocytopenia and worsening hypoxemia.     Assessment and plan:  Acute blood loss anemia secondary to severe epistaxis  Epistaxis, resolved  Severe thrombocytopenia  Presumed myelodysplastic syndrome  · Patient presented with severe epistaxis and blood loss anemia.  Hemoglobin is 7.3 down from 10 on  admission  · Monitor hemoglobin and transfuse hemoglobin less than 7 and PLT < 20k  · Received 2 units of platelets in the ER per Dr. Mcnally/ hematology recommendations  · No further epistaxis noted  · Need hematology oncology follow-up after discharge from the hospital and will consider bone marrow biopsy in future     Acute on chronic hypoxic respiratory failure  COPD with chronic hypoxic respiratory failure  Right upper lung mass 19x10 mm concerning for malignancy  · Discussed the goals of care with the patient's daughter bedside  · She was the patient to be evaluated by pulmonary team even though she is not interested in any aggressive measures like radiation therapy or chemotherapy, nor biopsy due to high risk for pneumo  · Nebulizer treatment  · No indication for antibiotic therapy  · Pulmonary team consulted.  Appreciate help and recommendations  · --possible PEt/CT as outpatient   · Pt had fever 101 per nurse pt had a lot of covers on once removed it was normal, procal neg, UA neg monitor and blood culture was obtained again, currently NGTD     Malnutrition with cachexia  · Dietary consult     Acute debility  · PT and OT-- recommending SNF at discharge. Christa is agreeable to this       I called and had a long discussion with Christa STAHL today. Patient is looking weaker and her respiratory status continues to decline. She is interetsed in Palliative care and DNR/ DNI focusing on comfort and improving quality of life for patient. I have placed these orders in Epic. Ultimate plan at this moment is to go to SNF with palliative care to follow, as family is not able to provide patient with care needed at home. Please have  start working on this Monday.         DVT prophylaxis:  Mechanical DVT prophylaxis orders are present.       AM-PAC 6 Clicks Score (PT): 12 (12/04/21 2026)    Disposition: I expect the patient to be discharged TBD     CODE STATUS:   Code Status and Medical Interventions:   Ordered at:  21 1219     Level Of Support Discussed With:    Health Care Surrogate     Code Status (Patient has no pulse and is not breathing):    No CPR (Do Not Attempt to Resuscitate)     Medical Interventions (Patient has pulse or is breathing):    Full Support       CAMPBELL Chen  21  More than 50% of time spent counseling on current illness and plan of care. Case discussed with: patient, family, nurse   Total time spent face to face with the patient was 35 minutes.  Total time of the encounter was 50 minutes.                  Electronically signed by Rachel Alegria APRN at 21 1414     Lynnette Harvey APRN at 21 0930              [facility] Medicine Services  PROGRESS NOTE    Patient Name: Elizabeth Oliveira  : 1929  MRN: [id number]    Date of Admission: 2021  Primary Care Physician: Azeem Merchant MD    Subjective   Subjective     CC:  Epistaxis     HPI:  Patient is sitting up in bed in NAD. She states she feels a little SOA this am unable to cough up any mucus. No acute events overnight per nursing.     ROS:  Gen- No fevers, chills  CV- No chest pain, palpitations  Resp- + cough, dyspnea  GI- No N/V/D, abd pain        Objective   Objective     Vital Signs:   Temp:  [98.1 °F (36.7 °C)-101.1 °F (38.4 °C)] 99 °F (37.2 °C)  Heart Rate:  [74-92] 81  Resp:  [14-19] 18  BP: (117-164)/(53-73) 164/70  Flow (L/min):  [4-5] 4     Physical Exam:  Constitutional: No acute distress, awake, alert, cachectic   HENT: NCAT, mucous membranes moist  Respiratory: Clear to auscultation bilaterally, respiratory effort normal, rhonchi upper air way, 5L 97%  Cardiovascular: RRR, no murmurs, rubs, or gallops  Gastrointestinal: Positive bowel sounds, soft, nontender, nondistended  Musculoskeletal: No bilateral ankle edema  Psychiatric: Appropriate affect, cooperative  Neurologic: Oriented x 3, strength symmetric in all extremities, Cranial Nerves grossly intact to  confrontation, speech clear  Skin: No rashes      Results Reviewed:  LAB RESULTS:      Lab 12/04/21  0410 12/03/21  1510 12/03/21  0327 12/02/21  1247   WBC 3.79 3.12* 3.07* 4.35   HEMOGLOBIN 7.4* 7.1* 7.3* 10.1*   HEMATOCRIT 23.0* 21.5* 23.4* 31.3*   PLATELETS 127* 125* 135* 14*   NEUTROS ABS 2.99 2.22 2.14 3.58   IMMATURE GRANS (ABS) 0.01 0.01 0.03 0.03   LYMPHS ABS 0.35* 0.54* 0.55* 0.42*   MONOS ABS 0.39 0.33 0.30 0.30   EOS ABS 0.05 0.02 0.05 0.01   .0* 108.0* 112.0* 109.8*   PROCALCITONIN 0.09  --   --   --      --   --   --    PROTIME  --   --   --  13.8         Lab 12/04/21  0410 12/03/21  0327 12/02/21  1247   SODIUM 139 142 141   POTASSIUM 4.1 4.4 4.5   CHLORIDE 102 104 101   CO2 27.0 27.0 29.0   ANION GAP 10.0 11.0 11.0   BUN 12 16 23   CREATININE 0.50* 0.42* 0.55*   GLUCOSE 90 82 118*   CALCIUM 8.6 9.0 9.7*         Lab 12/04/21  0410 12/02/21  1247   TOTAL PROTEIN 5.5* 7.1   ALBUMIN 3.10* 3.90   GLOBULIN 2.4 3.2   ALT (SGPT) 8 8   AST (SGOT) 12 12   BILIRUBIN 0.5 0.6   ALK PHOS 75 86         Lab 12/02/21  1247   PROBNP 648.8   TROPONIN T <0.010   PROTIME 13.8   INR 1.10             Lab 12/04/21  0410 12/02/21  1533   IRON 32*  --    IRON SATURATION 13*  --    TIBC 238*  --    TRANSFERRIN 160*  --    FERRITIN 114.90  --    ABO TYPING  --  O   RH TYPING  --  Negative   ANTIBODY SCREEN  --  Negative         Brief Urine Lab Results  (Last result in the past 365 days)      Color   Clarity   Blood   Leuk Est   Nitrite   Protein   CREAT   Urine HCG        12/04/21 0958 Yellow   Clear   Negative   Negative   Negative   Negative                 Microbiology Results Abnormal     Procedure Component Value - Date/Time    COVID-19, [facility] IN-HOUSE NP/OP SWAB IN UTM/VTM/[facility] TRANSPORT MEDIA 24HR TAT - Swab, Oropharynx [[id number]]  (Normal) Collected: 12/02/21 1736    Lab Status: Final result Specimen: Swab from Oropharynx Updated: 12/02/21 2135     COVID19 Not Detected    Narrative:       Fact sheet for providers: https://www.fda.gov/media/849769/download     Fact sheet for patients: https://www.fda.gov/media/547880/download    Test performed by RT PCR.          FL Video Swallow With Speech Single Contrast    Result Date: 12/4/2021  EXAMINATION: FL VIDEO SWALLOW W SPEECH SINGLE-CONTRAST-  INDICATION: Dysphagia; R04.0-Epistaxis; R09.02-Hypoxemia; C34.90-Malignant neoplasm of unspecified part of unspecified bronchus or lung; D69.6-Thrombocytopenia, unspecified; D64.9-Anemia, unspecified; D47.1-Chronic myeloproliferative disease; Z87.09-Personal history of other diseases of the respiratory system; J69.0-Pneumonitis due to inhalation of food and vomit; R13.10-Dysphagia, unspecified silent aspiration  TECHNIQUE: 1.4 minutes of fluoroscopy and 9 images used for modified barium swallow  COMPARISON: NONE  FINDINGS: Fluoroscopy was provided for modified barium swallow. The patient was evaluated the sitting position. The. Various consistencies of barium were administered to the patient. The speech therapy team was on hand for the procedure. Please to the speech therapy team's report for full details.       Impression: Fluoroscopy for modified barium swallow. Please see the speech therapy change and report for full details.        CT Angiogram Chest    Result Date: 12/2/2021  EXAMINATION: CT ANGIOGRAM CHEST- 12/02/2021  INDICATION: hypoxia, hemoptysis  TECHNIQUE: Spiral acquisition 3 mm post IV contrast images through the chest and upper abdomen with sagittal and coronal 2-D reconstructions.  The radiation dose reduction device was turned on for each scan per the ALARA (As Low as Reasonably Achievable) protocol.  COMPARISON: Chest radiograph 06/02/2020  FINDINGS: History indicates hypoxia, hemoptysis.  There is good contrast opacification of the pulmonary arteries. No filling defects are identified to suggest pulmonary embolic disease. There is no evidence of thoracic aortic aneurysm or dissection, no evidence  of pericardial effusion or significant mediastinal lymphadenopathy. There is unusually increased, low density soft tissue around the lower thoracic spine, axial images 50 through 80. On the left, this measures water density to moderate fat density. On the right, this measures water density to low fat density. Considerations include paraspinous lipomas, less likely extra medullary hematopoiesis. Well-differentiated liposarcoma would be considered unlikely with this appearance. Likewise, lymphoma would be unlikely to have this appearance.  Lung window images show a stellate, 1.9 x 1.0 cm mass in the right upper lobe, anterior to the right hilum, very worrisome for primary neoplasm. There is prominent pulmonary vasculature and perhaps very early interstitial edema elsewhere. No other pulmonary parenchymal nodule is seen. There is a minimal right pleural effusion.  Included images of the upper abdomen show fatty liver change. Spleen is not enlarged. Included pancreatic tail, adrenal glands, and upper renal poles appear unremarkable. Bony structures appear grossly intact.      Impression: 1. 19 x 10 mm stellate right upper lobe lesion worrisome for primary neoplasm. 2. No evidence of pulmonary embolus. 3. Mild pulmonary vascular congestion, suspected early interstitial edema, and trace right pleural effusion. 4. Unusual smooth margined paraspinous soft tissue lesions in the lower region, measuring between fat and water density, favored to be incidental as discussed above, and which should be followed.  D:  12/02/2021 E:  12/02/2021         Results for orders placed during the hospital encounter of 01/22/21    Adult Transthoracic Echo Complete W/ Cont if Necessary Per Protocol    Interpretation Summary  · Estimated left ventricular EF = 55%  · Aortic sclerosis without significant stenosis      I have reviewed the medications:  Scheduled Meds:cholecalciferol, 1,000 Units, Oral, Daily  docusate sodium, 100 mg, Oral,  BID  gabapentin, 300 mg, Oral, BID  guaiFENesin, 600 mg, Oral, Q12H  ipratropium-albuterol, 3 mL, Nebulization, 4x Daily - RT  multivitamin with minerals, 1 tablet, Oral, Daily  oxymetazoline, 1 spray, Nasal, Once  sodium chloride, 10 mL, Intravenous, Q12H  vitamin B-12, 1,000 mcg, Oral, Daily  vitamin B-6, 200 mg, Oral, Daily      Continuous Infusions:   PRN Meds:.•  acetaminophen  •  albuterol  •  bisacodyl  •  calcium carbonate  •  melatonin  •  ondansetron  •  sodium chloride  •  sodium chloride  •  sodium chloride    Assessment/Plan   Assessment & Plan     Active Hospital Problems    Diagnosis  POA   • **Acute on chronic respiratory failure with hypoxia (HCC) [J96.21]  Yes   • Severe malnutrition (CMS/HCC) [E43]  Yes   • Epistaxis [R04.0]  Unknown   • Mass of right lung [R91.8]  Unknown   • COPD (chronic obstructive pulmonary disease) (HCC) [J44.9]  Yes   • Myelodysplastic syndrome (HCC) [D46.9]  Yes   • BAUMAN (dyspnea on exertion) [R06.00]  Yes   • Thrombocytopenia (HCC) [D69.6]  Yes      Resolved Hospital Problems   No resolved problems to display.        Brief Hospital Course to date:  Elizabeth Oliveira is a 89 y.o. female with past medical history of COPD on home O2 2L NC, presumed myeloproliferative disease (never worked up/ seen by hematology), chronic arthritis with neuropathy, who presented to the hospital with acute epistaxis and was found to have severe thrombocytopenia and worsening hypoxemia.     Assessment and plan:  Acute blood loss anemia secondary to severe epistaxis  Epistaxis, resolved  Severe thrombocytopenia  Presumed myelodysplastic syndrome  · Patient presented with severe epistaxis and blood loss anemia.  Hemoglobin is 7.3 down from 10 on admission  · Monitor hemoglobin and transfuse hemoglobin less than 7 and PLT < 20k  · Received 2 units of platelets in the ER per Dr. Mcnally/ hematology recommendations  · No further epistaxis noted  · Need hematology oncology follow-up after discharge from  the hospital     Acute on chronic hypoxic respiratory failure  COPD with chronic hypoxic respiratory failure  Right upper lung mass 19x10 mm concerning for malignancy  · Discussed the goals of care with the patient's daughter bedside  · She was the patient to be evaluated by pulmonary team even though she is not interested in any aggressive measures like radiation therapy or chemotherapy.  · Nebulizer treatment  · No indication for antibiotic therapy  · Pulmonary team consulted.  Appreciate help and recommendations  · --possible PEt/CT as outpatient   · Pt had fever 101 per nurse pt had a lot of covers on once removed it was normal, procal neg, UA neg monitor      Malnutrition with cachexia  · Dietary consult     Acute debility  · PT and OT        DVT prophylaxis:  Mechanical DVT prophylaxis orders are present.       AM-PAC 6 Clicks Score (PT): 12 (12/03/21 1125)    Disposition: I expect the patient to be discharged TBD     CODE STATUS:   Code Status and Medical Interventions:   Ordered at: 12/02/21 1720     Level Of Support Discussed With:    Patient     Code Status (Patient has no pulse and is not breathing):    CPR (Attempt to Resuscitate)     Medical Interventions (Patient has pulse or is breathing):    Full Support       CAMPBELL Macdonald  12/04/21                Electronically signed by Lynnette Harvey APRN at 12/04/21 1229          Consult Notes (last 72 hours)      Siena Rojas MD at 12/05/21 1445      Consult Orders    1. Inpatient Palliative Care MD Consult [[id number]] ordered by Rachel Alegria APRN at 12/05/21 1220               Azeem Merchant MD  Consulting physician: Elham Rojas MD    Chief Complaint   Patient presents with   • Nose Bleed     CC: SOA    Reason for consult: GOC, symptom mgmt    HPI:   89 yo F with COPD, Myeloproliferative disease, chronic arthritis with neuropathy, protein calorie malnutrition (alb 3.1), RUL lung mass.    She presented to the [facility] ED on 12/02/2021 due  "to severe epistaxis.   She has myeloproliferative disorder (previously not evaluated or treated)  ith platelets 14K at time of admission.  Hgb dropped from 10 to 7.3 due to epistaxis.  She did require platelet transfusion and PRBCs for acute blood loss anemia.  CXR  showed RUL 1.9 cm x 1 cm stellate lung mass concerning for malignancy.   She did have Pulmonary consult who did not recommend biopsy due to comorbidities.  She is not interested in further treatment such as radiation or chemotherapy.    She may follow-up with heme/onc and possible PET/CT scan for further evaluation as an outpatient.    Per RN and Rachel Alegria APRN, pt has   Increased work of breathing today.  Oxygen saturations at rest are in the mid 90s on 3 L,  Of her nursing documentation she has rapid be that into the 80s with repositioning.  Patient herself does report a little bit of cough today but is usually nonproductive.  She, however, denies shortness of breath despite observed increased work of breathing.    She is O2 dependent at home and utilizes oxygen overnight.  However, her knees reports that she frequently requires reminders to put her oxygen back in place.    Patient does have some mild confusion.  When asked who lives at home, she reported her mother and father and the dogs. Pt   usually consumes 1 to 2 bourbon drinks each evening since her 20s.  Her niece reports that she has been more forgetful and not drinking bourbon as often recently.    For telephone conversation with her niece/POA/CARMITA/Lara GREENE,  Patient lives alone independently in her home prior to hospitalization.  She had someone coming 3 days per week to assist with meals from 5-7 PM.    Niece reports significant decline \"almost daily in\" since admission.    She has had minimal oral intake according to nursing staff.  She reports that she is just not hungry.  MBS done 12/4 showed moderate, oral dysphagia, pharyngeal dysphagia.   SLP Diet Recommendation: paola, " nectar thick liquids     H/o hip replacement and has always reported mild pain per niece.  She denied pain today during my visit.  However, she did report earlier to nursing staff.      Past Medical History:   Diagnosis Date   • COPD (chronic obstructive pulmonary disease) (HCC)    • Depression    • Dyslipidemia    • Dyslipidemia    • IBS (irritable bowel syndrome) 2016   • Macular degeneration    • Myelodysplastic syndrome (HCC)     anemia, thrombocytopemia   • Osteoarthritis    • Other spondylosis with radiculopathy, lumbar region      Past Surgical History:   Procedure Laterality Date   • CATARACT EXTRACTION, BILATERAL     • HIP SURGERY     • KNEE SURGERY     • REVISION OF TOTAL HIP FEMORAL Right 07/2017   • TOTAL HIP ARTHROPLASTY Right    • TOTAL HIP ARTHROPLASTY Left    • TOTAL KNEE ARTHROPLASTY Left     2006     Current Facility-Administered Medications   Medication Dose Route Frequency Provider Last Rate Last Admin   • acetaminophen (TYLENOL) tablet 650 mg  650 mg Oral Q4H PRN Jennifer Acosta APRN   650 mg at 12/03/21 1218   • albuterol (PROVENTIL) nebulizer solution 0.083% 2.5 mg/3mL  2.5 mg Nebulization Q6H PRN Jennifer Acosta APRN       • bisacodyl (DULCOLAX) EC tablet 10 mg  10 mg Oral Daily PRN Jennifer Acosta APRN       • calcium carbonate (TUMS) chewable tablet 500 mg (200 mg elemental)  2 tablet Oral BID PRN Jennifer Acosta APRN       • cholecalciferol (VITAMIN D3) tablet 1,000 Units  1,000 Units Oral Daily Jennifer Acosta APRN   1,000 Units at 12/05/21 1010   • docusate sodium (COLACE) capsule 100 mg  100 mg Oral BID Jennifer Acosta APRN   100 mg at 12/05/21 1010   • gabapentin (NEURONTIN) capsule 300 mg  300 mg Oral BID Jennifer Acosta APRN   300 mg at 12/05/21 1010   • guaiFENesin (MUCINEX) 12 hr tablet 600 mg  600 mg Oral Q12H Lynnette Harvey APRN   600 mg at 12/05/21 1010   • haloperidol lactate (HALDOL) injection 0.5 mg  0.5 mg Intravenous Q6H PRN Cornelio Babb,  MD       • ipratropium-albuterol (DUO-NEB) nebulizer solution 3 mL  3 mL Nebulization 4x Daily - RT Jennifer Acosta, APRN   3 mL at 12/05/21 1630   • LORazepam (ATIVAN) injection 0.5 mg  0.5 mg Intravenous Q4H PRN Cornelio Babb MD       • melatonin tablet 5 mg  5 mg Oral Nightly PRN Jennifer Acosta APRN   5 mg at 12/03/21 2034   • multivitamin with minerals 1 tablet  1 tablet Oral Daily Jennifer Acosta, APRN   1 tablet at 12/05/21 0900   • ondansetron (ZOFRAN) injection 4 mg  4 mg Intravenous Q6H PRN Jennifer Acosta APRN       • oxymetazoline (AFRIN) nasal spray 1 spray  1 spray Nasal Once Wali Coreas MD       • QUEtiapine (SEROquel) tablet 25 mg  25 mg Oral Nightly Cornelio Babb MD   25 mg at 12/04/21 1901   • sodium chloride 0.9 % flush 10 mL  10 mL Intravenous PRN Wali Coreas MD       • sodium chloride 0.9 % flush 10 mL  10 mL Intravenous PRN Wali Coreas MD       • sodium chloride 0.9 % flush 10 mL  10 mL Intravenous Q12H Jennifer Acosta APRN   10 mL at 12/05/21 1048   • sodium chloride 0.9 % flush 10 mL  10 mL Intravenous PRN Jennifer Acosta APRN       • vitamin B-12 (CYANOCOBALAMIN) tablet 1,000 mcg  1,000 mcg Oral Daily Jennifer Acosta APRN   1,000 mcg at 12/05/21 1010   • vitamin B-6 (PYRIDOXINE) tablet 200 mg  200 mg Oral Daily Jennifer Acosta, APRN   200 mg at 12/05/21 1009     Facility-Administered Medications Ordered in Other Encounters   Medication Dose Route Frequency Provider Last Rate Last Admin   • bupivacaine PF (MARCAINE) 0.25 % injection    PRN Monico Herbert CRNA   40 mL at 07/17/17 1625        Allergies   Allergen Reactions   • Cortisone      Family History   Problem Relation Age of Onset   • Heart failure Mother    • Leukemia Father    FHX (cont): Lewy body dementia - sister    Social History     Socioeconomic History   • Marital status: Single   Tobacco Use   • Smoking status: Former Smoker     Packs/day: 1.50     Years: 40.00  "    Pack years: 60.00     Types: Cigarettes     Quit date:      Years since quittin.9   • Smokeless tobacco: Never Used   Substance and Sexual Activity   • Alcohol use: Yes     Alcohol/week: 7.0 standard drinks     Types: 7 Shots of liquor per week     Comment: COCKTAIL EVERY NIGHT   • Drug use: No   • Sexual activity: Defer   SHX (cont):  Never , no children.    Niece is POA/HCS.    Sister (niece Lara's mother)  10/2020 of Lewy body dementia.  Nephew (Lara's brother)  recently of overdose.    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    Review of Systems - pt answers questions, but I am not sure re. Accuracy of answers  Dyspnea - pt denies but visible increased work of breathing  Pain - pt denies at time of visit, hip pain reports to RN earlier today  Nausea/Vomting - pt denies      General -unsure her regarding weight loss per knees, prior to hospitalization her energy and interaction inability to live alone was good, she reports that she is always cold, afebrile greater than 24 hours.    HEENT -macular degeneration, mild [place], dysphagia per HPI lungs-see HPI  Heart - no CP, no palpitations  GI - no N/V/ diarrhea  MSK - increased weakness since hospitalization, chronic hip pain (denies today) since surgery  Neuro - increased confusion/delirium  Psych - no agitation or anxiety, pleasant interaction      /64 (BP Location: Right arm, Patient Position: Lying)   Pulse 107   Temp 98.4 °F (36.9 °C) (Oral)   Resp 18   Ht 157.5 cm (62\")   Wt 47.6 kg (105 lb)   LMP  (LMP Unknown)   SpO2 94%   BMI 19.20 kg/m²     Intake/Output Summary (Last 24 hours) at 2021 1719  Last data filed at 2021 1600  Gross per 24 hour   Intake --   Output 100 ml   Net -100 ml     Physical Exam:  PPS 30%      General Appearance:   Alert, cooperative, frail, increased work of breathing at rest   Head:   Normocephalic,  " Frontal temporal wasting   Eyes:           Lids and lashes normal, conjunctivae and sclerae normal, no  icterus, EOMI, decreased vision due to MD   Ears:   Ears appear intact with no abnormalities noted, hearing grossly normal   Throat:  No oral lesions, no thrush, oral mucosa moist   Neck:  No adenopathy, supple, trachea midline, no thyromegaly,  no JVD   Back:    kphosis present   Lungs:     Decreased breath sounds bilateral bases, coarse rales bilateral upper lobes, no wheezes.    Heart:   Regular, tachycardia   Breast Exam:   Deferred   Abdomen:    Normal bowel sounds, no masses, no organomegaly, soft        non-tender, non-distended   Genitalia:   Deferred   Extremities:  Moves all extremities, no edema, no cyanosis, no            redness       Skin:  multiple bruises due to needle sticks   Lymph nodes:  No palpable adenopathy cervical and supraclavicular    Psych:            Mood and affect appropriate, good eye contact, confusion vs memory deficit   Neurologic:     Face appears symmetric, moves all extremities           Results from last 7 days   Lab Units 12/04/21  0410   WBC 10*3/mm3 3.79   HEMOGLOBIN g/dL 7.4*   HEMATOCRIT % 23.0*   PLATELETS 10*3/mm3 127*   SODIUM mmol/L 139   POTASSIUM mmol/L 4.1   CHLORIDE mmol/L 102   CO2 mmol/L 27.0   BUN mg/dL 12   CREATININE mg/dL 0.50*   CALCIUM mg/dL 8.6   BILIRUBIN mg/dL 0.5   ALK PHOS U/L 75   ALT (SGPT) U/L 8   AST (SGOT) U/L 12   GLUCOSE mg/dL 90     Imaging Results (Last 72 Hours)     Procedure Component Value Units Date/Time    FL Video Swallow With Speech Single Contrast [[id number]] Collected: 12/04/21 1017     Updated: 12/04/21 1550    Narrative:      EXAMINATION: FL VIDEO SWALLOW W SPEECH SINGLE-CONTRAST - 12/04/2021     INDICATION: R04.0-Epistaxis; R09.02-Hypoxemia; C34.90-Malignant neoplasm  of unspecified part of unspecified bronchus or lung;  D69.6-Thrombocytopenia, unspecified; D64.9-Anemia, unspecified;  D47.1-Chronic myeloproliferative disease;  Z87.09-Personal history of  other diseases of the respiratory system; J69.0-Pneumonitis due to  inhalation of food and vomit; R13.10-Dysphagia, unspecified. Silent  aspiration.     TECHNIQUE: 1.4 minutes of fluoroscopy and 9 images used for a modified  barium swallow.     COMPARISON: None     FINDINGS: Fluoroscopy was provided for modified barium swallow. The  patient was evaluated in the sitting position. Various consistencies of  barium were administered to the patient. The speech therapy team was on  hand for the procedure. Please to the speech therapy team report for  full details.       Impression:      Fluoroscopy for a modified barium swallow. Please see the  speech therapy team report for full details.     DICTATED:   12/04/2021  EDITED/lfs:   12/04/2021           CT Angiogram Chest [[id number]] Collected: 12/02/21 1559     Updated: 12/04/21 1518    Narrative:      EXAMINATION: CT ANGIOGRAM CHEST- 12/02/2021     INDICATION: hypoxia, hemoptysis     TECHNIQUE: Spiral acquisition 3 mm post IV contrast images through the  chest and upper abdomen with sagittal and coronal 2-D reconstructions.     The radiation dose reduction device was turned on for each scan per the  ALARA (As Low as Reasonably Achievable) protocol.     COMPARISON: Chest radiograph 06/02/2020     FINDINGS: History indicates hypoxia, hemoptysis.     There is good contrast opacification of the pulmonary arteries. No  filling defects are identified to suggest pulmonary embolic disease.  There is no evidence of thoracic aortic aneurysm or dissection, no  evidence of pericardial effusion or significant mediastinal  lymphadenopathy. There is unusually increased, low density soft tissue  around the lower thoracic spine, axial images 50 through 80. On the  left, this measures water density to moderate fat density. On the right,  this measures water density to low fat density. Considerations include  paraspinous lipomas, less likely extra medullary  hematopoiesis.  Well-differentiated liposarcoma would be considered unlikely with this  appearance. Likewise, lymphoma would be unlikely to have this  appearance.     Lung window images show a stellate, 1.9 x 1.0 cm mass in the right upper  lobe, anterior to the right hilum, very worrisome for primary neoplasm.  There is prominent pulmonary vasculature and perhaps very early  interstitial edema elsewhere. No other pulmonary parenchymal nodule is  seen. There is a minimal right pleural effusion.     Included images of the upper abdomen show fatty liver change. Spleen is  not enlarged. Included pancreatic tail, adrenal glands, and upper renal  poles appear unremarkable. Bony structures appear grossly intact.       Impression:      1. 19 x 10 mm stellate right upper lobe lesion worrisome for primary  neoplasm.  2. No evidence of pulmonary embolus.  3. Mild pulmonary vascular congestion, suspected early interstitial  edema, and trace right pleural effusion.  4. Unusual smooth margined paraspinous soft tissue lesions in the lower  region, measuring between fat and water density, favored to be  incidental as discussed above, but which should be followed.     D:  12/02/2021  E:  12/02/2021        This report was finalized on 12/4/2021 3:15 PM by Dr. Tyler Anderson MD.             Impression/Plan:  1.  Dyspnea -increased work of breathing.  Will repeat chest x-ray.  proBNP elevated (648) at time of admission.  ECHO (1/2021) with EF 50-55%.  Baseline COPD, new aspiration/dysphagia, possible fluid overload with IV fluids and transfusion.    Begin low-dose morphine 1 mg q.2 hours p.r.n. dyspnea air or increased work breathing.  New     2.  Delirium -  She has had history of usually alcohol each evening.  Add evening benzodiazepine instead of Seroquel.     Continue Haldol p.r.n..      3. Myelodysplastic syndrome-reviewed with POA regarding pathophysiology and potential need for repeat transfusions.  Reviewed burden versus benefit  of repeat transfusions.  Due to the decline I do not know that she would able to follow-up with bone marrow biopsy.      4. Right upper lobe lung mass -new finding on chest x-ray.  Prior history of tobacco use.  Continue aggressive pulmonary toilet.  I do not anticipate she will have any treatment or biopsy.      5. Dysphagia -ST recommended pureed with nectar liquids.  She has had minimal intake.    6. Code status - her Niecedesires to treat reversible causes.  However, she does not want aggressive measures such as CPR, ACLS, or intubation.    7.  Disposition- she will not be able to return to her independent home setting.  Did review with [facility] eligibility and availability in different care settings.  Possible transition to inpatient hospice if she continues to decline as she has last few days.  If she stabilizes prior to or after transition to hospice, she will require probably nursing home placement.  She did cancel her long-term care insurance so does not have additional funding for 24/7 care.     8.  Pill burden- increased dysphagia.  Doubt she will receive benefit at this stage of her disease from her supplements.  Cont symptom management  Medications.    Time: 55 min spent in care of patient. 10 min bedside, 24 min discussion by phone with niece/POA, 21 min chart review, , documentation.     Siena Rojas MD  12/05/21  17:19 EST    Electronically signed by Siena Rojas MD at 12/05/21 1832     Mark Adams MD at 12/04/21 1039      Consult Orders    1. Inpatient Hematology & Oncology Consult [[id number]] ordered by Jennifer Acosta APRN at 12/02/21 1715               HEMATOLOGY/ONCOLOGY INPATIENT CONSULT    REASON FOR CONSULT: Anemia and thrombocytopenia and right lung lesion    Subjective   HISTORY OF PRESENT ILLNESS;   Ms. Oliveira is a 89-year-old lady with past medical history of COPD, hypertension, hyperlipidemia, arthritis, anxiety, former tobacco abuse,  thrombocytopenia, anemia who presented to [facility] with epistaxis and hypoxia.  Patient is a poor historian and her history was taken from the medical records.  She had been having significant epistaxis for about 1 day.  She presented to the ED where she was found to have a platelet count of 14K.  At that time she was found to be hypoxic in the 70s and placed on 4 L nasal cannula with some improvement.  She was transfused 2 units of platelets with improvement of her platelet count to 127K.  However given her new need for oxygen as well as weakness, she was admitted to the hospital.  She had a CT chest completed in the ER which was notable for a 1.9 x 1.0 cm right upper lobe lesion concerning for malignancy.  Today she is by herself in her room and still has some shortness of breath and cough.  She denies any chest discomfort or abdominal pain.  She states that she lives alone but does have some help.  She is unaware of the medication she is taking or if she started any new medications recently.  She is unaware if she has received the Covid-19 vaccination previously, but per chart review, she had her Pfizer doses in January, February, and August 2021.      Past Medical History:   Diagnosis Date   • COPD (chronic obstructive pulmonary disease) (HCC)    • Depression    • Dyslipidemia    • Dyslipidemia    • IBS (irritable bowel syndrome) 2016   • Macular degeneration    • Myelodysplastic syndrome (HCC)     anemia, thrombocytopemia   • Osteoarthritis    • Other spondylosis with radiculopathy, lumbar region      Past Surgical History:   Procedure Laterality Date   • CATARACT EXTRACTION, BILATERAL     • HIP SURGERY     • KNEE SURGERY     • REVISION OF TOTAL HIP FEMORAL Right 07/2017   • TOTAL HIP ARTHROPLASTY Right    • TOTAL HIP ARTHROPLASTY Left    • TOTAL KNEE ARTHROPLASTY Left     2006       Current Facility-Administered Medications on File Prior to Encounter   Medication Dose Route Frequency Provider  "Last Rate Last Admin   • bupivacaine PF (MARCAINE) 0.25 % injection    PRN Monico Herbert CRNA   40 mL at 17 [id number]     Current Outpatient Medications on File Prior to Encounter   Medication Sig Dispense Refill   • albuterol sulfate  (90 Base) MCG/ACT inhaler Inhale 2 puffs Every 6 (Six) Hours As Needed for Wheezing.     • Anoro Ellipta 62.5-25 MCG/INH aerosol powder  inhaler INHALE 1 PUFF BY MOUTH ONCE DAILY 60 each 3   • cholecalciferol (VITAMIN D3) 1000 UNITS tablet Take 1,000 Units by mouth Daily.     • Cyanocobalamin 1000 MCG capsule Take 1,000 mcg by mouth Daily.     • gabapentin (NEURONTIN) 300 MG capsule Take 300 mg by mouth 2 (Two) Times a Day.     • meloxicam (MOBIC) 15 MG tablet Take 15 mg by mouth Daily.     • multivitamin with minerals (PRESERVISION AREDS PO) Take 1 tablet by mouth Daily.     • Pyridoxine HCl (VITAMIN B6) 200 MG tablet Take 1 tablet by mouth Daily.         Allergies   Allergen Reactions   • Cortisone        Social History     Socioeconomic History   • Marital status: Single   Tobacco Use   • Smoking status: Former Smoker     Packs/day: 1.50     Years: 40.00     Pack years: 60.00     Types: Cigarettes     Quit date:      Years since quittin.9   • Smokeless tobacco: Never Used   Substance and Sexual Activity   • Alcohol use: Yes     Alcohol/week: 7.0 standard drinks     Types: 7 Shots of liquor per week     Comment: COCKTAIL EVERY NIGHT   • Drug use: No   • Sexual activity: Defer       Family History   Problem Relation Age of Onset   • Heart failure Mother    • Leukemia Father          REVIEW OF SYSTEMS:  A 12 point review of systems was performed and is negative except as noted above.    Objective   PHYSICAL EXAM:    /70 (BP Location: Right arm, Patient Position: Lying)   Pulse 92   Temp 99 °F (37.2 °C) (Oral)   Resp 18   Ht 157.5 cm (62\")   Wt 47.6 kg (105 lb)   LMP  (LMP Unknown)   SpO2 95%   BMI 19.20 kg/m²       General: Frail appearing female in no " acute distress  HEENT: sclerae anicteric, oropharynx clear  Lymphatics: no cervical, supraclavicular, inguinal, or axillary adenopathy  Neck: Supple. No thyromegaly.  Cardiovascular: regular rate and rhythm, no murmurs  Lungs: clear to auscultation bilaterally. No respiratory distress  Abdomen: soft, nontender, nondistended.  No palpable organomegaly  Extremities: no lower extremity edema, cyanosis, or clubbing  Skin: no rashes, lesions, bruising, or petechiae  Neuro: Moves all extremities independently.    Results:    Results from last 7 days   Lab Units 12/04/21  0410 12/03/21  1510 12/03/21  0327   WBC 10*3/mm3 3.79 3.12* 3.07*   HEMOGLOBIN g/dL 7.4* 7.1* 7.3*   PLATELETS 10*3/mm3 127* 125* 135*     Results from last 7 days   Lab Units 12/04/21  0410 12/03/21  0327 12/02/21  1247   SODIUM mmol/L 139 142 141   POTASSIUM mmol/L 4.1 4.4 4.5   CO2 mmol/L 27.0 27.0 29.0   BUN mg/dL 12 16 23   CREATININE mg/dL 0.50* 0.42* 0.55*   GLUCOSE mg/dL 90 82 118*     Results from last 7 days   Lab Units 12/04/21  0410 12/02/21  1247   AST (SGOT) U/L 12 12   ALT (SGPT) U/L 8 8   BILIRUBIN mg/dL 0.5 0.6   ALK PHOS U/L 75 86         FL Video Swallow With Speech Single Contrast    Result Date: 12/4/2021  Fluoroscopy for modified barium swallow. Please see the speech therapy change and report for full details.        CT Angiogram Chest    Result Date: 12/2/2021  1. 19 x 10 mm stellate right upper lobe lesion worrisome for primary neoplasm. 2. No evidence of pulmonary embolus. 3. Mild pulmonary vascular congestion, suspected early interstitial edema, and trace right pleural effusion. 4. Unusual smooth margined paraspinous soft tissue lesions in the lower region, measuring between fat and water density, favored to be incidental as discussed above, and which should be followed.  D:  12/02/2021 E:  12/02/2021         Assessment    ASSESSMENT & PLAN:  Anemia with thrombocytopenia  -Per chart review, patient has had anemia with hemoglobin  ranging between 8.7-12.5 since 2016.  Platelet count has ranged between 50K-137K during that same timeframe.  -Now present with a platelet count of 14K-status post 2 units platelet transfusion on 12/2/2021 with improvement of her platelet count to 135K  -Hemoglobin 10.1 on admission however has dropped to 7.3 during her hospitalization  -Per patient, she does not know if she is seeing a blood doctor in the past regarding her blood counts  -We will plan to check LDH, haptoglobin, vitamin B12, folate, iron studies, zinc, copper levels, reticulocyte count today  -We will consider bone marrow biopsy in the future after further discussion with patient and family  -Okay to transfuse for a hemoglobin less than 7 and a platelet count less than 20k    Right upper lobe lesion  -High concern for malignancy given her previous smoking history  -Has been seen by pulmonary who deemed a biopsy to high risk given patient's clinical morbidities  -We will discuss further with patient and family about the goals of care  -We will consider PET/CT as an outpatient and possible radiation therapy depending on results    Debility  -Patient with significant weakness  -PT/OT following    Acute hypoxic respiratory failure  -Unclear etiology at this time  -Currently still requiring oxygen by nasal cannula for supplementation  -Pulmonary following    Thank you for the consult.  Please do not hesitate to contact with any questions or concerns.  We will continue to follow while inpatient      Mark Adams MD  Hematology and Oncology    12/4/2021  10:39 EST                 Electronically signed by Mark Adams MD at 12/04/21 1050     Graeme Herrera MD at 12/03/21 1252      Consult Orders    1. Inpatient Pulmonology Consult [[id number]] ordered by Cornelio Babb MD at 12/03/21 0934                 Pulmonary [facility] Consultation       Reason for Consultation: Acute on chronic respiratory failure with hypoxia (HCC)  Referring  Provider: Cornelio Babb MD      History of Present Illness     We are asked to evaluate this patient for abnormal chest CT scan.  89-year-old female with a history of COPD on home oxygen 2 L nasal cannula, myeloproliferative disorder, arthritis, neuropathy presented with acute epistaxis yesterday.  Patient tried conservative measures at home but did not work and was brought to hospital.  In the ER found to be hypoxic and placed on nasal cannula oxygen.  Patient was noted to have platelet count of 14,000 and hematology was consulted and patient was given 2 units of packed platelets and admitted to the hospital for further care.  Due to hypoxia patient underwent CT angiogram of chest which showed 19 x 10 mm dilated right upper lobe lesion.  No evidence of pulmonary embolism, small right pleural effusion noted.  There is also a paraspinous soft tissue around the lower thoracic vertebrae which is with rounded margins and of unclear etiology.  Patient was seen at bedside.  She tells me that she used to smoke but has not smoked for years.  She does have cough at home.  Does not produce any sputum.  No hemoptysis.  No fever, chills or night sweats.  No change in appetite.  States that she lives by herself and she has somebody come help her clean the house and cook for her.  Not very mobile in the house.  Denies any sick contacts recently or any travel recently either.    Problem List, Surgical History, Family, Social History, and ROS     Patient Active Problem List    Diagnosis    • *Acute on chronic respiratory failure with hypoxia (HCC) [J96.21]    • Hypoxia [R09.02]    • Epistaxis [R04.0]    • Mass of right lung [R91.8]    • Bronchitis [J40]    • COPD (chronic obstructive pulmonary disease) (HCC) [J44.9]    • Myelodysplastic syndrome (HCC) [D46.9]    • Aspiration pneumonitis (HCC) [J69.0]    • Centrilobular emphysema (HCC) [J43.2]    • BAUMAN (dyspnea on exertion) [R06.00]    • Oxygen desaturation, exercise  [R09.02]    • Fall [W19.XXXA]    • Closed fracture of right femur (HCC) [S72.91XA]    • Arthritis [M19.90]    • Thrombocytopenia (HCC) [D69.6]      Past Surgical History:   Procedure Laterality Date   • CATARACT EXTRACTION, BILATERAL     • HIP SURGERY     • KNEE SURGERY     • REVISION OF TOTAL HIP FEMORAL Right 2017   • TOTAL HIP ARTHROPLASTY Right    • TOTAL HIP ARTHROPLASTY Left    • TOTAL KNEE ARTHROPLASTY Left            Allergies   Allergen Reactions   • Cortisone      Current Facility-Administered Medications on File Prior to Encounter   Medication   • bupivacaine PF (MARCAINE) 0.25 % injection     Current Outpatient Medications on File Prior to Encounter   Medication Sig   • albuterol sulfate  (90 Base) MCG/ACT inhaler Inhale 2 puffs Every 6 (Six) Hours As Needed for Wheezing.   • Anoro Ellipta 62.5-25 MCG/INH aerosol powder  inhaler INHALE 1 PUFF BY MOUTH ONCE DAILY   • cholecalciferol (VITAMIN D3) 1000 UNITS tablet Take 1,000 Units by mouth Daily.   • Cyanocobalamin 1000 MCG capsule Take 1,000 mcg by mouth Daily.   • gabapentin (NEURONTIN) 300 MG capsule Take 300 mg by mouth 2 (Two) Times a Day.   • meloxicam (MOBIC) 15 MG tablet Take 15 mg by mouth Daily.   • multivitamin with minerals (PRESERVISION AREDS PO) Take 1 tablet by mouth Daily.   • Pyridoxine HCl (VITAMIN B6) 200 MG tablet Take 1 tablet by mouth Daily.     MEDICATION LIST AND ALLERGIES REVIEWED.    Family History   Problem Relation Age of Onset   • Heart failure Mother    • Leukemia Father      Social History     Tobacco Use   • Smoking status: Former Smoker     Packs/day: 1.50     Years: 40.00     Pack years: 60.00     Types: Cigarettes     Quit date:      Years since quittin.9   • Smokeless tobacco: Never Used   Substance Use Topics   • Alcohol use: Yes     Alcohol/week: 7.0 standard drinks     Types: 7 Shots of liquor per week     Comment: COCKTAIL EVERY NIGHT   • Drug use: No     Social History     Social History  "Narrative    Lives alone. Has part time assistance as needed.     FAMILY AND SOCIAL HISTORY REVIEWED.    Review of Systems  Limited review of systems due to patient factors.    Physical Exam and Clinical Information   /54 (BP Location: Right arm, Patient Position: Sitting)   Pulse 76   Temp 97 °F (36.1 °C) (Oral)   Resp 18   Ht 157.5 cm (62\")   Wt 47.6 kg (105 lb)   LMP  (LMP Unknown)   SpO2 93%   BMI 19.20 kg/m²   Physical Exam    General Appearance: Awake, alert, in no acute distress on nasal canula  Head:    Atraumatic, Normocephalic, without obvious abnormality  Lungs:   B/L Breath sounds present with decreased breath sounds on bases, no wheezing heard, occ crackles.   Heart: S1 and S2 present, no murmur,  Abdomen: Soft, non-tender, no guarding or rigidity, bowel sounds positive.  Extremities: Atraumatic, no cyanosis or clubbing,  no edema, warm to touch.  Pulses: Positive and symmetric.  Neurologic:  Moving all four extremities.  Psychologic: Appropriate affect, Cooperative.     Results from last 7 days   Lab Units 12/03/21  0327 12/02/21  1247   WBC 10*3/mm3 3.07* 4.35   HEMOGLOBIN g/dL 7.3* 10.1*   PLATELETS 10*3/mm3 135* 14*     Results from last 7 days   Lab Units 12/03/21  0327 12/02/21  1247   SODIUM mmol/L 142 141   POTASSIUM mmol/L 4.4 4.5   CO2 mmol/L 27.0 29.0   BUN mg/dL 16 23   CREATININE mg/dL 0.42* 0.55*   GLUCOSE mg/dL 82 118*     Estimated Creatinine Clearance: 34.4 mL/min (A) (by C-G formula based on SCr of 0.42 mg/dL (L)).          No results found for: LACTATE     Images: CTA reviewed personally and showed 19 x 10 mm right upper lobe stellate lesion without any significant adenopathy.  Small right pleural effusion.  Patient also has lower thoracic vertebrae paraspinous soft tissue density.  I reviewed the patient's results and images.     Impression     Acute on chronic respiratory failure with hypoxia (HCC)    Thrombocytopenia (HCC)    BAUMAN (dyspnea on exertion)    COPD (chronic " obstructive pulmonary disease) (HCC)    Myelodysplastic syndrome (HCC)    Epistaxis    Mass of right lung    Plan/Recommendations     This is a frail 89-year-old female with severe thrombocytopenia patient presenting with spontaneous epistaxis.  Incidentally found to have right upper lobe stellate lesion.  There is soft tissue component to this nodule as well.  Given her smoking history would be concerning for underlying malignant process.  Does not appear pulmonary AVM.  Discussed with patient as well as patient's niece who is patient's power of  over the phone.  Patient is quite frail and to do a biopsy of this lesion on we will have to put her under anesthesia to do navigational biopsy or CT-guided FNA would be very high risk given lot of normal lung tissue to traverse through which will increase the risk of pneumothorax.  Given her ongoing thrombocytopenia issues she will be very high risk of bleeding as well.  Given her age and other factors I recommended that we do not proceed with biopsy or any aggressive measures currently.  I offered her other options including outpatient PET scan to evaluate if there is any PET avidity which could open up [employer] option.  Other option would be to have repeat CT scan in 2 to 3 months timeframe to see if this nodule is growing.  If it remains stable then can be followed up with serial CT scans.  In the meantime we will also determine how her prognosis is from her potential bone marrow disease after visit with hematology and we can decide on further course.  Patient's niece was agreeable with current plan of care with short-term follow-up.  I will be happy to follow her along in 2 to 3 months time with repeat CT scan which can be arranged on discharge.    There is also unusual paraspinal soft tissue density  around lower thoracic vertebra of unclear etiology.  There does not appear abscess or infectious process.  Patient is moving lower extremities without any  obvious or profound weakness either.    Thank you for allowing me to participate in the care of this patient.      Time spent 40 min time (exclusive of procedure time)  including high complexity decision making and discussions with patient and her family members.       Graeme Herrera MD, [facility]  Pulmonary and Critical Care Medicine  12/03/21 12:53 EST     CC: Azeem Merchant MD      Electronically signed by Graeme Herrera MD at 12/03/21 8380

## 2021-12-06 NOTE — THERAPY TREATMENT NOTE
Patient Name: Elizabeth Oliveira  : 1929    MRN: [id number]                              Today's Date: 2021       Admit Date: 2021    Visit Dx:     ICD-10-CM ICD-9-CM   1. Oropharyngeal dysphagia  R13.12 787.22   2. Epistaxis  R04.0 784.7   3. Hypoxia  R09.02 799.02   4. Malignant neoplasm of lung, unspecified laterality, unspecified part of lung (HCC)  C34.90 162.9   5. Thrombocytopenia (HCC)  D69.6 287.5   6. Anemia, unspecified type  D64.9 285.9   7. Myeloproliferative disorder (HCC)  D47.1 238.79   8. History of COPD  Z87.09 V12.69   9. Aspiration pneumonitis (HCC)  J69.0 507.0     Patient Active Problem List   Diagnosis   • Fall   • Closed fracture of right femur (HCC)   • Arthritis   • Thrombocytopenia (HCC)   • Centrilobular emphysema (HCC)   • BAUMAN (dyspnea on exertion)   • Oxygen desaturation, exercise   • Acute on chronic respiratory failure with hypoxia (HCC)   • COPD (chronic obstructive pulmonary disease) (HCC)   • Myelodysplastic syndrome (HCC)   • Aspiration pneumonitis (HCC)   • Bronchitis   • Hypoxia   • Epistaxis   • Mass of right lung   • Severe malnutrition (CMS/HCC)     Past Medical History:   Diagnosis Date   • COPD (chronic obstructive pulmonary disease) (HCC)    • Depression    • Dyslipidemia    • Dyslipidemia    • IBS (irritable bowel syndrome)    • Macular degeneration    • Myelodysplastic syndrome (HCC)     anemia, thrombocytopemia   • Osteoarthritis    • Other spondylosis with radiculopathy, lumbar region      Past Surgical History:   Procedure Laterality Date   • CATARACT EXTRACTION, BILATERAL     • HIP SURGERY     • KNEE SURGERY     • REVISION OF TOTAL HIP FEMORAL Right 2017   • TOTAL HIP ARTHROPLASTY Right    • TOTAL HIP ARTHROPLASTY Left    • TOTAL KNEE ARTHROPLASTY Left           General Information     Row Name 21 1602          OT Time and Intention    Document Type therapy note (daily note)  -HAKAN     Mode of Treatment occupational therapy  -HAKAN      Row Name 12/06/21 1602          General Information    Patient Profile Reviewed yes  -     Existing Precautions/Restrictions fall; oxygen therapy device and L/min  -     Barriers to Rehab medically complex; cognitive status  -     Row Name 12/06/21 1602          Cognition    Orientation Status (Cognition) oriented to; person; disoriented to; place; situation; time  -     Row Name 12/06/21 1602          Safety Issues, Functional Mobility    Safety Issues Affecting Function (Mobility) ability to follow commands; awareness of need for assistance; insight into deficits/self-awareness; judgment; problem-solving; safety precaution awareness  -     Impairments Affecting Function (Mobility) balance; cognition; endurance/activity tolerance; postural/trunk control; shortness of breath; strength  -           User Key  (r) = Recorded By, (t) = Taken By, (c) = Cosigned By    Initials Name Provider Type    Louise Bhagat OT Occupational Therapist                 Mobility/ADL's     Row Name 12/06/21 1604          Bed Mobility    Comment (Bed Mobility) Pt declined transfer to sit EOB, decreased volitional movement with repositioning in bed for comfort  -     Row Name 12/06/21 1604          Activities of Daily Living    BADL Assessment/Intervention grooming  -     Row Name 12/06/21 1604          Grooming Assessment/Training    [place] Level (Grooming) wash face, hands; dependent (less than 25% patient effort)  -           User Key  (r) = Recorded By, (t) = Taken By, (c) = Cosigned By    Initials Name Provider Type    Louise Bhagat OT Occupational Therapist               Obj/Interventions     Row Name 12/06/21 1606          Shoulder (Therapeutic Exercise)    Shoulder (Therapeutic Exercise) PROM (passive range of motion)  -     Shoulder PROM (Therapeutic Exercise) left; right; flexion; horizontal aBduction/aDduction; supine; 5 repetitions  -     Row Name 12/06/21 1606          Elbow/Forearm  (Therapeutic Exercise)    Elbow/Forearm (Therapeutic Exercise) PROM (passive range of motion)  -     Elbow/Forearm PROM (Therapeutic Exercise) left; right; flexion; extension; supination; pronation; supine; 5 repetitions  -[facility] Name 12/06/21 1606          Wrist (Therapeutic Exercise)    Wrist (Therapeutic Exercise) PROM (passive range of motion)  -     Wrist PROM (Therapeutic Exercise) left; right; bilateral; flexion; 5 repetitions  -[facility] Name 12/06/21 1606          Motor Skills    Motor Skills therapeutic exercise  -[facility] Name 12/06/21 1606          Therapeutic Exercise    Therapeutic Exercise shoulder; elbow/forearm; wrist  -           User Key  (r) = Recorded By, (t) = Taken By, (c) = Cosigned By    Initials Name Provider Type    Louise Bhagat OT Occupational Therapist               Goals/Plan    No documentation.                Clinical Impression     [facility] Name 12/06/21 1607          Pain Assessment    Additional Documentation Pain Scale: FACES Pre/Post-Treatment (Group)  -[facility] Name 12/06/21 1607          Pain Scale: FACES Pre/Post-Treatment    Pain: FACES Scale, Pretreatment 0-->no hurt  -     Posttreatment Pain Rating 0-->no hurt  -[facility] Name 12/06/21 1607          Plan of Care Review    Progress declining  -     Outcome Summary Pt accepted PROM to BUE's in supine, repositioned for comfort and pressure relief, declined food or drink from lunch tray. Continue as appropriate, per OT POC.  -[facility] Name 12/06/21 1607          Vital Signs    O2 Delivery Pre Treatment nasal cannula  -     O2 Delivery Intra Treatment nasal cannula  -     O2 Delivery Post Treatment nasal cannula  -     Pre Patient Position Side Lying  -     Intra Patient Position Supine  -     Post Patient Position Supine  -[facility] Name 12/06/21 1607          Positioning and Restraints    Pre-Treatment Position in bed  -     Post Treatment Position bed  -HAKAN     In Bed supine; call light  within reach; encouraged to call for assist; exit alarm on; with nsg  -HAKAN           User Key  (r) = Recorded By, (t) = Taken By, (c) = Cosigned By    Initials Name Provider Type    Louise Bhagat OT Occupational Therapist               Outcome Measures     Row Name 12/06/21 1611          How much help from another is currently needed...    Putting on and taking off regular lower body clothing? 1  -HAKAN     Bathing (including washing, rinsing, and drying) 1  -HAKAN     Toileting (which includes using toilet bed pan or urinal) 1  -HAKAN     Putting on and taking off regular upper body clothing 2  -HAKAN     Taking care of personal grooming (such as brushing teeth) 1  -HAKAN     Eating meals 2  -HAKAN     AM-PAC 6 Clicks Score (OT) 8  -HAKAN     Row Name 12/06/21 0800          How much help from another person do you currently need...    Turning from your back to your side while in flat bed without using bedrails? 2  -KY     Moving from lying on back to sitting on the side of a flat bed without bedrails? 1  -KY     Moving to and from a bed to a chair (including a wheelchair)? 1  -KY     Standing up from a chair using your arms (e.g., wheelchair, bedside chair)? 1  -KY     Climbing 3-5 steps with a railing? 1  -KY     To walk in hospital room? 1  -KY     AM-PAC 6 Clicks Score (PT) 7  -KY     Row Name 12/06/21 1611          Functional Assessment    Outcome Measure Options AM-PAC 6 Clicks Daily Activity (OT)  -HAKAN           User Key  (r) = Recorded By, (t) = Taken By, (c) = Cosigned By    Initials Name Provider Type    Louise Bhagat OT Occupational Therapist    Eleanor Ferreira, RN Registered Nurse                Occupational Therapy Education                 Title: PT OT SLP Therapies (In Progress)     Topic: Occupational Therapy (In Progress)     Point: ADL training (Done)     Description:   Instruct learner(s) on proper safety adaptation and remediation techniques during self care or transfers.   Instruct in proper use of assistive  devices.              Learning Progress Summary           Patient Acceptance, E,TB,D, VU,NR by  at 12/3/2021 1153                   Point: Home exercise program (Not Started)     Description:   Instruct learner(s) on appropriate technique for monitoring, assisting and/or progressing therapeutic exercises/activities.              Learner Progress:  Not documented in this visit.          Point: Precautions (Done)     Description:   Instruct learner(s) on prescribed precautions during self-care and functional transfers.              Learning Progress Summary           Patient Acceptance, E,TB,D, VU,NR by KF at 12/3/2021 1153                   Point: Body mechanics (Done)     Description:   Instruct learner(s) on proper positioning and spine alignment during self-care, functional mobility activities and/or exercises.              Learning Progress Summary           Patient Acceptance, E,TB,D, VU,NR by KF at 12/3/2021 1153                               User Key     Initials Effective Dates Name Provider Type [facility] 06/16/21 -  Fabiana Ann, OT Occupational Therapist OT              OT Recommendation and Plan     Plan of Care Review  Progress: declining  Outcome Summary: Pt accepted PROM to BUE's in supine, repositioned for comfort and pressure relief, declined food or drink from lunch tray. Continue as appropriate, per OT POC.     Time Calculation:    Time Calculation- OT     Row Name 12/06/21 1535             Time Calculation- OT    OT Start Time 1535  -HAKAN      OT Received On 12/06/21  -HAKAN              Timed Charges    78543 - OT Self Care/Mgmt Minutes 25  -HAKAN              Total Minutes    Timed Charges Total Minutes 25  -HAKAN       Total Minutes 25  -HAKAN            User Key  (r) = Recorded By, (t) = Taken By, (c) = Cosigned By    Initials Name Provider Type    Louise Bhagat, OT Occupational Therapist              Therapy Charges for Today     Code Description Service Date Service Provider Modifiers  Qty    91771361785 HC OT SELF CARE/MGMT/TRAIN EA 15 MIN 12/6/2021 Louise Padron, OT  2               Louise Padron, NANCY  12/6/2021

## 2021-12-06 NOTE — PROGRESS NOTES
"Palliative Care Progress Note    Date of Admission: 12/2/2021    Code Status:   Current Code Status     Date Active Code Status Order ID Comments User Context       12/5/2021 1219 No CPR (Do Not Attempt to Resuscitate) [id number]  Rachel Alegria APRN Inpatient     Advance Care Planning Activity      Questions for Current Code Status     Question Answer    Code Status (Patient has no pulse and is not breathing) No CPR (Do Not Attempt to Resuscitate)    Medical Interventions (Patient has pulse or is breathing) Full Support    Level Of Support Discussed With Health Care Surrogate        Advance Directive: On file  Surrogate decision maker: Georges Poole    Subjective: I saw and examined Ms. Oliveira at her bedside, she was unable to make needs known.  Her niece Georges and nephew Chan were at her bedside, her niece Georges had concerns that she was uncomfortable in particular with repositioning but otherwise okay.  She is also noted significant decline in the past 24 to 48 hours, no longer taking in p.o.    Reviewed current scheduled and prn medications for route, type, dose, and frequency.     •  acetaminophen  •  acetaminophen  •  albuterol  •  bisacodyl  •  calcium carbonate  •  haloperidol lactate  •  LORazepam  •  melatonin  •  Morphine  •  ondansetron  •  sodium chloride  •  sodium chloride  •  sodium chloride    Objective:  PPS 20% /63 (BP Location: Right arm, Patient Position: Lying)   Pulse 72   Temp 98.3 °F (36.8 °C) (Axillary)   Resp 16   Ht 157.5 cm (62\")   Wt 47.6 kg (105 lb)   LMP  (LMP Unknown)   SpO2 95%   BMI 19.20 kg/m²    Intake & Output (last day)       12/05 0701 12/06 0700 12/06 0701 12/07 0700    P.O.  0    Total Intake(mL/kg)  0 (0)    Urine (mL/kg/hr) 500 (0.4)     Stool 0     Total Output 500     Net -500 0          Urine Unmeasured Occurrence 2 x     Stool Unmeasured Occurrence 2 x         Lab Results (last 24 hours)     Procedure Component Value Units Date/Time    Peripheral " Blood Smear [[id number]] Collected: 12/04/21 0410    Specimen: Blood Updated: 12/06/21 1010     Performed by: Bismark Kelly MD     Pathologist Interpretation --     Severe macrocytic anemia with mild anisopoikilocytosis including occasional elliptocytes.  Normal total white blood cell count with differential compatible with the automated differential reported.  Occasional neutrophils show slightly abnormal nuclear lobation.  No circulating blasts identified.  Borderline thrombocytopenia with normal platelet appearance.    Manual Differential [[id number]]  (Abnormal) Collected: 12/06/21 0628    Specimen: Blood Updated: 12/06/21 0737     Neutrophil % 30.0 %      Lymphocyte % 19.0 %      Monocyte % 9.0 %      Eosinophil % 0.0 %      Basophil % 0.0 %      Bands %  35.0 %      Metamyelocyte % 4.0 %      Atypical Lymphocyte % 3.0 %      Neutrophils Absolute 2.05 10*3/mm3      Lymphocytes Absolute 0.70 10*3/mm3      Monocytes Absolute 0.28 10*3/mm3      Eosinophils Absolute 0.00 10*3/mm3      Basophils Absolute 0.00 10*3/mm3      Anisocytosis Slight/1+     Macrocytes Mod/2+     Vacuolated Neutrophils Slight/1+     Platelet Morphology Normal    CBC & Differential [[id number]]  (Abnormal) Collected: 12/06/21 0628    Specimen: Blood Updated: 12/06/21 0737    Narrative:      The following orders were created for panel order CBC & Differential.  Procedure                               Abnormality         Status                     ---------                               -----------         ------                     CBC Auto Differential[[id number]]        Abnormal            Final result               Scan Slide[[id number]]                                                                    Please view results for these tests on the individual orders.    CBC Auto Differential [[id number]]  (Abnormal) Collected: 12/06/21 0628    Specimen: Blood Updated: 12/06/21 0737     WBC 3.16 10*3/mm3      RBC 2.49 10*6/mm3      Hemoglobin 8.7  "g/dL      Hematocrit 26.8 %      .6 fL      MCH 34.9 pg      MCHC 32.5 g/dL      RDW 15.2 %      RDW-SD 59.7 fl      MPV 11.1 fL      Platelets 69 10*3/mm3     Narrative:      The previously reported component NRBC is no longer being reported. Previous result was 0.0 /100 WBC (Reference Range: 0.0-0.2 /100 WBC) on 12/6/2021 at 0706 EST.    Lactic Acid, Plasma [[id number]]  (Normal) Collected: 12/06/21 0628    Specimen: Blood Updated: 12/06/21 0731     Lactate 1.2 mmol/L      Comment: Falsely depressed results may occur on samples drawn from patients receiving N-Acetylcysteine (NAC) or Metamizole.       Procalcitonin [[id number]]  (Abnormal) Collected: 12/06/21 0628    Specimen: Blood Updated: 12/06/21 0724     Procalcitonin 0.48 ng/mL     Narrative:      As a Marker for Sepsis (Non-Neonates):     1. <0.5 ng/mL represents a low risk of severe sepsis and/or septic shock.  2. >2 ng/mL represents a high risk of severe sepsis and/or septic shock.    As a Marker for Lower Respiratory Tract Infections that require antibiotic therapy:  PCT on Admission     Antibiotic Therapy             6-12 Hrs later  >0.5                          Strongly Recommended            >0.25 - <0.5             Recommended  0.1 - 0.25                  Discouraged                       Remeasure/reassess PCT  <0.1                         Strongly Discouraged         Remeasure/reassess PCT      As 28 day mortality risk marker: \"Change in Procalcitonin Result\" (>80% or <=80%) if Day 0 (or Day 1) and Day 4 values are available. Refer to http://www.[employer]-pct-calculator.com/    Change in PCT <=80 %   A decrease of PCT levels below or equal to 80% defines a positive change in PCT test result representing a higher risk for 28-day all-cause mortality of patients diagnosed with severe sepsis or septic shock.    Change in PCT >80 %   A decrease of PCT levels of more than 80% defines a negative change in PCT result representing a lower risk for 28-day " all-cause mortality of patients diagnosed with severe sepsis or septic shock.                Basic Metabolic Panel [[id number]]  (Abnormal) Collected: 12/06/21 0628    Specimen: Blood Updated: 12/06/21 0721     Glucose 128 mg/dL      BUN 19 mg/dL      Creatinine 0.57 mg/dL      Sodium 142 mmol/L      Potassium 4.1 mmol/L      Chloride 102 mmol/L      CO2 30.0 mmol/L      Calcium 9.2 mg/dL      eGFR Non African Amer 99 mL/min/1.73      BUN/Creatinine Ratio 33.3     Anion Gap 10.0 mmol/L     Narrative:      GFR Normal >60  Chronic Kidney Disease <60  Kidney Failure <15      Magnesium [[id number]]  (Normal) Collected: 12/06/21 0628    Specimen: Blood Updated: 12/06/21 0719     Magnesium 1.8 mg/dL     Blood Culture - Blood, Hand, Left [[id number]] Collected: 12/06/21 0610    Specimen: Blood from Hand, Left Updated: 12/06/21 0652    Blood Culture - Blood, Hand, Right [[id number]] Collected: 12/06/21 0600    Specimen: Blood from Hand, Right Updated: 12/06/21 0651    Blood Culture - Blood, Hand, Right [[id number]]  (Normal) Collected: 12/04/21 2031    Specimen: Blood from Hand, Right Updated: 12/05/21 2145     Blood Culture No growth at 24 hours    Narrative:      Aerobic Only        Imaging Results (Last 24 Hours)     Procedure Component Value Units Date/Time    XR Chest 1 View [[id number]] Collected: 12/06/21 0416     Updated: 12/06/21 0418    Narrative:      CR Chest 1 Vw    INDICATION:   Fever and dysphagia, hypoxemia, fever     COMPARISON:    CT chest 12/2/2021    FINDINGS:  Portable AP view(s) of the chest.        Heart size is normal. There appears the left lower lobe medial basilar atelectasis there is mild diffuse interstitial infiltrates throughout the right lung. Bones are unremarkable.       Impression:      Left medial basilar atelectasis    Mild interstitial prominence throughout the right lung    Signer Name: Waqas Crain MD   Signed: 12/6/2021 4:16 AM   Workstation Name: [facility]    [facility]  [place]          Physical Exam:  Gen: Frail, cachectic, elderly female in mild pain with passive movement of limbs  Skin: warm, dry, no mottling  Eyes: VITO, conjunctivae clear and moist  HEENT: Nasal cannula in place,  Resp/thorax: even effort, nonlabored, CTA anteriorly but shallow  CV: RRR   ABD: soft, bowel sounds   : [place] to bedside drainage  Ext: no edema, no redness   Neuro: alert and oriented x0, no myoclonus  Psych: unable to  assess mood or affect, does not respond to light touch    Reviewed labs and diagnostic results.   No results found for: HGBA1C  Results from last 7 days   Lab Units 12/06/21 0628   WBC 10*3/mm3 3.16*   HEMOGLOBIN g/dL 8.7*   HEMATOCRIT % 26.8*   PLATELETS 10*3/mm3 69*     Results from last 7 days   Lab Units 12/06/21 0628 12/04/21 0410 12/04/21  0410   SODIUM mmol/L 142   < > 139   POTASSIUM mmol/L 4.1   < > 4.1   CHLORIDE mmol/L 102   < > 102   CO2 mmol/L 30.0*   < > 27.0   BUN mg/dL 19   < > 12   CREATININE mg/dL 0.57   < > 0.50*   CALCIUM mg/dL 9.2   < > 8.6   BILIRUBIN mg/dL  --   --  0.5   ALK PHOS U/L  --   --  75   ALT (SGPT) U/L  --   --  8   AST (SGOT) U/L  --   --  12   GLUCOSE mg/dL 128*   < > 90    < > = values in this interval not displayed.       Impression: Ms. Oliveira 89 yo F with COPD, Myeloproliferative disease, chronic arthritis with neuropathy, protein calorie malnutrition (alb 3.1), RUL lung mass.    She presented to the [facility] ED on 12/02/2021 due to severe epistaxis requiring platelet and packed red blood cell transfusion.  PPS 20%    1.  Complex medical decision making: Myself and hospice liaison Belen had discussion with Georges Poole who is Ms. Purdy's a healthcare surrogate/niece.  Openly discussed probable transition to either inpatient or outpatient hospice care.  She expressed preference to give patient another 24 hours prior to making this decision, would like her to continue antibiotics at this time.  I shared with her very unlikely that  patient will recover.  Plan: We will plan to follow-up tomorrow, continue disease directed therapies for now.  DNR    Symptoms:  2.  Pain: Likely secondary to immobility.  Currently has morphine 1 mg every 2 hours as needed for pain, has not received any doses in the past 24 hours, will continue as needed at this time  3.  Agitation: Seems to be under fair control.  Currently on Ativan 0.25 scheduled twice daily, has not required as needed's  4.  Opiate-induced constipation: Well-controlled, last bowel movement 12/5, continue bowel regimen    Time: 30 minutes   > 50% time spent in counseling and education concerning current orders for symptom management with healthcare surrogate Georges Zulema as well as hospice liaison Belen Trinidad MD  [phone]  12/06/21  12:51 EST

## 2021-12-06 NOTE — PLAN OF CARE
Goal Outcome Evaluation:           Progress: declining  Outcome Summary: Pt accepted PROM to BUE's in supine, repositioned for comfort and pressure relief, declined food or drink from lunch tray. Continue as appropriate, per OT POC.

## 2021-12-06 NOTE — PLAN OF CARE
Goal Outcome Evaluation:               VSS. EKG: NSR with infrequent PAC's. Pt developed a fever up to 101.6. Nayla Perea was notified and placed orders (see previous note) still awaiting cuture results and sufficient UOP for urine culture. Pt given liquid tylenol. Pt has been confused to time and place. Pt denies pain or discomfort. Bladder scan showed 380, however pt has been voiding without being cathed. Pt turned q 2 hours. X-ray came in to do a CXR results pending. Will continue to monitor        Problem: Adult Inpatient Plan of Care  Goal: Absence of Hospital-Acquired Illness or Injury  Intervention: Identify and Manage Fall Risk  Recent Flowsheet Documentation  Taken 12/6/2021 0600 by Sidney Lange RN  Safety Promotion/Fall Prevention:   activity supervised   assistive device/personal items within reach   clutter free environment maintained   fall prevention program maintained   gait belt   lighting adjusted   mobility aid in reach   nonskid shoes/slippers when out of bed   room organization consistent   safety round/check completed  Taken 12/6/2021 0400 by Sidney Lange RN  Safety Promotion/Fall Prevention:   activity supervised   assistive device/personal items within reach   clutter free environment maintained   fall prevention program maintained   gait belt   lighting adjusted   mobility aid in reach   nonskid shoes/slippers when out of bed   room organization consistent   safety round/check completed  Taken 12/6/2021 0200 by Sidney Lange, HERNÁN  Safety Promotion/Fall Prevention:   activity supervised   assistive device/personal items within reach   clutter free environment maintained   fall prevention program maintained   gait belt   lighting adjusted   mobility aid in reach   nonskid shoes/slippers when out of bed   room organization consistent   safety round/check completed  Taken 12/6/2021 0000 by Sidney Lange, HERNÁN  Safety Promotion/Fall Prevention:   activity supervised   assistive device/personal items  within reach   clutter free environment maintained   fall prevention program maintained   gait belt   lighting adjusted   mobility aid in reach   nonskid shoes/slippers when out of bed   room organization consistent   safety round/check completed  Taken 12/5/2021 2200 by Sidney Lange RN  Safety Promotion/Fall Prevention:   activity supervised   assistive device/personal items within reach   clutter free environment maintained   fall prevention program maintained   gait belt   lighting adjusted   mobility aid in reach   nonskid shoes/slippers when out of bed   room organization consistent   safety round/check completed  Taken 12/5/2021 2000 by Sidney Lange RN  Safety Promotion/Fall Prevention:   activity supervised   assistive device/personal items within reach   clutter free environment maintained   fall prevention program maintained   gait belt   lighting adjusted   mobility aid in reach   nonskid shoes/slippers when out of bed   room organization consistent   safety round/check completed  Intervention: Prevent Skin Injury  Recent Flowsheet Documentation  Taken 12/6/2021 0600 by Sidney Lange RN  Body Position: weight shift assistance provided  Taken 12/6/2021 0400 by Sidney Lange RN  Body Position: tilted, left  Taken 12/6/2021 0200 by Sidney Lange RN  Body Position: tilted, right  Taken 12/6/2021 0000 by Sidney Lange RN  Body Position: tilted, right  Taken 12/5/2021 2200 by Sidney Lange RN  Body Position: side-lying, left  Taken 12/5/2021 2000 by Sidney Lange RN  Body Position: tilted, left  Skin Protection:   adhesive use limited   electrode sites changed   incontinence pads utilized   transparent dressing maintained   tubing/devices free from skin contact  Intervention: Prevent and Manage VTE (venous thromboembolism) Risk  Recent Flowsheet Documentation  Taken 12/5/2021 2000 by Sidney Lange RN  VTE Prevention/Management: sequential compression devices on  Intervention: Prevent Infection  Recent Flowsheet  Documentation  Taken 12/5/2021 2000 by Sidney Lange RN  Infection Prevention:   environmental surveillance performed   equipment surfaces disinfected   hand hygiene promoted   personal protective equipment utilized   rest/sleep promoted   single patient room provided   visitors restricted/screened  Goal: Optimal Comfort and Wellbeing  Intervention: Provide Person-Centered Care  Recent Flowsheet Documentation  Taken 12/5/2021 2000 by Sidney Lange RN  Trust Relationship/Rapport:   care explained   questions encouraged   questions answered   reassurance provided     Problem: Skin Injury Risk Increased  Goal: Skin Health and Integrity  Intervention: Optimize Skin Protection  Recent Flowsheet Documentation  Taken 12/6/2021 0600 by Sidney Lange RN  Head of Bed (HOB): HOB elevated  Taken 12/6/2021 0400 by Sidney Lange RN  Head of Bed (HOB): HOB at 30-45 degrees  Taken 12/6/2021 0200 by Sidney Lange RN  Head of Bed (HOB): HOB at 30-45 degrees  Taken 12/6/2021 0000 by Sidney Lange RN  Head of Bed (HOB): HOB at 30-45 degrees  Taken 12/5/2021 2200 by Sidney Lange RN  Head of Bed (HOB): HOB elevated  Taken 12/5/2021 2000 by Sidney Lange RN  Pressure Reduction Techniques: weight shift assistance provided  Head of Bed (HOB): HOB at 30-45 degrees  Pressure Reduction Devices: pressure-redistributing mattress utilized  Skin Protection:   adhesive use limited   electrode sites changed   incontinence pads utilized   transparent dressing maintained   tubing/devices free from skin contact     Problem: Fall Injury Risk  Goal: Absence of Fall and Fall-Related Injury  Intervention: Identify and Manage Contributors to Fall Injury Risk  Recent Flowsheet Documentation  Taken 12/6/2021 0600 by Sidney Lange RN  Medication Review/Management: medications reviewed  Taken 12/6/2021 0400 by Sidney Lange RN  Medication Review/Management: medications reviewed  Taken 12/6/2021 0200 by Sidney Lange RN  Medication Review/Management: medications  reviewed  Taken 12/5/2021 2200 by Sidney Lange RN  Medication Review/Management: medications reviewed  Taken 12/5/2021 2000 by Sidney Lange RN  Medication Review/Management: medications reviewed  Intervention: Promote Injury-Free Environment  Recent Flowsheet Documentation  Taken 12/6/2021 0600 by Sidney Lange RN  Safety Promotion/Fall Prevention:   activity supervised   assistive device/personal items within reach   clutter free environment maintained   fall prevention program maintained   gait belt   lighting adjusted   mobility aid in reach   nonskid shoes/slippers when out of bed   room organization consistent   safety round/check completed  Taken 12/6/2021 0400 by Sidney Lange RN  Safety Promotion/Fall Prevention:   activity supervised   assistive device/personal items within reach   clutter free environment maintained   fall prevention program maintained   gait belt   lighting adjusted   mobility aid in reach   nonskid shoes/slippers when out of bed   room organization consistent   safety round/check completed  Taken 12/6/2021 0200 by Sidney Lange RN  Safety Promotion/Fall Prevention:   activity supervised   assistive device/personal items within reach   clutter free environment maintained   fall prevention program maintained   gait belt   lighting adjusted   mobility aid in reach   nonskid shoes/slippers when out of bed   room organization consistent   safety round/check completed  Taken 12/6/2021 0000 by Sidney Lange RN  Safety Promotion/Fall Prevention:   activity supervised   assistive device/personal items within reach   clutter free environment maintained   fall prevention program maintained   gait belt   lighting adjusted   mobility aid in reach   nonskid shoes/slippers when out of bed   room organization consistent   safety round/check completed  Taken 12/5/2021 2200 by Sidney Lange RN  Safety Promotion/Fall Prevention:   activity supervised   assistive device/personal items within reach   clutter free  environment maintained   fall prevention program maintained   gait belt   lighting adjusted   mobility aid in reach   nonskid shoes/slippers when out of bed   room organization consistent   safety round/check completed  Taken 12/5/2021 2000 by Sidney Lange RN  Safety Promotion/Fall Prevention:   activity supervised   assistive device/personal items within reach   clutter free environment maintained   fall prevention program maintained   gait belt   lighting adjusted   mobility aid in reach   nonskid shoes/slippers when out of bed   room organization consistent   safety round/check completed     Problem: COPD Comorbidity  Goal: Maintenance of COPD Symptom Control  Intervention: Maintain COPD-Symptom Control  Recent Flowsheet Documentation  Taken 12/6/2021 0600 by Sidney Lange RN  Medication Review/Management: medications reviewed  Taken 12/6/2021 0400 by Sidney Lange RN  Medication Review/Management: medications reviewed  Taken 12/6/2021 0200 by Sidney Lange RN  Medication Review/Management: medications reviewed  Taken 12/5/2021 2200 by Sidney Lange RN  Medication Review/Management: medications reviewed  Taken 12/5/2021 2000 by Sidney Lange RN  Medication Review/Management: medications reviewed     Problem: Gas Exchange Impaired  Goal: Optimal Gas Exchange  Intervention: Optimize Oxygenation and Ventilation  Recent Flowsheet Documentation  Taken 12/6/2021 0600 by Sidney Lange RN  Head of Bed (HOB): HOB elevated  Taken 12/6/2021 0400 by Sidney Lange RN  Head of Bed (HOB): HOB at 30-45 degrees  Taken 12/6/2021 0200 by Sidney Lange RN  Head of Bed (HOB): HOB at 30-45 degrees  Taken 12/6/2021 0000 by Sidney Lange RN  Head of Bed (HOB): HOB at 30-45 degrees  Taken 12/5/2021 2200 by Sidney Lange RN  Head of Bed (HOB): HOB elevated  Taken 12/5/2021 2000 by Sidney Lange RN  Head of Bed (HOB): HOB at 30-45 degrees

## 2021-12-07 NOTE — THERAPY TREATMENT NOTE
Acute Care - Speech Language Pathology   Swallow Treatment Note [facility]     Patient Name: Elizabeth Oliveira  : 1929  MRN: [id number]  Today's Date: 2021               Admit Date: 2021    Visit Dx:     ICD-10-CM ICD-9-CM   1. Oropharyngeal dysphagia  R13.12 787.22   2. Epistaxis  R04.0 784.7   3. Hypoxia  R09.02 799.02   4. Malignant neoplasm of lung, unspecified laterality, unspecified part of lung (HCC)  C34.90 162.9   5. Thrombocytopenia (HCC)  D69.6 287.5   6. Anemia, unspecified type  D64.9 285.9   7. Myeloproliferative disorder (HCC)  D47.1 238.79   8. History of COPD  Z87.09 V12.69   9. Aspiration pneumonitis (HCC)  J69.0 507.0     Patient Active Problem List   Diagnosis   • Fall   • Closed fracture of right femur (HCC)   • Arthritis   • Thrombocytopenia (HCC)   • Centrilobular emphysema (HCC)   • BAUMAN (dyspnea on exertion)   • Oxygen desaturation, exercise   • Acute on chronic respiratory failure with hypoxia (HCC)   • COPD (chronic obstructive pulmonary disease) (HCC)   • Myelodysplastic syndrome (HCC)   • Aspiration pneumonitis (HCC)   • Bronchitis   • Hypoxia   • Epistaxis   • Mass of right lung   • Severe malnutrition (CMS/HCC)     Past Medical History:   Diagnosis Date   • COPD (chronic obstructive pulmonary disease) (HCC)    • Depression    • Dyslipidemia    • Dyslipidemia    • IBS (irritable bowel syndrome)    • Macular degeneration    • Myelodysplastic syndrome (HCC)     anemia, thrombocytopemia   • Osteoarthritis    • Other spondylosis with radiculopathy, lumbar region      Past Surgical History:   Procedure Laterality Date   • CATARACT EXTRACTION, BILATERAL     • HIP SURGERY     • KNEE SURGERY     • REVISION OF TOTAL HIP FEMORAL Right 2017   • TOTAL HIP ARTHROPLASTY Right    • TOTAL HIP ARTHROPLASTY Left    • TOTAL KNEE ARTHROPLASTY Left            SLP Recommendation and Plan  SLP Swallowing Diagnosis: moderate, oral dysphagia, pharyngeal dysphagia (21  1000)  SLP Diet Recommendation: puree with some mashed, nectar thick liquids (12/07/21 1000)  Recommended Precautions and Strategies: general aspiration precautions (12/07/21 1000)  SLP Rec. for Method of Medication Administration: meds whole, with pudding or applesauce, as tolerated (12/07/21 1000)     Monitor for Signs of Aspiration: yes, notify SLP if any concerns (12/07/21 1000)     Swallow Criteria for Skilled Therapeutic Interventions Met: demonstrates skilled criteria (12/07/21 1000)  Anticipated Discharge Disposition (SLP): unknown, anticipate therapy at next level of care (12/07/21 1000)  Rehab Potential/Prognosis, Swallowing: good, to achieve stated therapy goals (12/07/21 1000)  Therapy Frequency (Swallow): 5 days per week (12/07/21 1000)  Predicted Duration Therapy Intervention (Days): until discharge (12/07/21 1000)     Daily Summary of Progress (SLP): progress toward functional goals as expected (12/07/21 1000)                          SWALLOW EVALUATION (last 72 hours)     SLP Adult Swallow Evaluation     Row Name 12/07/21 1000                   Rehab Evaluation    Document Type therapy note (daily note)  -CH        Subjective Information no complaints  -CH        Patient Observations alert; cooperative; agree to therapy  -CH        Patient/Family/Caregiver Comments/Observations no family present  -CH        Patient Effort adequate  -CH        Symptoms Noted During/After Treatment none  -CH                  General Information    Patient Profile Reviewed yes  -CH                  Pain    Additional Documentation Pain Scale: FACES Pre/Post-Treatment (Group)  -CH                  Pain Scale: FACES Pre/Post-Treatment    Pain: FACES Scale, Pretreatment 0-->no hurt  -CH        Posttreatment Pain Rating 0-->no hurt  -CH                  Clinical Impression    SLP Swallowing Diagnosis moderate; oral dysphagia; pharyngeal dysphagia  -        Functional Impact risk of aspiration/pneumonia  -CH        Rehab  Potential/Prognosis, Swallowing good, to achieve stated therapy goals  -        Swallow Criteria for Skilled Therapeutic Interventions Met demonstrates skilled criteria  -                  SLP Treatment Clinical Impressions    Treatment Assessment (SLP) Patient tolerated trials of pureed and NT liquids without s/s of aspiration. Tolerated pureed, some mashed trials without difficulty. Diet consistency upgraded to pureed, some mashed, NT liquids. Continue to follow to address dysphagia  -        Daily Summary of Progress (SLP) progress toward functional goals as expected  -        Barriers to Overall Progress (SLP) Medically complex; Advanced age  -        Plan for Continued Treatment (SLP) continue treatment per plan of care  -        Care Plan Review evaluation/treatment results reviewed  -                  Recommendations    Therapy Frequency (Swallow) 5 days per week  -        Predicted Duration Therapy Intervention (Days) until discharge  -        SLP Diet Recommendation puree with some mashed; nectar thick liquids  -        Recommended Precautions and Strategies general aspiration precautions  -        Oral Care Recommendations Oral Care BID/PRN; Swab  -        SLP Rec. for Method of Medication Administration meds whole; with pudding or applesauce; as tolerated  -        Monitor for Signs of Aspiration yes; notify SLP if any concerns  -        Anticipated Discharge Disposition (SLP) unknown; anticipate therapy at next level of care  -              User Key  (r) = Recorded By, (t) = Taken By, (c) = Cosigned By    Initials Name Effective Dates     Gardenia Paniagua, MS CCC-SLP 06/16/21 -                 EDUCATION  The patient has been educated in the following areas:   Home Exercise Program (HEP) Dysphagia (Swallowing Impairment) Oral Care/Hydration Modified Diet Instruction.        SLP GOALS     Row Name 12/07/21 1000             Oral Nutrition/Hydration Goal 1 (SLP)    Oral  Nutrition/Hydration Goal 1, SLP LTG: Pt will demonstrate no s/s of aspiration with recommended diet levels  -CH      Time Frame (Oral Nutrition/Hydration Goal 1, SLP) by discharge  -CH      Barriers (Oral Nutrition/Hydration Goal 1, SLP) No s/s of aspiration with trials of pureed, nectar thick liquids or pureed somen mashed. DIet consistency changed to pureed, some mashed, NT liquids  -CH      Progress/Outcomes (Oral Nutrition/Hydration Goal 1, SLP) continuing progress toward goal  -CH              Lingual Strengthening Goal 1 (SLP)    Activity (Lingual Strengthening Goal 1, SLP) increase tongue back strength; increase lingual tone/sensation/control/coordination/movement  -CH      Increase Lingual Tone/Sensation/Control/Coordination/Movement swallow trials  -CH      Increase Tongue Back Strength lingual resistance exercises  -CH      [place]/Accuracy (Lingual Strengthening Goal 1, SLP) with moderate cues (50-74% accuracy)  -CH      Time Frame (Lingual Strengthening Goal 1, SLP) short term goal (STG); by discharge  -CH      Barriers (Lingual Strengthening Goal 1, SLP) Completed x 2 each  -CH      Progress/Outcomes (Lingual Strengthening Goal 1, SLP) continuing progress toward goal  -CH              Pharyngeal Strengthening Exercise Goal 1 (SLP)    Activity (Pharyngeal Strengthening Goal 1, SLP) increase timing; increase superior movement of the hyolaryngeal complex; increase anterior movement of the hyolaryngeal complex; increase closure at entrance to airway/closure of airway at glottis; increase squeeze/positive pressure generation; increase tongue base retraction  -CH      Increase Timing prepping - 3 second prep or suck swallow or 3-step swallow  -CH      Increase Superior Movement of the Hyolaryngeal Complex chin tuck against resistance (CTAR)  -CH      Increase Anterior Movement of the Hyolaryngeal Complex chin tuck against resistance (CTAR)  -CH      Increase Closure at Entrance to Airway/Closure of  Airway at Glottis super-supraglottic swallow  -CH      Increase Squeeze/Positive Pressure Generation hard effortful swallow  -CH      Increase Tongue Base Retraction shaker  -CH      [place]/Accuracy (Pharyngeal Strengthening Goal 1, SLP) with moderate cues (50-74% accuracy)  -CH      Time Frame (Pharyngeal Strengthening Goal 1, SLP) short term goal (STG); by discharge  -CH      Barriers (Pharyngeal Strengthening Goal 1, SLP) Completed each x 2 w min/mod cues  -CH      Progress/Outcomes (Pharyngeal Strengthening Goal 1, SLP) continuing progress toward goal  -CH              Swallow Management Recall Goal 1 (SLP)    Activity (Swallow Management Recall Goal 1, SLP) compensatory swallow strategies/techniques; rationale for use of strategies/techniques; other (see comments); safe liquid viscosity  -CH      [place]iley/Accuracy (Swallow Management Recall Goal 1, SLP) with moderate cues (50-74% accuracy)  -CH      Time Frame (Swallow Management Recall Goal 1, SLP) short term goal (STG); by discharge  -CH      Progress/Outcomes (Swallow Management Recall Goal 1, SLP) goal ongoing  -CH              Swallow Compensatory Strategies Goal 1 (SLP)    Activity (Swallow Compensatory Strategies/Techniques Goal 1, SLP) small cup sips; small bites; during meal intake; throat clear/extra swallow  -CH      Bailey/Accuracy (Swallow Compensatory Strategies/Techniques Goal 1, SLP) with moderate cues (50-74% accuracy)  -CH      Time Frame (Swallow Compensatory Strategies/Techniques Goal 1, SLP) short term goal (STG); by discharge  -CH      Barriers (Swallow Compensatory Strategies/Techniques Goal 1, SLP) reviewed with patient who was able to demonstrte with min cues.  -CH      Progress/Outcomes (Swallow Compensatory Strategies/Techniques Goal 1, SLP) continuing progress toward goal  -CH            User Key  (r) = Recorded By, (t) = Taken By, (c) = Cosigned By    Initials Name Provider Type    CH Gardenia Paniagua MS CCC-SLP  Speech and Language Pathologist                   Time Calculation:    Time Calculation- SLP     Row Name 12/07/21 1122             Time Calculation- SLP    SLP Start Time 1000  -CH      SLP Received On 12/07/21  -CH              Untimed Charges    26470-MH Treatment Swallow Minutes 45  -CH              Total Minutes    Untimed Charges Total Minutes 45  -CH       Total Minutes 45  -CH            User Key  (r) = Recorded By, (t) = Taken By, (c) = Cosigned By    Initials Name Provider Type     Gardenia Paniagua MS CCC-SLP Speech and Language Pathologist                Therapy Charges for Today     Code Description Service Date Service Provider Modifiers Qty    [id number]  ST TREATMENT SWALLOW 3 12/7/2021 Gardenia Paniagua MS CCC-BRODY GN 1               MS ALINA Leos  12/7/2021       Patient was not wearing a face mask and did exhibit coughing during this therapy encounter.  Procedure performed was aerosolizing, involved close contact (within 6 feet for at least 15 minutes or longer), and did not involve contact with infectious secretions or specimens.  Therapist used appropriate personal protective equipment including gloves, standard procedure mask and eye protection.  Appropriate PPE was worn during the entire therapy session.  Hand hygiene was completed before and after therapy session.

## 2021-12-07 NOTE — PLAN OF CARE
Goal Outcome Evaluation:    SLP treatment completed. Will continue to address dysphagia. Please see note for further details and recommendations.

## 2021-12-07 NOTE — PROGRESS NOTES
HEMATOLOGY/ONCOLOGY PROGRESS NOTE    Subjective      CC: Anemia and thrombocytopenia and right lung lesion    SUBJECTIVE:   Patient with continued clinical decline while inpatient. Unable to answer questions this afternoon. Resting comfortably in bed and still requiring O2 by nasal cannula. Per chart review and discussion with patient's family member, plan to transition to comfort care/hospice        Past Medical History, Past Surgical History, Social History, Family History have been reviewed and are without significant changes except as mentioned.      Medications:  The current medication list was reviewed in the EMR    ALLERGIES:   Allergies   Allergen Reactions   • Cortisone        ROS:  Unable to assess secondary to patient's mental status      Objective      Vitals:    12/07/21 1100 12/07/21 1140 12/07/21 1300 12/07/21 1500   BP: 140/70   111/57   BP Location: Right arm   Right arm   Patient Position: Lying   Lying   Pulse: 94 88 95 84   Resp: 16 18  16   Temp: 96.1 °F (35.6 °C)   96.3 °F (35.7 °C)   TempSrc: Axillary   Axillary   SpO2: 95% 95% 92% 94%   Weight:       Height:                General: Frail appearing, in no acute distress  HEENT: sclerae anicteric, oropharynx clear  Lymphatics: no cervical, supraclavicular, inguinal, or axillary adenopathy  Cardiovascular: regular rate and rhythm, no murmurs  Lungs: Coarse breath sounds in the apical lung fields bilaterally  Abdomen: soft, nontender, nondistended.  No palpable organomegaly  Extremities: no lower extremity edema  Skin: no rashes, lesions, bruising, or petechiae  Neuro: Unable to assess secondary to patient's mental status    RECENT LABS:    Results from last 7 days   Lab Units 12/07/21  1146 12/06/21  0628 12/04/21  0410   WBC 10*3/mm3 4.09 3.16* 3.79   HEMOGLOBIN g/dL 8.6* 8.7* 7.4*   PLATELETS 10*3/mm3 60* 69* 127*     Results from last 7 days   Lab Units 12/06/21  0628 12/04/21  0410 12/03/21  0327   SODIUM mmol/L 142 139 142   POTASSIUM  mmol/L 4.1 4.1 4.4   CO2 mmol/L 30.0* 27.0 27.0   BUN mg/dL 19 12 16   CREATININE mg/dL 0.57 0.50* 0.42*   GLUCOSE mg/dL 128* 90 82     Results from last 7 days   Lab Units 12/04/21  0410 12/02/21  1247   AST (SGOT) U/L 12 12   ALT (SGPT) U/L 8 8   BILIRUBIN mg/dL 0.5 0.6   ALK PHOS U/L 75 86         FL Video Swallow With Speech Single Contrast    Result Date: 12/6/2021  Fluoroscopy for a modified barium swallow. Please see the speech therapy team report for full details.  DICTATED:   12/04/2021 EDITED/lfs:   12/04/2021    This report was finalized on 12/6/2021 3:29 PM by Dr. Yecenia Coronel MD.      XR Chest 1 View    Result Date: 12/6/2021  Left medial basilar atelectasis Mild interstitial prominence throughout the right lung Signer Name: Waqas Crain MD  Signed: 12/6/2021 4:16 AM  Workstation Name: [email or web address]  [facility]    CT Angiogram Chest    Result Date: 12/4/2021  1. 19 x 10 mm stellate right upper lobe lesion worrisome for primary neoplasm. 2. No evidence of pulmonary embolus. 3. Mild pulmonary vascular congestion, suspected early interstitial edema, and trace right pleural effusion. 4. Unusual smooth margined paraspinous soft tissue lesions in the lower region, measuring between fat and water density, favored to be incidental as discussed above, but which should be followed.  D:  12/02/2021 E:  12/02/2021   This report was finalized on 12/4/2021 3:15 PM by Dr. Tyler Anderson MD.            Assessment   ASSESSMENT & PLAN:  Anemia with thrombocytopenia  -Per chart review, patient has had anemia with hemoglobin ranging between 8.7-12.5 since 2016.  Platelet count has ranged between 50K-137K during that same timeframe.  -Now present with a platelet count of 14K-status post 2 units platelet transfusion on 12/2/2021 with improvement of her platelet count to 135K  -Hemoglobin 10.1 on admission however has dropped to 7.3 during her hospitalization  -Per patient, she does not know if she is  seeing a blood doctor in the past regarding her blood counts  -LDH within normal limits, iron studies within normal limits, haptoglobin, vitamin B12 mildly elevated, folate within normal limits  -Peripheral smear showing no blast or schistocytes  -Platelet count is decreased to 60K over the past several days  -Discussed goals of care with patient's family member, Lara today. Patient with significantly worsening clinical decline since being inpatient. Plan to transition to hospice/comfort care. As such, would not recommend further work-up or bone marrow biopsy at this time     Right upper lobe lesion  -High concern for malignancy given her previous smoking history  -Has been seen by pulmonary who deemed a biopsy to high risk given patient's clinical morbidities  -Discussed this further with patient's family member Lara today. Agree that further work-up would likely cause more harm than good for the patient as she would not be a candidate for any significant systemic therapeutic treatments  -Palliative care consulted with plans for transition to hospice soon     Debility  -Patient with significant weakness which continues to worsen while inpatient  -Palliative care following with plans for transition to hospice in the near future     Acute hypoxic respiratory failure  -Currently still requiring oxygen by nasal cannula for supplementation     Thank you for the consult.  Please do not hesitate to contact with any questions or concerns.   Patient to transition to comfort care/hospice    Mark Adams MD  Hematology and Oncology    12/7/2021  15:42 EST

## 2021-12-07 NOTE — PLAN OF CARE
Goal Outcome Evaluation: pt rested well through the night. Pt had poor intake through the night, did take 30 ml of water,       did bladder scan pt and volume was 358. Spoke with Frantz Mora since order was to in and out cath stephanie vol 350. She placed order to in and out cath for vol 500 ml instead of 350 ml

## 2021-12-07 NOTE — PLAN OF CARE
Goal Outcome Evaluation:  Plan of Care Reviewed With: patient        Progress: declining   Pt has been sleeping more today, pleasant when awake. No c/o pain, not restless. 3L o2 today. No extreme changes. Transitioning to hospice soon. Little urine output, hardly any PO intake.     Problem: Adult Inpatient Plan of Care  Goal: Plan of Care Review  Outcome: Ongoing, Progressing  Flowsheets (Taken 12/7/2021 1625)  Progress: declining  Plan of Care Reviewed With: patient     Problem: Adult Inpatient Plan of Care  Goal: Absence of Hospital-Acquired Illness or Injury  Intervention: Prevent Skin Injury  Recent Flowsheet Documentation  Taken 12/7/2021 1605 by Anusha Jones RN  Body Position:   turned   tilted, right  Skin Protection: incontinence pads utilized  Taken 12/7/2021 1420 by Anusha Jones RN  Body Position:   turned   tilted, left  Skin Protection: incontinence pads utilized  Taken 12/7/2021 1400 by Anusha Jones RN  Body Position:   turned   supine   supine, legs elevated  Taken 12/7/2021 1200 by Anusha Jones RN  Body Position:   turned   tilted, left  Skin Protection: incontinence pads utilized  Taken 12/7/2021 1000 by Anusha Jones RN  Body Position:   turned   supine  Skin Protection: incontinence pads utilized  Taken 12/7/2021 0920 by Anusha Jones RN  Body Position: tilted, right  Skin Protection: incontinence pads utilized  Taken 12/7/2021 0805 by Anusha Jones RN  Body Position: tilted, right  Skin Protection:   adhesive use limited   incontinence pads utilized

## 2021-12-07 NOTE — PROGRESS NOTES
Continued Stay Note  [facility]     Patient Name: Elizabeth Oliveira  MRN: [id number]  Today's Date: 12/7/2021    Admit Date: 12/2/2021     Discharge Plan     Row Name 12/07/21 1150       Plan    Plan To be determined    Plan Comments Visit made to pt, pt's niece Lara present. Pt alert, staff nurse present talking to pt. Noted that pt has had no prn medications in the past 24 hrs. Lara stated had not spoken with family as of yet regarding a plan of care for pt, is planning to do so this afternoon. Lara will contact this writer after talking with the family. Will continue to follow. Please call [phone] if can be of further assistance.               Discharge Codes    No documentation.                     Belen Austin RN

## 2021-12-08 NOTE — THERAPY TREATMENT NOTE
Patient Name: Elizabeth Oliveira  : 1929    MRN: [id number]                              Today's Date: 2021       Admit Date: 2021    Visit Dx:     ICD-10-CM ICD-9-CM   1. Oropharyngeal dysphagia  R13.12 787.22   2. Epistaxis  R04.0 784.7   3. Hypoxia  R09.02 799.02   4. Malignant neoplasm of lung, unspecified laterality, unspecified part of lung (HCC)  C34.90 162.9   5. Thrombocytopenia (HCC)  D69.6 287.5   6. Anemia, unspecified type  D64.9 285.9   7. Myeloproliferative disorder (HCC)  D47.1 238.79   8. History of COPD  Z87.09 V12.69   9. Aspiration pneumonitis (HCC)  J69.0 507.0     Patient Active Problem List   Diagnosis   • Fall   • Closed fracture of right femur (HCC)   • Arthritis   • Thrombocytopenia (HCC)   • Centrilobular emphysema (HCC)   • BAUMAN (dyspnea on exertion)   • Oxygen desaturation, exercise   • Acute on chronic respiratory failure with hypoxia (HCC)   • COPD (chronic obstructive pulmonary disease) (HCC)   • Myelodysplastic syndrome (HCC)   • Aspiration pneumonitis (HCC)   • Bronchitis   • Hypoxia   • Epistaxis   • Mass of right lung   • Severe malnutrition (CMS/HCC)     Past Medical History:   Diagnosis Date   • COPD (chronic obstructive pulmonary disease) (HCC)    • Depression    • Dyslipidemia    • Dyslipidemia    • IBS (irritable bowel syndrome)    • Macular degeneration    • Myelodysplastic syndrome (HCC)     anemia, thrombocytopemia   • Osteoarthritis    • Other spondylosis with radiculopathy, lumbar region      Past Surgical History:   Procedure Laterality Date   • CATARACT EXTRACTION, BILATERAL     • HIP SURGERY     • KNEE SURGERY     • REVISION OF TOTAL HIP FEMORAL Right 2017   • TOTAL HIP ARTHROPLASTY Right    • TOTAL HIP ARTHROPLASTY Left    • TOTAL KNEE ARTHROPLASTY Left           General Information     Row Name 21 1019          Physical Therapy Time and Intention    Document Type therapy note (daily note)  -AS     Mode of Treatment physical  therapy  -AS     Row Name 12/08/21 1019          General Information    Patient Profile Reviewed yes  -AS     Existing Precautions/Restrictions fall; oxygen therapy device and L/min  -AS     Barriers to Rehab medically complex; cognitive status  -AS     Row Name 12/08/21 1019          Cognition    Orientation Status (Cognition) oriented to; person; disoriented to; place; situation; time  -AS     Row Name 12/08/21 1019          Safety Issues, Functional Mobility    Safety Issues Affecting Function (Mobility) ability to follow commands; awareness of need for assistance; insight into deficits/self-awareness; judgment; problem-solving; safety precaution awareness; safety precautions follow-through/compliance  -AS     Impairments Affecting Function (Mobility) balance; cognition; endurance/activity tolerance; postural/trunk control; shortness of breath; strength  -AS     Cognitive Impairments, Mobility Safety/Performance insight into deficits/self-awareness; judgment; problem-solving/reasoning; safety precaution awareness; safety precaution follow-through  -AS     Comment, Safety Issues/Impairments (Mobility) patient very congested, elevated HOB and had patient attempt to cough. Nursing notified and aware. Patient possibly going to hospice later today.  -AS           User Key  (r) = Recorded By, (t) = Taken By, (c) = Cosigned By    Initials Name Provider Type    AS Carol Degroot PTA Physical Therapy Assistant               Mobility     Row Name 12/08/21 1022          Bed Mobility    Comment (Bed Mobility) patient refused EOB/OOB activity. Repositioned to HOB with bed elevated for comfort. Nursing notified and aware.  -AS     Row Name 12/08/21 1022          Transfers    Comment (Transfers) patient refused OOB activity  -AS     Row Name 12/08/21 1022          Bed-Chair Transfer    Bed-Chair [place] (Transfers) unable to assess  -AS     Row Name 12/08/21 1022          Sit-Stand Transfer    Sit-Stand [place]  (Transfers) unable to assess  -AS     Row Name 12/08/21 1022          Gait/Stairs (Locomotion)    [place] (Gait) unable to assess  -AS           User Key  (r) = Recorded By, (t) = Taken By, (c) = Cosigned By    Initials Name Provider Type    AS Carol Degroot PTA Physical Therapy Assistant               Obj/Interventions     Row Name 12/08/21 1023          Motor Skills    Therapeutic Exercise shoulder; knee; ankle  -AS     Row Name 12/08/21 1023          Shoulder (Therapeutic Exercise)    Shoulder PROM (Therapeutic Exercise) bilateral; flexion; extension; aBduction; aDduction  bicep curls  -AS     Row Name 12/08/21 1023          Knee (Therapeutic Exercise)    Knee (Therapeutic Exercise) PROM (passive range of motion)  -AS     Knee PROM (Therapeutic Exercise) bilateral; flexion; extension; supine; 10 repetitions  -AS     Row Name 12/08/21 1023          Ankle (Therapeutic Exercise)    Ankle (Therapeutic Exercise) AAROM (active assistive range of motion)  -AS     Ankle AROM (Therapeutic Exercise) bilateral; dorsiflexion; plantarflexion; supine; 10 repetitions  -AS           User Key  (r) = Recorded By, (t) = Taken By, (c) = Cosigned By    Initials Name Provider Type    AS Carol Degroot PTA Physical Therapy Assistant               Goals/Plan    No documentation.                Clinical Impression     Row Name 12/08/21 1024          Pain    Additional Documentation Pain Scale: FACES Pre/Post-Treatment (Group)  -AS     Row Name 12/08/21 1024          Pain Scale: FACES Pre/Post-Treatment    Pain: FACES Scale, Pretreatment 0-->no hurt  -AS     Posttreatment Pain Rating 2-->hurts little bit  moaning when repositioning L LE in waffle boot.  -AS     Row Name 12/08/21 1024          Plan of Care Review    Plan of Care Reviewed With patient  -AS     Progress declining  -AS     Outcome Summary patient not making progress towards all goals, PROM in all extremities. Deferred EOB/OOB activities due to  declining medical status. Will change therapy frequency to 3x a week until patient participation improves.  -AS     Row Name 12/08/21 1024          Positioning and Restraints    Pre-Treatment Position in bed  -AS     Post Treatment Position bed  -AS     In Bed supine; call light within reach; encouraged to call for assist; exit alarm on; with family/caregiver; notified nsg; waffle boots/both  -AS           User Key  (r) = Recorded By, (t) = Taken By, (c) = Cosigned By    Initials Name Provider Type    AS Carol Degroot PTA Physical Therapy Assistant               Outcome Measures     Row Name 12/08/21 1030          How much help from another person do you currently need...    Turning from your back to your side while in flat bed without using bedrails? 1  -AS     Moving from lying on back to sitting on the side of a flat bed without bedrails? 1  -AS     Moving to and from a bed to a chair (including a wheelchair)? 1  -AS     Standing up from a chair using your arms (e.g., wheelchair, bedside chair)? 1  -AS     Climbing 3-5 steps with a railing? 1  -AS     To walk in hospital room? 1  -AS     AM-PAC 6 Clicks Score (PT) 6  -AS     Row Name 12/08/21 1030          Functional Assessment    Outcome Measure Options AM-PAC 6 Clicks Basic Mobility (PT)  -AS           User Key  (r) = Recorded By, (t) = Taken By, (c) = Cosigned By    Initials Name Provider Type    AS Carol Degroot PTA Physical Therapy Assistant                             Physical Therapy Education                 Title: PT OT SLP Therapies (In Progress)     Topic: Physical Therapy (In Progress)     Point: Mobility training (In Progress)     Learning Progress Summary           Patient Acceptance, E, NR by AS at 12/8/2021 1031    Acceptance, E,D, NR by DM at 12/3/2021 1240                   Point: Home exercise program (In Progress)     Learning Progress Summary           Patient Acceptance, E, NR by AS at 12/8/2021 1031    Acceptance, E,D, NR by  DM at 12/3/2021 1240                   Point: Body mechanics (In Progress)     Learning Progress Summary           Patient Acceptance, E, NR by AS at 12/8/2021 1031    Acceptance, E,D, NR by DM at 12/3/2021 1240                   Point: Precautions (In Progress)     Learning Progress Summary           Patient Acceptance, E, NR by AS at 12/8/2021 1031    Acceptance, E,D, NR by DM at 12/3/2021 1240                               User Key     Initials Effective Dates Name Provider Type Discipline    DM 06/16/21 -  Belen Mendoza, PT Physical Therapist PT    AS 06/16/21 -  Carol Degroot PTA Physical Therapy Assistant PT              PT Recommendation and Plan     Plan of Care Reviewed With: patient  Progress: declining  Outcome Summary: patient not making progress towards all goals, PROM in all extremities. Deferred EOB/OOB activities due to declining medical status. Will change therapy frequency to 3x a week until patient participation improves.     Time Calculation:    PT Charges     Row Name 12/08/21 1031             Time Calculation    Start Time 1000  -AS      PT Received On 12/08/21  -AS      PT Goal Re-Cert Due Date 12/13/21  -AS              Timed Charges    36998 - PT Therapeutic Exercise Minutes 23  -AS              Total Minutes    Timed Charges Total Minutes 23  -AS       Total Minutes 23  -AS            User Key  (r) = Recorded By, (t) = Taken By, (c) = Cosigned By    Initials Name Provider Type    AS Carol Degroot PTA Physical Therapy Assistant              Therapy Charges for Today     Code Description Service Date Service Provider Modifiers Qty    01522864973 HC PT THER PROC EA 15 MIN 12/8/2021 Carol Degroot PTA GP 2    76556287861 HC PT THER SUPP EA 15 MIN 12/8/2021 Carol Degroot PTA GP 2          PT G-Codes  Outcome Measure Options: AM-PAC 6 Clicks Basic Mobility (PT)  AM-PAC 6 Clicks Score (PT): 6  AM-PAC 6 Clicks Score (OT): 8    Carol Degroot PTA  12/8/2021

## 2021-12-08 NOTE — PLAN OF CARE
Goal Outcome Evaluation:  Plan of Care Reviewed With: patient        Progress: declining   Pt has been sleeping most of the day. 2L O2. Pt is unable to cough sputum up. Tele d/c'ed. Transfer to hospice tomorrow? Family updated on plan of care.      Problem: Skin Injury Risk Increased  Goal: Skin Health and Integrity  Outcome: Ongoing, Progressing  Intervention: Optimize Skin Protection  Recent Flowsheet Documentation  Taken 12/8/2021 1636 by Anusha Jones RN  Pressure Reduction Techniques: frequent weight shift encouraged  Head of Bed (HOB): HOB at 30 degrees  Pressure Reduction Devices: pressure-redistributing mattress utilized  Skin Protection: adhesive use limited  Taken 12/8/2021 1400 by Anusha Jones RN  Pressure Reduction Techniques: frequent weight shift encouraged  Head of Bed (HOB): HOB at 30-45 degrees  Pressure Reduction Devices: pressure-redistributing mattress utilized  Skin Protection: adhesive use limited  Taken 12/8/2021 1200 by Anusha Jones RN  Pressure Reduction Techniques: frequent weight shift encouraged  Head of Bed (HOB): HOB at 30 degrees  Pressure Reduction Devices: pressure-redistributing mattress utilized  Skin Protection:   adhesive use limited   incontinence pads utilized  Taken 12/8/2021 0950 by Anusha Jones RN  Pressure Reduction Techniques: frequent weight shift encouraged  Head of Bed (HOB): HOB at 30-45 degrees  Pressure Reduction Devices: pressure-redistributing mattress utilized  Skin Protection: adhesive use limited  Taken 12/8/2021 0800 by Anusha Jones RN  Pressure Reduction Techniques: frequent weight shift encouraged  Head of Bed (HOB): HOB at 15 degrees  Pressure Reduction Devices: pressure-redistributing mattress utilized  Skin Protection: adhesive use limited     Problem: Fall Injury Risk  Goal: Absence of Fall and Fall-Related Injury  Outcome: Ongoing, Progressing  Intervention: Identify and Manage Contributors to Fall Injury Risk  Recent Flowsheet  Documentation  Taken 12/8/2021 1636 by Anusha Jones RN  Medication Review/Management: medications reviewed  Taken 12/8/2021 1400 by Anusha Jones RN  Medication Review/Management: medications reviewed  Taken 12/8/2021 1200 by Anusha Jones RN  Medication Review/Management: medications reviewed  Taken 12/8/2021 0800 by Anusha Jones RN  Medication Review/Management: medications reviewed  Intervention: Promote Injury-Free Environment  Recent Flowsheet Documentation  Taken 12/8/2021 1636 by Anusha Jones RN  Safety Promotion/Fall Prevention:   assistive device/personal items within reach   activity supervised   clutter free environment maintained   fall prevention program maintained   lighting adjusted   room organization consistent   safety round/check completed   nonskid shoes/slippers when out of bed  Taken 12/8/2021 1400 by Anusha Jones RN  Safety Promotion/Fall Prevention:   activity supervised   assistive device/personal items within reach   fall prevention program maintained   clutter free environment maintained   lighting adjusted   muscle strengthening facilitated   nonskid shoes/slippers when out of bed   room organization consistent   safety round/check completed  Taken 12/8/2021 1200 by Anusha Jones RN  Safety Promotion/Fall Prevention:   activity supervised   assistive device/personal items within reach   clutter free environment maintained   fall prevention program maintained   lighting adjusted   muscle strengthening facilitated   nonskid shoes/slippers when out of bed   safety round/check completed   room organization consistent  Taken 12/8/2021 0800 by Anusha Jones RN  Safety Promotion/Fall Prevention:   activity supervised   assistive device/personal items within reach   clutter free environment maintained   fall prevention program maintained   lighting adjusted   nonskid shoes/slippers when out of bed   muscle strengthening facilitated   room organization consistent    safety round/check completed     Problem: Gas Exchange Impaired  Goal: Optimal Gas Exchange  Outcome: Ongoing, Progressing  Intervention: Optimize Oxygenation and Ventilation  Recent Flowsheet Documentation  Taken 12/8/2021 1636 by Anusha Jones RN  Head of Bed ([facility]): HOB at 30 degrees  Taken 12/8/2021 1400 by Anusha Jones RN  Head of Bed (HOB): HOB at 30-45 degrees  Taken 12/8/2021 1200 by Anusha Jones RN  Head of Bed (HOB): HOB at 30 degrees  Taken 12/8/2021 0950 by Anusha Jones RN  Head of Bed (HOB): HOB at 30-45 degrees  Taken 12/8/2021 0800 by Anusha Jones RN  Head of Bed (HOB): HOB at 15 degrees

## 2021-12-08 NOTE — PLAN OF CARE
VSS on 3.5L nc.  NSR per tele.  Pt had a weak, congested cough at the beginning of the shift.  Pt fell asleep and has been difficult to rouse, sleeps through T&R.  No signs of pain or distress.  Will continue to monitor.     Problem: Adult Inpatient Plan of Care  Goal: Plan of Care Review  Outcome: Ongoing, Not Progressing  Goal: Patient-Specific Goal (Individualized)  Outcome: Ongoing, Not Progressing  Goal: Absence of Hospital-Acquired Illness or Injury  Outcome: Ongoing, Not Progressing  Intervention: Identify and Manage Fall Risk  Recent Flowsheet Documentation  Taken 12/8/2021 0400 by Vandana Jones, RN  Safety Promotion/Fall Prevention:   activity supervised   assistive device/personal items within reach   clutter free environment maintained   fall prevention program maintained   nonskid shoes/slippers when out of bed   room organization consistent   safety round/check completed  Taken 12/8/2021 0200 by Vandana Jones, RN  Safety Promotion/Fall Prevention:   activity supervised   assistive device/personal items within reach   clutter free environment maintained   fall prevention program maintained   nonskid shoes/slippers when out of bed   room organization consistent   safety round/check completed  Taken 12/8/2021 0000 by Vandana Jones, RN  Safety Promotion/Fall Prevention:   activity supervised   assistive device/personal items within reach   clutter free environment maintained   fall prevention program maintained   nonskid shoes/slippers when out of bed   room organization consistent   safety round/check completed  Taken 12/7/2021 2200 by Vandana Jones, RN  Safety Promotion/Fall Prevention:   activity supervised   assistive device/personal items within reach   clutter free environment maintained   fall prevention program maintained   nonskid shoes/slippers when out of bed   room organization consistent   safety round/check completed  Taken 12/7/2021 2000 by Vandana Jones, RN  Safety Promotion/Fall  Prevention:   activity supervised   assistive device/personal items within reach   clutter free environment maintained   fall prevention program maintained   nonskid shoes/slippers when out of bed   room organization consistent   safety round/check completed  Intervention: Prevent Skin Injury  Recent Flowsheet Documentation  Taken 12/8/2021 0400 by Vandana Jones RN  Body Position: weight shift assistance provided  Skin Protection:   adhesive use limited   tubing/devices free from skin contact   incontinence pads utilized   skin-to-device areas padded  Taken 12/8/2021 0200 by Vandana Jones RN  Body Position: turned  Skin Protection:   adhesive use limited   incontinence pads utilized   tubing/devices free from skin contact   skin-to-device areas padded  Taken 12/8/2021 0000 by Vandana Jones RN  Body Position: weight shift assistance provided  Skin Protection:   adhesive use limited   incontinence pads utilized   tubing/devices free from skin contact   skin-to-device areas padded  Taken 12/7/2021 2200 by Vandana Jones RN  Body Position: weight shift assistance provided  Skin Protection:   adhesive use limited   incontinence pads utilized   other (see comments)   skin-to-device areas padded  Taken 12/7/2021 2000 by Vandana Jones RN  Body Position: turned  Skin Protection:   adhesive use limited   incontinence pads utilized   tubing/devices free from skin contact   skin sealant/moisture barrier applied   skin-to-device areas padded  Intervention: Prevent and Manage VTE (venous thromboembolism) Risk  Recent Flowsheet Documentation  Taken 12/7/2021 2000 by Vandana Jones RN  VTE Prevention/Management: sequential compression devices on  Intervention: Prevent Infection  Recent Flowsheet Documentation  Taken 12/8/2021 0400 by Vandana Jones RN  Infection Prevention:   environmental surveillance performed   visitors restricted/screened   rest/sleep promoted   single patient room provided   personal  protective equipment utilized  Taken 12/8/2021 0200 by Vandana Jones RN  Infection Prevention:   environmental surveillance performed   visitors restricted/screened   single patient room provided   rest/sleep promoted   personal protective equipment utilized  Taken 12/8/2021 0000 by Vandana Jones RN  Infection Prevention:   environmental surveillance performed   visitors restricted/screened   single patient room provided   rest/sleep promoted   personal protective equipment utilized  Taken 12/7/2021 2200 by Vandana Jones RN  Infection Prevention:   environmental surveillance performed   visitors restricted/screened   single patient room provided   rest/sleep promoted   personal protective equipment utilized  Taken 12/7/2021 2000 by Vandana Jones RN  Infection Prevention:   environmental surveillance performed   visitors restricted/screened   single patient room provided   rest/sleep promoted   personal protective equipment utilized  Goal: Optimal Comfort and Wellbeing  Outcome: Ongoing, Not Progressing  Intervention: Provide Person-Centered Care  Recent Flowsheet Documentation  Taken 12/7/2021 2000 by Vandana Jones RN  Trust Relationship/Rapport:   choices provided   care explained  Goal: Readiness for Transition of Care  Outcome: Ongoing, Not Progressing     Problem: Skin Injury Risk Increased  Goal: Skin Health and Integrity  Outcome: Ongoing, Not Progressing  Intervention: Optimize Skin Protection  Recent Flowsheet Documentation  Taken 12/8/2021 0400 by Vandana Jones RN  Pressure Reduction Techniques:   weight shift assistance provided   pressure points protected   heels elevated off bed   frequent weight shift encouraged  Head of Bed (HOB): HOB at 15 degrees  Pressure Reduction Devices:   pressure-redistributing mattress utilized   positioning supports utilized   heel offloading device utilized   foam padding utilized  Skin Protection:   adhesive use limited   tubing/devices free from skin  contact   incontinence pads utilized   skin-to-device areas padded  Taken 12/8/2021 0200 by Vandana Jones RN  Pressure Reduction Techniques:   weight shift assistance provided   pressure points protected   heels elevated off bed  Head of Bed (HOB): HOB at 15 degrees  Pressure Reduction Devices:   pressure-redistributing mattress utilized   positioning supports utilized   heel offloading device utilized   foam padding utilized  Skin Protection:   adhesive use limited   incontinence pads utilized   tubing/devices free from skin contact   skin-to-device areas padded  Taken 12/8/2021 0000 by Vandana Jones RN  Pressure Reduction Techniques:   weight shift assistance provided   pressure points protected   heels elevated off bed  Head of Bed (HOB): HOB at 20-30 degrees  Pressure Reduction Devices:   pressure-redistributing mattress utilized   positioning supports utilized   heel offloading device utilized   foam padding utilized  Skin Protection:   adhesive use limited   incontinence pads utilized   tubing/devices free from skin contact   skin-to-device areas padded  Taken 12/7/2021 2200 by Vandana Jones RN  Pressure Reduction Techniques:   sit time limited to 2 hours   pressure points protected   heels elevated off bed  Head of Bed (HOB): HOB at 20-30 degrees  Pressure Reduction Devices:   pressure-redistributing mattress utilized   positioning supports utilized   heel offloading device utilized   foam padding utilized  Skin Protection:   adhesive use limited   incontinence pads utilized   other (see comments)   skin-to-device areas padded  Taken 12/7/2021 2000 by Vandana Jones RN  Pressure Reduction Techniques:   weight shift assistance provided   pressure points protected   heels elevated off bed  Head of Bed (HOB): HOB at 20-30 degrees  Pressure Reduction Devices:   pressure-redistributing mattress utilized   positioning supports utilized   heel offloading device utilized   foam padding utilized  Skin  Protection:   adhesive use limited   incontinence pads utilized   tubing/devices free from skin contact   skin sealant/moisture barrier applied   skin-to-device areas padded     Problem: Fall Injury Risk  Goal: Absence of Fall and Fall-Related Injury  Outcome: Ongoing, Not Progressing  Intervention: Identify and Manage Contributors to Fall Injury Risk  Recent Flowsheet Documentation  Taken 12/8/2021 0400 by Vandana Jones RN  Medication Review/Management: medications reviewed  Taken 12/8/2021 0200 by Vandana Jones RN  Medication Review/Management: medications reviewed  Taken 12/8/2021 0000 by Vandana Jones RN  Medication Review/Management: medications reviewed  Taken 12/7/2021 2200 by Vandana Jones RN  Medication Review/Management: medications reviewed  Taken 12/7/2021 2000 by Vandana Jones RN  Medication Review/Management: medications reviewed  Intervention: Promote Injury-Free Environment  Recent Flowsheet Documentation  Taken 12/8/2021 0400 by Vandana Jones RN  Safety Promotion/Fall Prevention:   activity supervised   assistive device/personal items within reach   clutter free environment maintained   fall prevention program maintained   nonskid shoes/slippers when out of bed   room organization consistent   safety round/check completed  Taken 12/8/2021 0200 by Vandana Jones RN  Safety Promotion/Fall Prevention:   activity supervised   assistive device/personal items within reach   clutter free environment maintained   fall prevention program maintained   nonskid shoes/slippers when out of bed   room organization consistent   safety round/check completed  Taken 12/8/2021 0000 by Vandana Jones RN  Safety Promotion/Fall Prevention:   activity supervised   assistive device/personal items within reach   clutter free environment maintained   fall prevention program maintained   nonskid shoes/slippers when out of bed   room organization consistent   safety round/check completed  Taken 12/7/2021  2200 by Robert, Vandana, RN  Safety Promotion/Fall Prevention:   activity supervised   assistive device/personal items within reach   clutter free environment maintained   fall prevention program maintained   nonskid shoes/slippers when out of bed   room organization consistent   safety round/check completed  Taken 12/7/2021 2000 by Vandana Jones RN  Safety Promotion/Fall Prevention:   activity supervised   assistive device/personal items within reach   clutter free environment maintained   fall prevention program maintained   nonskid shoes/slippers when out of bed   room organization consistent   safety round/check completed     Problem: COPD Comorbidity  Goal: Maintenance of COPD Symptom Control  Outcome: Ongoing, Not Progressing  Intervention: Maintain COPD-Symptom Control  Recent Flowsheet Documentation  Taken 12/8/2021 0400 by Vandana Jones RN  Medication Review/Management: medications reviewed  Taken 12/8/2021 0200 by Vandana Jones RN  Medication Review/Management: medications reviewed  Taken 12/8/2021 0000 by Vandana Jones RN  Medication Review/Management: medications reviewed  Taken 12/7/2021 2200 by Vandana Jones RN  Medication Review/Management: medications reviewed  Taken 12/7/2021 2000 by Vandana Jones RN  Medication Review/Management: medications reviewed     Problem: Gas Exchange Impaired  Goal: Optimal Gas Exchange  Outcome: Ongoing, Not Progressing  Intervention: Optimize Oxygenation and Ventilation  Recent Flowsheet Documentation  Taken 12/8/2021 0400 by Vandana Jones RN  Head of Bed (HOB): HOB at 15 degrees  Taken 12/8/2021 0200 by Vandana Jones RN  Head of Bed (HOB): HOB at 15 degrees  Taken 12/8/2021 0000 by Vandana Jones RN  Head of Bed (HOB): HOB at 20-30 degrees  Taken 12/7/2021 2200 by Vandana Jones RN  Head of Bed (HOB): HOB at 20-30 degrees  Taken 12/7/2021 2000 by Vandana Jones RN  Head of Bed (HOB): HOB at 20-30 degrees     Problem: Palliative Care  Goal:  Enhanced Quality of Life  Outcome: Ongoing, Not Progressing

## 2021-12-08 NOTE — PLAN OF CARE
Goal Outcome Evaluation:  Plan of Care Reviewed With: patient        Progress: declining  Outcome Summary: patient not making progress towards all goals, PROM in all extremities. Deferred EOB/OOB activities due to declining medical status. Will change therapy frequency to 3x a week until patient participation improves.

## 2021-12-08 NOTE — PROGRESS NOTES
[facility] Medicine Services  PROGRESS NOTE    Patient Name: Elizabeth Oliveira  : 1929  MRN: [id number]    Date of Admission: 2021  Primary Care Physician: Azeem Merchant MD    Subjective     CC: f/u anemia / thrombocytopenia, fever     HPI:  In bed. Minimally interactive. O2 requirements have increased.   Niece, Lara, and nephew, Chan, at bedside. We discussed comfort measures in detail and both were in agreement.     ROS:  Unable to obtain complete or reliable ROS due to mental status     Objective     Vital Signs:   Temp:  [96.3 °F (35.7 °C)-98.6 °F (37 °C)] 98.6 °F (37 °C)  Heart Rate:  [77-91] 78  Resp:  [16-20] 17  BP: (111-124)/(55-71) 117/63  Flow (L/min):  [2-3.5] 3     Physical Exam:  Constitutional: No acute distress. Minimally conversant. Ill-appearing   HENT: NCAT, mucous membranes moist  Respiratory: Coarse breath sounds bilaterally, wet-sounding cough. Normal respiratory effort on 3.5L NC   Cardiovascular: RRR, no murmurs, rubs, or gallops  Gastrointestinal: Positive bowel sounds, soft, nontender, nondistended  Musculoskeletal: No bilateral ankle edema  Psychiatric: Calm, cooperative with exam   Neurologic: Moves all extremities spontaneously without focal deficits, speech clear and coherent     Results Reviewed:  LAB RESULTS:      Lab 21  1146 21  0628 21  2032 21  0410 21  1510 21  0327 21  1247 21  1247   WBC 4.09 3.16*  --  3.79 3.12* 3.07*   < > 4.35   HEMOGLOBIN 8.6* 8.7*  --  7.4* 7.1* 7.3*   < > 10.1*   HEMATOCRIT 27.0* 26.8*  --  23.0* 21.5* 23.4*   < > 31.3*   PLATELETS 60* 69*  --  127* 125* 135*   < > 14*   NEUTROS ABS  --  2.05  --  2.99 2.22 2.14  --  3.58   IMMATURE GRANS (ABS)  --   --   --  0.01 0.01 0.03  --  0.03   LYMPHS ABS  --   --   --  0.35* 0.54* 0.55*  --  0.42*   MONOS ABS  --   --   --  0.39 0.33 0.30  --  0.30   EOS ABS  --  0.00  --  0.05 0.02 0.05  --  0.01   .4*  107.6*  --  108.0* 108.0* 112.0*   < > 109.8*   PROCALCITONIN  --  0.48*  --  0.09  --   --   --   --    LACTATE  --  1.2 0.7  --   --   --   --   --    LDH  --   --   --  181  --   --   --   --    PROTIME  --   --   --   --   --   --   --  13.8    < > = values in this interval not displayed.         Lab 12/06/21  0628 12/04/21  0410 12/03/21  0327 12/02/21  1247   SODIUM 142 139 142 141   POTASSIUM 4.1 4.1 4.4 4.5   CHLORIDE 102 102 104 101   CO2 30.0* 27.0 27.0 29.0   ANION GAP 10.0 10.0 11.0 11.0   BUN 19 12 16 23   CREATININE 0.57 0.50* 0.42* 0.55*   GLUCOSE 128* 90 82 118*   CALCIUM 9.2 8.6 9.0 9.7*   MAGNESIUM 1.8  --   --   --          Lab 12/04/21  0410 12/02/21  1247   TOTAL PROTEIN 5.5* 7.1   ALBUMIN 3.10* 3.90   GLOBULIN 2.4 3.2   ALT (SGPT) 8 8   AST (SGOT) 12 12   BILIRUBIN 0.5 0.6   ALK PHOS 75 86         Lab 12/02/21  1247   PROBNP 648.8   TROPONIN T <0.010   PROTIME 13.8   INR 1.10         Lab 12/04/21  1709 12/04/21  0410 12/02/21  1533   IRON  --  32*  --    IRON SATURATION  --  13*  --    TIBC  --  238*  --    TRANSFERRIN  --  160*  --    FERRITIN  --  114.90  --    FOLATE 13.70  --   --    VITAMIN B 12 >2,000*  --   --    ABO TYPING  --   --  O   RH TYPING  --   --  Negative   ANTIBODY SCREEN  --   --  Negative     Brief Urine Lab Results  (Last result in the past 365 days)      Color   Clarity   Blood   Leuk Est   Nitrite   Protein   CREAT   Urine HCG        12/06/21 1808 Yellow   Cloudy   Negative   Small (1+)   Negative   Trace               Microbiology Results Abnormal     Procedure Component Value - Date/Time    Urine Culture - Urine, Urine, Clean Catch [[id number]]  (Normal) Collected: 12/06/21 1808    Lab Status: Preliminary result Specimen: Urine, Clean Catch Updated: 12/08/21 0745     Urine Culture Culture in progress    Blood Culture - Blood, Hand, Right [[id number]]  (Normal) Collected: 12/06/21 0600    Lab Status: Preliminary result Specimen: Blood from Hand, Right Updated: 12/08/21  0700     Blood Culture No growth at 2 days    Blood Culture - Blood, Hand, Left [[id number]]  (Normal) Collected: 12/06/21 0610    Lab Status: Preliminary result Specimen: Blood from Hand, Left Updated: 12/08/21 0700     Blood Culture No growth at 2 days    Blood Culture - Blood, Hand, Right [[id number]]  (Normal) Collected: 12/04/21 2031    Lab Status: Preliminary result Specimen: Blood from Hand, Right Updated: 12/07/21 2146     Blood Culture No growth at 3 days    Narrative:      Aerobic Only    COVID-19, APTIMA PANTHER [facility]ARRO IN-HOUSE NP/OP SWAB IN UTM/VTM/SALINE TRANSPORT MEDIA 24HR TAT - Swab, Oropharynx [[id number]]  (Normal) Collected: 12/02/21 1736    Lab Status: Final result Specimen: Swab from Oropharynx Updated: 12/02/21 2135     COVID19 Not Detected    Narrative:      Fact sheet for providers: https://www.fda.gov/media/856457/download     Fact sheet for patients: https://www.fda.gov/media/825195/download    Test performed by RT PCR.        No radiology results from the last 24 hrs  Results for orders placed during the hospital encounter of 01/22/21    Adult Transthoracic Echo Complete W/ Cont if Necessary Per Protocol    Interpretation Summary  · Estimated left ventricular EF = 55%  · Aortic sclerosis without significant stenosis    I have reviewed the medications:  Scheduled Meds:cefTRIAXone, 1 g, Intravenous, Q24H  doxycycline, 100 mg, Intravenous, Q12H  gabapentin, 300 mg, Oral, BID  ipratropium-albuterol, 3 mL, Nebulization, 4x Daily - RT  LORazepam, 0.25 mg, Intravenous, Q12H  Scopolamine, 1 patch, Transdermal, Q72H  sodium chloride, 10 mL, Intravenous, Q12H      Continuous Infusions:   PRN Meds:.•  acetaminophen  •  acetaminophen  •  albuterol  •  bisacodyl  •  bisacodyl  •  calcium carbonate  •  glycopyrrolate  •  haloperidol lactate  •  LORazepam  •  Morphine  •  ondansetron  •  sodium chloride  •  sodium chloride  •  sodium chloride    Assessment & Plan     Active Hospital Problems    Diagnosis  POA    • **Acute on chronic respiratory failure with hypoxia (HCC) [J96.21]  Yes   • Severe malnutrition (CMS/HCC) [E43]  Yes   • Epistaxis [R04.0]  Unknown   • Mass of right lung [R91.8]  Unknown   • COPD (chronic obstructive pulmonary disease) (HCC) [J44.9]  Yes   • Myelodysplastic syndrome (HCC) [D46.9]  Yes   • BAUMAN (dyspnea on exertion) [R06.00]  Yes   • Thrombocytopenia (HCC) [D69.6]  Yes      Resolved Hospital Problems   No resolved problems to display.     Brief Hospital Course to date:  Ms. Elizabeth Oliveira is a 89yoF with PMH significant for chronic respiratory failure secondary to COPD (baseline 2L NC), chronic arthritis, neuropathy and presumed myeloproliferative disease (never worked up or evaluated by hematology). She presented to [facility] ED on 12/2/21 for evaluation of acute epistaxis. She was found to have severe thrombocytopenia and hypoxemia worse than baseline.     Acute blood loss anemia secondary to severe epistaxis, resolved   Severe thrombocytopenia  Presumed myelodysplastic syndrome  · Patient presented with severe epistaxis and blood loss anemia  · Hgb 10.1 on admission, acutely worsened to 7.3 on 12/3 and has since stabilized  · Platelets 14 on admission, s/p 2 units platelets on 12/2  · Continue to monitor Hgb and platelets (transfuse for Hgb < 7.0 and platelets < 20K)  · Appreciate heme/onc evaluation. Not recommending bone marrow biopsy given current trajectory  Transitioning to comfort measures today. DC telemetry, no routine lab checks, will continue pulse oximetry per family request    Acute on chronic hypoxic respiratory failure  COPD with chronic hypoxic respiratory failure  Right upper lung mass 19x10 mm concerning for malignancy given history of smoking   · Pulmonology consulted per POA's request - recommended against biopsy or other aggressive measures such as radiation or chemotherapy due to risk of pneumothorax and clinical worsening given her baseline functional  status    · Continue scheduled nebs  · O2 requirements increasing     Fever  - Had fever 100-101 x 3 occurrences.   - Procalcitonin slightly worse at 0.48  - 12/6 CXR concerning for right-sided pneumonia  - 12/6 UA concerning for UTI, follow culture  - Continue IV Rocephin / Doxy x 5 days    Malnutrition with cachexia  · RD following     Acute debility  · PT/OT following - [facility]. Niece/POA interested in long-term care placement    GOALS OF CARE: Appreciate palliative care evaluation. CODE status has been changed to DNR/DNI. She has been transitioned to comfort measures. Hospice following, I anticipate she will qualify for inpatient hospice services soon      DVT prophylaxis:Mechanical DVT prophylaxis orders are present.     AM-PAC 6 Clicks Score (PT): 6 (12/08/21 1030)    Disposition: I expect the patient to be discharged TBD    CODE STATUS:   Code Status and Medical Interventions:   Ordered at: 12/06/21 1374     Medical Intervention Limits:    NO intubation (DNI)     Level Of Support Discussed With:    Health Care Surrogate     Code Status (Patient has no pulse and is not breathing):    No CPR (Do Not Attempt to Resuscitate)     Medical Interventions (Patient has pulse or is breathing):    Limited Support     Comments:    Changes to reflect CODE status discussion 12/5 by Dr. Elham Rojas, please see note for details     Deloris Norman PA-C  12/08/21

## 2021-12-08 NOTE — NURSING NOTE
Daily Low Donal <=14    Donal score: 12    At this time the patient's RN reports no new skin issues or needs.  Patient is trending towards hospice.  Interventions in place. Continue to provide good general skin care. Apply barrier cream daily/ PRN. Keep patient dry and turn regularly.  Elevate and offload heels. The patient is on a waffle mattress at this time.     Please contact [facility] if new skin issues arise.

## 2021-12-08 NOTE — PLAN OF CARE
Goal Outcome Evaluation:  Plan of Care Reviewed With: other (see comments) (nursing)        Progress: no change   POC reviewed with nursing.  Pt. Continues to have weak, congested non-productive cough.  Pt. Asleep on 3LNC.  Pt. Did not have any visible symptoms of discomfort during visit.  No needs for pt. From palliative at this time per nursing.  Outcome Summary: No family at BS at time of visit.  Palliative care following for support, POC and symptom management.      [facility] Palliative Team Conference: CYNTHIA Zabala RN, CHPN; GABBIE Erazo DO; EUGENIO Barnes, MARGARITAW, [facility]-; EUGENIO Clinton, APRN; DAYA Rangel RN, CHPN; JABIER Austin, HERNÁN, CHPN; GONZALO Cerna, APRN; EUNICE Troncoso RN, CHPN

## 2021-12-09 PROBLEM — J96.20 ACUTE ON CHRONIC RESPIRATORY FAILURE (HCC): Status: ACTIVE | Noted: 2021-01-01

## 2021-12-09 NOTE — H&P
"Hospice H&P     Attending Provider: Siena Rojas MD    Code Status:   Code Status and Medical Interventions:   Ordered at: 12/09/21 1631     Code Status (Patient has no pulse and is not breathing):    No CPR (Do Not Attempt to Resuscitate)     Medical Interventions (Patient has pulse or is breathing):    Comfort Measures      CC: \"help me\"    Present during visit: Lara Poole (niece/POA/HCS), pt.   Discussed with hospice IDT - Bárbara RN, Tianna AVILAW, Louise Cerna DNP    Subjective   HPI:   Elizabeth Oliveira is a 89 yo F with acute on chronic respiratory failure.  Related diagnosis: COPD, Myeloproliferative disease, chronic arthritis with neuropathy, protein calorie malnutrition (alb 3.1), RUL lung mass, dysphagia.    She presented to the [facility] ED on 12/02/2021 due to severe epistaxis.   She has myeloproliferative disorder (previously not evaluated or treated)  with platelets 14K at time of admission.  Hgb dropped from 10 to 7.3 due to epistaxis.  She did require platelet transfusion and PRBCs for acute blood loss anemia.  CXR  showed RUL 1.9 cm x 1 cm stellate lung mass concerning for malignancy.   She did have Pulmonary consult who did not recommend biopsy due to comorbidities.  She is not interested in further treatment such as radiation or chemotherapy.        Increased work of breathing today.  She has been treated for COPD exacerbation and right-sided PNA and UTI. She continued to decline during hospitalization despite antibiotics and treatment.  She has been on O2 at home for several years.  Weak nonproductive cough today and increased work of breathing.  c/o pain with touch to BLE or any movement.       Patient lived alone independently in her home prior to hospitalization.  She had someone coming 3 days per week to assist with meals from 5-7 PM.       She has had minimal oral intake according to nursing staff.  No meds or food intake today.  MBS done 12/4 showed moderate, oral dysphagia, pharyngeal " dysphagia.   SLP Diet Recommendation: puree, nectar thick liquids      H/o hip replacement and has always reported mild pain per niece. When asked today if she was ok, she shook her head no.  Unable to provide specifics why she is not feeling well or how I can help her.       ROS: Pt unable to answer. History per niece or chart.   General - no food or medication oral intake today, decreased verbalization  pulm - increased work of breathing, weak cough  Telemetry - NSR        Past Medical History:   Diagnosis Date   • COPD (chronic obstructive pulmonary disease) (HCC)    • Depression    • Dyslipidemia    • Dyslipidemia    • IBS (irritable bowel syndrome)    • Macular degeneration    • Myelodysplastic syndrome (HCC)     anemia, thrombocytopemia   • Osteoarthritis    • Other spondylosis with radiculopathy, lumbar region      Past Surgical History:   Procedure Laterality Date   • CATARACT EXTRACTION, BILATERAL     • HIP SURGERY     • KNEE SURGERY     • REVISION OF TOTAL HIP FEMORAL Right 2017   • TOTAL HIP ARTHROPLASTY Right    • TOTAL HIP ARTHROPLASTY Left    • TOTAL KNEE ARTHROPLASTY Left          Social History     Socioeconomic History   • Marital status: Single   Tobacco Use   • Smoking status: Former Smoker     Packs/day: 1.50     Years: 40.00     Pack years: 60.00     Types: Cigarettes     Quit date:      Years since quittin.9   • Smokeless tobacco: Never Used   Substance and Sexual Activity   • Alcohol use: Yes     Alcohol/week: 7.0 standard drinks     Types: 7 Shots of liquor per week     Comment: COCKTAIL EVERY NIGHT   • Drug use: No   • Sexual activity: Defer   SHX (cont):  Never , no children.    Niece (Lara) is POA/HCS.    Sister (niece Lara's mother)  10/2020 of Lewy body dementia. Nephew (Lara's brother)  recently of overdose.    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  Kimberly 1-2  drinks per day    Family History   Problem Relation Age of Onset   • Heart failure Mother    • Leukemia Father    FHX (cont): Lewy body dementia - sister    Allergies   Allergen Reactions   • Cortisone        Current Facility-Administered Medications   Medication Dose Route Frequency Provider Last Rate Last Admin   • [START ON 12/10/2021] acetaminophen (TYLENOL) suppository 650 mg  650 mg Rectal Q4H PRN Louise Cerna, APRN       • albuterol (PROVENTIL) nebulizer solution 0.083% 2.5 mg/3mL  2.5 mg Nebulization Q6H PRN Louise Cerna, APRN       • bisacodyl (DULCOLAX) suppository 10 mg  10 mg Rectal Daily PRN Louise Cerna, APRN       • furosemide (LASIX) injection 20 mg  20 mg Intravenous Q6H PRN Louise Cerna, APRN       • gabapentin (NEURONTIN) capsule 300 mg  300 mg Oral BID Louise Cerna, APRN       • glycopyrrolate (ROBINUL) injection 0.2 mg  0.2 mg Intravenous Q6H PRN Louise Cerna, APRN       • haloperidol lactate (HALDOL) injection 1 mg  1 mg Intravenous Q4H PRN Louise Cerna, APRN       • ipratropium-albuterol (DUO-NEB) nebulizer solution 3 mL  3 mL Nebulization Q4H PRN Louise Cerna, APRN       • ketorolac (TORADOL) injection 15 mg  15 mg Intravenous Q6H PRN Louise Cerna, APRN       • LORazepam (ATIVAN) injection 0.25 mg  0.25 mg Intravenous Q12H Louise Cerna, APRN       • LORazepam (ATIVAN) injection 0.5 mg  0.5 mg Intravenous Q4H PRN Louise Cerna, APRN   0.5 mg at 12/09/21 1643   • morphine injection 1 mg  1 mg Intravenous Q6H Louise Cerna, APRN   1 mg at 12/09/21 1631   • morphine injection 1 mg  1 mg Intravenous Q1H PRN Louise Cerna, APRN        Or   • morphine injection 2 mg  2 mg Intravenous Q1H PRN Louise Cerna, APRN       • ondansetron (ZOFRAN) injection 4 mg  4 mg Intravenous Q6H PRN Louise Cerna, APRN       • palliative care oral rinse   Mouth/Throat PRN Louise Cerna, CAMPBELL       • palliative care oral rinse    Mouth/Throat Q6H Louise Cerna APRN       • polyvinyl alcohol (LIQUIFILM) 1.4 % ophthalmic solution 2 drop  2 drop Both Eyes Q1H PRN Louise Cerna APRN       • [START ON 12/11/2021] scopolamine patch 1 mg/72 hr  1 patch Transdermal Q72H Louise Cerna APRN       • scopolamine patch 1 mg/72 hr  1 patch Transdermal Q72H PRN Louise Cerna APRN         Facility-Administered Medications Ordered in Other Encounters   Medication Dose Route Frequency Provider Last Rate Last Admin   • bupivacaine PF (MARCAINE) 0.25 % injection    PRN Monico Herbert CRNA   40 mL at 07/17/17 1625          Objective     LMP  (LMP Unknown)     Intake/Output Summary (Last 24 hours) at 12/9/2021 1702  Last data filed at 12/8/2021 1800  Gross per 24 hour   Intake --   Output 800 ml   Net -800 ml       PPS: 10%  Physical Exam:       General Appearance:   lying in bed, HOB mildly elevated, frail, increased work of breathing at rest, voice barely audible   Head:   Normocephalic,  Frontal temporal wasting   Eyes:           Lids and lashes normal, conjunctivae and sclerae normal, no  icterus, EOMI, decreased vision due to MD   Ears:   Ears appear intact with no abnormalities noted, hearing grossly normal   Throat:  No oral lesions, no thrush, oral mucosa dry   Neck:  No adenopathy, supple, trachea midline, no thyromegaly,  no JVD   Back:    kyphosis present   Lungs:     Decreased breath sounds and decreased air movt bilateral bases, coarse rales left, no wheezes, accessory muscle use for resp effort    Heart:   Regular, no m,r,g   Breast Exam:   Deferred   Abdomen:   Minimal bowel sounds, no masses, no organomegaly, soft        guarding to abd palpation   Genitalia:   Deferred   Extremities:  grimace to palp BLE, changes c/w chronic edema with hemosiderin staining but now improved swelling with skin fold prominent          Skin:  multiple bruises due to needle sticks, no mottling   Lymph nodes:  No palpable adenopathy cervical and  supraclavicular     Psych:              No agitation, withdrawn, eyes closed most of visit   Neurologic:     Face appears symmetric, minimal spontaneous movement, voice barely audible, unable to provide hx           Labs:   Results from last 7 days   Lab Units 12/07/21  1146 12/06/21  0628 12/06/21 0628 12/04/21 0410 12/04/21  0410   WBC 10*3/mm3 4.09   < > 3.16*   < > 3.79   HEMOGLOBIN g/dL 8.6*   < > 8.7*   < > 7.4*   HEMATOCRIT % 27.0*   < > 26.8*   < > 23.0*   PLATELETS 10*3/mm3 60*   < > 69*   < > 127*   SODIUM mmol/L  --   --  142   < > 139   POTASSIUM mmol/L  --   --  4.1   < > 4.1   CHLORIDE mmol/L  --   --  102   < > 102   CO2 mmol/L  --   --  30.0*   < > 27.0   BUN mg/dL  --   --  19   < > 12   CREATININE mg/dL  --   --  0.57   < > 0.50*   CALCIUM mg/dL  --   --  9.2   < > 8.6   BILIRUBIN mg/dL  --   --   --   --  0.5   ALK PHOS U/L  --   --   --   --  75   ALT (SGPT) U/L  --   --   --   --  8   AST (SGOT) U/L  --   --   --   --  12   GLUCOSE mg/dL  --   --  128*   < > 90    < > = values in this interval not displayed.     Imaging Results (Last 72 Hours)     ** No results found for the last 72 hours. **          Diagnostics/Clinical Data: Reviewed    Assessment/Plan    Patient Active Problem List   Diagnosis   • Fall   • Closed fracture of right femur (HCC)   • Arthritis   • Thrombocytopenia (HCC)   • Centrilobular emphysema (HCC)   • BAUMAN (dyspnea on exertion)   • Oxygen desaturation, exercise   • Acute on chronic respiratory failure with hypoxia (HCC)   • COPD (chronic obstructive pulmonary disease) (HCC)   • Myelodysplastic syndrome (HCC)   • Aspiration pneumonitis (HCC)   • Bronchitis   • Hypoxia   • Epistaxis   • Mass of right lung   • Severe malnutrition (CMS/HCC)   • Acute on chronic respiratory failure (HCC)       Assessment/Problems/Plan:   1. Anxiety - She usually drinks bourbon 2 drinks per day.    Cont scheduled lorazepam 0.25mg Q12hr scheduled and lorazepam and Haldol PRN.     2. Dyspnea -  Give morphine 1mg Q6hr ATC scheduled and PRN.     3. Secretions - currently not an issue.  Add scopolamine and furosemide PRN.      4. Generalized MSK pain/neuropathy - unable to take gabapentin oral.  Add morphine scheduled and PRN.  Avoid toradol for now due to thrombocytopenia.     5. Myelodysplastic syndrome - no bleeding observed at this time.  Monitor for recurrent bleeding.           Justification: Patient meets criteria for Acute In-patient Care due to need for frequent nurse assessment, need for scheduled and PRN IV medications for symptom mgmt, medication adjustments required, decline despite treatment, IDT support of pt and family.    Time:35 min at bedside.  Additional time of chart review and documentation.     Siena Rojas MD  12/9/2021

## 2021-12-09 NOTE — PLAN OF CARE
"Goal Outcome Evaluation:      Transferred to hospice care today. Pt was saying \"help me\" morphine and ativan given.  Pt c/o back pain as well. Repositioned to supine which seemed to make the pt more comfortable. Pt has been resting since then and visually more comfortable. Family has been visiting and supporting the pt throughout the day. Transfer to 5B when a bed is open.             "

## 2021-12-09 NOTE — SIGNIFICANT NOTE
12/09/21 1214   SLP Deferred Reason   SLP Deferred Reason Unable to treat, medical status change  (Deferred per MD as patient not medically appropriate for dysphagia tx at this time d/t decline.)

## 2021-12-09 NOTE — PLAN OF CARE
Pt O2 requirements increased overnight to 6L NC.  Pt appears to be resting comfortably.  Robinul administered for increased secretions.  [place] cath anchored.  Family at bedside.  Will continue to monitor.     Problem: Adult Inpatient Plan of Care  Goal: Plan of Care Review  Outcome: Ongoing, Progressing  Goal: Patient-Specific Goal (Individualized)  Outcome: Ongoing, Progressing  Goal: Absence of Hospital-Acquired Illness or Injury  Outcome: Ongoing, Progressing  Intervention: Identify and Manage Fall Risk  Recent Flowsheet Documentation  Taken 12/9/2021 0400 by Vandana Jones, RN  Safety Promotion/Fall Prevention:   activity supervised   assistive device/personal items within reach   clutter free environment maintained   fall prevention program maintained   nonskid shoes/slippers when out of bed   room organization consistent   safety round/check completed  Taken 12/9/2021 0200 by Vandana Jones, RN  Safety Promotion/Fall Prevention:   activity supervised   assistive device/personal items within reach   clutter free environment maintained   fall prevention program maintained   nonskid shoes/slippers when out of bed   room organization consistent   safety round/check completed  Taken 12/9/2021 0000 by Vandana Jones, RN  Safety Promotion/Fall Prevention:   activity supervised   assistive device/personal items within reach   clutter free environment maintained   fall prevention program maintained   nonskid shoes/slippers when out of bed   room organization consistent   safety round/check completed  Taken 12/8/2021 2200 by Vandana Jones, RN  Safety Promotion/Fall Prevention:   activity supervised   assistive device/personal items within reach   clutter free environment maintained   fall prevention program maintained   nonskid shoes/slippers when out of bed   room organization consistent   safety round/check completed  Taken 12/8/2021 2000 by Vandana Jones, RN  Safety Promotion/Fall Prevention:   activity  supervised   assistive device/personal items within reach   clutter free environment maintained   fall prevention program maintained   nonskid shoes/slippers when out of bed   room organization consistent   safety round/check completed  Intervention: Prevent Skin Injury  Recent Flowsheet Documentation  Taken 12/9/2021 0400 by Vandana Jones RN  Body Position: weight shift assistance provided  Taken 12/9/2021 0200 by Vandana Jones RN  Body Position: weight shift assistance provided  Skin Protection:   adhesive use limited   incontinence pads utilized   tubing/devices free from skin contact   skin-to-device areas padded  Taken 12/9/2021 0000 by Vandana Jones RN  Body Position: weight shift assistance provided  Skin Protection:   adhesive use limited   incontinence pads utilized   tubing/devices free from skin contact   skin-to-device areas padded  Taken 12/8/2021 2200 by Vandana Jones RN  Body Position: position maintained  Skin Protection:   adhesive use limited   incontinence pads utilized   tubing/devices free from skin contact   skin-to-device areas padded   skin sealant/moisture barrier applied  Taken 12/8/2021 2000 by Vandana Jones RN  Body Position: turned  Skin Protection:   adhesive use limited   incontinence pads utilized   tubing/devices free from skin contact   skin sealant/moisture barrier applied   skin-to-device areas padded  Intervention: Prevent and Manage VTE (venous thromboembolism) Risk  Recent Flowsheet Documentation  Taken 12/8/2021 2000 by Vandana Jones RN  VTE Prevention/Management:   sequential compression devices off   bleeding risk factor(s) identified  Intervention: Prevent Infection  Recent Flowsheet Documentation  Taken 12/9/2021 0400 by Vandana Jones RN  Infection Prevention:   environmental surveillance performed   visitors restricted/screened   single patient room provided   rest/sleep promoted   personal protective equipment utilized  Taken 12/9/2021 0200 by  Vandana Jones RN  Infection Prevention:   environmental surveillance performed   visitors restricted/screened   single patient room provided   personal protective equipment utilized   rest/sleep promoted  Taken 12/9/2021 0000 by Vandana Jones RN  Infection Prevention:   environmental surveillance performed   visitors restricted/screened   single patient room provided   rest/sleep promoted   personal protective equipment utilized  Taken 12/8/2021 2200 by Vandana Jones RN  Infection Prevention:   environmental surveillance performed   visitors restricted/screened   single patient room provided   personal protective equipment utilized   rest/sleep promoted  Taken 12/8/2021 2000 by Vandana Jones RN  Infection Prevention:   environmental surveillance performed   visitors restricted/screened   single patient room provided   rest/sleep promoted   personal protective equipment utilized  Goal: Optimal Comfort and Wellbeing  Outcome: Ongoing, Progressing  Intervention: Provide Person-Centered Care  Recent Flowsheet Documentation  Taken 12/8/2021 2000 by Vandana Jones RN  Trust Relationship/Rapport:   care explained   choices provided  Goal: Readiness for Transition of Care  Outcome: Ongoing, Progressing  Goal: Plan of Care Review  Outcome: Ongoing, Progressing  Goal: Patient-Specific Goal (Individualized)  Outcome: Ongoing, Progressing  Goal: Absence of Hospital-Acquired Illness or Injury  Outcome: Ongoing, Progressing  Intervention: Identify and Manage Fall Risk  Recent Flowsheet Documentation  Taken 12/9/2021 0400 by Vandana Jones RN  Safety Promotion/Fall Prevention:   activity supervised   assistive device/personal items within reach   clutter free environment maintained   fall prevention program maintained   nonskid shoes/slippers when out of bed   room organization consistent   safety round/check completed  Taken 12/9/2021 0200 by Vandana Jones, RN  Safety Promotion/Fall Prevention:   activity  supervised   assistive device/personal items within reach   clutter free environment maintained   fall prevention program maintained   nonskid shoes/slippers when out of bed   room organization consistent   safety round/check completed  Taken 12/9/2021 0000 by Vandana Jones RN  Safety Promotion/Fall Prevention:   activity supervised   assistive device/personal items within reach   clutter free environment maintained   fall prevention program maintained   nonskid shoes/slippers when out of bed   room organization consistent   safety round/check completed  Taken 12/8/2021 2200 by Vandana Jones RN  Safety Promotion/Fall Prevention:   activity supervised   assistive device/personal items within reach   clutter free environment maintained   fall prevention program maintained   nonskid shoes/slippers when out of bed   room organization consistent   safety round/check completed  Taken 12/8/2021 2000 by Vandana Jones RN  Safety Promotion/Fall Prevention:   activity supervised   assistive device/personal items within reach   clutter free environment maintained   fall prevention program maintained   nonskid shoes/slippers when out of bed   room organization consistent   safety round/check completed  Intervention: Prevent Skin Injury  Recent Flowsheet Documentation  Taken 12/9/2021 0400 by Vandana Jones RN  Body Position: weight shift assistance provided  Taken 12/9/2021 0200 by Vandana Jones RN  Body Position: weight shift assistance provided  Skin Protection:   adhesive use limited   incontinence pads utilized   tubing/devices free from skin contact   skin-to-device areas padded  Taken 12/9/2021 0000 by Vandana Jones RN  Body Position: weight shift assistance provided  Skin Protection:   adhesive use limited   incontinence pads utilized   tubing/devices free from skin contact   skin-to-device areas padded  Taken 12/8/2021 2200 by Vandana Jones RN  Body Position: position maintained  Skin Protection:    adhesive use limited   incontinence pads utilized   tubing/devices free from skin contact   skin-to-device areas padded   skin sealant/moisture barrier applied  Taken 12/8/2021 2000 by Vandana Jones RN  Body Position: turned  Skin Protection:   adhesive use limited   incontinence pads utilized   tubing/devices free from skin contact   skin sealant/moisture barrier applied   skin-to-device areas padded  Intervention: Prevent and Manage VTE (venous thromboembolism) Risk  Recent Flowsheet Documentation  Taken 12/8/2021 2000 by Vandana Jones RN  VTE Prevention/Management:   sequential compression devices off   bleeding risk factor(s) identified  Intervention: Prevent Infection  Recent Flowsheet Documentation  Taken 12/9/2021 0400 by Vandana Jones RN  Infection Prevention:   environmental surveillance performed   visitors restricted/screened   single patient room provided   rest/sleep promoted   personal protective equipment utilized  Taken 12/9/2021 0200 by Vandana Jones RN  Infection Prevention:   environmental surveillance performed   visitors restricted/screened   single patient room provided   personal protective equipment utilized   rest/sleep promoted  Taken 12/9/2021 0000 by Vandana Jones RN  Infection Prevention:   environmental surveillance performed   visitors restricted/screened   single patient room provided   rest/sleep promoted   personal protective equipment utilized  Taken 12/8/2021 2200 by Vandana Jones RN  Infection Prevention:   environmental surveillance performed   visitors restricted/screened   single patient room provided   personal protective equipment utilized   rest/sleep promoted  Taken 12/8/2021 2000 by Vandana Jones RN  Infection Prevention:   environmental surveillance performed   visitors restricted/screened   single patient room provided   rest/sleep promoted   personal protective equipment utilized  Goal: Optimal Comfort and Wellbeing  Outcome: Ongoing,  Progressing  Intervention: Provide Person-Centered Care  Recent Flowsheet Documentation  Taken 12/8/2021 2000 by Vandana Jones RN  Trust Relationship/Rapport:   care explained   choices provided  Goal: Readiness for Transition of Care  Outcome: Ongoing, Progressing     Problem: Skin Injury Risk Increased  Goal: Skin Health and Integrity  Outcome: Ongoing, Progressing  Intervention: Optimize Skin Protection  Recent Flowsheet Documentation  Taken 12/9/2021 0400 by Vandana Jones RN  Head of Bed (HOB): HOB at 15 degrees  Taken 12/9/2021 0200 by Vandana Jones RN  Pressure Reduction Techniques:   heels elevated off bed   pressure points protected   weight shift assistance provided  Head of Bed (HOB): HOB at 15 degrees  Pressure Reduction Devices:   pressure-redistributing mattress utilized   positioning supports utilized   heel offloading device utilized   foam padding utilized  Skin Protection:   adhesive use limited   incontinence pads utilized   tubing/devices free from skin contact   skin-to-device areas padded  Taken 12/9/2021 0000 by Vandana Jones RN  Pressure Reduction Techniques:   weight shift assistance provided   pressure points protected   heels elevated off bed  Head of Bed (HOB): HOB at 15 degrees  Pressure Reduction Devices:   pressure-redistributing mattress utilized   positioning supports utilized   heel offloading device utilized   foam padding utilized  Skin Protection:   adhesive use limited   incontinence pads utilized   tubing/devices free from skin contact   skin-to-device areas padded  Taken 12/8/2021 2200 by Vandana Jones RN  Pressure Reduction Techniques:   weight shift assistance provided   pressure points protected   heels elevated off bed  Head of Bed (HOB): HOB at 15 degrees  Pressure Reduction Devices:   pressure-redistributing mattress utilized   positioning supports utilized   heel offloading device utilized   foam padding utilized  Skin Protection:   adhesive use limited    incontinence pads utilized   tubing/devices free from skin contact   skin-to-device areas padded   skin sealant/moisture barrier applied  Taken 12/8/2021 2000 by Vandana Jones RN  Pressure Reduction Techniques:   weight shift assistance provided   pressure points protected   heels elevated off bed  Head of Bed (HOB): HOB at 15 degrees  Pressure Reduction Devices:   pressure-redistributing mattress utilized   positioning supports utilized   heel offloading device utilized   foam padding utilized  Skin Protection:   adhesive use limited   incontinence pads utilized   tubing/devices free from skin contact   skin sealant/moisture barrier applied   skin-to-device areas padded     Problem: Fall Injury Risk  Goal: Absence of Fall and Fall-Related Injury  Outcome: Ongoing, Progressing  Intervention: Identify and Manage Contributors to Fall Injury Risk  Recent Flowsheet Documentation  Taken 12/9/2021 0400 by Vandana Jones RN  Medication Review/Management: medications reviewed  Taken 12/9/2021 0200 by Vandana Jones RN  Medication Review/Management: medications reviewed  Taken 12/9/2021 0000 by Vandana Jones RN  Medication Review/Management: medications reviewed  Taken 12/8/2021 2200 by Vandana Jones RN  Medication Review/Management: medications reviewed  Taken 12/8/2021 2000 by Vandana Jones RN  Medication Review/Management: medications reviewed  Intervention: Promote Injury-Free Environment  Recent Flowsheet Documentation  Taken 12/9/2021 0400 by Vandana Jones, RN  Safety Promotion/Fall Prevention:   activity supervised   assistive device/personal items within reach   clutter free environment maintained   fall prevention program maintained   nonskid shoes/slippers when out of bed   room organization consistent   safety round/check completed  Taken 12/9/2021 0200 by Vandana Jones, RN  Safety Promotion/Fall Prevention:   activity supervised   assistive device/personal items within reach   clutter free  environment maintained   fall prevention program maintained   nonskid shoes/slippers when out of bed   room organization consistent   safety round/check completed  Taken 12/9/2021 0000 by Vandana Jones RN  Safety Promotion/Fall Prevention:   activity supervised   assistive device/personal items within reach   clutter free environment maintained   fall prevention program maintained   nonskid shoes/slippers when out of bed   room organization consistent   safety round/check completed  Taken 12/8/2021 2200 by Vandana Jones RN  Safety Promotion/Fall Prevention:   activity supervised   assistive device/personal items within reach   clutter free environment maintained   fall prevention program maintained   nonskid shoes/slippers when out of bed   room organization consistent   safety round/check completed  Taken 12/8/2021 2000 by Vandana Jones RN  Safety Promotion/Fall Prevention:   activity supervised   assistive device/personal items within reach   clutter free environment maintained   fall prevention program maintained   nonskid shoes/slippers when out of bed   room organization consistent   safety round/check completed     Problem: COPD Comorbidity  Goal: Maintenance of COPD Symptom Control  Outcome: Ongoing, Progressing  Intervention: Maintain COPD-Symptom Control  Recent Flowsheet Documentation  Taken 12/9/2021 0400 by Vandana Jones RN  Medication Review/Management: medications reviewed  Taken 12/9/2021 0200 by Vandana Jones RN  Medication Review/Management: medications reviewed  Taken 12/9/2021 0000 by Vandana Joens RN  Medication Review/Management: medications reviewed  Taken 12/8/2021 2200 by Vandana Jones RN  Medication Review/Management: medications reviewed  Taken 12/8/2021 2000 by Vandana Jones RN  Medication Review/Management: medications reviewed     Problem: Gas Exchange Impaired  Goal: Optimal Gas Exchange  Outcome: Ongoing, Progressing  Intervention: Optimize Oxygenation and  Ventilation  Recent Flowsheet Documentation  Taken 12/9/2021 0400 by Vandana Jones RN  Head of Bed (HOB): HOB at 15 degrees  Taken 12/9/2021 0200 by Vandana Jones RN  Head of Bed (HOB): HOB at 15 degrees  Taken 12/9/2021 0000 by Vandana Jones RN  Head of Bed (HOB): HOB at 15 degrees  Taken 12/8/2021 2200 by Vandana Jones RN  Head of Bed (HOB): HOB at 15 degrees  Taken 12/8/2021 2000 by Vandana Jones RN  Head of Bed (HOB): HOB at 15 degrees     Problem: Palliative Care  Goal: Enhanced Quality of Life  Outcome: Ongoing, Progressing  Intervention: Optimize Psychosocial Wellbeing  Recent Flowsheet Documentation  Taken 12/8/2021 2000 by Vandana Jones RN  Family/Support System Care:   presence promoted   caregiver stress acknowledged

## 2021-12-09 NOTE — PROGRESS NOTES
Continued Stay Note  [facility]     Patient Name: Elizabeth Oliveira  MRN: [id number]  Today's Date: 12/9/2021    Admit Date: 12/2/2021     Discharge Plan     Row Name 12/09/21 3495       Plan    Plan Inpatient hospice    Plan Comments Visit made to pt, pt's niece Lara and Lara' brother present. Pt appear thin, frail, noted will shallow respirations, weak voice. Staff nurse MODESTO Jones RN present, stated pt's 02 sats have been dropping to the 50s. Pt's interventions were limited support at time of visit. Teaching done with Lara on comfort measures, Lara stated to change to comfort measures. Called EUGENIO Clinton APRCASIE-palliative care team-regarding Lara' decision to change pt to comfort measures, Oz spoke with Lara to verify. Teaching done on inpatient hospice and medicare criteria, Lara stated does want inpatient hospice for pt when pt meets the criteria, [facility]. Will continue to follow. Please call [phone] if can be of futher assistance    Row Name 12/09/21 1249       Plan    Plan             Discharge Codes    No documentation.                     Belen Austin, RN

## 2021-12-09 NOTE — PLAN OF CARE
Goal Outcome Evaluation:  Plan of Care Reviewed With:  (nursing)        Progress: no change  Outcome Summary: Pt. sleeping at time of visit with no visible signs of discomfort. Inpatient hospice to meet with family at 1500.      1300 Palliative Team Conference: CYNTHIA Zabala RN, CHPN; GABBIE Erazo DO;  DAYA Rangel RN, CHPN; EUGENIO Barnes, LCSW; JABIER Austin RN, CHPN; EUGENIO Clinton, APRN

## 2021-12-09 NOTE — PROGRESS NOTES
Continued Stay Note  [facility]     Patient Name: Elizabeth Oliveira  MRN: [id number]  Today's Date: 12/9/2021    Admit Date: 12/2/2021     Discharge Plan     Row Name 12/09/21 1249       Plan    Plan Meeting at 1500    Plan Comments Patient meets criteria for inpatient hospice. SW called Lara vaughan, to arrange time to meet. Christa will be here at 3pm.    If inpatient hospice team can be of assistance, please call [phone].               Discharge Codes    No documentation.                     PAULINE Bello

## 2021-12-09 NOTE — PROGRESS NOTES
Continued Stay Note  [facility]     Patient Name: Elizabeth Oliveira  MRN: [id number]  Today's Date: 12/9/2021    Admit Date: 12/2/2021     Discharge Plan     Row Name 12/09/21 1520       Plan    Plan Inpatient hospice    Plan Comments Inpatient hospice team met with clement Bermudez at bedside and discussed inpatient hospice services.  She is electing this benefit.  Paperwork signed.  Patient will be admitted to inpatient hospice effective today, 12/9.    Row Name 12/09/21 1445       Plan    Plan Inpatient hospice    Plan Comments Visit made to pt, pt's clement Bermudez and Lara' brother present. Pt appear thin, frail, noted will shallow respirations. Staff nurse MODESTO Jones RN present, stated pt's 02 sats have been dropping to the 50s. Pt's interventions were limited support at time of visit. Teaching done with Lara on comfort measures, Lara stated to change to comfort measures. Called EUGENIO HIGHTOWER-palliative care team-regarding Lara' decision to change pt to comfort measures, Oz spoke with Lara to verify. Teaching done on inpatient hospice and medicare guidelines, Lara stated does want inpatient hospice for pt when pt meets the guidelines, [facility]. Will continue to follow. Please call [phone] if can be of futher assistance    Row Name 12/09/21 1249       Plan    Plan Meeting at 1500    Plan Comments Patient meets criteria for inpatient hospice. SW called Lara vaughan, to arrange time to meet. Clement will be here at 3pm.               Discharge Codes    No documentation.                     Bárbara Hoyt RN

## 2021-12-09 NOTE — PROGRESS NOTES
"Palliative Care Consult Note    Patient Name: Elizabeth Oliveira   : 1929  Sex: female    Date of Admission: 2021    Subjective: Pt is resting in bed, minimally responsive, tachypneic at 32-36 breaths per minute.  Christa Bermudez is at bedside and is asking for change to comfort measures plan of care.     Review of Systems   Unable to perform ROS: Patient nonverbal       Reviewed current scheduled and prn medications for route, type, dose and frequency.     •  acetaminophen  •  albuterol  •  bisacodyl  •  glycopyrrolate  •  haloperidol lactate  •  ipratropium-albuterol  •  LORazepam  •  Morphine  •  ondansetron  •  sodium chloride  •  sodium chloride  •  sodium chloride    Objective:   /70 (BP Location: Right arm, Patient Position: Lying)   Pulse 90   Temp 98.3 °F (36.8 °C) (Axillary)   Resp 16   Ht 157.5 cm (62\")   Wt 47.6 kg (105 lb)   LMP  (LMP Unknown)   SpO2 100%   BMI 19.20 kg/m²    Intake & Output (last day)       12/08 0701  12/09 0700 12/09 0701  12/10 0700    P.O.      IV Piggyback      Total Intake(mL/kg)      Urine (mL/kg/hr) 800 (0.7)     Total Output 800     Net -800               Lab Results (last 24 hours)     Procedure Component Value Units Date/Time    Urine Culture - Urine, Urine, Clean Catch [[id number]]  (Abnormal) Collected: 21 1808    Specimen: Urine, Clean Catch Updated: 21 1249     Urine Culture >100,000 CFU/mL Aerococcus urinae     Comment: Aerococcus urinae are usually susceptible to Penicillin G, Vancomycin, and Tetracycline and Resistant to Trimethoprim sulfamethoxazole and Gentamicin.         Blood Culture - Blood, Hand, Right [[id number]]  (Normal) Collected: 21 0600    Specimen: Blood from Hand, Right Updated: 21     Blood Culture No growth at 3 days    Blood Culture - Blood, Hand, Left [[id number]]  (Normal) Collected: 21 0610    Specimen: Blood from Hand, Left Updated: 21     Blood Culture No growth at 3 days    " Blood Culture - Blood, Hand, Right [[id number]]  (Normal) Collected: 12/04/21 2031    Specimen: Blood from Hand, Right Updated: 12/08/21 2145     Blood Culture No growth at 4 days    Narrative:      Aerobic Only        Imaging Results (Last 24 Hours)     ** No results found for the last 24 hours. **          PPS:   20% based on the following measures:   Ambulation: Totally bed bound  Activity and Evidence of Disease: Unable to do any work, extensive evidence of disease  Self-Care: Total care  Intake:Minimal sips  LOC: Full, drowsy or confusion    Physical Exam:  Vitals and nursing note reviewed.  General Appearance: very ill appearing, cachectic, color is pale, minimally responsive    HEENT: Normocephalic; atraumatic. PERTL,  mm dry   Neck:   supple; trachea midline  Lungs: RR 32-36, labored. BBS = diminished in bases,  NCO2 patent   Heart: RRR, peripheral pulses pos, neg for edema   Abdomen: flat, soft, NTND, BS +   Extremities: THOMAS willfully,   Skin: Warm and dry, no noted mottling   Neurological: minimally responsive   Psych: calm       Acute on chronic respiratory failure with hypoxia (HCC)    Thrombocytopenia (HCC)    BAUMAN (dyspnea on exertion)    COPD (chronic obstructive pulmonary disease) (HCC)    Myelodysplastic syndrome (HCC)    Epistaxis    Mass of right lung    Severe malnutrition (CMS/HCC)      Assessment/Plan: 89 yo F with COPD, Myeloproliferative disease, chronic arthritis with neuropathy, protein calorie malnutrition (alb 3.1), RUL lung mass.    She presented to the [facility] ED on 12/02/2021 due to severe epistaxis requiring platelet and packed red blood cell transfusion.  PPS 20%     Pain: gabapentin 300 mg BID; tylenol 650 mg PRN; morphine 1 mg q 2 hr PRN  Dyspnea: albuteral nebs PRN;   Nausea/Vomiting: haldol 0.5 mg q 6 hr PRN; zofran 4 mg q 6 hr PRN    Anxiety/Agitation: Lorazepam 0.25 mg q 12 hrs and PRN q 6 hrs;   Terminal Fevers:   Termianl Secretions:  Scop patch; robinul 0.2 g q 4 hr PRN     Met  with niece Lara at bedside; she desires to move to comfort measures only. I have changed code status to DNR Comfort Measures, have DCd abx, encourage PRN Morphine for tachypnea. Consult placed to inpatient hospice team.     1500: return to bedside where pt is resting comfortably after PRN Morphine. Per chart review, inpatient hospice will admit this afternoon.     Educated patient/family about using palliative approach with advanced age and multiple chronic disease processes that cannot be reversed.     Total Visit Time: 45  Face to Face Time: 35    Deisy Clinton, APRN  [phone]  12/09/21  14:56 EST

## 2021-12-09 NOTE — DISCHARGE SUMMARY
[facility] Hospital Medicine Services  DISCHARGE TO INPATIENT HOSPICE    Patient Name: Elizabeth Oliveira  : 1929  MRN: [id number]    Date of Admission: 2021  Date of Discharge: 2021  Primary Care Physician: Azeem Merchant MD    Consults     Date and Time Order Name Status Description    2021 12:20 PM Inpatient Palliative Care MD Consult Completed     12/3/2021  9:36 AM Inpatient Pulmonology Consult Completed     12/3/2021 12:36 AM Inpatient Hematology & Oncology Consult Completed         Hospital Course     Presenting Problem:   Hypoxia [R09.02]  Epistaxis [R04.0]    Active Hospital Problems    Diagnosis  POA   • **Acute on chronic respiratory failure with hypoxia (HCC) [J96.21]  Yes   • Severe malnutrition (CMS/HCC) [E43]  Yes   • Epistaxis [R04.0]  Unknown   • Mass of right lung [R91.8]  Unknown   • COPD (chronic obstructive pulmonary disease) (HCC) [J44.9]  Yes   • Myelodysplastic syndrome (HCC) [D46.9]  Yes   • BAUMAN (dyspnea on exertion) [R06.00]  Yes   • Thrombocytopenia (HCC) [D69.6]  Yes      Resolved Hospital Problems   No resolved problems to display.     Hospital Course:  Ms. Elizabeth Oliveira is a 89yoF with PMH significant for chronic respiratory failure secondary to COPD (baseline 2L NC), chronic arthritis, neuropathy and presumed myeloproliferative disease (never worked up or evaluated by hematology). She presented to [facility] ED on 21 for evaluation of acute epistaxis. She was found to have severe thrombocytopenia and hypoxemia worse than baseline.      Acute blood loss anemia secondary to severe epistaxis, resolved   Severe thrombocytopenia  Presumed myelodysplastic syndrome  - Hgb 10.1 on admission, acutely worsened to 7.3 on 12/3 and has since stabilized  - Platelets 14 on admission, s/p 2 units platelets on   - Appreciate heme/onc evaluation. Not recommending bone marrow biopsy given current trajectory  - Transitioned to  comfort measures on 12/8 and she will be admitted to inpatient hospice service on 12/9     Acute on chronic hypoxic respiratory failure  COPD with chronic hypoxic respiratory failure  Right upper lung mass 19x10 mm concerning for malignancy given history of smoking   - Pulmonology consulted per POA's request - recommended against biopsy or other aggressive measures such as radiation or chemotherapy due to risk of pneumothorax and clinical worsening given her baseline functional status      Fever  - Treated with IV Rocephin / Doxycycline 12/6-12/9 due to concerns for right-sided pneumonia and UTI  - Antibiotics discontinued with transition to full comfort measures     Day of Discharge     HPI:   In bed. She opened her eyes, said hi and thanked me multiple times. Per RN, her close friend Arely (who is also hospitalized) was able to see her for a bit this morning.     ROS:  Unable to obtain complete or reliable ROS due to mental status    Vital Signs:   Temp:  [98.3 °F (36.8 °C)-98.5 °F (36.9 °C)] 98.3 °F (36.8 °C)  Heart Rate:  [90] 90  Resp:  [15-16] 16  BP: (116-123)/(70-71) 123/70     Physical Exam:  Constitutional: No acute distress. Minimally conversant. Ill-appearing   HENT: NCAT, mucous membranes dry  Respiratory: Coarse breath sounds bilaterally, wet-sounding cough. Shallow respirations   Cardiovascular: Regular rate and rhythm   Gastrointestinal: Positive bowel sounds, soft, nondistended  Psychiatric: Calm   Neurologic: Moves all extremities spontaneously without focal deficits, speech soft but clear and coherent     Discharge Details     Discharge Disposition:  Transfer care to inpatient Hospice at [facility]    Time Spent on Discharge: 30 minutes    Deloris Norman PA-C  12/09/21

## 2021-12-10 NOTE — PLAN OF CARE
"Pt made comfort measures/hospice today.  Patient verbalized the words \"help me\" at one point during the shift- PRN's utilized.  Repositioned per patients tolerance and comfort.  Will continue to monitor.     Problem: End-of-Life Care  Goal: Comfort, Peace and Preserved Dignity  Outcome: Ongoing, Progressing  Intervention: Support the Grieving Process  Recent Flowsheet Documentation  Taken 12/9/2021 1950 by Vandana Jones RN  Family/Support System Care: presence promoted     Problem: Skin Injury Risk Increased  Goal: Skin Health and Integrity  Outcome: Ongoing, Progressing  Intervention: Optimize Skin Protection  Recent Flowsheet Documentation  Taken 12/10/2021 0200 by Vandana Jones RN  Pressure Reduction Techniques:   weight shift assistance provided   heels elevated off bed   pressure points protected  Head of Bed (HOB): HOB at 15 degrees  Pressure Reduction Devices:   pressure-redistributing mattress utilized   positioning supports utilized   heel offloading device utilized  Skin Protection:   adhesive use limited   incontinence pads utilized   tubing/devices free from skin contact   skin-to-device areas padded  Taken 12/10/2021 0000 by Vandana Jones RN  Pressure Reduction Techniques:   weight shift assistance provided   heels elevated off bed   pressure points protected  Head of Bed (HOB): HOB at 20 degrees  Pressure Reduction Devices:   pressure-redistributing mattress utilized   positioning supports utilized   heel offloading device utilized  Skin Protection:   adhesive use limited   tubing/devices free from skin contact   skin-to-device areas padded   skin sealant/moisture barrier applied   incontinence pads utilized  Taken 12/9/2021 2200 by Vandana Jones RN  Pressure Reduction Techniques:   weight shift assistance provided   pressure points protected   heels elevated off bed  Head of Bed (HOB): HOB at 20 degrees  Pressure Reduction Devices:   pressure-redistributing mattress utilized   positioning " supports utilized   heel offloading device utilized  Skin Protection:   adhesive use limited   tubing/devices free from skin contact   skin-to-device areas padded   skin sealant/moisture barrier applied   incontinence pads utilized  Taken 12/9/2021 1950 by Vandana Jones RN  Pressure Reduction Techniques:   weight shift assistance provided   pressure points protected   heels elevated off bed  Head of Bed (HOB): HOB at 15 degrees  Pressure Reduction Devices:   pressure-redistributing mattress utilized   positioning supports utilized   heel offloading device utilized  Skin Protection:   adhesive use limited   incontinence pads utilized   tubing/devices free from skin contact   skin-to-device areas padded     Problem: Fall Injury Risk  Goal: Absence of Fall and Fall-Related Injury  Outcome: Ongoing, Progressing  Intervention: Identify and Manage Contributors to Fall Injury Risk  Recent Flowsheet Documentation  Taken 12/10/2021 0200 by Vandana Jones RN  Medication Review/Management: medications reviewed  Taken 12/10/2021 0000 by Vandana Jones RN  Medication Review/Management: medications reviewed  Taken 12/9/2021 2200 by Vandana Jones RN  Medication Review/Management: medications reviewed  Taken 12/9/2021 1950 by Vandana Jones RN  Medication Review/Management: medications reviewed  Intervention: Promote Injury-Free Environment  Recent Flowsheet Documentation  Taken 12/10/2021 0200 by Vandana Jones, RN  Safety Promotion/Fall Prevention:   clutter free environment maintained   fall prevention program maintained   nonskid shoes/slippers when out of bed   room organization consistent   safety round/check completed  Taken 12/10/2021 0000 by Vandana Jones, RN  Safety Promotion/Fall Prevention:   activity supervised   assistive device/personal items within reach   clutter free environment maintained   fall prevention program maintained   nonskid shoes/slippers when out of bed   room organization consistent    safety round/check completed  Taken 12/9/2021 2200 by Vandana Jones, RN  Safety Promotion/Fall Prevention:   activity supervised   assistive device/personal items within reach   clutter free environment maintained   fall prevention program maintained   nonskid shoes/slippers when out of bed   room organization consistent   safety round/check completed  Taken 12/9/2021 1950 by Vandana Jones, RN  Safety Promotion/Fall Prevention:   clutter free environment maintained   fall prevention program maintained   nonskid shoes/slippers when out of bed   safety round/check completed   room organization consistent

## 2021-12-10 NOTE — PROGRESS NOTES
"Hospice Progress Note    Patient Name: Elizabeth Oliveira   : 1929  Gender: female    Code Status: comfort measures    Date of Admission: 2021    Subjective:    Overnight notes reviewed: Pt made comfort measures/hospice today. Patient verbalized the words \"help me\" at one point during the shift- PRN's utilized.  Repositioned per patients tolerance and comfort.  Will continue to monitor.     Pt uncomfortable this morning with Hospice Nursing visit - better after prns - uncomfortable again this afternoon - prns given. Visitor at bedside.     - PRNs:  Haldol 1mg x1  Ativan 0.5mg x2  Morphine 1mg x1  Lasix 20mg x1  Robinul 0.2mg x1    - Intake/Output  *PO: NPO  *Urine: 800ml; 500ml  *LBM: 12/10, small      ROS:  Review of Systems   Unable to perform ROS: Acuity of condition       Reviewed current scheduled and prn medications for route, type, dose and frequency.     •  acetaminophen  •  albuterol  •  bisacodyl  •  furosemide  •  glycopyrrolate  •  haloperidol lactate  •  ipratropium-albuterol  •  LORazepam  •  Morphine **OR** Morphine  •  ondansetron  •  palliative care oral rinse  •  polyvinyl alcohol  •  Scopolamine    Objective:   /75 (BP Location: Right arm, Patient Position: Lying)   Pulse 86   Temp 97.9 °F (36.6 °C) (Axillary)   Resp 18   LMP  (LMP Unknown)   SpO2 95%        PPS: Palliative Performance Scale score as of 12/10/2021, 15:38 EST is 10% based on the following measures:   Ambulation: Totally bed bound  Activity and Evidence of Disease: Unable to do any work, extensive evidence of disease  Self-Care: Total care  Intake:  Mouth care only  LOC: Drowsy or coma      Physical Exam:  Physical Exam  Constitutional:       Appearance: She is ill-appearing.      Comments: Frail, thin. Not comfortable - coughing d/t secretions; + facial grimacing   HENT:      Head: Atraumatic.      Mouth/Throat:      Mouth: Mucous membranes are dry.   Eyes:      Comments: closed   Neck:      Comments: " thin  Cardiovascular:      Rate and Rhythm: Normal rate and regular rhythm.   Pulmonary:      Comments: + audible congestion but CTA. Shallow, even; non-labored... at times holding breath d/t discomfort?  Abdominal:      Palpations: Abdomen is soft.      Comments: Hypoactive BSx4   Genitourinary:     Comments: [place] with dark straw colored output  Musculoskeletal:      Right lower leg: No edema.      Left lower leg: No edema.   Skin:     Coloration: Skin is pale.   Neurological:      Comments: Does not follow commands   Psychiatric:      Comments: + facial grimacing; + moaning           Acute on chronic respiratory failure (HCC)      Assessment/Plan:     89yoF admitted in Hospice on 12/9 for acute on chronic respiratory failure.     Pain, nos, all over, existential  Congestion  Constipation  Anxiety  Dyspnea  EOLC    Lasix 20mg BID (11p, 11a)    Ativan 0.25mg q6h    Morphine 2mg q6h    Scop patch    Transfer to N5B    Order for alcohol - pt loves stephanie ([facility])    Discontinue diet    Palliative oral rinse scheduled and prn    Eye gtts scheduled and prn    Discussion at bedside with longtime friend. Support provided. Pt's long term caregiver, Analilia, to visit later today.     Care coordinated with Nursing and Hospice IDT.    Discharge Disposition: EOLC    Total Visit Time: 45min  Face to Face Time: 25min    Justification for care:  Patient meets criteria for acute in-patient care with required nursing assessment and interventions for symptoms with IV medications.      Louise Cerna, BRIAN, MHA, APRN  [facility]  Hospice and Palliative Care Nurse Practitioner  12/10/21  11:30 EST

## 2021-12-10 NOTE — PROGRESS NOTES
Continued Stay Note  [facility]     Patient Name: Elizabteh Oliveira  MRN: [id number]  Today's Date: 12/10/2021    Admit Date: 12/9/2021     Discharge Plan     Row Name 12/10/21 0939       Plan    Plan Inpatient hospice    Plan Comments Patient lying in bed holding hands to chest, facial grimacing, and brow furrowed.  Asked her if she was comfortable and she quietly said no.  Asked staff nurse, Kelley to give patient prn Morphine for discomfort.  No family present at time of visit.                Discharge Codes    No documentation.                     Bárbara Hoyt RN

## 2021-12-11 NOTE — PROGRESS NOTES
Continued Stay Note  [facility]     Patient Name: Elizabeth Oliveira  MRN: [id number]  Today's Date: 12/11/2021    Admit Date: 12/9/2021     Discharge Plan     Row Name 12/11/21 0943       Plan    Plan Inpatient hospice    Plan Comments Patient resting in bed with eyes closed.  Face, jaw, and body relaxed and breathing even and unlabored. Minimally responsive to voice and touch during assessment.  Asked staff nurseKelley to give prn Dulcolax.               Discharge Codes    No documentation.                     Bárbara Hoyt RN

## 2021-12-11 NOTE — PLAN OF CARE
Goal Outcome Evaluation:           Progress: declining  Outcome Summary: Resting comfortably. No response to stimuli except mouth care.Repers shallow. O2 on for comfort.

## 2021-12-11 NOTE — PROGRESS NOTES
Clinical Nutrition       Patient Name: Elizabeth Oliveira  YOB: 1929  MRN: [id number]  Date of Encounter: 12/11/21 10:22 EST  Admission date: 12/9/2021      Reason for Visit   Identified at risk by screening criteria, MST score 2+      EMR  Reviewed   COPD  Myeloproliferative disease  Protein Calorie Malnutrition  RUL lung mass  Dysphagia         Reported/Observed/Food/Nutrition Related - Comments     RD reviewed note, pt has elected for comfort measures/hospice care (12/10). Currently NPO with SLP recommendation of Puree, nectar thick liquids if comfort diet appropriate due to moderate oral and pharyngeal dysphagia.     Current Nutrition Prescription     NPO Diet  No active supplement orders      Average Intake from Charting:   N/A    Actions     Care plan reviewed   --Nutrition Services will sign off as pt under hospice care, please consult as needed    Monitor Per Protocol      Anali Ching, CHAPIS, LD   Time Spent: 20 min

## 2021-12-11 NOTE — PLAN OF CARE
Goal Outcome Evaluation:  Plan of Care Reviewed With: patient        Progress: no change  Outcome Summary: pt rested comfortably throughout shift, given scheduled meds but no indication for PRNs, [facility]

## 2021-12-12 NOTE — PROGRESS NOTES
"Hospice Progress Note    Patient Name: Elizabeth Oliveira   : 1929  Gender: female    Code Status: comfort measures    Date of Admission: 2021    Subjective:  89 yo WF admitted to inpatient hospice 21 with acute on chronic respiratory failure.     Per nursing report, pt more comfortable today following medication adjustments yesterday.  Still no BM, RN present in room during assessment to give 1st dose of suppository.      Pt appears to be resting comfortably.  No s/s of pain or distress present.  Pt unresponsive to assessment.  Non verbal.  No family present today.     - Scheduled:  Morphine 2 mg q 6 hrs   Lasix 20 mg BID   Lorazepam 0.25 mg q 6 hrs   Palliative oral rinse q 6 hrs   Liquifilm to both eyes BID  Scopolamine 1 mg patch q 72 hrs   Dulcolax suppository 10 mg daily       - PRNs:  Robinul 0.2 mg q 6 hrs   Haldol 1 mg q 4 hrs   Lorazepam 0.5 mg q 4 hrs   Morphine 2 mg q 1 hr     - Held: None     - Intake/Output  *PO: NPO  *Urine:  425 ml   *LBM: 2021      ROS:  Review of Systems   Unable to perform ROS: Acuity of condition       Reviewed current scheduled and prn medications for route, type, dose and frequency.     •  acetaminophen  •  beer  •  bisacodyl  •  furosemide  •  glycopyrrolate  •  haloperidol lactate  •  ipratropium-albuterol  •  LORazepam  •  [DISCONTINUED] Morphine **OR** Morphine  •  ondansetron  •  palliative care oral rinse  •  polyvinyl alcohol  •  Scopolamine    Objective:   /76 (BP Location: Right arm, Patient Position: Lying)   Pulse 98   Temp 98 °F (36.7 °C) (Axillary)   Resp 16   Ht 157.5 cm (62\")   Wt 43.1 kg (95 lb 1.6 oz)   LMP  (LMP Unknown)   SpO2 95%   BMI 17.39 kg/m²      PPS: Palliative Performance Scale score as of 2021, 21:01 EST is 10% based on the following measures:   Ambulation: Totally bed bound  Activity and Evidence of Disease: Unable to do any work, extensive evidence of disease  Self-Care: Total care  Intake:  Mouth care " only  LOC: Drowsy or coma    Physical Exam:  Physical Exam  Vitals and nursing note reviewed.   Constitutional:       General: She is sleeping.      Appearance: She is cachectic.   Cardiovascular:      Rate and Rhythm: Rhythm regularly irregular.   Pulmonary:      Effort: Bradypnea present.      Breath sounds: Examination of the right-upper field reveals decreased breath sounds. Examination of the left-upper field reveals decreased breath sounds. Examination of the right-lower field reveals decreased breath sounds. Examination of the left-lower field reveals decreased breath sounds. Decreased breath sounds present.   Abdominal:      General: Abdomen is flat.   Skin:     General: Skin is warm and dry.   Neurological:      Mental Status: She is unresponsive.   Psychiatric:         Speech: She is noncommunicative.             Acute on chronic respiratory failure (HCC)      Assessment/Plan:   89 yo WF admitted to inpatient hospice 12/9/21 with acute on chronic respiratory failure.       Symptoms:  Pain  SOA   Anxiety/restlessness   Constipation   EOLC     -Continue medications as ordered.  Morphine 2mg q 6 hrs    -Rn giving suppository during assessment for constipation.     -Care coordinated with RN at bedside and hospice IDT.     Discharge Disposition: EOLC     Total Visit Time: 30 min   Face to Face Time: 10 min     Justification for care:  Patient meets criteria for acute in-patient care with required nursing assessment and interventions for symptoms with IV medications.      Amelia Montesinos, MSN, APRN  [facility]  Hospice and Palliative Care Nurse Practitioner  12/11/21  20:56 EST

## 2021-12-12 NOTE — PLAN OF CARE
Goal Outcome Evaluation:  Plan of Care Reviewed With: patient        Progress: declining  Outcome Summary: Patient appears to be resting comfortably. tolerates repositioning. FC patent with minimal UOP. arouses with stimulation.will continue to monitor.

## 2021-12-12 NOTE — PROGRESS NOTES
Continued Stay Note  [facility]     Patient Name: Elizabeth Oliveira  MRN: [id number]  Today's Date: 12/12/2021    Admit Date: 12/9/2021     Discharge Plan     Row Name 12/12/21 1727       Plan    Plan Inpatient hospice    Plan Comments Visit to beside.  Present for visit are patient, RN.  Patient unresponsive. She is with relaxed face, jaw, body posture, respirations. She continues to meet gip criteria due to requiring complex technical injectable medication delivery requiring frequent skilled nursing assessments, interventions, and reevaluations.  She is actively dying, current level of care unable to be maintained in alternate setting.  Should the patient's condition remain on current trajectory she will not be expected to survive this hospitalization.    Final Discharge Disposition Code 51 - hospice medical facility               Discharge Codes                         Vandana Garcia RN

## 2021-12-12 NOTE — PLAN OF CARE
Goal Outcome Evaluation:  Plan of Care Reviewed With: caregiver, family        Progress: declining  Outcome Summary: Comfortable. Respirations shallow. Opened eyes today. Family visited, yonatan was updated. Continue to provide eolc.

## 2021-12-12 NOTE — PROGRESS NOTES
"Hospice Progress Note    Patient Name: Elizabeth Oliveira   : 1929  Gender: female    Code Status: comfort measures    Date of Admission: 2021    Subjective:  89 yo WF admitted to inpatient hospice 21 with acute on chronic respiratory failure.     Per nursing report, pt rested comfortably through the night.  No new needs identified.      Upon assessment, pt appears to be resting comfortably.  No obvious signs of pain or distress noted.  No new needs identified during today's assessment.  No family present at bedside.     - Scheduled:  Lasix 20 mg x 2   Lorazepam 0.25 mg x 6   Morphine 2 mg x 6   Palliative Oral Rinse   Liquifilm     - PRNs:  Robinul 0.4 mg x 1     - Held:   None     - Intake/Output  *PO: NPO   *Urine: 425 ml out   *LBM: 2021      ROS:  Review of Systems   Unable to perform ROS: Acuity of condition       Reviewed current scheduled and prn medications for route, type, dose and frequency.     •  acetaminophen  •  beer  •  bisacodyl  •  furosemide  •  glycopyrrolate  •  haloperidol lactate  •  ipratropium-albuterol  •  LORazepam  •  [DISCONTINUED] Morphine **OR** Morphine  •  ondansetron  •  palliative care oral rinse  •  polyvinyl alcohol  •  Scopolamine    Objective:   /76 (BP Location: Right arm, Patient Position: Lying)   Pulse 98   Temp 98 °F (36.7 °C) (Axillary)   Resp 16   Ht 157.5 cm (62\")   Wt 43.1 kg (95 lb 1.6 oz)   LMP  (LMP Unknown)   SpO2 95%   BMI 17.39 kg/m²      PPS: Palliative Performance Scale score as of 2021, 13:28 EST is 10% based on the following measures:   Ambulation: Totally bed bound  Activity and Evidence of Disease: Unable to do any work, extensive evidence of disease  Self-Care: Total care  Intake:  Mouth care only  LOC: Drowsy or coma    Physical Exam:  Physical Exam  Vitals and nursing note reviewed. Exam conducted with a chaperone present.   Constitutional:       General: She is sleeping.   HENT:      Mouth/Throat:      " Mouth: Mucous membranes are dry.   Eyes:      General: Lids are normal.      Conjunctiva/sclera:      Right eye: No exudate.     Left eye: No exudate.     Comments: Closed    Pulmonary:      Breath sounds: Examination of the right-upper field reveals decreased breath sounds. Examination of the left-upper field reveals decreased breath sounds. Examination of the right-lower field reveals decreased breath sounds. Examination of the left-lower field reveals decreased breath sounds. Decreased breath sounds present.   Abdominal:      General: Bowel sounds are normal.      Palpations: Abdomen is soft.   Psychiatric:         Speech: She is noncommunicative.             Acute on chronic respiratory failure (HCC)      Assessment/Plan:     89 yo WF admitted to inpatient hospice 12/9/21 with acute on chronic respiratory failure.     Symptoms:  Pain   Anxiety  Restlessness   EOLC     -Continue comfort focused POC.     -Continue medications as prescribed.     Discharge Disposition: EOLC     Total Visit Time: 20 min   Face to Face Time: 10 min     Justification for care:  Patient meets criteria for acute in-patient care with required nursing assessment and interventions for symptoms with IV medications.      Amelia Montesinos, MSN, APRN  [facility]  Hospice and Palliative Care Nurse Practitioner  12/12/21  13:23 EST

## 2021-12-13 NOTE — PLAN OF CARE
Goal Outcome Evaluation:               Comfort care measures in place, niece and nephew at bedside. PRNs given throughout shift. F/C in place patency maintained. Will continue to monitor.

## 2021-12-13 NOTE — PROGRESS NOTES
"Hospice Progress Note    Patient Name: Elizabeth Oliveira   : 1929  Gender: female    Code Status: comfort measures    Date of Admission: 2021    Subjective:    89yoF admitted inpt Hospice on  for acute on chronic respiratory failure.     Overnight notes reviewed: Appears comfortable. no PRN's needed at this time. Pt slept well will continue to monitor.    Pt today remains comfortable with scheduled and prns. Actively dying. Family (niece, nephew) at bedside.    - PRNs:  Lasix 20mg x1  Robinul 0.2mg x2  Morphine 2mg x3    - Held: none    - Intake/Output  *PO: NPO  *Urine: 425ml, 250ml  *LBM: -      ROS:  Review of Systems   Unable to perform ROS: Acuity of condition       Reviewed current scheduled and prn medications for route, type, dose and frequency.     •  acetaminophen  •  beer  •  bisacodyl  •  furosemide  •  glycopyrrolate  •  haloperidol lactate  •  ipratropium-albuterol  •  LORazepam  •  [DISCONTINUED] Morphine **OR** Morphine  •  ondansetron  •  palliative care oral rinse  •  polyvinyl alcohol  •  Scopolamine    Objective:   /76 (BP Location: Right arm, Patient Position: Lying)   Pulse 98   Temp 98 °F (36.7 °C) (Axillary)   Resp 16   Ht 157.5 cm (62\")   Wt 43.1 kg (95 lb 1.6 oz)   LMP  (LMP Unknown)   SpO2 95%   BMI 17.39 kg/m²        PPS: Palliative Performance Scale score as of 2021, 12:54 EST is 10% based on the following measures:   Ambulation: Totally bed bound  Activity and Evidence of Disease: Unable to do any work, extensive evidence of disease  Self-Care: Total care  Intake:  Mouth care only  LOC: Drowsy or coma      Physical Exam:  Physical Exam  Constitutional:       General: She is not in acute distress.     Comments: Actively dying; comfortable; respirations are irregular. Mouth open, relaxed.    HENT:      Head:      Comments: + temporal wasting     Mouth/Throat:      Comments: Kept moist with oral care  Eyes:      Comments: closed   Neck:     "  Comments: thin  Pulmonary:      Comments: Respirations are irr; shallow; ? Labored at times - prn given - explained to family respirations may stay irr but ppt is comfortable.  Abdominal:      General: Abdomen is flat.   Genitourinary:     Comments: Dark straw colored output  Musculoskeletal:      Right lower leg: No edema.      Left lower leg: No edema.   Skin:     General: Skin is dry.      Comments: Grey skin tone; ears are pale   Neurological:      Comments: Does not follow commands   Psychiatric:      Comments: No facial grimacing; no moaning           Acute on chronic respiratory failure (HCC)      Assessment/Plan:     89yoF admitted in Hospice on 12/9 for acute on chronic respiratory failure.      Pain, nos, all over, existential  Congestion  Constipation  Anxiety  Dyspnea  EOLC    Actively dying    Continue current plan of care    Monitor for changing needs    Coordinated care with Nursing and Hospice IDT    Discharge Disposition: EOLC    Total Visit Time: 15min  Face to Face Time:  5min    Justification for care:  Patient meets criteria for acute in-patient care with required nursing assessment and interventions for symptoms with IV medications.      Louise Cerna, DNP, MHA, APRN  [facility] Care Nav[facility]ators  Hospice and Palliative Care Nurse Practitioner  12/13/21  12:54 EST

## 2021-12-13 NOTE — PLAN OF CARE
Goal Outcome Evaluation:  Plan of Care Reviewed With: patient        Progress: declining  Outcome Summary: Appears comfortable. no PRN's needed at this time. Pt slept well will continue to monitor.

## 2021-12-13 NOTE — PROGRESS NOTES
Continued Stay Note  [facility]     Patient Name: Elizabeth Oliveira  MRN: [id number]  Today's Date: 12/13/2021    Admit Date: 12/9/2021     Discharge Plan     Row Name 12/13/21 1416       Plan    Plan Inpatient hospice    Plan Comments Visit to bedside.  Present for visit are patient, RN, [facility] Roxy Krishnan. Patient noted unresponsive. Respirations even, unlabored with miguel (=) excursion.  Relaxed face, jaw, body posture, respirations.  Noted patient with grimace at turns.    Roxy RN to medicate.  RN offers patient ongoing support, open communication, continuous availability. Reassures patient that she is safe, cared, for.  The patient continues to meet gip criteria as she requires injectable medications requiring frequent skilled nursing assessments, interventions, and reevaluations.  She is actively dying. Current level of care unable to be provided in alternate setting.  Should the patient’s condition remain on current trajectory she will not be expected to survive this admission.    Final Discharge Disposition Code 51 - hospice medical facility               Discharge Codes    No documentation.                     Vandana Garcia RN

## 2021-12-14 NOTE — SIGNIFICANT NOTE
Exam confirms with auscultation zero audible heart tones and zero audible respirations. Ms. Elizabeth Oliveira was pronounced dead at 2100.  MD notified by Patient's RN.    Johanne Herrera RN  Clinical House Supervisor  12/13/2021 2100

## 2021-12-15 NOTE — DISCHARGE SUMMARY
Date of Death:  12/13/2021  Time of Death:  2100    Presenting Problem/History of Present Illness    Acute on chronic respiratory failure (HCC)      Hospital Course    Elizabeth Oliveira is a 89 yo F with acute on chronic respiratory failure.  Related diagnosis: COPD, Myeloproliferative disease, chronic arthritis with neuropathy, protein calorie malnutrition (alb 3.1), RUL lung mass, dysphagia.    She presented to the [facility] ED on 12/02/2021 due to severe epistaxis.   She has myeloproliferative disorder (previously not evaluated or treated)  with platelets 14K at time of admission.  Hgb dropped from 10 to 7.3 due to epistaxis.  She did require platelet transfusion and PRBCs for acute blood loss anemia.  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   She did have Pulmonary consult who did not recommend biopsy due to comorbidities.  She is not interested in further treatment such as radiation or chemotherapy.         Increased work of breathing today.  She has been treated for COPD exacerbation and right-sided PNA and UTI. She continued to decline during hospitalization despite antibiotics and treatment.  She has been on O2 at home for several years.  Weak nonproductive cough today and increased work of breathing.  c/o pain with touch to BLE or any movement.       Patient lived alone independently in her home prior to hospitalization.  She had someone coming 3 days per week to assist with meals from 5-7 PM.       She has had minimal oral intake according to nursing staff.  No meds or food intake today.  MBS done 12/4 showed moderate, oral dysphagia, pharyngeal dysphagia.   SLP Diet Recommendation: puree, nectar thick liquids      H/o hip replacement and has always reported mild pain per niece. When asked today if she was ok, she shook her head no.  Unable to provide specifics why she is not feeling well or how I can help her.       Social History:  Social History     Tobacco Use   •  Smoking status: Former Smoker     Packs/day: 1.50     Years: 40.00     Pack years: 60.00     Types: Cigarettes     Quit date:      Years since quittin.9   • Smokeless tobacco: Never Used   Substance Use Topics   • Alcohol use: Yes     Alcohol/week: 7.0 standard drinks     Types: 7 Shots of liquor per week     Comment: COCKTAIL EVERY NIGHT         Consults:   Consults     Date and Time Order Name Status Description    2021 12:20 PM Inpatient Palliative Care MD Consult Completed     12/3/2021  9:36 AM Inpatient Pulmonology Consult Completed     12/3/2021 12:36 AM Inpatient Hematology & Oncology Consult Completed           Exam confirms with auscultation zero audible heart tones and zero audible respirations. Ms. Elizabeth Oliveira was pronounced dead at .  MD notified by Patient's RN.     Johanne Herrera RN  Clinical House Supervisor  2021 2100      Louise Cerna, DNP, MHA, APRN  [facility] Care Navigators  Hospice and Palliative Care Nurse Practitioner  21  20:23 EST

## 2024-08-13 NOTE — PROGRESS NOTES
[facility] Medicine Services  PROGRESS NOTE    Patient Name: Elizabeth Oliveira  : 1929  MRN: [id number]    Date of Admission: 2021  Primary Care Physician: Azeem Merchant MD    Subjective     CC: f/u anemia / thrombocytopenia, fever     HPI:  In bed, a little more alert today. Said hello and denied needs/complaints. Told me multiple times that she was going home.   Niece, Lara, at bedside. Feels like patient is a little more alert today but overall, unchanged. She is leaning towards comfort measures. I discussed discontinuation of telemetry, lab checks, transfusions - Lara wants to discuss with family before making any decisions.       ROS:  Unable to obtain complete or reliable ROS due to mental status     Objective     Vital Signs:   Temp:  [96 °F (35.6 °C)-98.2 °F (36.8 °C)] 96.3 °F (35.7 °C)  Heart Rate:  [79-95] 84  Resp:  [16-18] 16  BP: (110-140)/(57-78) 111/57  Flow (L/min):  [2-4] 3     Physical Exam:  Constitutional: No acute distress. Drowsy and conversed minimally   HENT: NCAT, mucous membranes moist  Respiratory: Coarse breath sounds bilaterally, wet-sounding cough. Normal respiratory effort, 92-94% on 2L NC   Cardiovascular: RRR, no murmurs, rubs, or gallops  Gastrointestinal: Positive bowel sounds, soft, nontender, nondistended  Musculoskeletal: No bilateral ankle edema  Psychiatric: Calm, cooperative with exam   Neurologic: Moves all extremities spontaneously without focal deficits, speech clear and coherent     Results Reviewed:  LAB RESULTS:      Lab 21  1146 21  0628 21  2032 21  0410 21  1510 21  0327 21  1247 21  1247   WBC 4.09 3.16*  --  3.79 3.12* 3.07*   < > 4.35   HEMOGLOBIN 8.6* 8.7*  --  7.4* 7.1* 7.3*   < > 10.1*   HEMATOCRIT 27.0* 26.8*  --  23.0* 21.5* 23.4*   < > 31.3*   PLATELETS 60* 69*  --  127* 125* 135*   < > 14*   NEUTROS ABS  --  2.05  --  2.99 2.22 2.14  --  3.58   IMMATURE  GRANS (ABS)  --   --   --  0.01 0.01 0.03  --  0.03   LYMPHS ABS  --   --   --  0.35* 0.54* 0.55*  --  0.42*   MONOS ABS  --   --   --  0.39 0.33 0.30  --  0.30   EOS ABS  --  0.00  --  0.05 0.02 0.05  --  0.01   .4* 107.6*  --  108.0* 108.0* 112.0*   < > 109.8*   PROCALCITONIN  --  0.48*  --  0.09  --   --   --   --    LACTATE  --  1.2 0.7  --   --   --   --   --    LDH  --   --   --  181  --   --   --   --    PROTIME  --   --   --   --   --   --   --  13.8    < > = values in this interval not displayed.         Lab 12/06/21  0628 12/04/21  0410 12/03/21  0327 12/02/21  1247   SODIUM 142 139 142 141   POTASSIUM 4.1 4.1 4.4 4.5   CHLORIDE 102 102 104 101   CO2 30.0* 27.0 27.0 29.0   ANION GAP 10.0 10.0 11.0 11.0   BUN 19 12 16 23   CREATININE 0.57 0.50* 0.42* 0.55*   GLUCOSE 128* 90 82 118*   CALCIUM 9.2 8.6 9.0 9.7*   MAGNESIUM 1.8  --   --   --          Lab 12/04/21  0410 12/02/21  1247   TOTAL PROTEIN 5.5* 7.1   ALBUMIN 3.10* 3.90   GLOBULIN 2.4 3.2   ALT (SGPT) 8 8   AST (SGOT) 12 12   BILIRUBIN 0.5 0.6   ALK PHOS 75 86         Lab 12/02/21  1247   PROBNP 648.8   TROPONIN T <0.010   PROTIME 13.8   INR 1.10         Lab 12/04/21  1709 12/04/21  0410 12/02/21  1533   IRON  --  32*  --    IRON SATURATION  --  13*  --    TIBC  --  238*  --    TRANSFERRIN  --  160*  --    FERRITIN  --  114.90  --    FOLATE 13.70  --   --    VITAMIN B 12 >2,000*  --   --    ABO TYPING  --   --  O   RH TYPING  --   --  Negative   ANTIBODY SCREEN  --   --  Negative     Brief Urine Lab Results  (Last result in the past 365 days)      Color   Clarity   Blood   Leuk Est   Nitrite   Protein   CREAT   Urine HCG        12/06/21 1808 Yellow   Cloudy   Negative   Small (1+)   Negative   Trace               Microbiology Results Abnormal     Procedure Component Value - Date/Time    Urine Culture - Urine, Urine, Clean Catch [[id number]]  (Normal) Collected: 12/06/21 1808    Lab Status: Preliminary result Specimen: Urine, Clean Catch Updated:  12/07/21 0728     Urine Culture Culture in progress    Blood Culture - Blood, Hand, Right [[id number]]  (Normal) Collected: 12/06/21 0600    Lab Status: Preliminary result Specimen: Blood from Hand, Right Updated: 12/07/21 0700     Blood Culture No growth at 24 hours    Blood Culture - Blood, Hand, Left [[id number]]  (Normal) Collected: 12/06/21 0610    Lab Status: Preliminary result Specimen: Blood from Hand, Left Updated: 12/07/21 0700     Blood Culture No growth at 24 hours    Blood Culture - Blood, Hand, Right [[id number]]  (Normal) Collected: 12/04/21 2031    Lab Status: Preliminary result Specimen: Blood from Hand, Right Updated: 12/06/21 2145     Blood Culture No growth at 2 days    Narrative:      Aerobic Only    COVID-19, [facility] IN-HOUSE NP/OP SWAB IN UTM/VTM/[facility] TRANSPORT MEDIA 24HR TAT - Swab, Oropharynx [[id number]]  (Normal) Collected: 12/02/21 1736    Lab Status: Final result Specimen: Swab from Oropharynx Updated: 12/02/21 2135     COVID19 Not Detected    Narrative:      Fact sheet for providers: https://www.fda.gov/media/745583/download     Fact sheet for patients: https://www.fda.gov/media/644927/download    Test performed by RT PCR.        XR Chest 1 View    Result Date: 12/6/2021  CR Chest 1 Vw INDICATION: Fever and dysphagia, hypoxemia, fever COMPARISON:  CT chest 12/2/2021 FINDINGS: Portable AP view(s) of the chest. Heart size is normal. There appears the left lower lobe medial basilar atelectasis there is mild diffuse interstitial infiltrates throughout the right lung. Bones are unremarkable.     Impression: Left medial basilar atelectasis Mild interstitial prominence throughout the right lung Signer Name: Waqas Crain MD  Signed: 12/6/2021 4:16 AM  Workstation Name: [facility]  [facility]    Results for orders placed during the hospital encounter of 01/22/21    Adult Transthoracic Echo Complete W/ Cont if Necessary Per Protocol    Interpretation Summary  ·  Estimated left ventricular EF = 55%  · Aortic sclerosis without significant stenosis    I have reviewed the medications:  Scheduled Meds:cefTRIAXone, 1 g, Intravenous, Q24H  docusate sodium, 100 mg, Oral, BID  doxycycline, 100 mg, Intravenous, Q12H  gabapentin, 300 mg, Oral, BID  ipratropium-albuterol, 3 mL, Nebulization, 4x Daily - RT  LORazepam, 0.25 mg, Intravenous, Q12H  sodium chloride, 10 mL, Intravenous, Q12H      Continuous Infusions:   PRN Meds:.•  acetaminophen  •  acetaminophen  •  albuterol  •  bisacodyl  •  calcium carbonate  •  haloperidol lactate  •  LORazepam  •  melatonin  •  Morphine  •  ondansetron  •  sodium chloride  •  sodium chloride  •  sodium chloride    Assessment & Plan     Active Hospital Problems    Diagnosis  POA   • **Acute on chronic respiratory failure with hypoxia (HCC) [J96.21]  Yes   • Severe malnutrition (CMS/HCC) [E43]  Yes   • Epistaxis [R04.0]  Unknown   • Mass of right lung [R91.8]  Unknown   • COPD (chronic obstructive pulmonary disease) (HCC) [J44.9]  Yes   • Myelodysplastic syndrome (HCC) [D46.9]  Yes   • BAUMAN (dyspnea on exertion) [R06.00]  Yes   • Thrombocytopenia (HCC) [D69.6]  Yes      Resolved Hospital Problems   No resolved problems to display.     Brief Hospital Course to date:  Ms. Elizabeth Oliveira is a 89yoF with PMH significant for chronic respiratory failure secondary to COPD (baseline 2L NC), chronic arthritis, neuropathy and presumed myeloproliferative disease (never worked up or evaluated by hematology). She presented to [facility] ED on 12/2/21 for evaluation of acute epistaxis. She was found to have severe thrombocytopenia and hypoxemia worse than baseline.     Acute blood loss anemia secondary to severe epistaxis, resolved   Severe thrombocytopenia  Presumed myelodysplastic syndrome  · Patient presented with severe epistaxis and blood loss anemia  · Hgb 10.1 on admission, acutely worsened to 7.3 on 12/3 and has since stabilized  · Platelets  14 on admission, s/p 2 units platelets on 12/2  · Continue to monitor Hgb and platelets (transfuse for Hgb < 7.0 and platelets < 20K)  · Offered to stop serial CBC monitoring / PRN transfusions in line with comfort measures, family to discuss   · Heme/onc has seen. Ordered LDH, haptoglobin, vitamin B12, folate, iron studies, zinc, copper levels and reticulocyte count. Not recommending bone marrow biopsy given current trajectory    Acute on chronic hypoxic respiratory failure  COPD with chronic hypoxic respiratory failure  Right upper lung mass 19x10 mm concerning for malignancy given history of smoking   · Pulmonology consulted per POA's request - recommended against biopsy or other aggressive measures such as radiation or chemotherapy due to risk of pneumothorax and clinical worsening given her baseline functional status   · Consider PET on an outpatient basis / outpatient follow up with pulmonology in 2-3 months   · Continue scheduled nebs  · Wean O2 back to baseline 2L NC as able    Fever  - Has had fever 100-101 x 3 occurrences.   - Procalcitonin slightly worse at 0.48  - 12/6 CXR concerning for right-sided pneumonia  - 12/6 UA concerning for UTI, follow culture  - Continue IV Rocephin / Doxy x 5 days    Malnutrition with cachexia  · RD following     Acute debility  · PT/OT following - recommend SNF at [place]. Niece/POA interested in long-term care placement    GOALS OF CARE: Appreciate palliative care evaluation. CODE status has been changed to DNR/DNI. I spoke with niece/POA, Lara, over the telephone today. Hospice has been consulted and Lara is interested in hospice services. She feels that the patient has continuously declined since hospital admission. She would prefer inpatient hospice. We discussed that certain criteria would need to be met in order for her aunt to quality for inpatient hospice and that she would be evaluated daily. CM / hospice team to work on long-term care placement, in the event she  [___ Week(s)] : in [unfilled] week(s) stabilizes and could be discharged with outpatient hospice services.     If family is interested / agreeable, will transition to full comfort measures.     DVT prophylaxis:Mechanical DVT prophylaxis orders are present.     AM-PAC 6 Clicks Score (PT): 7 (12/07/21 0920)    Disposition: I expect the patient to be discharged TBD    CODE STATUS:   Code Status and Medical Interventions:   Ordered at: 12/06/21 3291     Medical Intervention Limits:    NO intubation (DNI)     Level Of Support Discussed With:    Health Care Surrogate     Code Status (Patient has no pulse and is not breathing):    No CPR (Do Not Attempt to Resuscitate)     Medical Interventions (Patient has pulse or is breathing):    Limited Support     Comments:    Changes to reflect CODE status discussion 12/5 by Dr. Elham Rojas, please see note for details     Deloris Norman PA-C  12/07/21               [FreeTextEntry1] : # s/p Heart transplant 12/25/23 - last RHC/EMBx 3/19/24 no rejection - currently off diuretics  - DSA 7/31 with new Class I DSA, low level MFI 2K - Allosure 8/5 with rise from 0.12 to 0.18 - TTE today - c/w ASA 81mg po daily now  - c/w statin - This patient has a heart allograft and is at risk for rejection through immune system recognition of non-self tissue. AlloMap and AlloSure and noninvasive tests ordered to evaluate for the probability of rejection after pretest and assist in immunosuppression management. Studies have shown that these tests can help to rule out rejection without subjecting the patient to the bleeding, arrhythmia, and structural damage risks of invasive endomyocardial biopsies. The AlloMap and AlloSure tests help guide clinical decision making for this patient's surveillance schedule and immunosuppression adjustments. - Pt on monthly heartcare schedule in year 1 post transplant. Will repeat allosure week of 8/19 in s/o recent rise, subtherapeutic tacro levels  #Immunosuppression - s/p Simulect on POD 12/25 and received second dose on POD 4 (12/29). - Will continue to adjust tacro as needed for goal 8-10, closer to 10 - Continue with Cellcept 1500g PO BID (increased 8/8 in s/o persistently subtherapeutic tacro, rise in allosure/low level DSA) - Continue with prednisone taper based on protocol - remains on 3mg daily (will maintain pending therapeutic IS)  # Prophylactic antibiotic  - CMV -/+: intermediate risk.  Completed Valcyte. Check CMV monthly - recheck with next labs - Toxo -/-: none required - PJP ppx: continue Bactrim S/S PO QD - Thrush ppx: completed fluconazole  - donor HCV TIM - but HCV antibody +. Check 1 month [facility] labs (not detected 1/25/24), then 3 mo, 12 mo  # ARIC (acute kidney injury) - pre transplant baseline Cr 1.2, peaked to 4 - monitor closely, avoid nephrotoxic agents - no need for diuretics based on last RHC  # pericardial effusion - continue to monitor - noted to be small and reduced in size on TTE 6/2024 - s/p colchicine  - CT Chest 3/25 demonstrated moderate pericardial effusion; pt did not repeat CT imaging as recommended, however improvement of effusion noted on echo  #HTN, improved - continue nifedipine 60mg bid - hydralazine discontinued  # Hyperglycemia, steroid induced.  - Endo following, mango placed - c/w lantus 29 units qhs and ISS - recommended to continue following diabetic diet  # Hypokalemia - c/w kdur 40meq BID and encouraged high potassium foods.  - K stable 4.2-4.3 on recent labs  # Constipation - CT abd/pelvis showed stercoral colitis 1/5 - S/p MgCO3, lactulose 20 QID +/- golytely  - GI consulted and signed off  - Can also use bisacodyl suppository if pt amenable  - Encourage ambulation  # Health maintenance - continue MVI - continue calcium citrate - continue PPI - c/w mag oxide 800mg TID - referred to cardiac rehab, encouraged home walking program   TTE today to guide next steps of management Labs/allosure/DSA week of 8/19